# Patient Record
Sex: MALE | Race: WHITE | Employment: OTHER | URBAN - METROPOLITAN AREA
[De-identification: names, ages, dates, MRNs, and addresses within clinical notes are randomized per-mention and may not be internally consistent; named-entity substitution may affect disease eponyms.]

---

## 2017-04-06 ENCOUNTER — OFFICE VISIT (OUTPATIENT)
Dept: ORTHOPEDIC SURGERY | Age: 82
End: 2017-04-06

## 2017-04-06 VITALS
HEIGHT: 72 IN | DIASTOLIC BLOOD PRESSURE: 71 MMHG | SYSTOLIC BLOOD PRESSURE: 125 MMHG | HEART RATE: 72 BPM | WEIGHT: 167 LBS | BODY MASS INDEX: 22.62 KG/M2

## 2017-04-06 DIAGNOSIS — M79.671 FOOT PAIN, BILATERAL: Primary | ICD-10-CM

## 2017-04-06 DIAGNOSIS — M79.672 FOOT PAIN, BILATERAL: Primary | ICD-10-CM

## 2017-04-06 PROCEDURE — G8598 ASA/ANTIPLAT THER USED: HCPCS | Performed by: ORTHOPAEDIC SURGERY

## 2017-04-06 PROCEDURE — 99213 OFFICE O/P EST LOW 20 MIN: CPT | Performed by: ORTHOPAEDIC SURGERY

## 2017-04-06 PROCEDURE — 73630 X-RAY EXAM OF FOOT: CPT | Performed by: ORTHOPAEDIC SURGERY

## 2017-04-06 PROCEDURE — 1123F ACP DISCUSS/DSCN MKR DOCD: CPT | Performed by: ORTHOPAEDIC SURGERY

## 2017-04-06 PROCEDURE — 4040F PNEUMOC VAC/ADMIN/RCVD: CPT | Performed by: ORTHOPAEDIC SURGERY

## 2017-04-06 PROCEDURE — G8428 CUR MEDS NOT DOCUMENT: HCPCS | Performed by: ORTHOPAEDIC SURGERY

## 2017-04-06 PROCEDURE — 1036F TOBACCO NON-USER: CPT | Performed by: ORTHOPAEDIC SURGERY

## 2017-04-06 PROCEDURE — G8419 CALC BMI OUT NRM PARAM NOF/U: HCPCS | Performed by: ORTHOPAEDIC SURGERY

## 2017-05-16 ENCOUNTER — ORTHOTIC/BRACE ENCOUNTER (OUTPATIENT)
Dept: ORTHOPEDIC SURGERY | Age: 82
End: 2017-05-16

## 2017-05-23 ENCOUNTER — ORTHOTIC/BRACE ENCOUNTER (OUTPATIENT)
Dept: ORTHOPEDIC SURGERY | Age: 82
End: 2017-05-23

## 2017-06-06 ENCOUNTER — ORTHOTIC/BRACE ENCOUNTER (OUTPATIENT)
Dept: ORTHOPEDIC SURGERY | Age: 82
End: 2017-06-06

## 2017-06-26 ENCOUNTER — OFFICE VISIT (OUTPATIENT)
Dept: CARDIOLOGY CLINIC | Age: 82
End: 2017-06-26

## 2017-06-26 VITALS
DIASTOLIC BLOOD PRESSURE: 72 MMHG | BODY MASS INDEX: 24.24 KG/M2 | HEART RATE: 70 BPM | HEIGHT: 72 IN | OXYGEN SATURATION: 98 % | WEIGHT: 179 LBS | SYSTOLIC BLOOD PRESSURE: 124 MMHG

## 2017-06-26 DIAGNOSIS — I10 ESSENTIAL HYPERTENSION: ICD-10-CM

## 2017-06-26 DIAGNOSIS — I25.10 CAD IN NATIVE ARTERY: ICD-10-CM

## 2017-06-26 DIAGNOSIS — E78.5 HYPERLIPIDEMIA WITH TARGET LDL LESS THAN 100: Primary | ICD-10-CM

## 2017-06-26 DIAGNOSIS — R53.83 FATIGUE, UNSPECIFIED TYPE: ICD-10-CM

## 2017-06-26 DIAGNOSIS — I49.3 PVC (PREMATURE VENTRICULAR CONTRACTION): ICD-10-CM

## 2017-06-26 DIAGNOSIS — G45.9 TRANSIENT CEREBRAL ISCHEMIA, UNSPECIFIED TYPE: ICD-10-CM

## 2017-06-26 PROCEDURE — 93000 ELECTROCARDIOGRAM COMPLETE: CPT | Performed by: INTERNAL MEDICINE

## 2017-06-26 PROCEDURE — 4040F PNEUMOC VAC/ADMIN/RCVD: CPT | Performed by: INTERNAL MEDICINE

## 2017-06-26 PROCEDURE — 1036F TOBACCO NON-USER: CPT | Performed by: INTERNAL MEDICINE

## 2017-06-26 PROCEDURE — 99214 OFFICE O/P EST MOD 30 MIN: CPT | Performed by: INTERNAL MEDICINE

## 2017-06-26 PROCEDURE — 1123F ACP DISCUSS/DSCN MKR DOCD: CPT | Performed by: INTERNAL MEDICINE

## 2017-06-26 PROCEDURE — G8427 DOCREV CUR MEDS BY ELIG CLIN: HCPCS | Performed by: INTERNAL MEDICINE

## 2017-06-26 PROCEDURE — G8598 ASA/ANTIPLAT THER USED: HCPCS | Performed by: INTERNAL MEDICINE

## 2017-06-26 PROCEDURE — G8420 CALC BMI NORM PARAMETERS: HCPCS | Performed by: INTERNAL MEDICINE

## 2017-06-26 RX ORDER — MIRTAZAPINE 15 MG/1
15 TABLET, FILM COATED ORAL NIGHTLY
Qty: 30 TABLET | Refills: 3 | Status: SHIPPED | OUTPATIENT
Start: 2017-06-26 | End: 2017-10-03 | Stop reason: ALTCHOICE

## 2017-06-26 RX ORDER — MIRTAZAPINE 15 MG/1
15 TABLET, FILM COATED ORAL NIGHTLY
Qty: 30 TABLET | Refills: 3 | Status: SHIPPED | OUTPATIENT
Start: 2017-06-26 | End: 2017-06-26 | Stop reason: SDUPTHER

## 2017-06-26 RX ORDER — AMLODIPINE BESYLATE 5 MG/1
5 TABLET ORAL DAILY
COMMUNITY
End: 2017-10-03 | Stop reason: SDUPTHER

## 2017-06-26 RX ORDER — CLOPIDOGREL BISULFATE 75 MG/1
75 TABLET ORAL DAILY
COMMUNITY
End: 2018-11-13 | Stop reason: SDUPTHER

## 2017-06-27 ENCOUNTER — HOSPITAL ENCOUNTER (OUTPATIENT)
Dept: OTHER | Age: 82
Discharge: HOME | End: 2017-06-27
Attending: INTERNAL MEDICINE | Admitting: INTERNAL MEDICINE

## 2017-06-27 LAB
A/G RATIO: 1.3 (ref 1.1–2.2)
ALBUMIN SERPL-MCNC: 4 G/DL (ref 3.4–5)
ALP BLD-CCNC: 92 U/L (ref 40–129)
ALT SERPL-CCNC: 10 U/L (ref 10–40)
ANION GAP SERPL CALCULATED.3IONS-SCNC: 14 MMOL/L (ref 3–16)
AST SERPL-CCNC: 19 U/L (ref 15–37)
BILIRUB SERPL-MCNC: 0.6 MG/DL (ref 0–1)
BUN BLDV-MCNC: 20 MG/DL (ref 7–20)
CALCIUM SERPL-MCNC: 9.4 MG/DL (ref 8.3–10.6)
CHLORIDE BLD-SCNC: 101 MMOL/L (ref 99–110)
CHOLESTEROL, FASTING: 164 MG/DL (ref 0–199)
CO2: 27 MMOL/L (ref 21–32)
CREAT SERPL-MCNC: 1.2 MG/DL (ref 0.8–1.3)
GFR AFRICAN AMERICAN: >60
GFR NON-AFRICAN AMERICAN: 57
GLOBULIN: 3.2 G/DL
GLUCOSE FASTING: 88 MG/DL (ref 70–99)
HCT VFR BLD CALC: 46.2 % (ref 40.5–52.5)
HDLC SERPL-MCNC: 53 MG/DL (ref 40–60)
HEMOGLOBIN: 15.5 G/DL (ref 13.5–17.5)
LDL CHOLESTEROL CALCULATED: 84 MG/DL
MCH RBC QN AUTO: 31.2 PG (ref 26–34)
MCHC RBC AUTO-ENTMCNC: 33.5 G/DL (ref 31–36)
MCV RBC AUTO: 93 FL (ref 80–100)
PDW BLD-RTO: 13.5 % (ref 12.4–15.4)
PLATELET # BLD: 188 K/UL (ref 135–450)
PMV BLD AUTO: 9.7 FL (ref 5–10.5)
POTASSIUM SERPL-SCNC: 4.4 MMOL/L (ref 3.5–5.1)
RBC # BLD: 4.97 M/UL (ref 4.2–5.9)
SODIUM BLD-SCNC: 142 MMOL/L (ref 136–145)
TOTAL PROTEIN: 7.2 G/DL (ref 6.4–8.2)
TRIGLYCERIDE, FASTING: 134 MG/DL (ref 0–150)
TSH REFLEX: 1.7 UIU/ML (ref 0.27–4.2)
VLDLC SERPL CALC-MCNC: 27 MG/DL
WBC # BLD: 10.6 K/UL (ref 4–11)

## 2017-08-19 PROBLEM — J15.9 COMMUNITY ACQUIRED BACTERIAL PNEUMONIA: Status: ACTIVE | Noted: 2017-08-19

## 2017-08-19 PROBLEM — J18.9 PNEUMONIA DUE TO ORGANISM: Status: ACTIVE | Noted: 2017-08-19

## 2017-08-19 PROBLEM — I50.32 CHRONIC DIASTOLIC CONGESTIVE HEART FAILURE (HCC): Status: ACTIVE | Noted: 2017-08-19

## 2017-08-21 PROBLEM — A41.9 SEPSIS (HCC): Status: ACTIVE | Noted: 2017-08-19

## 2017-09-08 ENCOUNTER — TELEPHONE (OUTPATIENT)
Dept: CARDIOLOGY CLINIC | Age: 82
End: 2017-09-08

## 2017-09-11 ENCOUNTER — TELEPHONE (OUTPATIENT)
Dept: CARDIOLOGY CLINIC | Age: 82
End: 2017-09-11

## 2017-10-03 ENCOUNTER — OFFICE VISIT (OUTPATIENT)
Dept: CARDIOLOGY CLINIC | Age: 82
End: 2017-10-03

## 2017-10-03 VITALS
SYSTOLIC BLOOD PRESSURE: 110 MMHG | OXYGEN SATURATION: 97 % | BODY MASS INDEX: 25.06 KG/M2 | HEIGHT: 72 IN | WEIGHT: 185 LBS | HEART RATE: 72 BPM | DIASTOLIC BLOOD PRESSURE: 66 MMHG

## 2017-10-03 DIAGNOSIS — I49.9 IRREGULAR CARDIAC RHYTHM: ICD-10-CM

## 2017-10-03 DIAGNOSIS — E78.2 MIXED HYPERLIPIDEMIA: ICD-10-CM

## 2017-10-03 DIAGNOSIS — I50.32 CHRONIC DIASTOLIC CONGESTIVE HEART FAILURE (HCC): ICD-10-CM

## 2017-10-03 DIAGNOSIS — I49.3 PVC (PREMATURE VENTRICULAR CONTRACTION): ICD-10-CM

## 2017-10-03 DIAGNOSIS — I10 ESSENTIAL HYPERTENSION: ICD-10-CM

## 2017-10-03 DIAGNOSIS — E78.5 HYPERLIPIDEMIA WITH TARGET LDL LESS THAN 100: Primary | ICD-10-CM

## 2017-10-03 DIAGNOSIS — I25.10 CAD IN NATIVE ARTERY: ICD-10-CM

## 2017-10-03 PROCEDURE — G8484 FLU IMMUNIZE NO ADMIN: HCPCS | Performed by: INTERNAL MEDICINE

## 2017-10-03 PROCEDURE — 4040F PNEUMOC VAC/ADMIN/RCVD: CPT | Performed by: INTERNAL MEDICINE

## 2017-10-03 PROCEDURE — G8427 DOCREV CUR MEDS BY ELIG CLIN: HCPCS | Performed by: INTERNAL MEDICINE

## 2017-10-03 PROCEDURE — 1123F ACP DISCUSS/DSCN MKR DOCD: CPT | Performed by: INTERNAL MEDICINE

## 2017-10-03 PROCEDURE — 99214 OFFICE O/P EST MOD 30 MIN: CPT | Performed by: INTERNAL MEDICINE

## 2017-10-03 PROCEDURE — G8598 ASA/ANTIPLAT THER USED: HCPCS | Performed by: INTERNAL MEDICINE

## 2017-10-03 PROCEDURE — 93000 ELECTROCARDIOGRAM COMPLETE: CPT | Performed by: INTERNAL MEDICINE

## 2017-10-03 PROCEDURE — G8419 CALC BMI OUT NRM PARAM NOF/U: HCPCS | Performed by: INTERNAL MEDICINE

## 2017-10-03 PROCEDURE — 1036F TOBACCO NON-USER: CPT | Performed by: INTERNAL MEDICINE

## 2017-10-03 RX ORDER — AMLODIPINE BESYLATE 2.5 MG/1
2.5 TABLET ORAL DAILY
Qty: 30 TABLET | Refills: 11 | Status: ON HOLD | OUTPATIENT
Start: 2017-10-03 | End: 2018-08-12 | Stop reason: HOSPADM

## 2017-10-03 NOTE — COMMUNICATION BODY
[address] Office Note  10/3/2017     Subjective:  Mr. Juni Grant is a 80 year male seen for HTN, HLD,  CHF recent hospitalization for multiple issues. Today he reports to feeling lethargic with lack of stamina. Denies chest pain, shortness of breath, edema, dizziness, palpitations and syncope. [place]:   He was recently in the hospital in August 2017  With pneumonia. TIA in 2011 vs CVA - presented w/ lightheadedness/dizziness (still present) and expressive aphasia (now resolved). 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 He is planning on returning to [place] for winter. Review of Systems:  12 point ROS negative in all areas as listed below except as in [place]  Constitutional, EENT, Cardiovascular, pulmonary, GI, , Musculoskeletal, skin, neurological, hematological, endocrine, Psychiatric    Reviewed past medical history, social, and family history.  nonsmoker    Past Medical History:   Diagnosis Date    Arthralgia     Cancer [facility])     prostate    Chronic back pain     x40 years    Diverticulitis     Dizziness     Fatigue     Fractures     Hearing decreased     Hyperlipidemia     Hypertension     Osteoarthritis of knee     Osteoarthritis of shoulder     TIA (transient ischemic attack) 2006    Vertigo     Wears glasses      Past Surgical History:   Procedure Laterality Date    CATARACT REMOVAL  1997    bilaterally    COLONOSCOPY      ESOPHAGEAL DILATATION      X 2    INGUINAL HERNIA REPAIR      right    JOINT REPLACEMENT      KNEE SURGERY Bilateral     PROSTATECTOMY  1998    carcinoma    SHOULDER ARTHROPLASTY      SHOULDER SURGERY      TOE SURGERY Right 06/03/2013    PERMANENT RIGHT GREAT TOENAIL ABLATION AND REMOVAL    TOTAL KNEE ARTHROPLASTY      UPPER GASTROINTESTINAL ENDOSCOPY  jan 2011       Objective:   /66  Pulse 72  Ht 6' (1.829 m)  Wt 185 lb (83.9 kg)  SpO2 97%  BMI 25.09 kg/m2    Wt Readings from Last 3 Encounters:   10/03/17 185 lb (83.9 kg)   09/21/17 184 lb (83.5 kg)   08/22/17 184 lb 6.4 oz (83.6 kg)       Physical Exam:  General: No Respiratory distress, appears well developed and well nourished. Eyes:  Sclera nonicteric  Nose/Sinuses:  negative findings: nose shows no deformity, asymmetry, or inflammation, nasal mucosa normal, septum midline with no perforation or bleeding  Back:  no pain to palpation  Joint:  no active joint inflammation  Musculoskeletal:  negative  Skin:  Warm and dry  Neck:  Negative for JVD and Carotid Bruits. Chest:  Clear to auscultation, respiration easy  Cardiovascular:  Irregular with ectopy, S1S2 normal, no murmur, no rub or thrill. Abdomen:  Soft normal liver and spleen  Extremities:   No edema, clubbing, cyanosis,  Pulses:   1+ right foot pulse, 1+ left foot pulse  Neuro: intact    Medications:   Outpatient Encounter Prescriptions as of 10/3/2017   Medication Sig Dispense Refill    pantoprazole (PROTONIX) 40 MG tablet Take 1 tablet by mouth daily 30 tablet 3    CLONAZEPAM PO Take by mouth as needed      Vitamins-Lipotropics (LIPOFLAVONOID) TABS Take 1 tablet by mouth Daily with lunch Indications: vertigo      amLODIPine (NORVASC) 5 MG tablet Take 5 mg by mouth daily      clopidogrel (PLAVIX) 75 MG tablet Take 75 mg by mouth daily      escitalopram (LEXAPRO) 10 MG tablet Take 10 mg by mouth daily      vitamin B-12 (CYANOCOBALAMIN) 100 MCG tablet Take 50 mcg by mouth daily      predniSONE (DELTASONE) 5 MG tablet Take 5 mg by mouth daily.  simvastatin (ZOCOR) 40 MG tablet Take 40 mg by mouth daily.  CALCIUM PO Take 1,000 mg by mouth daily.  vitamin D (CHOLECALCIFEROL) 1000 UNIT TABS tablet Take 1,000 Units by mouth daily.       Multiple Vitamins-Minerals (MULTIVITAMIN PO) Take 1 tablet by mouth daily       [DISCONTINUED] loperamide (LOPERAMIDE A-D) 2 MG tablet Take 2 mg by mouth Daily with lunch      [DISCONTINUED] mirtazapine (REMERON) 15 MG tablet Take 1 tablet by mouth nightly 30 tablet 3     No facility-administered encounter medications on file as of 10/3/2017. Lab Data:  CBC: No results for input(s): WBC, HGB, HCT, MCV, PLT in the last 72 hours. BMP: No results for input(s): NA, K, CL, CO2, PHOS, BUN, CREATININE in the last 72 hours. Invalid input(s): CA  LIVER PROFILE: No results for input(s): AST, ALT, LIPASE, BILIDIR, BILITOT, ALKPHOS in the last 72 hours. Invalid input(s): AMYLASE,  ALB  LIPID:   Lab Results   Component Value Date    CHOL 171 09/29/2016    CHOL 124 08/18/2016    CHOL 147 09/10/2012     Lab Results   Component Value Date    TRIG 147 09/29/2016    TRIG 94 08/18/2016    TRIG 113 09/10/2012     Lab Results   Component Value Date    HDL 53 06/27/2017    HDL 55 09/29/2016    HDL 45 08/18/2016     Lab Results   Component Value Date    LDLCALC 84 06/27/2017    LDLCALC 87 09/29/2016    LDLCALC 60 08/18/2016     Lab Results   Component Value Date    LABVLDL 27 06/27/2017    LABVLDL 29 09/29/2016    LABVLDL 19 08/18/2016     No results found for: CHOLHDLRATIO  PT/INR: No results for input(s): PROTIME, INR in the last 72 hours. A1C:   Lab Results   Component Value Date    LABA1C 5.6 09/29/2016     BNP:  No results for input(s): BNP in the last 72 hours. IMAGING:   Chest xray 9.21.17  No acute cardiopulmonary disease. Hyperinflation of the lungs. EKG: 10.3.17  PVC LAFB possible old anterior wall MI  EKG 9/21/17  Sinus bradycardia Left anterior fascicular block Non-specific intra-ventricular conduction delay   When compared with ECG of 19-AUG-2017 17:04, Minimal criteria for Septal infarct are no longer Present Confirmed by Geraldo Nava MD, Graciela Duke ([id number]) on 9/21/2017 4:39:40 PM     Echo doppler 8.21.17   Left ventricle systolic function is mildly reduced with an estimated   ejection fraction of 45-50%.  There is hypokinesis of the basal inferior wall.   Normal left ventricle size.   No wall motion aortic valve regurgitation. Mild pulmonic insuffiencey. 03/09/2016 CT chest PE W contrast  No evidence of PE. Minimal nodularity in the apices, probably granulomatous. This should be followed up to ensure stability. 4-6 mm pulmonary nodules. 04/17/2014 carotid u/s  IMPRESSION:       1. Stable exam with less than 50% stenosis of the bilateral   internal carotid arteries. 2. Patent vertebral arteries with antegrade flow bilaterally. Assessment:  Alberto Lance was seen today for  Fatigue and weakness  Last lipids 9/29/16  Cholesterol, [id number] - 199 mg/dLFinal [id number] - 150 mg/dLFinal [id number] - 60 mg/dLFinal LDL [id number]  EKG today shows occasional PVC possible old ant MI but work up 5 years ago in [place] with stress test was negative. Plan:  1. EKG today PVC's. Reduce amlodipine dose due to low normal BP and fatigue  2. Healthy lifestyle education reviewed including nutrition, exercise and activity  3. Scheduled return visit 6 months      QUALITY MEASURES  1. Tobacco Cessation Counseling: NA  2. Retake of BP if >140/90:   NA  3. Documentation to PCP/referring for new patient:  Sent to PCP at close of office visit  4. CAD patient on anti-platelet: Yes  5. CAD patient on STATIN therapy:  Yes  6.  Patient with CHF and aFib on anticoagulation:  CHECO Barbosa MD 10/3/2017 2:13 PM

## 2017-10-03 NOTE — PROGRESS NOTES
(REMERON) 15 MG tablet Take 1 tablet by mouth nightly 30 tablet 3     No facility-administered encounter medications on file as of 10/3/2017. Lab Data:  CBC: No results for input(s): WBC, HGB, HCT, MCV, PLT in the last 72 hours. BMP: No results for input(s): NA, K, CL, CO2, PHOS, BUN, CREATININE in the last 72 hours. Invalid input(s): CA  LIVER PROFILE: No results for input(s): AST, ALT, LIPASE, BILIDIR, BILITOT, ALKPHOS in the last 72 hours. Invalid input(s): AMYLASE,  ALB  LIPID:   Lab Results   Component Value Date    CHOL 171 09/29/2016    CHOL 124 08/18/2016    CHOL 147 09/10/2012     Lab Results   Component Value Date    TRIG 147 09/29/2016    TRIG 94 08/18/2016    TRIG 113 09/10/2012     Lab Results   Component Value Date    HDL 53 06/27/2017    HDL 55 09/29/2016    HDL 45 08/18/2016     Lab Results   Component Value Date    LDLCALC 84 06/27/2017    LDLCALC 87 09/29/2016    LDLCALC 60 08/18/2016     Lab Results   Component Value Date    LABVLDL 27 06/27/2017    LABVLDL 29 09/29/2016    LABVLDL 19 08/18/2016     No results found for: CHOLHDLRATIO  PT/INR: No results for input(s): PROTIME, INR in the last 72 hours. A1C:   Lab Results   Component Value Date    LABA1C 5.6 09/29/2016     BNP:  No results for input(s): BNP in the last 72 hours. IMAGING:   Chest xray 9.21.17  No acute cardiopulmonary disease. Hyperinflation of the lungs. EKG: 10.3.17  PVC LAFB possible old anterior wall MI  EKG 9/21/17  Sinus bradycardia Left anterior fascicular block Non-specific intra-ventricular conduction delay   When compared with ECG of 19-AUG-2017 17:04, Minimal criteria for Septal infarct are no longer Present Confirmed by Marine Casey MD, Marisela Underwood ([id number]) on 9/21/2017 4:39:40 PM     Echo doppler 8.21.17   Left ventricle systolic function is mildly reduced with an estimated   ejection fraction of 45-50%.  There is hypokinesis of the basal inferior wall.   Normal left ventricle size.   No wall motion aortic valve regurgitation. Mild pulmonic insuffiencey. 03/09/2016 CT chest PE W contrast  No evidence of PE. Minimal nodularity in the apices, probably granulomatous. This should be followed up to ensure stability. 4-6 mm pulmonary nodules. 04/17/2014 carotid u/s  IMPRESSION:       1. Stable exam with less than 50% stenosis of the bilateral   internal carotid arteries. 2. Patent vertebral arteries with antegrade flow bilaterally. Assessment:  Tayler Bonilla was seen today for  Fatigue and weakness  Last lipids 9/29/16  Cholesterol, [id number] - 199 mg/dLFinal Fzwvumoyzgpxx6538 - 150 mg/dLFinal OPT1105 - 60 mg/dLFinal LDL [id number]  EKG today shows occasional PVC possible old ant MI but work up 5 years ago in [place] with stress test was negative. Plan:  1. EKG today PVC's. Reduce amlodipine dose due to low normal BP and fatigue  2. Healthy lifestyle education reviewed including nutrition, exercise and activity  3. Scheduled return visit 6 months      QUALITY MEASURES  1. Tobacco Cessation Counseling: NA  2. Retake of BP if >140/90:   NA  3. Documentation to PCP/referring for new patient:  Sent to PCP at close of office visit  4. CAD patient on anti-platelet: Yes  5. CAD patient on STATIN therapy:  Yes  6.  Patient with CHF and aFib on anticoagulation:  NA     Regan Sanders MD 10/3/2017 2:13 PM

## 2017-11-14 ENCOUNTER — TELEPHONE (OUTPATIENT)
Dept: CARDIOLOGY CLINIC | Age: 82
End: 2017-11-14

## 2017-11-14 NOTE — TELEPHONE ENCOUNTER
Pt is in [employer] currently, before pt left for [email or web address] had decreased pt's amLODIPine (NORVASC) 2.5 MG tablet. Pt was taking 5mg but his B/P was low at 110/60. Last Thursday 11/09 the pt accidentally started taking the 5mg dose again. Now his B/P are running 11/10 164/79 pulse 50 , 11/11 163/71 pulse 51, 11/12 163/85, pulse 55, 11/14 169/84 pulse 57. Sister in law wants to know what she should do. Not sure she should restart the 2.5mg due to his pulse being low. Please advise.

## 2018-05-01 ENCOUNTER — OFFICE VISIT (OUTPATIENT)
Dept: ORTHOPEDIC SURGERY | Age: 83
End: 2018-05-01

## 2018-05-01 VITALS
BODY MASS INDEX: 24.93 KG/M2 | HEIGHT: 72 IN | HEART RATE: 62 BPM | WEIGHT: 184.08 LBS | SYSTOLIC BLOOD PRESSURE: 143 MMHG | DIASTOLIC BLOOD PRESSURE: 82 MMHG

## 2018-05-01 DIAGNOSIS — M51.36 DDD (DEGENERATIVE DISC DISEASE), LUMBAR: ICD-10-CM

## 2018-05-01 DIAGNOSIS — S39.012A STRAIN OF LUMBAR REGION, INITIAL ENCOUNTER: ICD-10-CM

## 2018-05-01 DIAGNOSIS — M54.50 LUMBAR SPINE PAIN: Primary | ICD-10-CM

## 2018-05-01 PROCEDURE — G8427 DOCREV CUR MEDS BY ELIG CLIN: HCPCS | Performed by: PHYSICIAN ASSISTANT

## 2018-05-01 PROCEDURE — 4040F PNEUMOC VAC/ADMIN/RCVD: CPT | Performed by: PHYSICIAN ASSISTANT

## 2018-05-01 PROCEDURE — G8420 CALC BMI NORM PARAMETERS: HCPCS | Performed by: PHYSICIAN ASSISTANT

## 2018-05-01 PROCEDURE — 1123F ACP DISCUSS/DSCN MKR DOCD: CPT | Performed by: PHYSICIAN ASSISTANT

## 2018-05-01 PROCEDURE — 1036F TOBACCO NON-USER: CPT | Performed by: PHYSICIAN ASSISTANT

## 2018-05-01 PROCEDURE — G8598 ASA/ANTIPLAT THER USED: HCPCS | Performed by: PHYSICIAN ASSISTANT

## 2018-05-01 PROCEDURE — 99213 OFFICE O/P EST LOW 20 MIN: CPT | Performed by: PHYSICIAN ASSISTANT

## 2018-05-01 RX ORDER — HYDROCODONE BITARTRATE AND ACETAMINOPHEN 5; 325 MG/1; MG/1
1 TABLET ORAL EVERY 4 HOURS PRN
Qty: 42 TABLET | Refills: 0 | Status: SHIPPED | OUTPATIENT
Start: 2018-05-01 | End: 2018-05-08

## 2018-05-08 ENCOUNTER — OFFICE VISIT (OUTPATIENT)
Dept: ORTHOPEDIC SURGERY | Age: 83
End: 2018-05-08

## 2018-05-08 VITALS
BODY MASS INDEX: 24.93 KG/M2 | DIASTOLIC BLOOD PRESSURE: 78 MMHG | HEART RATE: 67 BPM | HEIGHT: 72 IN | SYSTOLIC BLOOD PRESSURE: 141 MMHG | WEIGHT: 184.08 LBS

## 2018-05-08 DIAGNOSIS — M51.36 DDD (DEGENERATIVE DISC DISEASE), LUMBAR: ICD-10-CM

## 2018-05-08 DIAGNOSIS — S22.000A CLOSED COMPRESSION FRACTURE OF THORACIC VERTEBRA, INITIAL ENCOUNTER (HCC): Primary | ICD-10-CM

## 2018-05-08 PROCEDURE — 4040F PNEUMOC VAC/ADMIN/RCVD: CPT | Performed by: PHYSICIAN ASSISTANT

## 2018-05-08 PROCEDURE — G8420 CALC BMI NORM PARAMETERS: HCPCS | Performed by: PHYSICIAN ASSISTANT

## 2018-05-08 PROCEDURE — 99214 OFFICE O/P EST MOD 30 MIN: CPT | Performed by: PHYSICIAN ASSISTANT

## 2018-05-08 PROCEDURE — G8427 DOCREV CUR MEDS BY ELIG CLIN: HCPCS | Performed by: PHYSICIAN ASSISTANT

## 2018-05-08 PROCEDURE — 1123F ACP DISCUSS/DSCN MKR DOCD: CPT | Performed by: PHYSICIAN ASSISTANT

## 2018-05-08 PROCEDURE — L0462 TLSO 3MOD SACRO-SCAP PRE: HCPCS | Performed by: PHYSICIAN ASSISTANT

## 2018-05-08 PROCEDURE — 1036F TOBACCO NON-USER: CPT | Performed by: PHYSICIAN ASSISTANT

## 2018-05-08 PROCEDURE — G8598 ASA/ANTIPLAT THER USED: HCPCS | Performed by: PHYSICIAN ASSISTANT

## 2018-06-08 ENCOUNTER — OFFICE VISIT (OUTPATIENT)
Dept: ORTHOPEDIC SURGERY | Age: 83
End: 2018-06-08

## 2018-06-08 VITALS
HEART RATE: 61 BPM | HEIGHT: 72 IN | DIASTOLIC BLOOD PRESSURE: 81 MMHG | SYSTOLIC BLOOD PRESSURE: 152 MMHG | WEIGHT: 184.08 LBS | BODY MASS INDEX: 24.93 KG/M2

## 2018-06-08 DIAGNOSIS — S22.000A CLOSED COMPRESSION FRACTURE OF THORACIC VERTEBRA, INITIAL ENCOUNTER (HCC): Primary | ICD-10-CM

## 2018-06-08 DIAGNOSIS — M51.36 DDD (DEGENERATIVE DISC DISEASE), LUMBAR: ICD-10-CM

## 2018-06-08 PROCEDURE — 1123F ACP DISCUSS/DSCN MKR DOCD: CPT | Performed by: PHYSICAL MEDICINE & REHABILITATION

## 2018-06-08 PROCEDURE — 99214 OFFICE O/P EST MOD 30 MIN: CPT | Performed by: PHYSICAL MEDICINE & REHABILITATION

## 2018-06-08 PROCEDURE — G8420 CALC BMI NORM PARAMETERS: HCPCS | Performed by: PHYSICAL MEDICINE & REHABILITATION

## 2018-06-08 PROCEDURE — G8427 DOCREV CUR MEDS BY ELIG CLIN: HCPCS | Performed by: PHYSICAL MEDICINE & REHABILITATION

## 2018-06-08 PROCEDURE — G8598 ASA/ANTIPLAT THER USED: HCPCS | Performed by: PHYSICAL MEDICINE & REHABILITATION

## 2018-06-08 PROCEDURE — 4040F PNEUMOC VAC/ADMIN/RCVD: CPT | Performed by: PHYSICAL MEDICINE & REHABILITATION

## 2018-06-08 PROCEDURE — 1036F TOBACCO NON-USER: CPT | Performed by: PHYSICAL MEDICINE & REHABILITATION

## 2018-06-25 ENCOUNTER — OFFICE VISIT (OUTPATIENT)
Dept: ORTHOPEDIC SURGERY | Age: 83
End: 2018-06-25

## 2018-06-25 VITALS — HEIGHT: 72 IN | BODY MASS INDEX: 24.79 KG/M2 | WEIGHT: 183 LBS

## 2018-06-25 DIAGNOSIS — M75.102 ROTATOR CUFF TEAR ARTHROPATHY, LEFT: ICD-10-CM

## 2018-06-25 DIAGNOSIS — M25.512 PAIN IN JOINT OF LEFT SHOULDER REGION: Primary | ICD-10-CM

## 2018-06-25 DIAGNOSIS — M12.812 ROTATOR CUFF TEAR ARTHROPATHY, LEFT: ICD-10-CM

## 2018-06-25 PROCEDURE — 4040F PNEUMOC VAC/ADMIN/RCVD: CPT | Performed by: PHYSICIAN ASSISTANT

## 2018-06-25 PROCEDURE — 1036F TOBACCO NON-USER: CPT | Performed by: PHYSICIAN ASSISTANT

## 2018-06-25 PROCEDURE — G8420 CALC BMI NORM PARAMETERS: HCPCS | Performed by: PHYSICIAN ASSISTANT

## 2018-06-25 PROCEDURE — 99213 OFFICE O/P EST LOW 20 MIN: CPT | Performed by: PHYSICIAN ASSISTANT

## 2018-06-25 PROCEDURE — 1123F ACP DISCUSS/DSCN MKR DOCD: CPT | Performed by: PHYSICIAN ASSISTANT

## 2018-06-25 PROCEDURE — G8598 ASA/ANTIPLAT THER USED: HCPCS | Performed by: PHYSICIAN ASSISTANT

## 2018-06-25 PROCEDURE — G8427 DOCREV CUR MEDS BY ELIG CLIN: HCPCS | Performed by: PHYSICIAN ASSISTANT

## 2018-06-26 ENCOUNTER — HOSPITAL ENCOUNTER (OUTPATIENT)
Dept: PHYSICAL THERAPY | Age: 83
Discharge: HOME | End: 2018-06-30
Admitting: PHYSICIAN ASSISTANT

## 2018-06-26 NOTE — PROGRESS NOTES
The following patient has been evaluated for physical therapy services and for therapy to continue, Medicare requires physician review of the treatment plan. Please review the attached evaluation and/or summary of the patient's plan of care, and verify that you agree therapy should continue by signing below and sending it back to our office. Thank you for this referral.    Physician signature_______________________ Date________________    Fax to:   [facility] [phone]    UPPER EXTREMITY PHYSICAL THERAPY EVALUATION    Evaluation Date:  6/26/2018        Patient Name: Kobe Ware  \"Rigoberto\"       YOB: 1927       Medical Diagnosis:    Pain in joint of left shoulder region (M25.512)                 Treatment Diagnosis:  Left UE weakness, limited ROM, pain    Onset Date:  4/25/18      Referral Date:  6/25/18     Referring Physician:  Charisse Ramirez PA-C        Visits Allowed/Insurance/Certification Information:  Medicare    Restrictions/Precautions:  HTN, OA, [place], wear TLSO until 7/6/18, and avoid bending and twisting, fall risk. Pt Occupation/Job Duties:   Retired, enjoys watching TV, woodworking    Social support/Environment: , lives alone, in a single level house with a basement, two steps to enter. He does not go to the basement. He has a cleaning lady come 1x/week and she does laundry and house cleaning. He has 4 grown children, but only one lives close by (20 min away). Health History reviewed with pt:  Yes, via epic: OA, prostate CA, chronic back pain, diverticulitis, dizziness, fatigue, [place], hyperlipidemia, HTN, TIA 2006, vertigo, wears glasses, cataracts removed, esophageal dilation, inguinal hernia repair x2, L TKR, prostatectomy, Bilateral great toenail abalation with removal, bladder stimulator implant, 3 falls in the past year.     SUBJECTIVE FINDINGS      History of Present Illness:   Had left shoulder replacement 7 years arm and decrease pain \"    OBJECTIVE FINDINGS    Posture  Slouched in chair with forward head and rounded shoulders. Keeps left arm flexed and resting against his stomach. Cervical Spine    AROM and strength WFL  Sensation:  WNL bilateral UE's     Range of Motion/Strength Testing   Right hand dominant  Range Tested AROM PROM Resisted Comments   *denotes pain Left Right Left Right Left Right    Shoulder Flexion 85 135 130  2/5 5/5    Shoulder Extension 42 63   4/5 5/5    Shoulder Abduction 55 110 120  2/5 5/5    Shoulder Adduction      /5 /5    Shoulder IR 60 40   /5 5/5 R shoulder in 90 deg abd, left shoulder in 60 deg abd   Shoulder ER 55 85   /5 5/5 \"   Elbow Flexion WNL WNL   4/5 5/5    Elbow Extension  \" \"   4/5 5/5    Pronation \" \"   5/5 5/5    Supination \" \"   5/5 5/5    Wrist Flexion \" \"   5/5 5/5    Wrist Extension \" \"   5/5 5/5    Radial Deviation \" \"   5/5 5/5    Ulnar Deviation \" \"   5/5 5/5         /5 /5      Palpation/Tenderness    Non-tender with palpation, but reports pain is normally at insertion of left deltoid with movement     Special Tests    Test Right Left Comments   Neer  +    Woo janett  +    Empty can  +                        Functional Outcome Measure    Measure used:  SPADI  Score:  105/130  % Disability:  81%    Additional Comments  Patient reports 3 falls in the past year. Patient was instructed to wear TSLO and start using straight cane which he brought neither to the clinic today. Patient instructed to wear TSLO when out in the community. Also educated patient on how to size straight cane for proper height, and provided gait instruction using a three point pattern and swing through gait when walking out of the clinic to the car, due to the patient using environmental support for balance when walking into clinic. Patient verbalized understanding of instruction provided and ambulated with supervision with straight cane.     ASSESSMENT    GCode:  [id number]  Patient presents with s/s consistent with Dx. Recommend PT treatment to address problem list so that the patient may return to regular activities without any functional limitations noted. Needed reminders from PT to wear TLSO brace and use straight cane for community mobility. Problems     Decreased Range of Motion   Decreased Strength   Decreased Joint Mobility   Decreased Functional Status   Decreased Flexibility   Poor Posture/Alignment   Increased Pain    Rehabilitation Potential: Good for goals listed below. Strengths for achieving goals include:  PLOF  Limitations for achieving goals include:  none    Prognosis: [x]    Good []    Fair []    Poor    GOALS  GCode:  K6025IQ  Short Term Goals (  4  weeks) Long Term Goals ( 8  weeks)   1). Decrease pain to 5/10 at worst 1). Decreased pain to 0-3/10 at all times. 2). Increase AROM by 5-10 deg in limited areas 2). AROM L UE = R UE within 5 deg   3). Increase MMT by 1/2-1 grade in weak areas. 3).  MMT >/=4+/5 throughout left UE   4). Patient independent with HEP 4). Patient able to perform regular activities without significant limitations noted in left UE. 5). 5).   6). 6). PLAN OF CARE    To see patient  2  x/week for 8  weeks for the following treatment interventions:   Therapeutic Exercise   Progressive Resistive Exercise   Modalities of Choice (Heat/Cold/US/EStim/Ionto)   Home Exercise Program   Manual Techniques/Mobilization   Postural Reeducation    Thank you for the referral of this patient.       Timed Code Treatment Minutes: 40  minutes      Total Treatment Time:  60 minutes    Kait Irwin PT [id number]

## 2018-06-26 NOTE — FLOWSHEET NOTE
Outpatient Physical Therapy     [x] Daily Treatment Note   [] Progress Note   [] Discharge Note    Date:  6/26/2018    Patient Name:  Geno Santana        YOB: 1927    Medical Diagnosis:    Pain in joint of left shoulder region (M25.512)                           Treatment Diagnosis:  Left UE weakness, limited ROM, pain     Onset Date:  4/25/18                          Referral Date:  6/25/18           Referring Physician:  Ismael Devlin PA-C                                                Visits Allowed/Insurance/Certification Information:  Medicare     Restrictions/Precautions:  HTN, OA, [place], wear TLSO until 7/6/18, and avoid bending and twisting, fall risk. Plan of care signed (Y/N): sent for cosign     Progress Note covers period from (if applicable):    [x]  NA    [] From          To           Next Progress Note due:   7/24/18    Visit# / total visits: 1 /16    Plan for Next Session:  Review and progress exercises for left shoulder ROM, strengthening, initiate PROM, and joint mobs. Subjective: see eval     Pain level: 0-8/10 lateral aspect left shoulder      Objective:   See eval    Exercises:    Exercises in bold performed in department today. Items not bolded are carried forward from prior visits for continuity of the record. Exercise/Equipment Resistance/Repetitions Other comments     PROM left shoulder nv      codmans x10      AAROM shoulder flex, abd, IR/ER x10 each direction with cane      Seated AAROM shoulder flex on table x10      scap retraction 6 sec/ x10                                                                                                           Therapeutic Exercise/Home Exercise Program:  x30 min. Patient educated on eval findings, plan of care, and goals. Instructed in HEP with handout given. Group Therapy:       Therapeutic Activity:        Gait: x10 min. Patient reports 3 falls in the past year.   Patient was instructed to wear TSLO and start using straight cane which he brought neither to the clinic today. Patient instructed to wear TSLO when out in the community. Also educated patient on how to size straight cane for proper height, and provided gait instruction using a three point pattern and swing through gait when walking out of the clinic to the car, due to the patient using environmental support for balance when walking into clinic. Patient verbalized understanding of instruction provided and ambulated with supervision with straight cane.     Neuromuscular Re-Education:      Canalith Repositioning Procedure:    Manual Therapy:     Modalities:      Functional Outcome Measure:   Measure used:  SPADI  Score:  105/130  % Disability:  81%       Assessment/Treatment/Activity Tolerance:    GCode:  E4944XP  Patients response to treatment:   [x] Patient tolerated treatment well with no adverse reactions noted [] Patient limited by fatigue   [] Patient limited by pain [] Patient limited by other medical complications   [] Other:     Goals: P2478XP  Progress towards goals: goals set at eval    Prognosis: [x] Good [] Fair  [] Poor    Patient Requires Follow-up:  [x] Yes  [] No    Plan: [] Continue per plan of care [] Alter current plan (see comments)   [x] Plan of care initiated [] Hold pending MD visit [] Discharge    Timed Code Treatment Minutes:  40    Total Treatment Minutes:  60    Medicare Cap total YTD:  $171    Electronically signed by:  Sheryl Contreras, PT [id number]

## 2018-07-01 ENCOUNTER — HOSPITAL ENCOUNTER (OUTPATIENT)
Dept: OTHER | Age: 83
Discharge: HOME | End: 2018-07-01
Attending: PHYSICIAN ASSISTANT | Admitting: PHYSICIAN ASSISTANT

## 2018-07-03 ENCOUNTER — HOSPITAL ENCOUNTER (OUTPATIENT)
Dept: PHYSICAL THERAPY | Age: 83
Discharge: HOME | End: 2018-07-04

## 2018-07-03 NOTE — FLOWSHEET NOTE
Outpatient Physical Therapy     [x] Daily Treatment Note   [] Progress Note   [] Discharge Note    Date:  7/3/2018    Patient Name:  Noel Batres        YOB: 1927    Medical Diagnosis:    Pain in joint of left shoulder region (M25.512)                           Treatment Diagnosis:  Left UE weakness, limited ROM, pain     Onset Date:  4/25/18                          Referral Date:  6/25/18           Referring Physician:  Trinity Echols PA-C                                                Visits Allowed/Insurance/Certification Information:  Medicare     Restrictions/Precautions:  HTN, OA, [place], wear TLSO until 7/6/18, and avoid bending and twisting, fall risk. Plan of care signed (Y/N): sent for cosign     Progress Note covers period from (if applicable):    [x]  NA    [] From          To           Next Progress Note due:   7/24/18    Visit# / total visits: 2/16    Plan for Next Session:  Review and progress exercises for left shoulder ROM, strengthening,jt mobs    Subjective: reports he is doing better, pain not waking him, bothersome with movement. Reports when he was seen for his shoulder many years ago he never was able to do either the ER or abd (we are not for sure which but think it was ER)     Pain level: 0-5/10 in the last wk  eval 0-8/10 lateral aspect left shoulder      Objective:   See eval    Exercises:    Exercises in bold performed in department today. Items not bolded are carried forward from prior visits for continuity of the record.   Exercise/Equipment Resistance/Repetitions Other comments     PROM left shoulder 10'      codmans x10      AAROM shoulder flex, abd, IR/ER x10 each direction with cane      Seated AAROM shoulder flex on table x10      scap retraction 6 sec/ x10      Mid and low row 1x10, red To incr gradually to 3x10-15, 1-2x/day                                                                                                   Therapeutic Exercise/Home Exercise Program:  x24 min.       Group Therapy:       Therapeutic Activity:        Gait: pt walking with cane today    Neuromuscular Re-Education:      Manual Therapy: gh distraction and inf glide x 8'    Modalities:      Functional Outcome Measure:   Measure used:  SPADI  Score:  105/130  % Disability:  81%       Assessment/Treatment/Activity Tolerance:    GCode:  [id number]  Patients response to treatment:   [x] Patient tolerated treatment well with no adverse reactions noted [] Patient limited by fatigue   [] Patient limited by pain [] Patient limited by other medical complications   [] Other:     Goals: [id number]  Progress towards goals: goals set at [facility]    Prognosis: [x] Good [] Fair  [] Poor    Patient Requires Follow-up:  [x] Yes  [] No    Plan: [x] Continue per plan of care [] Alter current plan (see comments)   [] Plan of care initiated [] Hold pending MD visit [] Discharge    Timed Code Treatment Minutes:  32    Total Treatment Minutes:  32    Medicare Cap total YTD:  $[id number]     Electronically signed by:  Nguyễn Billings [id number]

## 2018-07-06 ENCOUNTER — HOSPITAL ENCOUNTER (OUTPATIENT)
Dept: PHYSICAL THERAPY | Age: 83
Discharge: HOME | End: 2018-07-07

## 2018-07-06 NOTE — FLOWSHEET NOTE
Outpatient Physical Therapy     [x] Daily Treatment Note   [] Progress Note   [] Discharge Note    Date:  7/6/2018    Patient Name:  Yanet Trinidad        YOB: 1927    Medical Diagnosis:    Pain in joint of left shoulder region (M25.512) (focus on RTC/ deltoid strengthening)                          Treatment Diagnosis:  Left UE weakness, limited ROM, pain     Onset Date:  4/25/18                          Referral Date:  6/25/18           Referring Physician:  Jereld Mcardle, PA-C                                                Visits Allowed/Insurance/Certification Information:  Medicare     Restrictions/Precautions:  HTN, OA, [place], fall risk,  wear TLSO until 7/6/18 for T6 compression fracture on 4/25/18, and avoid bending and twisting, prior left TSA 6-7 years ago. Plan of care signed (Y/N): Signed on 6/26/18    Progress Note covers period from (if applicable):    [x]  NA    [] From          To           Next Progress Note due:   7/24/18    Visit# / total visits: 3/16    Plan for Next Session:  Review and progress exercises for left shoulder ROM, strengthening,jt mobs    Subjective: Patient very pleased with his progress. Reports his pain is much better, but still limited with ROM. Able to sleep through the night without waking due to pain. Performing HEP 2x/day, not icing. Pain level: 0/10 at rest, 4/10 at worst with end range positioning  eval 0-8/10 lateral aspect left shoulder      Objective:   See eval    Exercises:    Exercises in bold performed in department today. Items not bolded are carried forward from prior visits for continuity of the record.   Exercise/Equipment Resistance/Repetitions Other comments     PROM left shoulder (flex abd, IR/ER) 2x10 each direction      codmans x10      AAROM shoulder flex, abd, IR/ER x10 each direction with cane      Seated AAROM shoulder flex on table x10      scap retraction 6 sec/ x10      Mid and low row 1x10, red To incr gradually to

## 2018-07-10 ENCOUNTER — HOSPITAL ENCOUNTER (OUTPATIENT)
Dept: PHYSICAL THERAPY | Age: 83
Discharge: HOME | End: 2018-07-11

## 2018-07-10 NOTE — FLOWSHEET NOTE
2x10, red To incr gradually to 3x10-15, 1-2x/day     Ceiling punches 2x10, 2#     supine chest press  2x10, 2#      Rhythmic stabilization in supine x1 min with manual resistance      Bilateral shoulder abd   2X10, 2#       Standing bicep curls 2x10, 2#      Bent over row 2x10, 2#                                                       Therapeutic Exercise/Home Exercise Program:  x20 min. Progressed exercises as noted above with new handout given. Demonstrates good understanding after further review of ceiling punches and chest press. Instructed to use water bottle of can of soup for 1-2# weights. Group Therapy:       Therapeutic Activity:        Gait: pt walking with cane today    Neuromuscular Re-Education:      Manual Therapy: x10 min. PROM as noted above, gentle inf oscillations performed between each direction. Gentle inferior and posterior mobs to left shoulder with patient in supine. Modalities:  Cp x10 min to left shoulder after treatment with patient in semi-reclined position. Functional Outcome Measure:   Measure used:  SPADI  Score:  105/130  % Disability:  81%       Assessment/Treatment/Activity Tolerance:    GCode:  Y0855HW  Patients response to treatment:   [x] Patient tolerated treatment well with no adverse reactions noted [] Patient limited by fatigue   [] Patient limited by pain [] Patient limited by other medical complications   [x] Other: Pain 0/10 after treatment with some reports of fatigue.     Goals: [id number]  Progress towards goals: goals set at eval    Prognosis: [x] Good [] Fair  [] Poor    Patient Requires Follow-up:  [x] Yes  [] No    Plan: [x] Continue per plan of care [] Alter current plan (see comments)   [] Plan of care initiated [] Hold pending MD visit [] Discharge    Timed Code Treatment Minutes:  30    Total Treatment Minutes:  30 +cp    Medicare Cap total YTD:  [id number]     Electronically signed by:  Martha Neri, [facility] [address] [place] [address]

## 2018-07-13 ENCOUNTER — HOSPITAL ENCOUNTER (OUTPATIENT)
Dept: PHYSICAL THERAPY | Age: 83
Discharge: HOME | End: 2018-07-14

## 2018-07-13 NOTE — FLOWSHEET NOTE
Plan of care initiated [] Hold pending MD visit [] Discharge    Timed Code Treatment Minutes:  30    Total Treatment Minutes:  30 +cp    Medicare Cap total YTD:  [id number]     Electronically signed by:  Karolyn Quintanilla, PT [id number]

## 2018-07-16 ENCOUNTER — OFFICE VISIT (OUTPATIENT)
Dept: ORTHOPEDIC SURGERY | Age: 83
End: 2018-07-16

## 2018-07-16 VITALS
SYSTOLIC BLOOD PRESSURE: 146 MMHG | WEIGHT: 183 LBS | HEART RATE: 51 BPM | DIASTOLIC BLOOD PRESSURE: 83 MMHG | HEIGHT: 72 IN | BODY MASS INDEX: 24.79 KG/M2

## 2018-07-16 DIAGNOSIS — Z96.612 HISTORY OF TOTAL SHOULDER REPLACEMENT, LEFT: Primary | ICD-10-CM

## 2018-07-16 PROCEDURE — 99213 OFFICE O/P EST LOW 20 MIN: CPT | Performed by: ORTHOPAEDIC SURGERY

## 2018-07-16 PROCEDURE — 4040F PNEUMOC VAC/ADMIN/RCVD: CPT | Performed by: ORTHOPAEDIC SURGERY

## 2018-07-16 PROCEDURE — 1036F TOBACCO NON-USER: CPT | Performed by: ORTHOPAEDIC SURGERY

## 2018-07-16 PROCEDURE — 1123F ACP DISCUSS/DSCN MKR DOCD: CPT | Performed by: ORTHOPAEDIC SURGERY

## 2018-07-16 PROCEDURE — 1101F PT FALLS ASSESS-DOCD LE1/YR: CPT | Performed by: ORTHOPAEDIC SURGERY

## 2018-07-16 PROCEDURE — G8427 DOCREV CUR MEDS BY ELIG CLIN: HCPCS | Performed by: ORTHOPAEDIC SURGERY

## 2018-07-16 PROCEDURE — G8420 CALC BMI NORM PARAMETERS: HCPCS | Performed by: ORTHOPAEDIC SURGERY

## 2018-07-16 PROCEDURE — G8598 ASA/ANTIPLAT THER USED: HCPCS | Performed by: ORTHOPAEDIC SURGERY

## 2018-07-17 ENCOUNTER — HOSPITAL ENCOUNTER (OUTPATIENT)
Dept: PHYSICAL THERAPY | Age: 83
Setting detail: THERAPIES SERIES
Discharge: HOME | End: 2018-07-17
Payer: MEDICARE

## 2018-07-17 PROCEDURE — 97110 THERAPEUTIC EXERCISES: CPT

## 2018-07-17 PROCEDURE — 97150 GROUP THERAPEUTIC PROCEDURES: CPT

## 2018-07-17 PROCEDURE — 97140 MANUAL THERAPY 1/> REGIONS: CPT

## 2018-07-17 NOTE — FLOWSHEET NOTE
Outpatient Physical Therapy     [x] Daily Treatment Note   [] Progress Note   [] Discharge Note    Date:  7/17/2018    Patient Name:  Barbara Franklin        YOB: 1927    Medical Diagnosis:    Pain in joint of left shoulder region (M25.512) (focus on RTC/ deltoid strengthening)                          Treatment Diagnosis:  Left UE weakness, limited ROM, pain     Onset Date:  4/25/18                          Referral Date:  6/25/18           Referring Physician:  Jayesh Dumont PA-C                                                Visits Allowed/Insurance/Certification Information:  Medicare     Restrictions/Precautions:  HTN, OA, [place], fall risk,  wear TLSO until 7/6/18 for T6 compression fracture on 4/25/18, and avoid bending and twisting, prior left TSA 6-7 years ago. Plan of care signed (Y/N): Signed on 6/26/18    Progress Note covers period from (if applicable):    [x]  NA    [] From          To           Next Progress Note due:   7/24/18    Visit# / total visits: 6/16    Plan for Next Session:  Review and progress exercises for left shoulder ROM, strengthening, jt mobs    Subjective: reports MD did not have a lot to say during his visit. Performing HEP 2x/day. Still sleeping through the night without waking due to pain. Pain level: 0/10 at rest, 3/10 at worst with end range positioning  eval 0-8/10 lateral aspect left shoulder      Objective:     PROM: left shoulder flex 140 , abd 135 , ER 60, IR 60  AROM:  Left shoulder flex 75, abd 65, ext 50    Exercises:    Exercises in bold performed in department today. Items not bolded are carried forward from prior visits for continuity of the record.   Exercise/Equipment Resistance/Repetitions Other comments     PROM left shoulder (flex abd, IR/ER) 2x10 each direction      codmans x10      AAROM shoulder flex, abd, IR/ER x10 each direction with cane      Seated AAROM shoulder flex on table x10      scap retraction 2 x10, green tband      low No    Plan: [x] Continue per plan of care [] Alter current plan (see comments)   [] Plan of care initiated [] Hold pending MD visit [] Discharge    Timed Code Treatment Minutes:  44    Total Treatment Minutes:  44+cp    Medicare Cap total YTD:  $[id number]     Electronically signed by:  Ghazal Oneill, [id number]

## 2018-07-20 ENCOUNTER — HOSPITAL ENCOUNTER (OUTPATIENT)
Dept: PHYSICAL THERAPY | Age: 83
Setting detail: THERAPIES SERIES
Discharge: HOME | End: 2018-07-20
Payer: MEDICARE

## 2018-07-20 PROCEDURE — 97110 THERAPEUTIC EXERCISES: CPT

## 2018-07-20 PROCEDURE — 97140 MANUAL THERAPY 1/> REGIONS: CPT

## 2018-07-20 PROCEDURE — G8986 CARRY D/C STATUS: HCPCS

## 2018-07-20 PROCEDURE — G8985 CARRY GOAL STATUS: HCPCS

## 2018-07-20 PROCEDURE — G8984 CARRY CURRENT STATUS: HCPCS

## 2018-07-20 NOTE — FLOWSHEET NOTE
PROM left shoulder (flex abd, IR/ER) x15 each direction      codmans x10      AAROM shoulder flex, abd, IR/ER x10 each direction with cane      Seated AAROM shoulder flex on table x10      scap retraction 2 x10, green tband      low row 2x10,  Green tband To incr gradually to 3x10-15, 1-2x/day     Ceiling punches 2x10, 2#     supine chest press  2x10, 2#      Rhythmic stabilization in supine x1 min with manual resistance      Bilateral shoulder abd   2X10, 2#       Standing bicep curls 2x10, 2#      Bent over row 2x10, 2#      Bent over tricep kickback 2x10, 2#      Standing bilateral shoulder flexion 2x10, 2#                                         Therapeutic Exercise/Home Exercise Program:  x30 min. Exercises per flowsheet. Patient demonstrates independence and good compliance with HEP. Assessment done for d/c.         Group Therapy:       Therapeutic Activity:        Gait: pt walking with cane today    Neuromuscular Re-Education:      Manual Therapy: x15 min. PROM as noted above, gentle inf oscillations performed between each direction. Gentle inferior and posterior mobs to left shoulder with patient in supine. Modalities:  Cp x10 min to left shoulder after treatment with patient in semi-reclined position. Functional Outcome Measure:   Measure used:  SPADI  Score:  105/130  % Disability:  81%   7/20/18 [address]:  9% disability      Assessment/Treatment/Activity Tolerance:   Patient made significant improvement with PT treatment. Patient able to perform all normal activities without limitations noted. Demonstrates good understanding and compliance with HEP. All goals MET or partially MET. No further PT treatment needed at this time. Patient shoulder continue with HEP independently.      GCode:  [id number]  Patients response to treatment:   [x] Patient tolerated treatment well with no adverse reactions noted [] Patient limited by fatigue   [] Patient limited by pain [] Patient limited by other

## 2018-08-10 ENCOUNTER — HOSPITAL ENCOUNTER (INPATIENT)
Age: 83
LOS: 2 days | Discharge: HOME | DRG: 641 | End: 2018-08-12
Attending: EMERGENCY MEDICINE | Admitting: INTERNAL MEDICINE
Payer: MEDICARE

## 2018-08-10 ENCOUNTER — APPOINTMENT (OUTPATIENT)
Dept: GENERAL RADIOLOGY | Age: 83
DRG: 641 | End: 2018-08-10
Payer: MEDICARE

## 2018-08-10 ENCOUNTER — APPOINTMENT (OUTPATIENT)
Dept: CT IMAGING | Age: 83
DRG: 641 | End: 2018-08-10
Payer: MEDICARE

## 2018-08-10 DIAGNOSIS — I49.3 PVC (PREMATURE VENTRICULAR CONTRACTION): ICD-10-CM

## 2018-08-10 DIAGNOSIS — R42 DIZZINESS: Primary | ICD-10-CM

## 2018-08-10 DIAGNOSIS — E86.0 MILD DEHYDRATION: ICD-10-CM

## 2018-08-10 DIAGNOSIS — R00.1 BRADYCARDIA: ICD-10-CM

## 2018-08-10 LAB
A/G RATIO: 1.5 (ref 1.1–2.2)
ALBUMIN SERPL-MCNC: 4.4 G/DL (ref 3.4–5)
ALP BLD-CCNC: 93 U/L (ref 40–129)
ALT SERPL-CCNC: 13 U/L (ref 10–40)
ANION GAP SERPL CALCULATED.3IONS-SCNC: 14 MMOL/L (ref 3–16)
AST SERPL-CCNC: 22 U/L (ref 15–37)
BASOPHILS ABSOLUTE: 0.1 K/UL (ref 0–0.2)
BASOPHILS RELATIVE PERCENT: 0.5 %
BILIRUB SERPL-MCNC: 0.4 MG/DL (ref 0–1)
BUN BLDV-MCNC: 21 MG/DL (ref 7–20)
CALCIUM SERPL-MCNC: 9.4 MG/DL (ref 8.3–10.6)
CHLORIDE BLD-SCNC: 102 MMOL/L (ref 99–110)
CO2: 24 MMOL/L (ref 21–32)
CREAT SERPL-MCNC: 1.3 MG/DL (ref 0.8–1.3)
EOSINOPHILS ABSOLUTE: 0 K/UL (ref 0–0.6)
EOSINOPHILS RELATIVE PERCENT: 0.5 %
GFR AFRICAN AMERICAN: >60
GFR NON-AFRICAN AMERICAN: 52
GLOBULIN: 3 G/DL
GLUCOSE BLD-MCNC: 89 MG/DL (ref 70–99)
HCT VFR BLD CALC: 46.6 % (ref 40.5–52.5)
HEMOGLOBIN: 15.9 G/DL (ref 13.5–17.5)
LYMPHOCYTES ABSOLUTE: 0.9 K/UL (ref 1–5.1)
LYMPHOCYTES RELATIVE PERCENT: 9.7 %
MAGNESIUM: 2.2 MG/DL (ref 1.8–2.4)
MCH RBC QN AUTO: 31.5 PG (ref 26–34)
MCHC RBC AUTO-ENTMCNC: 34.2 G/DL (ref 31–36)
MCV RBC AUTO: 92.2 FL (ref 80–100)
MONOCYTES ABSOLUTE: 0.6 K/UL (ref 0–1.3)
MONOCYTES RELATIVE PERCENT: 6.3 %
NEUTROPHILS ABSOLUTE: 8.1 K/UL (ref 1.7–7.7)
NEUTROPHILS RELATIVE PERCENT: 83 %
PDW BLD-RTO: 13.7 % (ref 12.4–15.4)
PLATELET # BLD: 218 K/UL (ref 135–450)
PMV BLD AUTO: 9.3 FL (ref 5–10.5)
POTASSIUM REFLEX MAGNESIUM: 4.5 MMOL/L (ref 3.5–5.1)
RBC # BLD: 5.05 M/UL (ref 4.2–5.9)
SODIUM BLD-SCNC: 140 MMOL/L (ref 136–145)
TOTAL PROTEIN: 7.4 G/DL (ref 6.4–8.2)
TROPONIN: <0.01 NG/ML
WBC # BLD: 9.7 K/UL (ref 4–11)

## 2018-08-10 PROCEDURE — 84443 ASSAY THYROID STIM HORMONE: CPT

## 2018-08-10 PROCEDURE — 2580000003 HC RX 258: Performed by: INTERNAL MEDICINE

## 2018-08-10 PROCEDURE — 96361 HYDRATE IV INFUSION ADD-ON: CPT

## 2018-08-10 PROCEDURE — 6370000000 HC RX 637 (ALT 250 FOR IP): Performed by: EMERGENCY MEDICINE

## 2018-08-10 PROCEDURE — 2580000003 HC RX 258: Performed by: EMERGENCY MEDICINE

## 2018-08-10 PROCEDURE — 83735 ASSAY OF MAGNESIUM: CPT

## 2018-08-10 PROCEDURE — 70450 CT HEAD/BRAIN W/O DYE: CPT

## 2018-08-10 PROCEDURE — 99285 EMERGENCY DEPT VISIT HI MDM: CPT

## 2018-08-10 PROCEDURE — 84436 ASSAY OF TOTAL THYROXINE: CPT

## 2018-08-10 PROCEDURE — 6370000000 HC RX 637 (ALT 250 FOR IP): Performed by: INTERNAL MEDICINE

## 2018-08-10 PROCEDURE — 1200000000 HC SEMI PRIVATE

## 2018-08-10 PROCEDURE — 96360 HYDRATION IV INFUSION INIT: CPT

## 2018-08-10 PROCEDURE — 85025 COMPLETE CBC W/AUTO DIFF WBC: CPT

## 2018-08-10 PROCEDURE — 36415 COLL VENOUS BLD VENIPUNCTURE: CPT

## 2018-08-10 PROCEDURE — 93005 ELECTROCARDIOGRAM TRACING: CPT | Performed by: EMERGENCY MEDICINE

## 2018-08-10 PROCEDURE — 84484 ASSAY OF TROPONIN QUANT: CPT

## 2018-08-10 PROCEDURE — 80053 COMPREHEN METABOLIC PANEL: CPT

## 2018-08-10 PROCEDURE — 71046 X-RAY EXAM CHEST 2 VIEWS: CPT

## 2018-08-10 RX ORDER — SODIUM CHLORIDE 0.9 % (FLUSH) 0.9 %
10 SYRINGE (ML) INJECTION EVERY 12 HOURS SCHEDULED
Status: DISCONTINUED | OUTPATIENT
Start: 2018-08-10 | End: 2018-08-12 | Stop reason: HOSPADM

## 2018-08-10 RX ORDER — LOPERAMIDE HYDROCHLORIDE 2 MG/1
2 CAPSULE ORAL 4 TIMES DAILY PRN
COMMUNITY
End: 2019-01-01 | Stop reason: ALTCHOICE

## 2018-08-10 RX ORDER — 0.9 % SODIUM CHLORIDE 0.9 %
500 INTRAVENOUS SOLUTION INTRAVENOUS ONCE
Status: COMPLETED | OUTPATIENT
Start: 2018-08-10 | End: 2018-08-10

## 2018-08-10 RX ORDER — CLOPIDOGREL BISULFATE 75 MG/1
75 TABLET ORAL DAILY
Status: DISCONTINUED | OUTPATIENT
Start: 2018-08-10 | End: 2018-08-12 | Stop reason: HOSPADM

## 2018-08-10 RX ORDER — MIRTAZAPINE 15 MG/1
15 TABLET, FILM COATED ORAL NIGHTLY
COMMUNITY
End: 2019-04-03

## 2018-08-10 RX ORDER — AMLODIPINE BESYLATE 2.5 MG/1
2.5 TABLET ORAL DAILY
Status: DISCONTINUED | OUTPATIENT
Start: 2018-08-10 | End: 2018-08-12 | Stop reason: HOSPADM

## 2018-08-10 RX ORDER — AMLODIPINE BESYLATE 5 MG/1
2.5 TABLET ORAL EVERY MORNING
Status: ON HOLD | COMMUNITY
End: 2018-09-06 | Stop reason: HOSPADM

## 2018-08-10 RX ORDER — ESCITALOPRAM OXALATE 10 MG/1
10 TABLET ORAL DAILY
Status: DISCONTINUED | OUTPATIENT
Start: 2018-08-10 | End: 2018-08-12 | Stop reason: HOSPADM

## 2018-08-10 RX ORDER — LOPERAMIDE HYDROCHLORIDE 2 MG/1
2 CAPSULE ORAL 4 TIMES DAILY PRN
Status: DISCONTINUED | OUTPATIENT
Start: 2018-08-10 | End: 2018-08-12 | Stop reason: HOSPADM

## 2018-08-10 RX ORDER — SIMVASTATIN 40 MG
40 TABLET ORAL DAILY
Status: DISCONTINUED | OUTPATIENT
Start: 2018-08-10 | End: 2018-08-12 | Stop reason: HOSPADM

## 2018-08-10 RX ORDER — PREDNISONE 1 MG/1
5 TABLET ORAL DAILY
Status: DISCONTINUED | OUTPATIENT
Start: 2018-08-10 | End: 2018-08-12 | Stop reason: HOSPADM

## 2018-08-10 RX ORDER — SODIUM CHLORIDE 0.9 % (FLUSH) 0.9 %
10 SYRINGE (ML) INJECTION PRN
Status: DISCONTINUED | OUTPATIENT
Start: 2018-08-10 | End: 2018-08-12 | Stop reason: HOSPADM

## 2018-08-10 RX ORDER — ONDANSETRON 2 MG/ML
4 INJECTION INTRAMUSCULAR; INTRAVENOUS EVERY 6 HOURS PRN
Status: DISCONTINUED | OUTPATIENT
Start: 2018-08-10 | End: 2018-08-10 | Stop reason: CLARIF

## 2018-08-10 RX ORDER — MIRTAZAPINE 15 MG/1
15 TABLET, FILM COATED ORAL NIGHTLY
Status: DISCONTINUED | OUTPATIENT
Start: 2018-08-10 | End: 2018-08-12 | Stop reason: HOSPADM

## 2018-08-10 RX ORDER — FAMOTIDINE 40 MG/1
40 TABLET, FILM COATED ORAL DAILY
COMMUNITY
End: 2019-04-03

## 2018-08-10 RX ADMIN — Medication 10 ML: at 20:04

## 2018-08-10 RX ADMIN — MIRTAZAPINE 15 MG: 15 TABLET, FILM COATED ORAL at 20:04

## 2018-08-10 RX ADMIN — SODIUM CHLORIDE 500 ML: 9 INJECTION, SOLUTION INTRAVENOUS at 16:10

## 2018-08-10 RX ADMIN — SODIUM CHLORIDE 500 ML: 9 INJECTION, SOLUTION INTRAVENOUS at 17:22

## 2018-08-10 RX ADMIN — ESCITALOPRAM OXALATE 10 MG: 10 TABLET ORAL at 20:05

## 2018-08-10 RX ADMIN — ASPIRIN 325 MG: 325 TABLET, DELAYED RELEASE ORAL at 17:22

## 2018-08-10 RX ADMIN — CLOPIDOGREL BISULFATE 75 MG: 75 TABLET ORAL at 20:03

## 2018-08-10 RX ADMIN — AMLODIPINE BESYLATE 2.5 MG: 2.5 TABLET ORAL at 20:03

## 2018-08-10 NOTE — ED PROVIDER NOTES
[address] ED      CHIEF COMPLAINT  Dizziness (Pt presents with c/o dizziness x \"a week. \".  States his blood pressure at home was 158/60. He stated he called his MD who told him to come into the ER. Denies chest pain. Denies n/v/f/d.)       HISTORY OF PRESENT ILLNESS  Calista Overton is a 80 y.o. male  who presents to the ED complaining of fatigue/dizziness. The patient has a history of vertigo but he's been feeling lightheaded for about 2 weeks. Just under a month ago the patient had 10 teeth pulled and initially he was eating gray but he's been doing better but he states over the last week or 2 he's been more dizzy worse when he standing or when he lays down. No focal weakness no numbness. He denies any palpitations, chest pain or difficulty breathing. He denies any pain other than his chronic wrapping his feet, joint pain but that is not new. No new back or neck pain. No headaches. He's had no fever. No cough. He just felt generally weak so they called his primary care doctor today and they recommended he come in for evaluation. He does have a remote history of a TIA. He has chronic issues with urination due to BPH and has a sphincter that he can control his output and he's had problems with that for years but that's unchanged. No hematuria patient states the dizziness is really been getting worse since yesterday were the family feels like he's been off balance. No other complaints, modifying factors or associated symptoms. I have reviewed the following from the nursing documentation.     Past Medical History:   Diagnosis Date    Arthralgia     Cancer [facility])     prostate    Chronic back pain     x40 years    Diverticulitis     Dizziness     Fatigue     Fractures     Hearing decreased     Hyperlipidemia     Hypertension     Osteoarthritis of knee     Osteoarthritis of shoulder     TIA (transient ischemic attack) 2006    Vertigo     Wears glasses      Past Surgical History:   Procedure Laterality Date    CATARACT REMOVAL  1997    bilaterally    COLONOSCOPY      ESOPHAGEAL DILATATION      X 2    INGUINAL HERNIA REPAIR      right    JOINT REPLACEMENT      KNEE SURGERY Bilateral     PROSTATECTOMY  1998    carcinoma    SHOULDER ARTHROPLASTY      SHOULDER SURGERY      TOE SURGERY Right 06/03/2013    PERMANENT RIGHT GREAT TOENAIL ABLATION AND REMOVAL    TOTAL KNEE ARTHROPLASTY      UPPER GASTROINTESTINAL ENDOSCOPY  jan 2011     Family History   Problem Relation Age of Onset    Heart Disease Mother         MI    Parkinsonism Father      Social History     Social History    Marital status:      Spouse name: N/A    Number of children: N/A    Years of education: N/A     Occupational History    Not on file.      Social History Main Topics    Smoking status: Former Smoker     Types: Cigarettes     Quit date: 5/28/1949    Smokeless tobacco: Never Used    Alcohol use No    Drug use: No    Sexual activity: Not Currently     Other Topics Concern    Not on file     Social History Narrative    No narrative on file     Current Facility-Administered Medications   Medication Dose Route Frequency Provider Last Rate Last Dose    0.9 % sodium chloride bolus  500 mL Intravenous Once Neoma Seals,  mL/hr at 08/10/18 1610 500 mL at 08/10/18 1610    aspirin EC tablet 325 mg  325 mg Oral Once Neoma Seals, DO        0.9 % sodium chloride bolus  500 mL Intravenous Once Neoma Seals, DO         Current Outpatient Prescriptions   Medication Sig Dispense Refill    amLODIPine (NORVASC) 5 MG tablet Take 5 mg by mouth every morning      famotidine (PEPCID) 40 MG tablet Take 40 mg by mouth daily      loperamide (IMODIUM) 2 MG capsule Take 2 mg by mouth 4 times daily as needed for Diarrhea      mirtazapine (REMERON) 15 MG tablet Take 15 mg by mouth nightly      amLODIPine (NORVASC) 2.5 MG tablet Take 1 tablet by mouth daily (Patient taking differently: Take 2.5 mg by mouth nightly ) 30 tablet 11    CLONAZEPAM PO Take by mouth as needed      Vitamins-Lipotropics (LIPOFLAVONOID) TABS Take 1 tablet by mouth Daily with lunch Indications: vertigo      clopidogrel (PLAVIX) 75 MG tablet Take 75 mg by mouth daily      escitalopram (LEXAPRO) 10 MG tablet Take 10 mg by mouth daily      vitamin B-12 (CYANOCOBALAMIN) 100 MCG tablet Take 50 mcg by mouth daily      predniSONE (DELTASONE) 5 MG tablet Take 5 mg by mouth daily.  simvastatin (ZOCOR) 40 MG tablet Take 40 mg by mouth daily.  CALCIUM PO Take 1,000 mg by mouth daily.  vitamin D (CHOLECALCIFEROL) 1000 UNIT TABS tablet Take 1,000 Units by mouth daily.  Multiple Vitamins-Minerals (MULTIVITAMIN PO) Take 1 tablet by mouth daily        Allergies   Allergen Reactions    Altace [Ramipril]      (cough) Pt denies ever taking Ramipril    Sulfa Antibiotics        REVIEW OF SYSTEMS  10 systems reviewed, pertinent positives per HPI otherwise noted to be negative. PHYSICAL EXAM  /64   Pulse 57   Temp 97.1 °F (36.2 °C) (Oral)   Resp 25   SpO2 96%    GENERAL APPEARANCE: Awake and alert. Cooperative. Patient in no acute distress. HENT: Normocephalic. Atraumatic. Mucous membranes are moist, posterior pharynx is clear  NECK: Supple. EYES: PERRL. EOM's grossly intact. HEART/CHEST: RRR. No murmurs. 2+ radial and pedal pulses bilaterally  LUNGS: Respirations unlabored. CTAB. Good air exchange. Speaking comfortably in full sentences. ABDOMEN: No tenderness. Soft. Non-distended. No guarding or rigidity   MUSCULOSKELETAL: No extremity edema. Compartments soft. No deformity. No tenderness in the extremities. All extremities neurovascularly intact. SKIN: Warm and dry. No acute rashes. NEUROLOGICAL: Alert and oriented. CN's 2-12 intact. No gross facial drooping. Strength 5/5, sensation intact. Patient moving all extremities normally. Sensation intact. Finger to nose intact.   PSYCHIATRIC: Normal branch blockLeft anterior fascicular blockMinimal voltage criteria for LVH, may be normal variantNonspecific ST and T wave abnormalityAbnormal ECGWhen compared with ECG of 25-APR-2018 19:05,Electronic demand pacing is now PresentST now depressed in Inferior leadsNonspecific T wave abnormality now evident in Inferior leads       ECG  The Ekg interpreted by me shows  Some baseline artifact with what appears to be a sinus bradycardia with PVCs rate of 60. No ST elevation or depression but there is finding ST segments. He does have an incomplete right bundle-branch block as well. No comparison immediately available  RADIOLOGY  Xr Chest Standard (2 Vw)    Result Date: 8/10/2018  EXAMINATION: TWO VIEWS OF THE CHEST 8/10/2018 3:55 pm COMPARISON: Chest 04/25/2018. HISTORY: ORDERING SYSTEM PROVIDED HISTORY: dizziness TECHNOLOGIST PROVIDED HISTORY: Reason for exam:->dizziness Ordering Physician Provided Reason for Exam: Dizziness (Pt presents with c/o dizziness x \"a week. \". States his blood pressure at home was 158/60. He stated he called his MD who told him to come into the ER. Denies chest pain. Denies n/v/f/d.) Acuity: Acute Type of Exam: Initial Relevant Medical/Surgical History: HTN PROSTATE CANCER FINDINGS: Cardiac silhouette appears within normal limits for size. Calcific atherosclerotic disease aorta. Mediastinum appears otherwise unremarkable. The hilar silhouettes appear unremarkable. Chronic appearing coarse interstitial densities predominate parahilar regions and lung bases, typical of sequela from smoking or other previous infectious/inflammatory process. No focal consolidation is evident right lung. Hazy alveolar densities seen along the left cardiac apex central lower lung on frontal view. No pleural effusion is evident. No pleural effusion or pneumothorax is seen. Mild-to-moderate anterior wedge compression fracture of 1 of the midthoracic vertebra progressed compared with prior study.   Left humeral head prosthesis. 1. Interval progression of anterior wedge compression fracture of 1 of the midthoracic vertebra compared to chest 04/25/2018. 2. Possible infiltrate or atelectasis central left lung base. 3. Senescent pulmonary changes. 4. Calcific atherosclerotic disease aorta. 5. Otherwise unremarkable chest.     Ct Head Wo Contrast    Result Date: 8/10/2018  EXAMINATION: CT OF THE HEAD WITHOUT CONTRAST  8/10/2018 4:06 pm TECHNIQUE: CT of the head was performed without the administration of intravenous contrast. Dose modulation, iterative reconstruction, and/or weight based adjustment of the mA/kV was utilized to reduce the radiation dose to as low as reasonably achievable. COMPARISON: None. HISTORY: ORDERING SYSTEM PROVIDED HISTORY: ATAXIA, SLOWLY PROGRESSIVE, OR LONG DURATION TECHNOLOGIST PROVIDED HISTORY: Has a \"code stroke\" or \"stroke alert\" been called? ->No Ordering Physician Provided Reason for Exam: Dizziness (Pt presents with c/o dizziness x \"a week. \". States his blood pressure at home was 158/60. He stated he called his MD who told him to come into the ER. Denies chest pain. Denies n/v/f/d.) Acuity: Acute Type of Exam: Initial Relevant Medical/Surgical History: TIA HTN PROSTATE CANCER FINDINGS: BRAIN/VENTRICLES: There is no acute intracranial hemorrhage, mass effect or midline shift. No abnormal extra-axial fluid collection. The gray-white differentiation is maintained without evidence of an acute infarct. There is no evidence of hydrocephalus. There is extensive periventricular leukoencephalopathy consistent with small vessel ischemia slightly progressed from the prior exam. ORBITS: The visualized portion of the orbits demonstrate no acute abnormality. SINUSES: The visualized paranasal sinuses and mastoid air cells demonstrate no acute abnormality. SOFT TISSUES/SKULL:  No acute abnormality of the visualized skull or soft tissues. No acute intracranial abnormality.        ED COURSE/MDM  Patient

## 2018-08-10 NOTE — PROGRESS NOTES
Admitted to [place] [address] room 213-2 from ER in stable condition. Alert and oriented. Admission question asked and answered. Bed alarm in place and turned on. Call light in reach.

## 2018-08-11 LAB
BILIRUBIN URINE: NEGATIVE
BLOOD, URINE: NEGATIVE
CLARITY: CLEAR
COLOR: YELLOW
EKG ATRIAL RATE: 60 BPM
EKG ATRIAL RATE: 60 BPM
EKG DIAGNOSIS: NORMAL
EKG DIAGNOSIS: NORMAL
EKG P AXIS: 22 DEGREES
EKG P AXIS: 76 DEGREES
EKG P-R INTERVAL: 180 MS
EKG P-R INTERVAL: 206 MS
EKG Q-T INTERVAL: 416 MS
EKG Q-T INTERVAL: 420 MS
EKG QRS DURATION: 110 MS
EKG QRS DURATION: 110 MS
EKG QTC CALCULATION (BAZETT): 416 MS
EKG QTC CALCULATION (BAZETT): 420 MS
EKG R AXIS: -67 DEGREES
EKG R AXIS: -68 DEGREES
EKG T AXIS: 23 DEGREES
EKG T AXIS: 41 DEGREES
EKG VENTRICULAR RATE: 60 BPM
EKG VENTRICULAR RATE: 60 BPM
GLUCOSE URINE: NEGATIVE MG/DL
KETONES, URINE: NEGATIVE MG/DL
LEUKOCYTE ESTERASE, URINE: NEGATIVE
LV EF: 46 %
LVEF MODALITY: NORMAL
MICROSCOPIC EXAMINATION: NORMAL
NITRITE, URINE: NEGATIVE
PH UA: 6.5
PROTEIN UA: NEGATIVE MG/DL
SPECIFIC GRAVITY UA: 1.01
T4 TOTAL: 6.9 UG/DL (ref 4.5–10.9)
TSH SERPL DL<=0.05 MIU/L-ACNC: 1.94 UIU/ML (ref 0.27–4.2)
URINE REFLEX TO CULTURE: NORMAL
URINE TYPE: NORMAL
UROBILINOGEN, URINE: 0.2 E.U./DL

## 2018-08-11 PROCEDURE — 93010 ELECTROCARDIOGRAM REPORT: CPT | Performed by: INTERNAL MEDICINE

## 2018-08-11 PROCEDURE — 87077 CULTURE AEROBIC IDENTIFY: CPT

## 2018-08-11 PROCEDURE — 87186 SC STD MICRODIL/AGAR DIL: CPT

## 2018-08-11 PROCEDURE — 2580000003 HC RX 258: Performed by: INTERNAL MEDICINE

## 2018-08-11 PROCEDURE — 81003 URINALYSIS AUTO W/O SCOPE: CPT

## 2018-08-11 PROCEDURE — 6370000000 HC RX 637 (ALT 250 FOR IP): Performed by: INTERNAL MEDICINE

## 2018-08-11 PROCEDURE — 93005 ELECTROCARDIOGRAM TRACING: CPT | Performed by: INTERNAL MEDICINE

## 2018-08-11 PROCEDURE — 6360000002 HC RX W HCPCS: Performed by: INTERNAL MEDICINE

## 2018-08-11 PROCEDURE — 87086 URINE CULTURE/COLONY COUNT: CPT

## 2018-08-11 PROCEDURE — 99223 1ST HOSP IP/OBS HIGH 75: CPT | Performed by: INTERNAL MEDICINE

## 2018-08-11 PROCEDURE — 93306 TTE W/DOPPLER COMPLETE: CPT

## 2018-08-11 PROCEDURE — 2060000000 HC ICU INTERMEDIATE R&B

## 2018-08-11 RX ORDER — SODIUM CHLORIDE 9 MG/ML
INJECTION, SOLUTION INTRAVENOUS CONTINUOUS
Status: DISCONTINUED | OUTPATIENT
Start: 2018-08-11 | End: 2018-08-12

## 2018-08-11 RX ADMIN — AMLODIPINE BESYLATE 2.5 MG: 2.5 TABLET ORAL at 09:37

## 2018-08-11 RX ADMIN — ESCITALOPRAM OXALATE 10 MG: 10 TABLET ORAL at 09:37

## 2018-08-11 RX ADMIN — MIRTAZAPINE 15 MG: 15 TABLET, FILM COATED ORAL at 20:51

## 2018-08-11 RX ADMIN — Medication 10 ML: at 09:36

## 2018-08-11 RX ADMIN — CLOPIDOGREL BISULFATE 75 MG: 75 TABLET ORAL at 09:37

## 2018-08-11 RX ADMIN — Medication 10 ML: at 20:53

## 2018-08-11 RX ADMIN — SODIUM CHLORIDE: 9 INJECTION, SOLUTION INTRAVENOUS at 09:36

## 2018-08-11 RX ADMIN — SODIUM CHLORIDE: 9 INJECTION, SOLUTION INTRAVENOUS at 19:25

## 2018-08-11 RX ADMIN — ENOXAPARIN SODIUM 40 MG: 40 INJECTION SUBCUTANEOUS at 09:36

## 2018-08-11 RX ADMIN — PREDNISONE 5 MG: 5 TABLET ORAL at 09:37

## 2018-08-11 RX ADMIN — SIMVASTATIN 40 MG: 40 TABLET, FILM COATED ORAL at 09:37

## 2018-08-11 NOTE — PROGRESS NOTES
Occupational Therapy/Physical Therapy    Unit:  PCU   Date:  8/11/2018  Patient Name:    Abby Stratton  Admitting diagnosis:  Dizziness [R42]  Admit Date:  8/10/2018    Attempt @ 0401: Hold. Per chart review, observed pt to be experiencing bradycardia. Per conversation with RN, pt not appropriate for eval at this time. Will f/u as available and appropriate at this time.      Tess Villasenor, MOT, OTR/L   [id number]  Joan Dunlap, [facility]

## 2018-08-11 NOTE — PROGRESS NOTES
Attempted x2 to call pt's emergency contact [phone]  with no answer. Message left to call back.  Tony Mcwilliams

## 2018-08-11 NOTE — PROGRESS NOTES
Pt. oriented to room. Shift assessment completed. Pt. Denies any needs at this time. Will continue toy monitor. Call light within reach.

## 2018-08-11 NOTE — PROGRESS NOTES
Consult has been called to Dr. Elroy Poon' after hours answering service regarding cardiology consult.  11:51 PM    Carli Kuhn  8/10/2018

## 2018-08-11 NOTE — CARE COORDINATION
Case Management Assessment  Initial Evaluation    Date/Time of Evaluation: 8/11/2018 11:25 AM  Assessment Completed by: Wilma Prieto    Patient Name: Noel Batres  YOB: 1927  Diagnosis: Dizziness [R42]  Date / Time: 8/10/2018  3:34 PM  Admission status/Date:INPT 8/10/18  Chart Reviewed: Yes      Patient Interviewed: Yes   Family Interviewed:  No      Hospitalization in the last 30 days:  No    Contacts  : Melva Whitney   Relationship to Patient:Daughter  Phone Number: [phone]  Alternate Contact: Ewa Malcolm   Relationship to Patient: daughter   Phone Number: [phone]    Current PCP Bruce Zamora: Children  Transportation: self    Meal Preparation: self    Housing  Home Environment: home alone  Steps: 2  Plans to Return to Present Housing: Yes  Other Identified Issues: -    [address] 78  Currently active with [employer] : Yes - [facility]  Type of Home Care Services: Housekeeping  Passport/Waiver : No  Passport/Waiver Services: -    Durable Medical Equipment   DME Provider: n/a  Equipment: cane    Has Home O2 in place on admit:  No  Informed of need to bring portable home O2 tank on day of discharge for nursing to connect prior to leaving:   Not Indicated  Verbalized agreement/Understanding:   Not Indicated    Community Service Affiliation  Dialysis:  No  Outpatient PT/OT: No    Cancer Center: No     CHF Clinic: No     Pulmonary Rehab: No  Pain Clinic: No  Community Mental Health: No    Wound Clinic: No     Other: -    DISCHARGE PLAN: reviewed chart and met with pt. Pt reports that he lives at home alone and is IPTA. Active with [facility] Services for housekeeping weekly. Plans to return home at discharge. Denies needs. Will cont to follow.

## 2018-08-11 NOTE — PROGRESS NOTES
Pt resting. Resp e/e. Initial assessment completed and charted. No needs. Will monitor.  Mahad Romeat

## 2018-08-11 NOTE — CONSULTS
(PEPCID) 40 MG tablet Take 40 mg by mouth daily   Yes Historical Provider, MD   loperamide (IMODIUM) 2 MG capsule Take 2 mg by mouth 4 times daily as needed for Diarrhea   Yes Historical Provider, MD   mirtazapine (REMERON) 15 MG tablet Take 15 mg by mouth nightly   Yes Historical Provider, MD   amLODIPine (NORVASC) 2.5 MG tablet Take 1 tablet by mouth daily  Patient taking differently: Take 2.5 mg by mouth nightly  10/3/17  Yes David Phan MD   CLONAZEPAM PO Take by mouth as needed   Yes Historical Provider, MD   Vitamins-Lipotropics (LIPOFLAVONOID) TABS Take 1 tablet by mouth Daily with lunch Indications: vertigo   Yes Historical Provider, MD   clopidogrel (PLAVIX) 75 MG tablet Take 75 mg by mouth daily   Yes Historical Provider, MD   escitalopram (LEXAPRO) 10 MG tablet Take 10 mg by mouth daily   Yes Historical Provider, MD   vitamin B-12 (CYANOCOBALAMIN) 100 MCG tablet Take 50 mcg by mouth daily   Yes Historical Provider, MD   predniSONE (DELTASONE) 5 MG tablet Take 5 mg by mouth daily. Yes Historical Provider, MD   simvastatin (ZOCOR) 40 MG tablet Take 40 mg by mouth daily. Yes Historical Provider, MD   CALCIUM PO Take 1,000 mg by mouth daily. Yes Historical Provider, MD   vitamin D (CHOLECALCIFEROL) 1000 UNIT TABS tablet Take 1,000 Units by mouth daily.    Yes Historical Provider, MD   Multiple Vitamins-Minerals (MULTIVITAMIN PO) Take 1 tablet by mouth daily    Yes Historical Provider, MD        Allergies:  Altace [ramipril] and Sulfa antibiotics     Review of Systems:   12 point ROS negative in all areas as listed below except as in [place]  Constitutional, EENT, Cardiovascular, pulmonary, GI, , Musculoskeletal, skin, neurological, hematological, endocrine, Psychiatric    Physical Examination:    Vitals:    08/11/18 0015   BP: (!) 156/60   Pulse: (!) 34   Resp: 14   Temp: 97.9 °F (36.6 °C)   SpO2: 97%    Weight: 178 lb 14.4 oz (81.1 kg)         General Appearance:  Alert, cooperative, no distress, appears stated age   Head:  Normocephalic, without obvious abnormality, atraumatic   Eyes:  PERRL, conjunctiva/corneas clear       Nose: Nares normal, no drainage or sinus tenderness   Throat: Lips, mucosa, and tongue normal   Neck: Supple, symmetrical, trachea midline, no adenopathy, thyroid: not enlarged, symmetric, no tenderness/mass/nodules, no carotid bruit or JVD       Lungs:   Clear to auscultation bilaterally, respirations unlabored   Chest Wall:  No tenderness or deformity   Heart:  Regular rate and rhythm, S1, S2 normal, no murmur, rub or gallop   Abdomen:   Soft, non-tender, bowel sounds active all four quadrants,  no masses, no organomegaly           Extremities: Extremities normal, atraumatic, no cyanosis or edema   Pulses: Neg pulses in feet   Skin: Skin color, texture, turgor normal, no rashes or lesions   Pysch: Normal mood and affect   Neurologic: Normal gross motor and sensory exam.         Labs  CBC:   Lab Results   Component Value Date    WBC 9.7 08/10/2018    RBC 5.05 08/10/2018    HGB 15.9 08/10/2018    HCT 46.6 08/10/2018    MCV 92.2 08/10/2018    RDW 13.7 08/10/2018     08/10/2018     CMP:    Lab Results   Component Value Date     08/10/2018    K 4.5 08/10/2018     08/10/2018    CO2 24 08/10/2018    BUN 21 08/10/2018    CREATININE 1.3 08/10/2018    GFRAA >60 08/10/2018    GFRAA >60 09/10/2012    AGRATIO 1.5 08/10/2018    LABGLOM 52 08/10/2018    GLUCOSE 89 08/10/2018    PROT 7.4 08/10/2018    PROT 7.1 09/10/2012    CALCIUM 9.4 08/10/2018    BILITOT 0.4 08/10/2018    ALKPHOS 93 08/10/2018    AST 22 08/10/2018    ALT 13 08/10/2018     PT/INR:  No results found for: PTINR  Lab Results   Component Value Date    CKTOTAL 60 09/10/2012    TROPONINI <0.01 08/10/2018       Echo doppler heart 8.21.17   Left ventricle systolic function is mildly reduced with an estimated   ejection fraction of 45-50%. There is hypokinesis of the basal inferior   wall. Normal left ventricle size.

## 2018-08-11 NOTE — H&P
Labs      08/10/18   1542   WBC  9.7   HGB  15.9   HCT  46.6   PLT  218      RENAL  Recent Labs      08/10/18   1542   NA  140   K  4.5   CL  102   CO2  24   BUN  21*   CREATININE  1.3     LFT'S  Recent Labs      08/10/18   1542   AST  22   ALT  13   BILITOT  0.4   ALKPHOS  93     CARDIAC ENZYMES  Recent Labs      08/10/18   1542   TROPONINI  <1.56     PHYSICIAN CERTIFICATION    I certify that Courtney Delacruz is expected to be hospitalized for 2 midnights based on the following assessment and plan:    ASSESSMENT/PLAN:  1. Light headedness with difficulty ambulating, likely related to cardiac arrhythmia and/or dehydration. IVF, telemetry, fall precautions, check TSH. CT head read as no acute process. 2. Bradycardic Arrhythmia, 2:1 and 1:1 normal beat to PVC ratios. Patient's functional heart rate is significantly less than that read on the monitor. By palpation/auscultation functional heart rate is between half and two thirds of the rate displayed on the monitor. He has had palpated rates down to the 30s. Cardiology consult. Patient does not have a pacemaker. Move patient to PCU. Of note the arhythmia was present to a lesser degree in last EKG 4/25/18. Interestingly, it was not present on EKG from 9/21/17 but at that time there were not any neurostimulator spikes either. 3. Possible mild RONAK, creatinine 1.3, baseline around 1.1. Likely related to dehydration. Repeat labs in a.m. Chk UA. 4. Dehydration, IVF, BUN to creatinine ratio 21:1.3.  5. Chronic back pain with worsening thoracic wedge fracture, neurostimulator in place. 6. Decreased oral intake since multiple teeth removed. Dietitian consult. 7. Remote history of TIA, continue home aspirin and Plavix. 8. Psy, continue home regimen. 9. Hyperlipidemia, continue statin. DVT Prophylaxis: Lovenox  Diet: Gen. Code Status: Full Code   PT/OT Eval Status:  Will order if needed and as patient condition allows  Dispo - Admit to inpatient Jacob Salinas MD    Thank you Jono Rivera MD for the opportunity to be involved in this patient's care. If you have any questions or concerns please feel free to contact me via the [employer] at [phone]. This chart was generated using the [address] [place] [address] [address] dictation system. I created this record but it may contain dictation errors given the limitations of this technology.

## 2018-08-12 VITALS
DIASTOLIC BLOOD PRESSURE: 55 MMHG | SYSTOLIC BLOOD PRESSURE: 160 MMHG | OXYGEN SATURATION: 98 % | TEMPERATURE: 96.5 F | BODY MASS INDEX: 23.63 KG/M2 | HEART RATE: 42 BPM | WEIGHT: 174.5 LBS | HEIGHT: 72 IN | RESPIRATION RATE: 18 BRPM

## 2018-08-12 PROCEDURE — 97116 GAIT TRAINING THERAPY: CPT

## 2018-08-12 PROCEDURE — 97530 THERAPEUTIC ACTIVITIES: CPT

## 2018-08-12 PROCEDURE — 97535 SELF CARE MNGMENT TRAINING: CPT

## 2018-08-12 PROCEDURE — 2580000003 HC RX 258: Performed by: INTERNAL MEDICINE

## 2018-08-12 PROCEDURE — 6370000000 HC RX 637 (ALT 250 FOR IP): Performed by: INTERNAL MEDICINE

## 2018-08-12 PROCEDURE — G8989 SELF CARE D/C STATUS: HCPCS

## 2018-08-12 PROCEDURE — 99233 SBSQ HOSP IP/OBS HIGH 50: CPT | Performed by: INTERNAL MEDICINE

## 2018-08-12 PROCEDURE — G8978 MOBILITY CURRENT STATUS: HCPCS

## 2018-08-12 PROCEDURE — 97165 OT EVAL LOW COMPLEX 30 MIN: CPT

## 2018-08-12 PROCEDURE — G8987 SELF CARE CURRENT STATUS: HCPCS

## 2018-08-12 PROCEDURE — G8980 MOBILITY D/C STATUS: HCPCS

## 2018-08-12 PROCEDURE — 97162 PT EVAL MOD COMPLEX 30 MIN: CPT

## 2018-08-12 PROCEDURE — G8988 SELF CARE GOAL STATUS: HCPCS

## 2018-08-12 PROCEDURE — 6360000002 HC RX W HCPCS: Performed by: INTERNAL MEDICINE

## 2018-08-12 RX ORDER — CEFUROXIME AXETIL 250 MG/1
250 TABLET ORAL 2 TIMES DAILY
Qty: 10 TABLET | Refills: 0 | Status: SHIPPED | OUTPATIENT
Start: 2018-08-12 | End: 2018-08-22

## 2018-08-12 RX ADMIN — ENOXAPARIN SODIUM 40 MG: 40 INJECTION SUBCUTANEOUS at 09:18

## 2018-08-12 RX ADMIN — Medication 10 ML: at 09:18

## 2018-08-12 RX ADMIN — ESCITALOPRAM OXALATE 10 MG: 10 TABLET ORAL at 09:16

## 2018-08-12 RX ADMIN — SIMVASTATIN 40 MG: 40 TABLET, FILM COATED ORAL at 09:18

## 2018-08-12 RX ADMIN — CLOPIDOGREL BISULFATE 75 MG: 75 TABLET ORAL at 09:16

## 2018-08-12 RX ADMIN — SODIUM CHLORIDE: 9 INJECTION, SOLUTION INTRAVENOUS at 04:45

## 2018-08-12 RX ADMIN — PREDNISONE 5 MG: 5 TABLET ORAL at 09:18

## 2018-08-12 RX ADMIN — AMLODIPINE BESYLATE 2.5 MG: 2.5 TABLET ORAL at 09:18

## 2018-08-12 NOTE — DISCHARGE SUMMARY
Hospital Medicine Discharge Summary    Patient ID: Geno Santana      Patient's PCP: Laura Maravilla MD    Admit Date: 8/10/2018     Discharge Date:   8/12/18  Admitting Physician: Melody Gao MD     Discharge Physician: Adrianna Dancer, MD     Discharge Diagnoses: Active Hospital Problems    Diagnosis Date Noted    Bradycardia [R00.1]     Mild dehydration [E86.0]     PVC (premature ventricular contraction) [I49.3]     Near syncope [R55]     Dizziness [R42]        The patient was seen and examined on day of discharge and this discharge summary is in conjunction with any daily progress note from day of discharge. Hospital Course: History of Present Illness:  Geno Santana is a 80 y.o. patient who presented to the hospital with complaints of progressive lightheadedness and fatigue over last 2 years    He was given IVFdue to mild dehydration  He remained bradycardic PVCS      He feels much better today  Cardio consulted with DrGupta    Sinus bradycardia possible sick sinus syndrome   1. He will contact his technician to have his bladder neck stimulant turn  it down,pt will schedule 24 hr Holter as out patient and  F/up as an outpt  Physical Exam Performed:     BP (!) 160/55   Pulse (!) 42   Temp 96.5 °F (35.8 °C) (Oral)   Resp 18   Ht 6' (1.829 m)   Wt 174 lb 8 oz (79.2 kg)   SpO2 98%   BMI 23.67 kg/m²       General appearance:  No apparent distress, appears stated age and cooperative. HEENT:  Normal cephalic, atraumatic without obvious deformity. Pupils equal, round, and reactive to light. Extra ocular muscles intact. Conjunctivae/corneas clear. Neck: Supple, with full range of motion. No jugular venous distention. Trachea midline. Respiratory:  Normal respiratory effort. Clear to auscultation, bilaterally without Rales/Wheezes/Rhonchi. Cardiovascular:  Regular rate and rhythm with normal S1/S2 without murmurs, rubs or gallops.   Abdomen: Soft, non-tender, tablet Take 5 mg by mouth every morning      famotidine (PEPCID) 40 MG tablet Take 40 mg by mouth daily      loperamide (IMODIUM) 2 MG capsule Take 2 mg by mouth 4 times daily as needed for Diarrhea      mirtazapine (REMERON) 15 MG tablet Take 15 mg by mouth nightly      CLONAZEPAM PO Take by mouth as needed      Vitamins-Lipotropics (LIPOFLAVONOID) TABS Take 1 tablet by mouth Daily with lunch Indications: vertigo      clopidogrel (PLAVIX) 75 MG tablet Take 75 mg by mouth daily      escitalopram (LEXAPRO) 10 MG tablet Take 10 mg by mouth daily      vitamin B-12 (CYANOCOBALAMIN) 100 MCG tablet Take 50 mcg by mouth daily      predniSONE (DELTASONE) 5 MG tablet Take 5 mg by mouth daily. simvastatin (ZOCOR) 40 MG tablet Take 40 mg by mouth daily. CALCIUM PO Take 1,000 mg by mouth daily. vitamin D (CHOLECALCIFEROL) 1000 UNIT TABS tablet Take 1,000 Units by mouth daily. Multiple Vitamins-Minerals (MULTIVITAMIN PO) Take 1 tablet by mouth daily              Time Spent on discharge is more than 45 minutes in the examination, evaluation, counseling and review of medications and discharge plan. Signed:    Brooke Prakash MD   8/12/2018      Thank you Lior Alcala MD for the opportunity to be involved in this patient's care. If you have any questions or concerns please feel free to contact me at [phone].

## 2018-08-12 NOTE — PROGRESS NOTES
Pt discharged. Reviewed discharge instructions with patient. Deny further questions regarding instructions. IV removed per discharge. Pt leaving in stable condition via wheelchair and transport. Pt received walker via therapy. Pt denies further needs. Belongings sent with patient.

## 2018-08-12 NOTE — PROGRESS NOTES
[facility] Daily Progress Note      Admit Date:  8/10/2018    Reason for Consultation/Chief Complaint: \"I have been having lightheadedness and fatigue . \"  Jana Barthel feels much better today and denies any complaints. He remains on heart monitor and sinus bradycardia with HR in 30- 40 with PVC's  No reported symptoms with it today. History of Present Illness:  Aranza García is a 80 y.o. patient who presented to the hospital with complaints of progressive lightheadedness and fatigue over last 2 years  Usually if he would lay down symptoms will resolve but on day of admission they did not. He felt as if he is going to pass out. He had no chest pain shortness of breath palpitations or syncope. He has a bladder sphincter stimulator implanted for incontinence of urine with power source placed in his rt hip region. He says it is not helping much.   . I have been asked to provide consultation regarding further management and testing.     ROS:  12 point ROS negative in all areas as listed below except as in [place]  Constitutional, EENT, Cardiovascular, pulmonary, GI, , Musculoskeletal, skin, neurological, hematological, endocrine, Psychiatric    Past Medical History:   Diagnosis Date    Arthralgia     Cancer ([address]     prostate    Chronic back pain     x40 years    Chronic diastolic (congestive) heart failure ([address]     Diverticulitis     Dizziness     Fatigue     Fractures     Hearing decreased     Hyperlipidemia     Hypertension     Osteoarthritis of knee     Osteoarthritis of shoulder     TIA (transient ischemic attack) 2006    Vertigo     Wears glasses      Past Surgical History:   Procedure Laterality Date    CATARACT REMOVAL  1997    bilaterally    COLONOSCOPY      ESOPHAGEAL DILATATION      X 2    INGUINAL HERNIA REPAIR      right    JOINT REPLACEMENT      KNEE SURGERY Bilateral     PROSTATECTOMY  1998    carcinoma    SHOULDER ARTHROPLASTY      SHOULDER SURGERY      TOE SURGERY Right AST  22   ALT  13   BILITOT  0.4   ALKPHOS  93     PT/INR: No results for input(s): PROTIME, INR in the last 72 hours. APTT: No results for input(s): APTT in the last 72 hours. BNP:  No results for input(s): BNP in the last 72 hours. IMAGING:   EKG 8.11.18     Final 08/11/2018  8:46 AM 14   Sinus rhythm with occasional Premature ventricular complexes and Fusion complexes Intra-ventricular conduction delayLeft axis deviationVoltage criteria for left ventricular hypertrophyAbnormal ECGWhen compared with ECG of 10-AUG-2018 15:39,Fusion complexes are now PresentConfirmed by Tay Zuniga MD, [address] ([id number]) on 8/11/2018 9:15:35 AM      Chest xray 8.10.18  1. Interval progression of anterior wedge compression fracture of 1 of the   midthoracic vertebra compared to chest 04/25/2018. 2. Possible infiltrate or atelectasis central left lung base. 3. Senescent pulmonary changes. 4. Calcific atherosclerotic disease aorta. 5. Otherwise unremarkable chest.     Echo doppler of heart 8.11.18   Summary   Left ventricle systolic function is mildly reduced with ejection fraction of   46%. Inferior basal akinesia is present.   Normal left ventricle size.   Moderate concentric left ventricular hypertrophy.   Grade I diastolic dysfunction with normal filling pressure.   The aortic valve sclerosis but opens well.   Mild aortic regurgitation.   The aortic root is enlarged in size at 4.5cm.   Assessment:  Sinus bradycardia with PVC's  Near syncope at admission   Bladder sphincter stimulant makes it very hard to interpret EKG strips  Patient Active Problem List    Diagnosis Date Noted    Bradycardia     Mild dehydration     PVC (premature ventricular contraction)     Near syncope     History of total shoulder replacement, left 07/16/2018    Pneumonia due to organism 08/19/2017    Sepsis [address] 08/19/2017    Community acquired bacterial pneumonia 08/19/2017    Chronic diastolic congestive heart failure ([address] 08/19/2017    TIA

## 2018-08-12 NOTE — PROGRESS NOTES
Inpatient Occupational Therapy  Evaluation and Treatment    Unit:   PCU   Date:  8/12/2018  Patient Name:    Carlitos Dyer  Admitting diagnosis:  Dizziness [R42]  Admit Date:  8/10/2018  Precautions/Restrictions/WB Status/ Lines/ Wounds/ Oxygen: fall risk, bradycardia, dizziness  Treatment Time:  2416-4920  Treatment Number: 1    Patient Goals for Therapy:  \" to go home \"    Discharge Recommendations: Home with PRN assist  AM-PAC Score: 24    DME needs for discharge: Shower chair (discussed with pt, not interested at this time)       Preadmission Environment:    Pt. Lives alone. Has someone available to stay with him overnight for the first few nights. Home environment:  one story home   Steps to enter first floor: 2 GILB[address] with doorframe and handle   Bath: tub shower, and grab bars   Bed: Pt sleeps in flat bed   Equipment owned: cane     Preadmission Status:  Pt. Is able to drive. Pt. Ind. with cooking, selfcare (bathing, dressing, grooming, toileting). Daughter does the grocery shopping   Senior services comes in 2 hrs/wk: cleaning (dishes, laundry)   Pt. Fully independent for transfers and gait and walked with [facility]   Preferred Leisure Ax's: used to golf. Reads some     Fall Hx: Pt reports 3-4 falls in the 6 months. Pt reports he has tripped over items. OT/PT Hx: 10 days ago at [facility] (Pt reports he is not going to continue.)     Pain:  Pt with no c/o pain at rest.     Cognition:    A&O to self, place, and time. Follows 1 step and 2 step commands, consistently. Upper Extremity ROM/Strength:  [facility]EN[facility], pt able to perform all bed mobility, transfers, and gait without ROM limitation. Upper Extremity Sensation:  No apparent deficits for UE. Burning and tingling in feet.      Upper Extremity Proprioception:  No apparent deficits    Skin Integrity:  No apparent deficits    Coordination and Tone:  No apparent deficits     Activity Tolerance:  Initial: /49  Following mobility: /117     Balance: Static Sitting: Good  Dynamic Sitting: Good  Static Standing: Good  Dynamic Standing: Good (-); Pt with 1 therapist corrected LOB. Bed mobility:    Supine to sit: Indpt with HOB flat  Sit to supine: Indpt  Scooting to head of bed: Indpt  Scooting in sitting: Indpt  Rolling: Indpt    Transfers:    Sit to stand: Indpt up to RW; Sup with cane   Stand to sit: Indpt with RW; sup with cane   Pt completed functional mobility of household distance in preparation for standing ADL/IADLs this date with RW and supervision. Pt benefited from CGA and 1 episode of Abran with cane. ADLs:  LE dressing: Indpt  Eating: Indpt  Toileting: Sup for toilet transfer with use of crab bars  Grooming: Indpt to wash hands standing sink side     Positioning Needs: Pt in bed with needs in place. Patient/Family Education:  Role of OT, d/c planning, shower chair, home safety assessment  Handouts provided: fall prevention strategies, remaining in your home as you age     Family Present: None    Staff Transfer Message (as written in room): None provided, indpt walker     Assessment of Deficits: Self Care [id number]  Pt demonstrated decreased activity tolerance, decreased balance, however, not limiting completion of daily needs. Pt is not in need of acute skilled occupational therapy services at this time. Discussed with pt the benefits of HHOT and a home safety evaluation. Pt reports he feels comfortable without that assessment at this time. Educated pt on process of obtaining at a later date. At end of evaluation, pt. In bed, call light and needs within reach. RN notified. Pt discharged from acute skilled occupational therapy. No goals set. No plan of care established. Please reorder if pt status changes. Thank you.      Timed Code Treatment Minutes: 30 min    Total Treatment Time:   40   minutes    Signature and License #    Amalia Logan, MOT, OTR/L   [id number]           If patient discharges from this facility prior to next visit, this note

## 2018-08-12 NOTE — CARE COORDINATION
DISCHARGE ORDER  Date/Time 2018 12:54 PM  Completed by: Josie Castillo, Case Management    Patient Name: Jason Macario    : 1927  Admitting Diagnosis: Dizziness [R42]  Admit Date/Time: 8/10/2018  3:34 PM    Noted discharge order. Confirmed discharge plan with patient / family (pt): Yes   Discharge Plan: pt cont to plan to go home at discharge. Denies needs.

## 2018-08-13 ENCOUNTER — TELEPHONE (OUTPATIENT)
Dept: CARDIOLOGY CLINIC | Age: 83
End: 2018-08-13

## 2018-08-13 DIAGNOSIS — R00.1 BRADYCARDIA: Primary | ICD-10-CM

## 2018-08-13 DIAGNOSIS — R55 NEAR SYNCOPE: ICD-10-CM

## 2018-08-13 LAB
ORGANISM: ABNORMAL
ORGANISM: ABNORMAL
URINE CULTURE, ROUTINE: ABNORMAL

## 2018-08-14 ENCOUNTER — HOSPITAL ENCOUNTER (OUTPATIENT)
Dept: NON INVASIVE DIAGNOSTICS | Age: 83
Discharge: HOME | End: 2018-08-14
Payer: MEDICARE

## 2018-08-14 DIAGNOSIS — R55 NEAR SYNCOPE: ICD-10-CM

## 2018-08-14 DIAGNOSIS — R00.1 BRADYCARDIA: ICD-10-CM

## 2018-08-14 PROCEDURE — 93225 XTRNL ECG REC<48 HRS REC: CPT

## 2018-08-16 LAB
ACQUISITION DURATION: NORMAL S
AVERAGE HEART RATE: 58 BPM
EKG DIAGNOSIS: NORMAL
FASTEST SUPRAVENTRICULAR RATE: 141 BPM
FASTEST VENTRICULAR RATE: 144 BPM
HOLTER MAX HEART RATE: 73 BPM
HOOKUP DATE: NORMAL
HOOKUP TIME: NORMAL
LONGEST SUPRAVENTRICULAR RATE: 129 BPM
LONGEST VE RUN RATE: 125 BPM
MAX HEART RATE TIME/DATE: NORMAL
MIN HEART RATE TIME/DATE: NORMAL
MIN HEART RATE: 44 BPM
NUMBER OF FASTEST SUPRAVENTRICULAR BEATS: 4
NUMBER OF FASTEST VENTRICULAR BEATS: 3
NUMBER OF LONGEST SUPRAVENTRICULAR BEATS: 6
NUMBER OF LONGEST VENTRICULAR BEATS: 5
NUMBER OF QRS COMPLEXES: NORMAL
NUMBER OF SUPRAVENTRICULAR BEATS IN RUNS: 10
NUMBER OF SUPRAVENTRICULAR COUPLETS: 2
NUMBER OF SUPRAVENTRICULAR ECTOPICS: 83
NUMBER OF SUPRAVENTRICULAR ISOLATED BEATS: 69
NUMBER OF SUPRAVENTRICULAR RUNS: 2
NUMBER OF VENTRICULAR BEATS IN RUNS: 101
NUMBER OF VENTRICULAR BIGEMINAL CYCLES: 6585
NUMBER OF VENTRICULAR COUPLETS: 173
NUMBER OF VENTRICULAR ECTOPICS: NORMAL
NUMBER OF VENTRICULAR ISOLATED BEATS: NORMAL
NUMBER OF VENTRICULAR RUNS: 33

## 2018-08-17 ENCOUNTER — TELEPHONE (OUTPATIENT)
Dept: CARDIOLOGY CLINIC | Age: 83
End: 2018-08-17

## 2018-08-17 RX ORDER — METOPROLOL SUCCINATE 25 MG/1
25 TABLET, EXTENDED RELEASE ORAL DAILY
Qty: 30 TABLET | Refills: 3 | Status: SHIPPED | OUTPATIENT
Start: 2018-08-17 | End: 2018-09-04

## 2018-08-17 NOTE — TELEPHONE ENCOUNTER
Spoke with Annalee Devries, relayed message per [facility] regarding monitor results. Pt verbalized understanding.

## 2018-08-31 ENCOUNTER — TELEPHONE (OUTPATIENT)
Dept: CARDIOLOGY CLINIC | Age: 83
End: 2018-08-31

## 2018-09-04 ENCOUNTER — HOSPITAL ENCOUNTER (EMERGENCY)
Age: 83
Discharge: ANOTHER HOSPITAL | End: 2018-09-04
Attending: EMERGENCY MEDICINE
Payer: MEDICARE

## 2018-09-04 ENCOUNTER — HOSPITAL ENCOUNTER (OUTPATIENT)
Age: 83
Setting detail: OBSERVATION
Discharge: NURSING, REHAB OR LONG-TERM CARE | End: 2018-09-06
Attending: INTERNAL MEDICINE | Admitting: INTERNAL MEDICINE
Payer: MEDICARE

## 2018-09-04 ENCOUNTER — APPOINTMENT (OUTPATIENT)
Dept: CT IMAGING | Age: 83
End: 2018-09-04
Payer: MEDICARE

## 2018-09-04 ENCOUNTER — APPOINTMENT (OUTPATIENT)
Dept: GENERAL RADIOLOGY | Age: 83
End: 2018-09-04
Payer: MEDICARE

## 2018-09-04 VITALS
RESPIRATION RATE: 18 BRPM | SYSTOLIC BLOOD PRESSURE: 161 MMHG | DIASTOLIC BLOOD PRESSURE: 66 MMHG | OXYGEN SATURATION: 100 % | BODY MASS INDEX: 23.73 KG/M2 | WEIGHT: 175 LBS | TEMPERATURE: 97.6 F | HEART RATE: 81 BPM

## 2018-09-04 DIAGNOSIS — G45.0 VERTEBROBASILAR ARTERY SYNDROME: Primary | ICD-10-CM

## 2018-09-04 LAB
A/G RATIO: 1.4 (ref 1.1–2.2)
ALBUMIN SERPL-MCNC: 3.9 G/DL (ref 3.4–5)
ALP BLD-CCNC: 88 U/L (ref 40–129)
ALT SERPL-CCNC: 16 U/L (ref 10–40)
AMPHETAMINE SCREEN, URINE: NORMAL
ANION GAP SERPL CALCULATED.3IONS-SCNC: 16 MMOL/L (ref 3–16)
AST SERPL-CCNC: 25 U/L (ref 15–37)
BARBITURATE SCREEN URINE: NORMAL
BASOPHILS ABSOLUTE: 0.1 K/UL (ref 0–0.2)
BASOPHILS RELATIVE PERCENT: 1.4 %
BENZODIAZEPINE SCREEN, URINE: NORMAL
BILIRUB SERPL-MCNC: 0.5 MG/DL (ref 0–1)
BILIRUBIN URINE: NEGATIVE
BLOOD, URINE: NEGATIVE
BUN BLDV-MCNC: 22 MG/DL (ref 7–20)
C-REACTIVE PROTEIN: 5.5 MG/L (ref 0–5.1)
CALCIUM SERPL-MCNC: 9.3 MG/DL (ref 8.3–10.6)
CANNABINOID SCREEN URINE: NORMAL
CHLORIDE BLD-SCNC: 102 MMOL/L (ref 99–110)
CHP ED QC CHECK: YES
CLARITY: CLEAR
CO2: 21 MMOL/L (ref 21–32)
COCAINE METABOLITE SCREEN URINE: NORMAL
COLOR: YELLOW
CREAT SERPL-MCNC: 1.5 MG/DL (ref 0.8–1.3)
EKG ATRIAL RATE: 65 BPM
EKG DIAGNOSIS: NORMAL
EKG Q-T INTERVAL: 446 MS
EKG QRS DURATION: 98 MS
EKG QTC CALCULATION (BAZETT): 463 MS
EKG R AXIS: -71 DEGREES
EKG T AXIS: 32 DEGREES
EKG VENTRICULAR RATE: 65 BPM
EOSINOPHILS ABSOLUTE: 0.1 K/UL (ref 0–0.6)
EOSINOPHILS RELATIVE PERCENT: 1.2 %
EPITHELIAL CELLS, UA: NORMAL /HPF
GFR AFRICAN AMERICAN: 53
GFR NON-AFRICAN AMERICAN: 44
GLOBULIN: 2.7 G/DL
GLUCOSE BLD-MCNC: 103 MG/DL
GLUCOSE BLD-MCNC: 103 MG/DL (ref 70–99)
GLUCOSE BLD-MCNC: 114 MG/DL (ref 70–99)
GLUCOSE URINE: NEGATIVE MG/DL
HCT VFR BLD CALC: 45.2 % (ref 40.5–52.5)
HEMOGLOBIN: 15.5 G/DL (ref 13.5–17.5)
INR BLD: 1.1 (ref 0.86–1.14)
KETONES, URINE: NEGATIVE MG/DL
LEUKOCYTE ESTERASE, URINE: NEGATIVE
LYMPHOCYTES ABSOLUTE: 2.2 K/UL (ref 1–5.1)
LYMPHOCYTES RELATIVE PERCENT: 21.8 %
Lab: NORMAL
MCH RBC QN AUTO: 31.2 PG (ref 26–34)
MCHC RBC AUTO-ENTMCNC: 34.2 G/DL (ref 31–36)
MCV RBC AUTO: 91.3 FL (ref 80–100)
METHADONE SCREEN, URINE: NORMAL
MICROSCOPIC EXAMINATION: YES
MONOCYTES ABSOLUTE: 1.1 K/UL (ref 0–1.3)
MONOCYTES RELATIVE PERCENT: 10.5 %
NEUTROPHILS ABSOLUTE: 6.5 K/UL (ref 1.7–7.7)
NEUTROPHILS RELATIVE PERCENT: 65.1 %
NITRITE, URINE: NEGATIVE
OPIATE SCREEN URINE: NORMAL
OXYCODONE URINE: NORMAL
PDW BLD-RTO: 14.2 % (ref 12.4–15.4)
PERFORMED ON: ABNORMAL
PH UA: 7.5
PH UA: 7.5
PHENCYCLIDINE SCREEN URINE: NORMAL
PLATELET # BLD: 183 K/UL (ref 135–450)
PMV BLD AUTO: 9.5 FL (ref 5–10.5)
POTASSIUM SERPL-SCNC: 4.7 MMOL/L (ref 3.5–5.1)
PRO-BNP: 2898 PG/ML (ref 0–449)
PROPOXYPHENE SCREEN: NORMAL
PROTEIN UA: ABNORMAL MG/DL
PROTHROMBIN TIME: 12.5 SEC (ref 9.8–13)
RBC # BLD: 4.95 M/UL (ref 4.2–5.9)
RBC UA: NORMAL /HPF (ref 0–2)
SEDIMENTATION RATE, ERYTHROCYTE: 7 MM/HR (ref 0–20)
SODIUM BLD-SCNC: 139 MMOL/L (ref 136–145)
SPECIFIC GRAVITY UA: 1.01
TOTAL CK: 59 U/L (ref 39–308)
TOTAL PROTEIN: 6.6 G/DL (ref 6.4–8.2)
TROPONIN: <0.01 NG/ML
URINE TYPE: ABNORMAL
UROBILINOGEN, URINE: 0.2 E.U./DL
WBC # BLD: 10 K/UL (ref 4–11)
WBC UA: NORMAL /HPF (ref 0–5)

## 2018-09-04 PROCEDURE — 83880 ASSAY OF NATRIURETIC PEPTIDE: CPT

## 2018-09-04 PROCEDURE — 85025 COMPLETE CBC W/AUTO DIFF WBC: CPT

## 2018-09-04 PROCEDURE — 96374 THER/PROPH/DIAG INJ IV PUSH: CPT

## 2018-09-04 PROCEDURE — 6370000000 HC RX 637 (ALT 250 FOR IP): Performed by: INTERNAL MEDICINE

## 2018-09-04 PROCEDURE — 51798 US URINE CAPACITY MEASURE: CPT

## 2018-09-04 PROCEDURE — 99223 1ST HOSP IP/OBS HIGH 75: CPT | Performed by: PSYCHIATRY & NEUROLOGY

## 2018-09-04 PROCEDURE — 97535 SELF CARE MNGMENT TRAINING: CPT

## 2018-09-04 PROCEDURE — 70450 CT HEAD/BRAIN W/O DYE: CPT

## 2018-09-04 PROCEDURE — 82550 ASSAY OF CK (CPK): CPT

## 2018-09-04 PROCEDURE — G8988 SELF CARE GOAL STATUS: HCPCS

## 2018-09-04 PROCEDURE — 6360000002 HC RX W HCPCS: Performed by: INTERNAL MEDICINE

## 2018-09-04 PROCEDURE — 51701 INSERT BLADDER CATHETER: CPT

## 2018-09-04 PROCEDURE — G0378 HOSPITAL OBSERVATION PER HR: HCPCS

## 2018-09-04 PROCEDURE — 96372 THER/PROPH/DIAG INJ SC/IM: CPT

## 2018-09-04 PROCEDURE — 85652 RBC SED RATE AUTOMATED: CPT

## 2018-09-04 PROCEDURE — 93005 ELECTROCARDIOGRAM TRACING: CPT | Performed by: EMERGENCY MEDICINE

## 2018-09-04 PROCEDURE — 96361 HYDRATE IV INFUSION ADD-ON: CPT

## 2018-09-04 PROCEDURE — 36415 COLL VENOUS BLD VENIPUNCTURE: CPT

## 2018-09-04 PROCEDURE — 2580000003 HC RX 258: Performed by: INTERNAL MEDICINE

## 2018-09-04 PROCEDURE — 97162 PT EVAL MOD COMPLEX 30 MIN: CPT

## 2018-09-04 PROCEDURE — G8989 SELF CARE D/C STATUS: HCPCS

## 2018-09-04 PROCEDURE — 85610 PROTHROMBIN TIME: CPT

## 2018-09-04 PROCEDURE — 86140 C-REACTIVE PROTEIN: CPT

## 2018-09-04 PROCEDURE — G8987 SELF CARE CURRENT STATUS: HCPCS

## 2018-09-04 PROCEDURE — G8979 MOBILITY GOAL STATUS: HCPCS

## 2018-09-04 PROCEDURE — 99285 EMERGENCY DEPT VISIT HI MDM: CPT

## 2018-09-04 PROCEDURE — 81001 URINALYSIS AUTO W/SCOPE: CPT

## 2018-09-04 PROCEDURE — 97116 GAIT TRAINING THERAPY: CPT

## 2018-09-04 PROCEDURE — 84484 ASSAY OF TROPONIN QUANT: CPT

## 2018-09-04 PROCEDURE — 93010 ELECTROCARDIOGRAM REPORT: CPT | Performed by: INTERNAL MEDICINE

## 2018-09-04 PROCEDURE — 71045 X-RAY EXAM CHEST 1 VIEW: CPT

## 2018-09-04 PROCEDURE — 80053 COMPREHEN METABOLIC PANEL: CPT

## 2018-09-04 PROCEDURE — 97530 THERAPEUTIC ACTIVITIES: CPT

## 2018-09-04 PROCEDURE — 80307 DRUG TEST PRSMV CHEM ANLYZR: CPT

## 2018-09-04 PROCEDURE — 97166 OT EVAL MOD COMPLEX 45 MIN: CPT

## 2018-09-04 PROCEDURE — G8978 MOBILITY CURRENT STATUS: HCPCS

## 2018-09-04 RX ORDER — GABAPENTIN 100 MG/1
100 CAPSULE ORAL 2 TIMES DAILY
Status: DISCONTINUED | OUTPATIENT
Start: 2018-09-04 | End: 2018-09-06 | Stop reason: HOSPADM

## 2018-09-04 RX ORDER — AMLODIPINE BESYLATE 2.5 MG/1
2.5 TABLET ORAL EVERY MORNING
Status: DISCONTINUED | OUTPATIENT
Start: 2018-09-04 | End: 2018-09-06

## 2018-09-04 RX ORDER — FINASTERIDE 5 MG/1
5 TABLET, FILM COATED ORAL DAILY
Status: DISCONTINUED | OUTPATIENT
Start: 2018-09-04 | End: 2018-09-05

## 2018-09-04 RX ORDER — SODIUM CHLORIDE 0.9 % (FLUSH) 0.9 %
10 SYRINGE (ML) INJECTION PRN
Status: DISCONTINUED | OUTPATIENT
Start: 2018-09-04 | End: 2018-09-06 | Stop reason: HOSPADM

## 2018-09-04 RX ORDER — SODIUM CHLORIDE 9 MG/ML
INJECTION, SOLUTION INTRAVENOUS CONTINUOUS
Status: DISCONTINUED | OUTPATIENT
Start: 2018-09-04 | End: 2018-09-06 | Stop reason: HOSPADM

## 2018-09-04 RX ORDER — GABAPENTIN 100 MG/1
100 CAPSULE ORAL 3 TIMES DAILY
Status: DISCONTINUED | OUTPATIENT
Start: 2018-09-04 | End: 2018-09-04

## 2018-09-04 RX ORDER — ACETAMINOPHEN 325 MG/1
650 TABLET ORAL EVERY 4 HOURS PRN
Status: DISCONTINUED | OUTPATIENT
Start: 2018-09-04 | End: 2018-09-06 | Stop reason: HOSPADM

## 2018-09-04 RX ORDER — HYDROMORPHONE HCL 110MG/55ML
0.5 PATIENT CONTROLLED ANALGESIA SYRINGE INTRAVENOUS
Status: DISCONTINUED | OUTPATIENT
Start: 2018-09-04 | End: 2018-09-04

## 2018-09-04 RX ORDER — PREDNISONE 1 MG/1
5 TABLET ORAL DAILY
Status: DISCONTINUED | OUTPATIENT
Start: 2018-09-04 | End: 2018-09-04

## 2018-09-04 RX ORDER — SODIUM CHLORIDE 0.9 % (FLUSH) 0.9 %
10 SYRINGE (ML) INJECTION EVERY 12 HOURS SCHEDULED
Status: DISCONTINUED | OUTPATIENT
Start: 2018-09-04 | End: 2018-09-06 | Stop reason: HOSPADM

## 2018-09-04 RX ORDER — GABAPENTIN 100 MG/1
100 CAPSULE ORAL 3 TIMES DAILY
Status: ON HOLD | COMMUNITY
End: 2018-09-06 | Stop reason: HOSPADM

## 2018-09-04 RX ORDER — MECLIZINE HCL 12.5 MG/1
12.5 TABLET ORAL 3 TIMES DAILY PRN
Status: ON HOLD | COMMUNITY
End: 2018-09-06 | Stop reason: HOSPADM

## 2018-09-04 RX ORDER — 0.9 % SODIUM CHLORIDE 0.9 %
1000 INTRAVENOUS SOLUTION INTRAVENOUS ONCE
Status: COMPLETED | OUTPATIENT
Start: 2018-09-04 | End: 2018-09-04

## 2018-09-04 RX ORDER — CLOPIDOGREL BISULFATE 75 MG/1
75 TABLET ORAL DAILY
Status: DISCONTINUED | OUTPATIENT
Start: 2018-09-04 | End: 2018-09-06 | Stop reason: HOSPADM

## 2018-09-04 RX ORDER — ONDANSETRON 2 MG/ML
4 INJECTION INTRAMUSCULAR; INTRAVENOUS EVERY 6 HOURS PRN
Status: DISCONTINUED | OUTPATIENT
Start: 2018-09-04 | End: 2018-09-04

## 2018-09-04 RX ORDER — HEPARIN SODIUM 5000 [USP'U]/ML
5000 INJECTION, SOLUTION INTRAVENOUS; SUBCUTANEOUS EVERY 8 HOURS SCHEDULED
Status: DISCONTINUED | OUTPATIENT
Start: 2018-09-04 | End: 2018-09-05

## 2018-09-04 RX ORDER — SIMVASTATIN 40 MG
40 TABLET ORAL DAILY
Status: DISCONTINUED | OUTPATIENT
Start: 2018-09-04 | End: 2018-09-06 | Stop reason: HOSPADM

## 2018-09-04 RX ORDER — ESCITALOPRAM OXALATE 10 MG/1
10 TABLET ORAL DAILY
Status: DISCONTINUED | OUTPATIENT
Start: 2018-09-04 | End: 2018-09-06 | Stop reason: HOSPADM

## 2018-09-04 RX ORDER — FAMOTIDINE 20 MG/1
40 TABLET, FILM COATED ORAL DAILY
Status: DISCONTINUED | OUTPATIENT
Start: 2018-09-04 | End: 2018-09-06 | Stop reason: HOSPADM

## 2018-09-04 RX ORDER — PREDNISONE 1 MG/1
5 TABLET ORAL DAILY
Status: DISCONTINUED | OUTPATIENT
Start: 2018-09-05 | End: 2018-09-06 | Stop reason: HOSPADM

## 2018-09-04 RX ORDER — MIRTAZAPINE 15 MG/1
15 TABLET, FILM COATED ORAL NIGHTLY
Status: DISCONTINUED | OUTPATIENT
Start: 2018-09-04 | End: 2018-09-06 | Stop reason: HOSPADM

## 2018-09-04 RX ORDER — ASPIRIN 81 MG/1
162 TABLET, CHEWABLE ORAL DAILY
Status: DISCONTINUED | OUTPATIENT
Start: 2018-09-04 | End: 2018-09-06 | Stop reason: HOSPADM

## 2018-09-04 RX ADMIN — HEPARIN SODIUM 5000 UNITS: 5000 INJECTION INTRAVENOUS; SUBCUTANEOUS at 07:15

## 2018-09-04 RX ADMIN — FINASTERIDE 5 MG: 5 TABLET, FILM COATED ORAL at 15:06

## 2018-09-04 RX ADMIN — HEPARIN SODIUM 5000 UNITS: 5000 INJECTION INTRAVENOUS; SUBCUTANEOUS at 22:35

## 2018-09-04 RX ADMIN — MIRTAZAPINE 15 MG: 15 TABLET, FILM COATED ORAL at 21:07

## 2018-09-04 RX ADMIN — FAMOTIDINE 40 MG: 20 TABLET ORAL at 09:42

## 2018-09-04 RX ADMIN — GABAPENTIN 100 MG: 100 CAPSULE ORAL at 09:42

## 2018-09-04 RX ADMIN — ESCITALOPRAM OXALATE 10 MG: 10 TABLET ORAL at 09:43

## 2018-09-04 RX ADMIN — SODIUM CHLORIDE: 9 INJECTION, SOLUTION INTRAVENOUS at 15:07

## 2018-09-04 RX ADMIN — SODIUM CHLORIDE 1000 ML: 9 INJECTION, SOLUTION INTRAVENOUS at 09:43

## 2018-09-04 RX ADMIN — HYDROCORTISONE SODIUM SUCCINATE 50 MG: 100 INJECTION, POWDER, FOR SOLUTION INTRAMUSCULAR; INTRAVENOUS at 09:43

## 2018-09-04 RX ADMIN — ASPIRIN 81 MG 162 MG: 81 TABLET ORAL at 09:42

## 2018-09-04 RX ADMIN — SODIUM CHLORIDE, PRESERVATIVE FREE 10 ML: 5 INJECTION INTRAVENOUS at 09:43

## 2018-09-04 RX ADMIN — CLOPIDOGREL BISULFATE 75 MG: 75 TABLET ORAL at 09:42

## 2018-09-04 RX ADMIN — SIMVASTATIN 40 MG: 40 TABLET, FILM COATED ORAL at 09:42

## 2018-09-04 RX ADMIN — GABAPENTIN 100 MG: 100 CAPSULE ORAL at 21:07

## 2018-09-04 RX ADMIN — AMLODIPINE BESYLATE 2.5 MG: 2.5 TABLET ORAL at 09:42

## 2018-09-04 RX ADMIN — HEPARIN SODIUM 5000 UNITS: 5000 INJECTION INTRAVENOUS; SUBCUTANEOUS at 15:06

## 2018-09-04 ASSESSMENT — PAIN SCALES - GENERAL
PAINLEVEL_OUTOF10: 2
PAINLEVEL_OUTOF10: 0
PAINLEVEL_OUTOF10: 2
PAINLEVEL_OUTOF10: 0
PAINLEVEL_OUTOF10: 2
PAINLEVEL_OUTOF10: 2
PAINLEVEL_OUTOF10: 0

## 2018-09-04 ASSESSMENT — PAIN DESCRIPTION - DESCRIPTORS
DESCRIPTORS: ACHING;SORE
DESCRIPTORS: ACHING;SORE

## 2018-09-04 NOTE — ED NOTES
Report given to Maryann at [facility] at this time     Shea Haskins, JOHN  09/04/18 8158

## 2018-09-04 NOTE — PLAN OF CARE
Problem: Nutrition  Goal: Optimal nutrition therapy  Outcome: Ongoing  Nutrition Problem: Predicted suboptimal energy intake  Intervention: Food and/or Nutrient Delivery: Modify current diet, Start ONS  Nutritional Goals:  Tolerate diet and have intakes 50% or greater this admission

## 2018-09-04 NOTE — CONSULTS
0639    clopidogrel (PLAVIX) tablet 75 mg  75 mg Oral Daily Chon Holland MD   75 mg at 09/04/18 0942    escitalopram (LEXAPRO) tablet 10 mg  10 mg Oral Daily Chon Holland MD   10 mg at 09/04/18 0943    famotidine (PEPCID) tablet 40 mg  40 mg Oral Daily Chon Holland MD   40 mg at 09/04/18 7793    mirtazapine (REMERON) tablet 15 mg  15 mg Oral Nightly Chon Holland MD        simvastatin (ZOCOR) tablet 40 mg  40 mg Oral Daily Chon Holland MD   40 mg at 09/04/18 4405    heparin (porcine) injection 5,000 Units  5,000 Units Subcutaneous 3 times per day Chon Holland MD   5,000 Units at 09/04/18 0715    gabapentin (NEURONTIN) capsule 100 mg  100 mg Oral BID Roselie Dandy, MD   100 mg at 09/04/18 5387    prochlorperazine (COMPAZINE) injection 5 mg  5 mg Intravenous Q6H PRN Roselie Dandy, MD       [facility] [START ON 9/5/2018] predniSONE (DELTASONE) tablet 5 mg  5 mg Oral Daily Roselie Dandy, MD        0.9 % sodium chloride bolus  1,000 mL Intravenous Once Roselie Dandy,  mL/hr at 09/04/18 0943 1,000 mL at 09/04/18 0943    0.9 % sodium chloride infusion   Intravenous Continuous Roselie Dandy, MD        acetaminophen (TYLENOL) tablet 650 mg  650 mg Oral Q4H PRN Roselie Dandy, MD           ROS : A 10-12 system review of constitutional, cardiovascular, respiratory, musculoskeletal, endocrine, skin, hematological, SHEENT, genitourinary, psychiatric and neurologic systems was obtained and updated today and is unremarkable except as mentioned in my HPI      Exam:   Constitutional:   Vitals:    09/04/18 0504 09/04/18 0717   BP: (!) 153/84 (!) 161/74   Pulse: (!) 40 67   Resp: 20 18   Temp: 98 °F (36.7 °C) 99.4 °F (37.4 °C)   TempSrc: Oral Oral   SpO2: 97% 98%   Height: 6' (1.829 m)        General appearance: NAD  Eye: No icterus. No blurring of optic disc. Neck: supple  Cardiovascular: No carotid bruit. No lower leg edema with good pulsation. Mental Status: Oriented to person, place, problem, and time.

## 2018-09-04 NOTE — PROGRESS NOTES
PT/OT this afternoon. \"I'm feeling about three times better today than I did yesterday. \"  Pt c/o \"bad lightheadedness and headache\" when he was first admitted to hospital, although states that has since resolved.   Pain Screening  Patient Currently in Pain: Denies  Intervention List: Patient able to continue with treatment    Orientation  Orientation  Overall Orientation Status: Impaired  Orientation Level: Oriented to situation;Oriented to person;Disoriented to place;Oriented to time    Social/Functional History  Social/Functional History  Lives With: Alone  Type of Home: House  Home Layout: One level  Home Access: Stairs to enter with rails (2 GILBERTO (door frame & handle available for pt to hold onto))  Entrance Stairs - Rails: Left (ascending)  Bathroom Shower/Tub: Tub/Shower unit  Bathroom Toilet: Standard  Bathroom Equipment: Grab bars in shower  Home Equipment: Grab bars, Rolling walker, Cane  Receives Help From: Other (comment), Family ([employer] services 2x/week for cleaning & laundry (although family reports whether or not they show is very inconsistent), daughter does grocery shopping & recently has been doing all homemaking for pt)  ADL Assistance: Independent  Homemaking Assistance: Needs assistance  Ambulation Assistance: Independent (occasionally uses cane or RW (pt states \"I should\") although pt reports he typically just furniture-walks)  Transfer Assistance: Independent  Active : Yes  Occupation: Retired  Leisure & Hobbies: Reading, used to enjoy golfing  Objective  Observation/Palpation  Posture: Fair    RLE AROM: WFL  LLE AROM : WFL  Strength RLE: Grossly 4-/5 to 4/5 throughout  Strength LLE: Grossly 4-/5 to 4/5 throughout     Bed mobility  Supine to Sit: Supervision (HOB elevated ~15 degrees)  Scooting: Supervision (to scoot forward to EOB)     Transfers  Sit to Stand: Contact guard assistance  Stand to sit: Contact guard assistance  Bed to Chair: Contact guard assistance (using RW, pt mildly unsteady during transfers)     Ambulation 1  Surface: level tile  Device: No Device  Assistance: Minimal assistance  Quality of Gait: Pt amb with mildly unsteady gait with wide NOÉ, reaching outside NOÉ for nearby objects to steady himself. Distance: x 25 feet  Comments: Discontinued amb at this point without device due to pt's increasing unsteadiness. Ambulation 2  Surface - 2: level tile  Device 2: Rolling Walker  Assistance 2: Contact guard assistance  Quality of Gait 2: Pt amb with slow denise but with improved degree of steadiness when using RW vs. no device. Demonstrates decreased step length and foot clearance bilat. Min cues needed to turn safely and with proper technique while managing RW. Distance: x 125 feet    Balance  Posture: Fair  Sitting - Static: Good  Sitting - Dynamic: Fair;+  Standing - Static: Fair;+  Standing - Dynamic: Fair;-    Assessment   Body structures, Functions, Activity limitations: Decreased functional mobility ; Decreased endurance;Decreased balance;Decreased strength  Assessment: Pt referred for PT evaluation during current hospital stay with dx of acute-on-chronic lightheadedness due to possible dehydration. Pt currently functioning below his baseline with regard to strength, balance, and (I) with mobility, requiring consistent use of RW and min/CGA x 1 for safe transfers/amb. Pt demonstrates noticeably unsteady gait when attempting to amb without AD. Mild-moderate c/o lightheadedness evident when standing or after ambulating, although no significant change in BP or HR observed with position changes/activity. Given pt's current deficits, hx of falls (pt reporting falling approximately once every 2 weeks at home), and previously (I) LOF, recommend IP rehab at D/C.   Treatment Diagnosis: Impaired standing balance, decreased (I) with mobility  Specific instructions for Next Treatment: Progress ther ex and mobility as noemi, gait training with RW  Prognosis: Good  Decision

## 2018-09-04 NOTE — ED NOTES
Pt more alert and able to give name and birth date, pt states I just dont feel no good     Alessia Oscar RN  09/04/18 3761

## 2018-09-04 NOTE — PROGRESS NOTES
Patient admitted to room 208 from Emergency Department. Patient oriented to room, call light, bed rails, phone, lights and bathroom. Patient instructed about the schedule of the day including: vital sign frequency, lab draws, possible tests, frequency of MD and staff rounds, including RN/MD rounding together at bedside, daily weights, and I &O's. Patient instructed about prescribed diet, how to use 8MENU, and television. Bed alarm in place, patient aware of placement and reason. Telemetry box in place, patient aware of placement and reason. Bed locked, in lowest position, side rails up 2/4, call light within reach. Will continue to monitor.       Electronically signed by Joe Ovalle RN on 9/4/2018 at 5:04 AM

## 2018-09-04 NOTE — PROGRESS NOTES
mentioned occupational performance deficits which are affecting participation in daily living skills. Pt would benefit from continued skilled occupational therapy to address ADLs, functional mobility, and safety while in acute care. Prognosis: Good  Decision Making: Low Complexity  Patient Education: ADLs, bed mobility, functional transfers and role  of OT  REQUIRES OT FOLLOW UP: Yes  Activity Tolerance  Activity Tolerance: Patient limited by fatigue;Patient Tolerated treatment well  Activity Tolerance: Vitals during session:  Seated EOB: /71, HR 47 bpm, O2 sat 95% on room air. Standing: /66, HR 70 bpm, O2 sat 94%. After functional mobility: /94, HR 46 bpm, O2 sat 95%. HR readings inconsistent via pulse oximeter (HR consistently read in the low- to mid-70s on telemetry monitor). Pt with mild-moderate c/o lightheadedness when standing and while amb. Safety Devices  Safety Devices in place: Yes  Type of devices: Left in chair;Call light within reach;Nurse notified;Gait belt; Chair alarm in place         Plan   Plan  Times per week: 3-5x's a week while in acute care    G-Code  OT G-codes  Functional Assessment Tool Used: -PAC  Score: 19  Functional Limitation: Self care  Self Care Current Status (): At least 40 percent but less than 60 percent impaired, limited or restricted  Self Care Goal Status ():  At least 20 percent but less than 40 percent impaired, limited or restricted    AM-PAC Score        AM-[facility] Inpatient Daily Activity Raw Score: 19  AM-PAC Inpatient ADL T-Scale Score : 40.22  ADL Inpatient CMS 0-100% Score: 42.8  ADL Inpatient CMS G-Code Modifier : CK    Goals  Short term goals  Time Frame for Short term goals: 1 week unless otherwise specified  Short term goal 1: Pt. will complete LE dressing with SBA by 9/8  Short term goal 2: Pt will complete toilet transfers with SBA  Short term goal 3: Pt will complete standing level ADLs with SBA for balance  Short term goal 4: Pt

## 2018-09-04 NOTE — H&P
Hospital Medicine History & Physical      PCP: Bridgette Delatorre MD    Date of Admission: 9/4/2018    Date of Service: Pt seen/examined on 09/04/18 and Admitted to Inpatient with expected LOS greater than two midnights due to medical therapy. Chief Complaint:  Worse dizziness and headache than normal    History Of Present Illness:   80 Y M with a h/o former smoking, HTN, HLD, prostate cancer s/p prostatectomy, and TIA was sent to the [facility] ED by his family because his chronic dizziness, fatigue, and generalized weakness were worse than normal.  The [facility] ED thought a CVA was possible so he was transferred to [facility] overnight for neurology consultation. The patient was recently discharged with [facility] on 8/12 after being admitted with 2 years of lightheadedness and fatigue. During that admission he felt better after IVFs. Cardiology noted that he was bradycardic and suggested an outpatient 24 hr holter monitor and perhaps turning down the intensity of his bladder neck stimulator. PT/OT rec'd PRN assistance during that stay. He was resting calmly in bed with family present. Main complaint is headache. Frontal, constant, severe, present since last night. No h/o migraines.         Past Medical History:          Diagnosis Date    Arthralgia     Cancer [facility])     prostate    Chronic back pain     x40 years    Chronic diastolic (congestive) heart failure (HCC)     Diverticulitis     Dizziness     Fatigue     Fractures     Hearing decreased     Hyperlipidemia     Hypertension     Osteoarthritis of knee     Osteoarthritis of shoulder     TIA (transient ischemic attack) 2006    Vertigo     Wears glasses        Past Surgical History:          Procedure Laterality Date    CATARACT REMOVAL  1997    bilaterally    COLONOSCOPY      ESOPHAGEAL DILATATION      X 2    INGUINAL HERNIA REPAIR      right    JOINT REPLACEMENT      KNEE SURGERY Bilateral     PROSTATECTOMY  1998    carcinoma    SHOULDER Allergies:  Altace [ramipril] and Sulfa antibiotics    Social History:      The patient currently lives at home alone    TOBACCO:   reports that he quit smoking about 69 years ago. His smoking use included Cigarettes. He has never used smokeless tobacco.  ETOH:   reports that he does not drink alcohol. Family History:      Reviewed in detail and negative for DM, Cancer, CVA. Positive as follows:    Family History   Problem Relation Age of Onset    Heart Disease Mother         MI    Parkinsonism Father        REVIEW OF SYSTEMS:   Pertinent positives as noted in the HPI. All other systems reviewed and negative. PHYSICAL EXAM PERFORMED:    BP (!) 161/74   Pulse 67   Temp 99.4 °F (37.4 °C) (Oral)   Resp 18   Ht 6' (1.829 m)   SpO2 98%   BMI 23.73 kg/m²     General appearance:  No apparent distress, appears stated age and cooperative. HEENT:  Normal cephalic, atraumatic without obvious deformity. Pupils equal, round, and reactive to light. Extra ocular muscles intact. Conjunctivae/corneas clear. Neck: Supple, with full range of motion. No jugular venous distention. Trachea midline. Respiratory:  Normal respiratory effort. Clear to auscultation, bilaterally without Rales/Wheezes/Rhonchi. Cardiovascular:  Regular rate and rhythm with normal S1/S2 without murmurs, rubs or gallops. Abdomen: Soft, non-tender, non-distended with normal bowel sounds. Musculoskeletal:  No clubbing, cyanosis or edema bilaterally. Full range of motion without deformity. Skin: Skin color, texture, turgor normal.  No rashes or lesions. Neurologic:  Neurovascularly intact without any focal sensory/motor deficits. Cranial nerves: II-XII intact, grossly non-focal.  Tremulous extremities. Psychiatric:  Alert, oriented to self, year, and hospital, wrong month. Poor concentration, limited insight.   Capillary Refill: Brisk,< 3 seconds   Peripheral Pulses: +2 palpable, equal bilaterally       Labs:     Recent Labs

## 2018-09-04 NOTE — PLAN OF CARE
Problem: Falls - Risk of:  Goal: Will remain free from falls  Will remain free from falls   Outcome: Ongoing  Pt high fall risk. Instructed to use call light before getting out of bed. Call light within reach. Bed in low position. Bed alarm on. Will continue to monitor.

## 2018-09-04 NOTE — PROGRESS NOTES
Nutrition Assessment    Type and Reason for Visit: Initial, Positive Nutrition Screen    Nutrition Recommendations:   · Recommend add dental soft restriction  · Continue 2 gm Na diet   · Add Ensure BID  · Obtain actual weight if possible  · Monitor po intakes, nutrition adequacy, weights, pertinent labs, BMs, chewing    Malnutrition Assessment:  · Malnutrition Status: Insufficient data (PT sleeping)    Nutrition Diagnosis:   · Problem: Predicted suboptimal energy intake  · Etiology: related to Insufficient energy/nutrient consumption     Signs and symptoms:  as evidenced by  (Difficulty chewing - recent tooth extraction)    Nutrition Assessment:  · Subjective Assessment: + screen for difficulty chewing/swallowing. Pt is a 79 yo male with Hx of CHF, HTN, HLD, TIA, who was admitted with worse dizziness and headache than normal. Pt sleeping at time of visit, spoke with family at [facility]. Family reports that pt recently had teeth extracted. Pt appetite is okay, but not large. Pt has been eating softer foods PTA, will add dental soft restriction. Family favorable to adding Ensure, will order. Weight stable per family. JOSE any weight changes d/t lack of CBW recorded.    · Nutrition-Focused Physical Findings: Recent teeth extraction  · Wound Type: None  · Current Nutrition Therapies:  · Oral Diet Orders: 2gm Sodium   · Oral Diet intake: 0%  · Oral Nutrition Supplement (ONS) Orders: None  · ONS intake: Unable to assess  · Anthropometric Measures:  · Ht: 6' (182.9 cm)   · Current Body Wt:  (None recorded)  · % Weight Change: JOSE,     · Ideal Body Wt: 178 lb (80.7 kg)   · BMI Classification:  (JOSE)  · Comparative Standards (Estimated Nutrition Needs):  · Estimated Daily Total Kcal: 2017-2421  · Estimated Daily Protein (g): 80-96    Estimated Intake vs Estimated Needs: Intake Less Than Needs    Nutrition Risk Level: High (Needs full assessment)    Nutrition Interventions:   Modify current diet, Start ONS  Continued Inpatient

## 2018-09-05 ENCOUNTER — APPOINTMENT (OUTPATIENT)
Dept: CT IMAGING | Age: 83
End: 2018-09-05
Attending: INTERNAL MEDICINE
Payer: MEDICARE

## 2018-09-05 LAB
ANION GAP SERPL CALCULATED.3IONS-SCNC: 9 MMOL/L (ref 3–16)
BASOPHILS ABSOLUTE: 0 K/UL (ref 0–0.2)
BASOPHILS RELATIVE PERCENT: 0.4 %
BUN BLDV-MCNC: 22 MG/DL (ref 7–20)
CALCIUM SERPL-MCNC: 8.3 MG/DL (ref 8.3–10.6)
CHLORIDE BLD-SCNC: 108 MMOL/L (ref 99–110)
CO2: 24 MMOL/L (ref 21–32)
CREAT SERPL-MCNC: 1.2 MG/DL (ref 0.8–1.3)
EOSINOPHILS ABSOLUTE: 0.1 K/UL (ref 0–0.6)
EOSINOPHILS RELATIVE PERCENT: 1.6 %
GFR AFRICAN AMERICAN: >60
GFR NON-AFRICAN AMERICAN: 57
GLUCOSE BLD-MCNC: 87 MG/DL (ref 70–99)
HCT VFR BLD CALC: 40 % (ref 40.5–52.5)
HEMOGLOBIN: 13.2 G/DL (ref 13.5–17.5)
LYMPHOCYTES ABSOLUTE: 1.3 K/UL (ref 1–5.1)
LYMPHOCYTES RELATIVE PERCENT: 17.1 %
MCH RBC QN AUTO: 30.4 PG (ref 26–34)
MCHC RBC AUTO-ENTMCNC: 33 G/DL (ref 31–36)
MCV RBC AUTO: 92.2 FL (ref 80–100)
MONOCYTES ABSOLUTE: 0.8 K/UL (ref 0–1.3)
MONOCYTES RELATIVE PERCENT: 10.6 %
NEUTROPHILS ABSOLUTE: 5.4 K/UL (ref 1.7–7.7)
NEUTROPHILS RELATIVE PERCENT: 70.3 %
PDW BLD-RTO: 14.4 % (ref 12.4–15.4)
PLATELET # BLD: 143 K/UL (ref 135–450)
PMV BLD AUTO: 8.8 FL (ref 5–10.5)
POTASSIUM REFLEX MAGNESIUM: 4.8 MMOL/L (ref 3.5–5.1)
RBC # BLD: 4.34 M/UL (ref 4.2–5.9)
SODIUM BLD-SCNC: 141 MMOL/L (ref 136–145)
WBC # BLD: 7.7 K/UL (ref 4–11)

## 2018-09-05 PROCEDURE — 80048 BASIC METABOLIC PNL TOTAL CA: CPT

## 2018-09-05 PROCEDURE — 70496 CT ANGIOGRAPHY HEAD: CPT

## 2018-09-05 PROCEDURE — 6360000002 HC RX W HCPCS: Performed by: INTERNAL MEDICINE

## 2018-09-05 PROCEDURE — 6360000004 HC RX CONTRAST MEDICATION: Performed by: INTERNAL MEDICINE

## 2018-09-05 PROCEDURE — 36415 COLL VENOUS BLD VENIPUNCTURE: CPT

## 2018-09-05 PROCEDURE — 2580000003 HC RX 258: Performed by: INTERNAL MEDICINE

## 2018-09-05 PROCEDURE — G0378 HOSPITAL OBSERVATION PER HR: HCPCS

## 2018-09-05 PROCEDURE — 97116 GAIT TRAINING THERAPY: CPT

## 2018-09-05 PROCEDURE — 70498 CT ANGIOGRAPHY NECK: CPT

## 2018-09-05 PROCEDURE — 6370000000 HC RX 637 (ALT 250 FOR IP): Performed by: INTERNAL MEDICINE

## 2018-09-05 PROCEDURE — 96372 THER/PROPH/DIAG INJ SC/IM: CPT

## 2018-09-05 PROCEDURE — 85025 COMPLETE CBC W/AUTO DIFF WBC: CPT

## 2018-09-05 PROCEDURE — 97110 THERAPEUTIC EXERCISES: CPT

## 2018-09-05 PROCEDURE — 99232 SBSQ HOSP IP/OBS MODERATE 35: CPT | Performed by: PSYCHIATRY & NEUROLOGY

## 2018-09-05 PROCEDURE — 51798 US URINE CAPACITY MEASURE: CPT

## 2018-09-05 RX ADMIN — SODIUM CHLORIDE: 9 INJECTION, SOLUTION INTRAVENOUS at 23:22

## 2018-09-05 RX ADMIN — FINASTERIDE 5 MG: 5 TABLET, FILM COATED ORAL at 08:53

## 2018-09-05 RX ADMIN — HEPARIN SODIUM 5000 UNITS: 5000 INJECTION INTRAVENOUS; SUBCUTANEOUS at 05:42

## 2018-09-05 RX ADMIN — PREDNISONE 5 MG: 5 TABLET ORAL at 08:54

## 2018-09-05 RX ADMIN — FAMOTIDINE 40 MG: 20 TABLET ORAL at 08:53

## 2018-09-05 RX ADMIN — GABAPENTIN 100 MG: 100 CAPSULE ORAL at 19:54

## 2018-09-05 RX ADMIN — AMLODIPINE BESYLATE 2.5 MG: 2.5 TABLET ORAL at 08:54

## 2018-09-05 RX ADMIN — ASPIRIN 81 MG 162 MG: 81 TABLET ORAL at 08:54

## 2018-09-05 RX ADMIN — IOPAMIDOL 80 ML: 755 INJECTION, SOLUTION INTRAVENOUS at 11:46

## 2018-09-05 RX ADMIN — CLOPIDOGREL BISULFATE 75 MG: 75 TABLET ORAL at 08:54

## 2018-09-05 RX ADMIN — SIMVASTATIN 40 MG: 40 TABLET, FILM COATED ORAL at 08:53

## 2018-09-05 RX ADMIN — MIRTAZAPINE 15 MG: 15 TABLET, FILM COATED ORAL at 19:54

## 2018-09-05 RX ADMIN — SODIUM CHLORIDE: 9 INJECTION, SOLUTION INTRAVENOUS at 13:26

## 2018-09-05 RX ADMIN — ESCITALOPRAM OXALATE 10 MG: 10 TABLET ORAL at 08:53

## 2018-09-05 RX ADMIN — GABAPENTIN 100 MG: 100 CAPSULE ORAL at 08:54

## 2018-09-05 RX ADMIN — ENOXAPARIN SODIUM 40 MG: 40 INJECTION SUBCUTANEOUS at 18:24

## 2018-09-05 ASSESSMENT — PAIN SCALES - GENERAL
PAINLEVEL_OUTOF10: 0

## 2018-09-05 NOTE — PROGRESS NOTES
Pt voided into urinal twice with 100 mL out each time. Bladder scan read 97 mL residual. Will continue to monitor. No need for blanton at this time according to orders.

## 2018-09-05 NOTE — PLAN OF CARE
Problem: Falls - Risk of:  Goal: Absence of physical injury  Absence of physical injury   Outcome: Ongoing  Pt will remain free from falls throughout hospital stay. Fall precautions in place, bed alarm on, bed in lowest position with wheels locked and side rails 2/4 up. Room door open and hourly rounding completed. Will continue to monitor throughout shift.

## 2018-09-05 NOTE — CARE COORDINATION
CM: Writer had lengthy discussion with Pt's Dtr (Pt currently in the br) about DC plans: states Pt is approaching the time when he will need [address] - r/t freq falls and not keeping his house clean - dishes / food etc. Pt lives alone and is able to heat up the pre- prepared meals but does not clean up after himself and family is concerned with Pt's well being. Dtr visits him frequently, at least 3 x / week. Pt had been on the waiting list for the [facility]) and 6 months ago took his name off the list when approached with avail bed - stated was not ready yet. Family plans to place Pt back on the [facility] waiting list - Pt agrees to LT there, but when he feels is time. Pt is agreeable to Acute Rehab, Pt / family would like [facility] - referral made. Discussed that if [facility] declines Pt - family will need to come up with another DC plan - ie: home with them / increased assist, or possibly PVT PAY at a SNF?? (discussed Medicare OBSERV status vs Medicare SNF coverage with IN PT 3 day stay)    Referral made to [facility],  [address] will review and let writer know if can take Pt or not - explained possibly ready for DC today or tomorrow. 9/5/2018 ELECTRONIC JERONIMO in chart for completion & MD signature as / if needed. Thank you, JESUS Brownlee RN DCP / [facility]

## 2018-09-05 NOTE — PLAN OF CARE
Problem: FLUID AND ELECTROLYTE IMBALANCE  Goal: Fluid and electrolyte balance are achieved/maintained  Outcome: Ongoing  Patient's EF (Ejection Fraction) is greater than 40%    Patient has a past medical history of Arthralgia; Cancer ([facility] [address] Chronic back pain; Chronic diastolic (congestive) heart failure ([facility] [address] Diverticulitis; Dizziness; Fatigue; Fractures; Hearing decreased; Hyperlipidemia; Hypertension; Osteoarthritis of knee; Osteoarthritis of shoulder; TIA (transient ischemic attack); Vertigo; and Wears glasses. Comorbidities reviewed and education provided. Patient and family's stated goal of care: be more comfortable prior to discharge    Patient's current functional capacity:  No limitation of physical activity. Ordinary physical activity does not cause undue fatigue, palpitation, dyspnea. Pt resting in bed at this time on room air. Pt denies shortness of breath. Pt without lower extremity edema. Patient's weights and intake/output reviewed:    Patient Vitals for the past 96 hrs (Last 3 readings):   Weight   09/04/18 1615 181 lb 4.8 oz (82.2 kg)       Intake/Output Summary (Last 24 hours) at 09/05/18 0039  Last data filed at 09/04/18 2244   Gross per 24 hour   Intake          1900.33 ml   Output              700 ml   Net          1200.33 ml       Patient provided with education on CHF signs/symptoms, causes, discharge medications, daily weights, low sodium diet, activity, and follow-up. Notified patient to call the doctor post discharge if patient experiences shortness of breath, chest pain, swelling, cough, or weight gain of three pounds in a day/five pounds in a week. Also notified patient to call the doctor with dizziness, increased fatigue, decreased or difficulty urinating. Pt verbalized understanding. No additional questions at this time.     Education Time: 15 Minutes

## 2018-09-05 NOTE — PROGRESS NOTES
M[facility]  Neurology Follow-up  [facility] Neurology    Date of Service: 9/5/2018    Subjective:   CC: Follow up today regarding: Acute dizziness and ataxia    Events noted. Chart and lab reviewed. He is slightly better today. ESR and CRP were unremarkable. He denies any severe headache, visual disturbance, neck or back pain. No chest pain, dysphagia or dysarthria. Occasional mild lightheadedness. Creatinine is back to normal.  No other new symptoms today. Other review of system was unremarkable. ROS : A 10-12 system review of constitutional, cardiovascular, respiratory, musculoskeletal, endocrine, skin, SHEENT, genitourinary, psychiatric and neurologic systems was obtained and updated today and is unremarkable except as mentioned  in my interval history.        Past Medical History:   Diagnosis Date    Arthralgia     Cancer [facility])     prostate    Chronic back pain     x40 years    Chronic diastolic (congestive) heart failure (HCC)     Diverticulitis     Dizziness     Fatigue     Fractures     Hearing decreased     Hyperlipidemia     Hypertension     Osteoarthritis of knee     Osteoarthritis of shoulder     TIA (transient ischemic attack) 2006    Vertigo     Wears glasses      Current Facility-Administered Medications   Medication Dose Route Frequency Provider Last Rate Last Dose    sodium chloride flush 0.9 % injection 10 mL  10 mL Intravenous 2 times per day Emmanuel Wu MD   10 mL at 09/04/18 0943    sodium chloride flush 0.9 % injection 10 mL  10 mL Intravenous PRN Emmanuel Wu MD        magnesium hydroxide (MILK OF MAGNESIA) 400 MG/5ML suspension 30 mL  30 mL Oral Daily PRN Emmanuel Wu MD        aspirin chewable tablet 162 mg  162 mg Oral Daily Emmanuel Wu MD   162 mg at 09/04/18 0942    amLODIPine (NORVASC) tablet 2.5 mg  2.5 mg Oral QAM Emmanuel Wu MD   2.5 mg at 09/04/18 0942    clopidogrel (PLAVIX) tablet 75 mg  75 mg Oral Daily Emmanuel Wu MD   75 mg at 09/04/18 2117  escitalopram (LEXAPRO) tablet 10 mg  10 mg Oral Daily Babatunde Aguilera MD   10 mg at 09/04/18 0943    famotidine (PEPCID) tablet 40 mg  40 mg Oral Daily Babatunde Aguilera MD   40 mg at 09/04/18 6674    mirtazapine (REMERON) tablet 15 mg  15 mg Oral Nightly Babatunde Aguilera MD   15 mg at 09/04/18 2107    simvastatin (ZOCOR) tablet 40 mg  40 mg Oral Daily Babatunde Aguilera MD   40 mg at 09/04/18 2351    heparin (porcine) injection 5,000 Units  5,000 Units Subcutaneous 3 times per day Babatunde Aguilera MD   5,000 Units at 09/05/18 0542    gabapentin (NEURONTIN) capsule 100 mg  100 mg Oral BID Regulo Rodriguez MD   100 mg at 09/04/18 2107    prochlorperazine (COMPAZINE) injection 5 mg  5 mg Intravenous Q6H PRN Regulo Rodriguez MD        predniSONE (DELTASONE) tablet 5 mg  5 mg Oral Daily Regulo Rodriguez MD        0.9 % sodium chloride infusion   Intravenous Continuous Regulo Rodriguez  mL/hr at 09/04/18 1507      acetaminophen (TYLENOL) tablet 650 mg  650 mg Oral Q4H PRN Regulo Rodriguez MD        finasteride (PROSCAR) tablet 5 mg  5 mg Oral Daily Regulo Rodriguez MD   5 mg at 09/04/18 1506     Allergies   Allergen Reactions    Altace [Ramipril]      (cough) Pt denies ever taking Ramipril    Sulfa Antibiotics      family history includes Heart Disease in his mother; Parkinsonism in his father. reports that he quit smoking about 69 years ago. His smoking use included Cigarettes. He has never used smokeless tobacco. He reports that he does not drink alcohol or use drugs. Objective:  Exam:  Constitutional:   Vitals:    09/05/18 0335 09/05/18 0618 09/05/18 0731 09/05/18 0847   BP: (!) 140/71  (!) 150/75 130/66   Pulse: 54  63 58   Resp: 18  16 16   Temp: 97.8 °F (36.6 °C)  97.6 °F (36.4 °C) 97.8 °F (36.6 °C)   TempSrc: Oral  Oral Oral   SpO2: 91%  93% 93%   Weight:  183 lb 1.6 oz (83.1 kg)     Height:           General appearance: NAD. Eye: No icterus. PRRR.    Neck: supple  Cardiovascular:          No lower leg edema with good pulsation. Mental Status: Oriented to person, place, problem, and time. Fluent speech. Normal attention span and concentration. Cranial Nerves:   II: Visual fields: Full to confrontation  III: Pupils: equal, round, reactive to light  III,IV,VI: Extra Ocular Movements are intact. No nystagmus  V: Facial sensation is intact to pin prick and light touch  VII: Facial strength and movements: intact and symmetric  VIII: Hearing: Intact to finger rub bilaterally  IX: Palate elevation is symmetric  XI: Shoulder shrug is intact  XII: Tongue movements are normal  Musculoskeletal: 5/5 in all 4 extremities. Normal tone. Reflexes: Bilateral biceps 2/4, triceps 2/4, brachial radialis 2/4, knee 1/4 and ankle 1/4. Planters: flexor bilaterally. Coordination: no pronator drift, no dysmetria. Finger nose finger testing within normal limits. Sensation: normal to all modalities. Gait/Posture: unsteady      Data:  LABS:   Lab Results   Component Value Date     09/05/2018    K 4.8 09/05/2018     09/05/2018    CO2 24 09/05/2018    BUN 22 09/05/2018    CREATININE 1.2 09/05/2018    GFRAA >60 09/05/2018    GFRAA >60 09/10/2012    LABGLOM 57 09/05/2018    GLUCOSE 87 09/05/2018    MG 2.20 08/10/2018    CALCIUM 8.3 09/05/2018     Lab Results   Component Value Date    WBC 7.7 09/05/2018    RBC 4.34 09/05/2018    HGB 13.2 09/05/2018    HCT 40.0 09/05/2018    MCV 92.2 09/05/2018    RDW 14.4 09/05/2018     09/05/2018     Lab Results   Component Value Date    INR 1.10 09/04/2018    PROTIME 12.5 09/04/2018     Neuroimaging and/or  labs reviewed by me and discussed results with the patient and family. Impression:  New onset dizziness, headache and encephalopathy. Likely combination of recent change in medications, bradycardia, peripheral vertigo and dehydration. The same. Acute kidney injury, improved.    Hypertension, poorly controlled  Cardiac arrhythmia  Prostate cancer      Recommendation  Continue

## 2018-09-05 NOTE — PROGRESS NOTES
(1.829 m)   Wt 183 lb 1.6 oz (83.1 kg)   SpO2 93%   BMI 24.83 kg/m²     General appearance:  No apparent distress, appears stated age and cooperative. HEENT:  Normal cephalic, atraumatic without obvious deformity. Pupils equal, round, and reactive to light. Extra ocular muscles intact. Conjunctivae/corneas clear. Neck: Supple, with full range of motion. No jugular venous distention. Trachea midline. Respiratory:  Normal respiratory effort. Clear to auscultation, bilaterally without Rales/Wheezes/Rhonchi. Cardiovascular:  Regular rate and rhythm with normal S1/S2 without murmurs, rubs or gallops. Abdomen: Soft, non-tender, non-distended with normal bowel sounds. Musculoskeletal:  No clubbing, cyanosis or edema bilaterally. Full range of motion without deformity. Skin: Skin color, texture, turgor normal.  No rashes or lesions. Neurologic:  Neurovascularly intact without any focal sensory/motor deficits. Cranial nerves: II-XII intact, grossly non-focal.  Tremulous extremities. Psychiatric:  Alert, oriented to self, year, and hospital, wrong month. Poor concentration, limited insight.   Capillary Refill: Brisk,< 3 seconds   Peripheral Pulses: +2 palpable, equal bilaterally       Labs:   Recent Labs      09/04/18 0115 09/05/18 0656   WBC  10.0  7.7   HGB  15.5  13.2*   HCT  45.2  40.0*   PLT  183  143     Recent Labs      09/04/18 0115  09/05/18   0656   NA  139  141   K  4.7  4.8   CL  102  108   CO2  21  24   BUN  22*  22*   CREATININE  1.5*  1.2   CALCIUM  9.3  8.3     Recent Labs      09/04/18   0115   AST  25   ALT  16   BILITOT  0.5   ALKPHOS  88     Recent Labs      09/04/18   0115   INR  1.10     Recent Labs      09/04/18   0115  09/04/18   0845   CKTOTAL   --   59   TROPONINI  <0.01   --        Urinalysis:    Lab Results   Component Value Date    NITRU Negative 09/04/2018    WBCUA 0-2 09/04/2018    RBCUA 0-2 09/04/2018    BLOODU Negative 09/04/2018    SPECGRAV 1.015 09/04/2018    GLUCOSEU Negative 09/04/2018       Radiology:  No orders to display           Assessment/Plan:    Active Hospital Problems    Diagnosis Date Noted    RONAK (acute kidney injury) ([facility] [address] [N17.9]     New persistent daily headache [G44.52]     Essential hypertension [I10] 09/06/2012    Dizziness [R42]        80 Y M with a h/o former smoking, HTN, HLD, prostate cancer s/p prostatectomy, and TIA was sent to the [facility] ED by his family because his chronic dizziness, fatigue, and generalized weakness were worse than normal.  The [facility] ED thought a CVA was possible so he was transferred to [facility] overnight for neurology consultation. The patient was recently discharged with [facility] on 8/12 after being admitted with 2 years of lightheadedness and fatigue. During that admission he felt better after IVFs. Cardiology noted that he was bradycardic and suggested an outpatient 24 hr holter monitor and perhaps turning down the intensity of his bladder neck stimulator. PT/OT rec'd PRN assistance during that stay.          Acute on chronic lightheadedness, fatigue, and diffuse weakness  - likely due to dehydration, with underlying chronic BPPV and muscular deconditioning. PT/OT, IVFs, encourage more PO intake. - Exam non-focal, CVA or TIA seems less likely. Head CT negative. Cannot have MRI because of his bladder stimulator. Will consider CTA head and neck when Cr improves. Aspirin, clopidogrel, statin. Neurology was consulted overnight and their input is appreciated. - His symptoms are not due to bradycardia from his sick sinus syndrome. He feels terribly lightheaded even when his HR is consistently in the 70's here. Recent 24-hr Holter monitor showed average daily heart rate 58 ranging from 44 to 73, with lots of PVCs. No need for pacer. Can continue to f/u with cardiology as outpatient. He has already turned off his bladder sphincter stimulator.    - trop and EKG not suggestive of ischemia.   The patient denied ever having chest

## 2018-09-05 NOTE — PROGRESS NOTES
Physical Therapy  Facility/Department: [facility] A2 CARD TELEMETRY  Daily Treatment Note  NAME: Filippo Best  : 1927  MRN: [id number]    Date of Service: 2018    Discharge Recommendations:  IP Rehab   PT Equipment Recommendations  Equipment Needed: No    Patient Diagnosis(es): There were no encounter diagnoses. has a past medical history of Arthralgia; Cancer ([facility] [address] Chronic back pain; Chronic diastolic (congestive) heart failure ([facility] [address] Diverticulitis; Dizziness; Fatigue; Fractures; Hearing decreased; Hyperlipidemia; Hypertension; Osteoarthritis of knee; Osteoarthritis of shoulder; TIA (transient ischemic attack); Vertigo; and Wears glasses. has a past surgical history that includes Inguinal hernia repair; Cataract removal (); Prostatectomy (); Colonoscopy; Upper gastrointestinal endoscopy (2011); Esophagus dilation; knee surgery (Bilateral); Total shoulder arthroplasty; shoulder surgery; Toe Surgery (Right, 2013); joint replacement; and Total knee arthroplasty.     Restrictions  Restrictions/Precautions  Restrictions/Precautions: General Precautions, Fall Risk  Position Activity Restriction  Other position/activity restrictions: High fall risk per nursing assessment, up as tolerated, orthostatic BP's     Subjective   General  Chart Reviewed: Yes  Family / Caregiver Present: Yes (dtr)  Referring Practitioner: Dr. Wang Spotted: Pt agreeable to work with PT, RN requests orthstatics on pt  Pain Screening  Patient Currently in Pain: No  Vital Signs  Patient Currently in Pain: No       Objective   Bed mobility  Supine to Sit: Supervision  Sit to Supine: Supervision  Transfers  Sit to Stand: Stand by assistance  Stand to sit: Stand by assistance  Ambulation  Ambulation?: Yes  Ambulation 1  Surface: level tile  Device: Rolling Walker  Assistance: Contact guard assistance  Distance: 125'        Exercises  Gluteal Sets: X 15 B  Hip Flexion: X 15 BLE seated march  Hip Abduction: X 15 BLE seated clamshells  Knee Long Arc Quad: X 15 BLE   Upper Extremity: X 15 BLE  Comments: Orthostatic vitals: supine 133/62 HR 50  sitting 142/56 HR 70  standing 141/57 HR 66  Pt denies any symptoms of lightheadedness or dizziness                        Assessment   Body structures, Functions, Activity limitations: Decreased functional mobility ; Decreased endurance;Decreased balance;Decreased strength  Treatment Diagnosis: Impaired standing balance, decreased (I) with mobility  Specific instructions for Next Treatment: Progress ther ex and mobility as noemi, gait training with RW  Prognosis: Good  Patient Education: Role of PT, benefits of mobility, safety in transfers/amb with RW vs. no device, D/C recs - pt and pt's dtr verbalize understanding  REQUIRES PT FOLLOW UP: Yes  Activity Tolerance  Activity Tolerance: Patient Tolerated treatment well              AM-PAC Score     AM-PAC Inpatient Mobility without Stair Climbing Raw Score : 18  AM-PAC Inpatient without Stair Climbing T-Scale Score : 51.97  Mobility Inpatient CMS 0-100% Score: 23.26  Mobility Inpatient without Stair CMS G-Code Modifier : CJ       Goals  Short term goals  Time Frame for Short term goals: 1 week (unless otherwise specified)  Short term goal 1: Pt will transfer supine <-> sit with (I)  Short term goal 2: Pt will transfer sit <-> stand and bed>chair using RW with modified(I)  Short term goal 3: Pt will ambulate x 200 feet using RW with supervision  Short term goal 4: By 9/06/18: Pt will tolerate 12-15 reps BLE exercise for strengthening, balance, and endurance  Patient Goals   Patient goals :  \"To get back to being about to walk while steady on my feet and to get rid of this lightheadedness\"    Plan    Plan  Times per week: 3-5x/week in acute care  Times per day: Daily  Specific instructions for Next Treatment: Progress ther ex and mobility as noemi, gait training with RW  Current Treatment Recommendations: Strengthening, Balance Training, Functional Mobility Training, Transfer Training, Gait Training, Patient/Caregiver Education & Training, Safety Education & Training, Endurance Training, Equipment Evaluation, Education, & procurement  Safety Devices  Type of devices:  All fall risk precautions in place, Call light within reach, Chair alarm in place, Gait belt, Left in chair, Nurse notified     Therapy Time   Individual Concurrent Group Co-treatment   Time In 1510         Time Out 1535         Minutes [id number] [address] U[address][id number]

## 2018-09-06 ENCOUNTER — HOSPITAL ENCOUNTER (INPATIENT)
Age: 83
LOS: 8 days | Discharge: HOME HEALTH | DRG: 640 | End: 2018-09-14
Attending: PHYSICAL MEDICINE & REHABILITATION | Admitting: PHYSICAL MEDICINE & REHABILITATION
Payer: MEDICARE

## 2018-09-06 VITALS
BODY MASS INDEX: 25.26 KG/M2 | OXYGEN SATURATION: 95 % | DIASTOLIC BLOOD PRESSURE: 55 MMHG | TEMPERATURE: 98.1 F | HEIGHT: 72 IN | WEIGHT: 186.5 LBS | HEART RATE: 47 BPM | RESPIRATION RATE: 16 BRPM | SYSTOLIC BLOOD PRESSURE: 143 MMHG

## 2018-09-06 PROBLEM — G93.41 METABOLIC ENCEPHALOPATHY: Status: ACTIVE | Noted: 2018-09-06

## 2018-09-06 LAB
ANION GAP SERPL CALCULATED.3IONS-SCNC: 9 MMOL/L (ref 3–16)
BUN BLDV-MCNC: 24 MG/DL (ref 7–20)
CALCIUM SERPL-MCNC: 8.2 MG/DL (ref 8.3–10.6)
CHLORIDE BLD-SCNC: 109 MMOL/L (ref 99–110)
CO2: 21 MMOL/L (ref 21–32)
CREAT SERPL-MCNC: 1 MG/DL (ref 0.8–1.3)
GFR AFRICAN AMERICAN: >60
GFR NON-AFRICAN AMERICAN: >60
GLUCOSE BLD-MCNC: 82 MG/DL (ref 70–99)
POTASSIUM REFLEX MAGNESIUM: 5 MMOL/L (ref 3.5–5.1)
SODIUM BLD-SCNC: 139 MMOL/L (ref 136–145)

## 2018-09-06 PROCEDURE — 80048 BASIC METABOLIC PNL TOTAL CA: CPT

## 2018-09-06 PROCEDURE — 6360000002 HC RX W HCPCS: Performed by: INTERNAL MEDICINE

## 2018-09-06 PROCEDURE — 97535 SELF CARE MNGMENT TRAINING: CPT

## 2018-09-06 PROCEDURE — 2580000003 HC RX 258: Performed by: INTERNAL MEDICINE

## 2018-09-06 PROCEDURE — 6370000000 HC RX 637 (ALT 250 FOR IP): Performed by: PHYSICAL MEDICINE & REHABILITATION

## 2018-09-06 PROCEDURE — G8988 SELF CARE GOAL STATUS: HCPCS

## 2018-09-06 PROCEDURE — 97110 THERAPEUTIC EXERCISES: CPT

## 2018-09-06 PROCEDURE — G8987 SELF CARE CURRENT STATUS: HCPCS

## 2018-09-06 PROCEDURE — 97530 THERAPEUTIC ACTIVITIES: CPT

## 2018-09-06 PROCEDURE — 1280000000 HC REHAB R&B

## 2018-09-06 PROCEDURE — 97116 GAIT TRAINING THERAPY: CPT

## 2018-09-06 PROCEDURE — 6370000000 HC RX 637 (ALT 250 FOR IP): Performed by: INTERNAL MEDICINE

## 2018-09-06 PROCEDURE — 36415 COLL VENOUS BLD VENIPUNCTURE: CPT

## 2018-09-06 PROCEDURE — G0378 HOSPITAL OBSERVATION PER HR: HCPCS

## 2018-09-06 RX ORDER — UREA 10 %
1 LOTION (ML) TOPICAL NIGHTLY PRN
Status: CANCELLED | OUTPATIENT
Start: 2018-09-06

## 2018-09-06 RX ORDER — AMLODIPINE BESYLATE 5 MG/1
5 TABLET ORAL EVERY MORNING
Status: DISCONTINUED | OUTPATIENT
Start: 2018-09-07 | End: 2018-09-13

## 2018-09-06 RX ORDER — AMLODIPINE BESYLATE 5 MG/1
5 TABLET ORAL EVERY MORNING
Qty: 30 TABLET | Refills: 3 | Status: ON HOLD | OUTPATIENT
Start: 2018-09-07 | End: 2018-09-13 | Stop reason: HOSPADM

## 2018-09-06 RX ORDER — FAMOTIDINE 20 MG/1
40 TABLET, FILM COATED ORAL DAILY
Status: DISCONTINUED | OUTPATIENT
Start: 2018-09-07 | End: 2018-09-14 | Stop reason: HOSPADM

## 2018-09-06 RX ORDER — ASPIRIN 81 MG/1
162 TABLET, CHEWABLE ORAL DAILY
Status: DISCONTINUED | OUTPATIENT
Start: 2018-09-07 | End: 2018-09-14 | Stop reason: HOSPADM

## 2018-09-06 RX ORDER — AMLODIPINE BESYLATE 5 MG/1
5 TABLET ORAL EVERY MORNING
Status: DISCONTINUED | OUTPATIENT
Start: 2018-09-07 | End: 2018-09-06 | Stop reason: HOSPADM

## 2018-09-06 RX ORDER — SIMVASTATIN 40 MG
40 TABLET ORAL DAILY
Status: DISCONTINUED | OUTPATIENT
Start: 2018-09-07 | End: 2018-09-14 | Stop reason: HOSPADM

## 2018-09-06 RX ORDER — PREDNISONE 1 MG/1
5 TABLET ORAL DAILY
Status: DISCONTINUED | OUTPATIENT
Start: 2018-09-07 | End: 2018-09-14 | Stop reason: HOSPADM

## 2018-09-06 RX ORDER — POLYETHYLENE GLYCOL 3350 17 G/17G
17 POWDER, FOR SOLUTION ORAL DAILY PRN
Status: CANCELLED | OUTPATIENT
Start: 2018-09-06

## 2018-09-06 RX ORDER — CLOPIDOGREL BISULFATE 75 MG/1
75 TABLET ORAL DAILY
Status: DISCONTINUED | OUTPATIENT
Start: 2018-09-07 | End: 2018-09-14 | Stop reason: HOSPADM

## 2018-09-06 RX ORDER — SODIUM CHLORIDE 0.9 % (FLUSH) 0.9 %
10 SYRINGE (ML) INJECTION EVERY 12 HOURS SCHEDULED
Status: DISCONTINUED | OUTPATIENT
Start: 2018-09-06 | End: 2018-09-14 | Stop reason: HOSPADM

## 2018-09-06 RX ORDER — FAMOTIDINE 20 MG/1
40 TABLET, FILM COATED ORAL DAILY
Status: CANCELLED | OUTPATIENT
Start: 2018-09-07

## 2018-09-06 RX ORDER — DOCUSATE SODIUM 100 MG/1
100 CAPSULE, LIQUID FILLED ORAL 2 TIMES DAILY
Status: CANCELLED | OUTPATIENT
Start: 2018-09-06

## 2018-09-06 RX ORDER — ASPIRIN 81 MG/1
162 TABLET, CHEWABLE ORAL DAILY
Status: CANCELLED | OUTPATIENT
Start: 2018-09-07

## 2018-09-06 RX ORDER — MIRTAZAPINE 15 MG/1
15 TABLET, FILM COATED ORAL NIGHTLY
Status: CANCELLED | OUTPATIENT
Start: 2018-09-06

## 2018-09-06 RX ORDER — ACETAMINOPHEN 325 MG/1
650 TABLET ORAL EVERY 6 HOURS PRN
Status: DISCONTINUED | OUTPATIENT
Start: 2018-09-06 | End: 2018-09-14 | Stop reason: HOSPADM

## 2018-09-06 RX ORDER — BISACODYL 10 MG
10 SUPPOSITORY, RECTAL RECTAL DAILY PRN
Status: DISCONTINUED | OUTPATIENT
Start: 2018-09-06 | End: 2018-09-14 | Stop reason: HOSPADM

## 2018-09-06 RX ORDER — SODIUM CHLORIDE 0.9 % (FLUSH) 0.9 %
10 SYRINGE (ML) INJECTION PRN
Status: CANCELLED | OUTPATIENT
Start: 2018-09-06

## 2018-09-06 RX ORDER — GABAPENTIN 100 MG/1
100 CAPSULE ORAL 2 TIMES DAILY
Status: DISCONTINUED | OUTPATIENT
Start: 2018-09-06 | End: 2018-09-14 | Stop reason: HOSPADM

## 2018-09-06 RX ORDER — POLYETHYLENE GLYCOL 3350 17 G/17G
17 POWDER, FOR SOLUTION ORAL DAILY PRN
Status: DISCONTINUED | OUTPATIENT
Start: 2018-09-06 | End: 2018-09-14 | Stop reason: HOSPADM

## 2018-09-06 RX ORDER — UREA 10 %
1 LOTION (ML) TOPICAL NIGHTLY PRN
Status: DISCONTINUED | OUTPATIENT
Start: 2018-09-06 | End: 2018-09-14 | Stop reason: HOSPADM

## 2018-09-06 RX ORDER — GABAPENTIN 100 MG/1
100 CAPSULE ORAL 2 TIMES DAILY
Qty: 60 CAPSULE | Refills: 0 | Status: ON HOLD
Start: 2018-09-06 | End: 2018-09-21 | Stop reason: HOSPADM

## 2018-09-06 RX ORDER — ACETAMINOPHEN 325 MG/1
650 TABLET ORAL EVERY 6 HOURS PRN
Status: CANCELLED | OUTPATIENT
Start: 2018-09-06

## 2018-09-06 RX ORDER — SODIUM CHLORIDE 0.9 % (FLUSH) 0.9 %
10 SYRINGE (ML) INJECTION EVERY 12 HOURS SCHEDULED
Status: CANCELLED | OUTPATIENT
Start: 2018-09-06

## 2018-09-06 RX ORDER — SIMVASTATIN 40 MG
40 TABLET ORAL DAILY
Status: CANCELLED | OUTPATIENT
Start: 2018-09-07

## 2018-09-06 RX ORDER — ESCITALOPRAM OXALATE 10 MG/1
10 TABLET ORAL DAILY
Status: CANCELLED | OUTPATIENT
Start: 2018-09-07

## 2018-09-06 RX ORDER — DOCUSATE SODIUM 100 MG/1
100 CAPSULE, LIQUID FILLED ORAL 2 TIMES DAILY
Status: DISCONTINUED | OUTPATIENT
Start: 2018-09-06 | End: 2018-09-14 | Stop reason: HOSPADM

## 2018-09-06 RX ORDER — CLOPIDOGREL BISULFATE 75 MG/1
75 TABLET ORAL DAILY
Status: CANCELLED | OUTPATIENT
Start: 2018-09-07

## 2018-09-06 RX ORDER — SODIUM CHLORIDE 0.9 % (FLUSH) 0.9 %
10 SYRINGE (ML) INJECTION PRN
Status: DISCONTINUED | OUTPATIENT
Start: 2018-09-06 | End: 2018-09-14 | Stop reason: HOSPADM

## 2018-09-06 RX ORDER — AMLODIPINE BESYLATE 2.5 MG/1
2.5 TABLET ORAL ONCE
Status: DISCONTINUED | OUTPATIENT
Start: 2018-09-06 | End: 2018-09-06 | Stop reason: HOSPADM

## 2018-09-06 RX ORDER — ONDANSETRON 4 MG/1
4 TABLET, FILM COATED ORAL EVERY 8 HOURS PRN
Status: CANCELLED | OUTPATIENT
Start: 2018-09-06

## 2018-09-06 RX ORDER — AMLODIPINE BESYLATE 5 MG/1
5 TABLET ORAL EVERY MORNING
Status: CANCELLED | OUTPATIENT
Start: 2018-09-07

## 2018-09-06 RX ORDER — GABAPENTIN 100 MG/1
100 CAPSULE ORAL 2 TIMES DAILY
Status: CANCELLED | OUTPATIENT
Start: 2018-09-06

## 2018-09-06 RX ORDER — PREDNISONE 1 MG/1
5 TABLET ORAL DAILY
Status: CANCELLED | OUTPATIENT
Start: 2018-09-07

## 2018-09-06 RX ORDER — BISACODYL 10 MG
10 SUPPOSITORY, RECTAL RECTAL DAILY PRN
Status: CANCELLED | OUTPATIENT
Start: 2018-09-06

## 2018-09-06 RX ORDER — MIRTAZAPINE 15 MG/1
15 TABLET, FILM COATED ORAL NIGHTLY
Status: DISCONTINUED | OUTPATIENT
Start: 2018-09-06 | End: 2018-09-14 | Stop reason: HOSPADM

## 2018-09-06 RX ORDER — ESCITALOPRAM OXALATE 10 MG/1
10 TABLET ORAL DAILY
Status: DISCONTINUED | OUTPATIENT
Start: 2018-09-07 | End: 2018-09-14 | Stop reason: HOSPADM

## 2018-09-06 RX ORDER — ONDANSETRON 4 MG/1
4 TABLET, ORALLY DISINTEGRATING ORAL EVERY 8 HOURS PRN
Status: DISCONTINUED | OUTPATIENT
Start: 2018-09-06 | End: 2018-09-14 | Stop reason: HOSPADM

## 2018-09-06 RX ADMIN — PREDNISONE 5 MG: 5 TABLET ORAL at 10:29

## 2018-09-06 RX ADMIN — ASPIRIN 81 MG 162 MG: 81 TABLET ORAL at 10:28

## 2018-09-06 RX ADMIN — SIMVASTATIN 40 MG: 40 TABLET, FILM COATED ORAL at 10:28

## 2018-09-06 RX ADMIN — MIRTAZAPINE 15 MG: 15 TABLET, FILM COATED ORAL at 21:08

## 2018-09-06 RX ADMIN — ESCITALOPRAM OXALATE 10 MG: 10 TABLET ORAL at 10:29

## 2018-09-06 RX ADMIN — GABAPENTIN 100 MG: 100 CAPSULE ORAL at 21:08

## 2018-09-06 RX ADMIN — CLOPIDOGREL BISULFATE 75 MG: 75 TABLET ORAL at 10:29

## 2018-09-06 RX ADMIN — DOCUSATE SODIUM 100 MG: 100 CAPSULE, LIQUID FILLED ORAL at 21:09

## 2018-09-06 RX ADMIN — GABAPENTIN 100 MG: 100 CAPSULE ORAL at 10:29

## 2018-09-06 RX ADMIN — ENOXAPARIN SODIUM 40 MG: 40 INJECTION SUBCUTANEOUS at 10:28

## 2018-09-06 RX ADMIN — FAMOTIDINE 40 MG: 20 TABLET ORAL at 10:28

## 2018-09-06 RX ADMIN — AMLODIPINE BESYLATE 2.5 MG: 2.5 TABLET ORAL at 10:31

## 2018-09-06 RX ADMIN — SODIUM CHLORIDE: 9 INJECTION, SOLUTION INTRAVENOUS at 10:34

## 2018-09-06 ASSESSMENT — PAIN SCALES - GENERAL: PAINLEVEL_OUTOF10: 0

## 2018-09-06 NOTE — PROGRESS NOTES
Physical Therapy  Facility/Department: [facility] A2 CARD TELEMETRY  Daily Treatment Note  NAME: Iggy Burk  : 1927  MRN: [id number]    Date of Service: 2018    Discharge Recommendations:  IP Rehab   PT Equipment Recommendations  Equipment Needed:  (defer)    Patient Diagnosis(es): There were no encounter diagnoses. has a past medical history of Arthralgia; Cancer ([facility] [address] Chronic back pain; Chronic diastolic (congestive) heart failure ([facility] [address] Diverticulitis; Dizziness; Fatigue; Fractures; Hearing decreased; Hyperlipidemia; Hypertension; Osteoarthritis of knee; Osteoarthritis of shoulder; TIA (transient ischemic attack); Vertigo; and Wears glasses. has a past surgical history that includes Inguinal hernia repair; Cataract removal (); Prostatectomy (); Colonoscopy; Upper gastrointestinal endoscopy (2011); Esophagus dilation; knee surgery (Bilateral); Total shoulder arthroplasty; shoulder surgery; Toe Surgery (Right, 2013); joint replacement; and Total knee arthroplasty. Restrictions  Restrictions/Precautions  Restrictions/Precautions: General Precautions, Fall Risk  Position Activity Restriction  Other position/activity restrictions: High fall risk per nursing assessment, up as tolerated, orthostatic BP's, telemetry  Subjective   General  Chart Reviewed: Yes  Family / Caregiver Present: Yes (daughter)  Referring Practitioner: Dr. Darrion Dalton: Pt supine in bed & agreeable to PT.   Pain Screening  Patient Currently in Pain: Denies  Vital Signs  Patient Currently in Pain: Denies       Orientation     Objective   Bed mobility  Supine to Sit: Modified independent  Sit to Supine: Modified independent  Transfers  Sit to Stand: Stand by assistance (verbal cues for hand placement)  Stand to sit: Stand by assistance (verbal cues for hand placement)  Ambulation  Ambulation?: Yes  More Ambulation?: Yes  Ambulation 1  Surface: level tile  Device: Rolling Walker  Assistance: Contact guard assistance;Stand by assistance  Quality of Gait: wide NOÉ, steady pace. Distance: 400 ft x 2  Comments: cues for positioning within device. no LOB. cues to bring RW with him when turning to sit. Exercises  Hip Flexion: x 20 bilat  Knee Long Arc Quad: x 20 bilat  Comments: standing toe/heel raises x 20 bilat (with RW)         Comment: sit<->stand x 10 from chair to RW, focusing on safe technique. Assessment   Body structures, Functions, Activity limitations: Decreased functional mobility ; Decreased endurance;Decreased balance;Decreased strength  Assessment: Pt progressing with gait & overall endurance. Continues to require frequent safety cues. Would benefit from cont intpt PT to maximize independence, as pt lives alone. Cont PT per plan of care. Treatment Diagnosis: Impaired standing balance, decreased (I) with mobility  Specific instructions for Next Treatment: Progress ther ex and mobility as noemi, gait training with RW  Prognosis: Good  Patient Education: transfers, gait, LE ther ex, what to expect on ARU. REQUIRES PT FOLLOW UP: Yes  Activity Tolerance  Activity Tolerance: Patient Tolerated treatment well                                                        AM-PAC Score  AM-PAC Inpatient Mobility Raw Score : 19  -PAC Inpatient T-Scale Score : 45.44  Mobility Inpatient CMS 0-100% Score: 41.77  Mobility Inpatient CMS G-Code Modifier : CK          Goals  Short term goals  Time Frame for Short term goals: 1 week (unless otherwise specified)  Short term goal 1: Pt will transfer supine <-> sit with (I)  Short term goal 2: Pt will transfer sit <-> stand and bed>chair using RW with modified(I)  Short term goal 3: Pt will ambulate x 200 feet using RW with supervision  Short term goal 4: By 9/06/18: Pt will tolerate 12-15 reps BLE exercise for strengthening, balance, and endurance (met 9/6)  Patient Goals   Patient goals :  \"To get back to being about to walk while steady on my feet

## 2018-09-06 NOTE — PROGRESS NOTES
Pt d/c'd to ARU. Removed PIV and stopped bleeding. Catheter intact. Pt tolerated well. No redness noted at site. Notified CMU and removed tele box. Reviewed d/c instructions, home meds, and  f/u information utilizing teach-back method. Patient and spouse educated on CHF management and verbalized understanding.

## 2018-09-06 NOTE — PROGRESS NOTES
ARU PATIENT TREATMENT PLAN  [address] [place] [address] [address]   [phone]    Des Perez    : 1927  Acct #: [de-identified]  MRN: [id number]   PHYSICIAN:  Alon Pappas MD  Primary Problem    Patient Active Problem List   Diagnosis    Fatigue    Dizziness    Hyperlipidemia with target LDL less than 100    Essential hypertension    Polymyalgia rheumatica syndrome (HCC)    Other affections of shoulder region, not elsewhere classified    Primary localized osteoarthrosis, lower leg    Primary localized osteoarthrosis of shoulder region    Toe pain    Toe fracture    Callus of foot    TIA (transient ischemic attack)    GERD (gastroesophageal reflux disease)    Insomnia    Depression    BPH (benign prostatic hyperplasia)    Acute encephalopathy    HTN (hypertension), benign    CAD in native artery    Hyperlipidemia    Pneumonia due to organism    Sepsis ([facility] [address]    Community acquired bacterial pneumonia    Chronic diastolic congestive heart failure (HCC)    History of total shoulder replacement, left    Bradycardia    Mild dehydration    PVC (premature ventricular contraction)    Near syncope    RONAK (acute kidney injury) ([facility] [address]    New persistent daily headache    Metabolic encephalopathy       Rehabilitation Diagnosis:     Metabolic encephalopathy [M92.89]       ADMIT DATE:2018    Patient Goals: to go home    Admitting Impairments: dizziness,ataxia  Barriers: ataxia, age  Parrticipation: good     CARE PLAN     NURSING:  Des Perez while on this unit will:     [x] Be continent of bowel and bladder     [x] Have an adequate number of bowel movements  [x] Urinate with no urinary retention >300ml in bladder  [x] Complete bladder protocol with blanton removal  [x] Maintain O2 SATs at ___%  [x] Have pain managed while on ARU       [x] Be pain free by discharge   [x] Have no skin breakdown while on ARU  [x] Have improved skin integrity via wound measurements  [x] Have no signs/symptoms of infection at the wound site  [x] Be free from injury during hospitalization   [x] Complete education with patient/family with understanding demonstrated for:  [x] Adjustment   [x] Other:   Nursing interventions may include bowel/bladder training, education for medical assistive devices, medication education, O2 saturation management, energy conservation, stress management techniques, fall prevention, alarms protocol, seating and positioning, skin/wound care, pressure relief instruction,dressing changes,  infection protection, DVT prophylaxis, and/or assistance with in room safety with transfers to bed, toilet, wheelchair, shower as well as bathroom activities and hygiene. Patient/caregiver education for:   [x] Disease/sustained injury/management      [x] Medication Use   [x] Surgical intervention   [x] Safety   [x] Body mechanics and or joint protection   [x] Health maintenance         PHYSICAL THERAPY:  Goals:                  Short term goals  Time Frame for Short term goals: 3 days  Short term goal 1: Pt will be supervision with sit<>Stand and bed<>Chair with LRAD. Short term goal 2: Pt will ambulate 150 ft with supervision with LRAD. Short term goal 3: Pt will negotiate 2 stairs with HR supervision. Long term goals  Time Frame for Long term goals : 7 days  Long term goal 1: Pt will be mod I with sit<>Stand and bed<>Chair with LRAD. Long term goal 2: Pt will ambulate 150 ft mod I with LRAD. Long term goal 3: Pt will negotiate 2 stairs with HR mod I.  Long term goal 4: Pt will be indep with HEP to maintain strength and balance. Long term goal 5: Pt will improve Titus Balance score to 52/56 to reflect decreased fall risk. These goals were reviewed with this patient at the time of assessment and Noel Batres is in agreement.      Plan of Care: Pt to be seen 5 out of 7 days per week, 90   mins (exact) per day for 7 days (exact) Current Treatment Recommendations: Strengthening, Neuromuscular Re-education, Home Exercise Program, ROM, Safety Education & Training, Balance Training, Endurance Training, Functional Mobility Training, Transfer Training, Gait Training, Stair training, Patient/Caregiver Education & Training      OCCUPATIONAL THERAPY:  Goals:             Short term goals  Time Frame for Short term goals: 3 days  Short term goal 1: Pt. will complete LE dressing with SBA  with LRAD  Short term goal 2: Pt will complete toilet transfers with SBA  Short term goal 3: Pt will complete standing level ADLs with SBA for balance  Short term goal 4: Pt will complete item retrival with SBA from closet :  Long term goals  Time Frame for Long term goals : 7 days  Long term goal 1: Pt will complete 1 simple meal prep task Kaila  Long term goal 2: Pt will complete bathing including gathering clothing Kaila prior to DC using LRAD  Long term goal 3: Pt will tolerate x15-20 minutes dyn standing balance task with LRAD :    These goals were reviewed with this patient at the time of assessment and Annalise Pascual is in agreement    Plan of Care:  Pt to be seen 5 out of 7 days per week, 90  mins (exact) per day for 7 days (exact)  Current Treatment Recommendations: Strengthening, ROM, Balance Training, Functional Mobility Training, Safety Education & Training, Endurance Training, Self-Care / ADL, Patient/Caregiver Education & Training      SPEECH THERAPY:   Goals:  Long Term: Pt will improve overall cog-linguistic skills to promote safe and independent return home at [facility]. Short-term Goals  Timeframe for Short-term Goals: 7 Days  Goal 1: Pt will complete graded recall tasks with 90% accy, min cueing. Goal 2: Pt will complete functional problem solving tasks with 90% accy (math, time, money, meds) given min cueing. Goal 3: Pt will complete higher level exec function / safety and thought organization tasks with 90% accy, min cues.    Goal 4: Pt with SLP on dietary upgrades. Neuropsychology/Psychology may evaluate and provide necessary support. Medical issues being managed closely and that require 24 hour availability of a physician:   [] Swallowing Precautions  [] Bowel/Bladder Fx  [] Weight bearing precautions   [] Wound Care    [x] Pain Mgmt   [x] Infection Protection   [x] DVT Prophylaxis   [x] Fall Precautions  [x] Fluid/Electrolyte/Nutrition Balance   [] Voice Protection   [] Respiratory  [] Other:    Medical Prognosis: [] Good  [x] Fair    [] Guarded     Total expected IRF days 10    Anticipated discharge destination:    [] Home Independently   [x] Home Modified Independent  [] Home with supervision    []SNF     [] Other                                           Physician anticipated functional outcomes: To go home     IPOC brief synthesis: 80 Y M with a h/o former smoking, HTN, HLD, prostate cancer s/p prostatectomy, and TIA was sent to the [facility] ED by his family because his chronic dizziness, fatigue, and generalized weakness that were worse than normal.The patient has a history of 2 years of lightheadedness and fatigue. The [facility] ED thought a CVA was possible so he was transferred to [facility] overnight for neurology consultation.  Neurology felt that his new onset dizziness, headache and encephalopathy was likely due to a combination of recent change in medications, bradycardia, peripheral vertigo and dehydration. CT Scan of head negative for acute changes. Cardiology noted that he was bradycardic and suggested an outpatient 24 hr holter monitor and perhaps turning down the intensity of his bladder neck stimulator. ESR and CRP were unremarkable.  He denies any severe headache, visual disturbance, neck or back pain.  No chest pain, dysphagia or dysarthria.  Occasional mild lightheadedness.  Creatinine is back to normal.  Patient now feeling better with treatment than he has in last month, and he needs more therapy before D/C home safely.  He lives at

## 2018-09-06 NOTE — PLAN OF CARE
Problem: HEMODYNAMIC STATUS  Goal: Patient has stable vital signs and fluid balance  Outcome: Met This Shift  Patient's EF (Ejection Fraction) is greater than 40%    Patient has a past medical history of Arthralgia; Cancer ([facility] [address] Chronic back pain; Chronic diastolic (congestive) heart failure ([place] [address] Diverticulitis; Dizziness; Fatigue; Fractures; Hearing decreased; Hyperlipidemia; Hypertension; Osteoarthritis of knee; Osteoarthritis of shoulder; TIA (transient ischemic attack); Vertigo; and Wears glasses. Comorbidities reviewed and education provided. Patient and family's stated goal of care: increase activity tolerance prior to discharge    Patient's current functional capacity:  Slight limitation of physical activity. Comfortable at rest. Ordinary physical activity results in fatigue, palpitation, dyspnea. Pt resting in bed at this time on room air. Pt denies shortness of breath. Pt with nonpitting lower extremity edema. Patient's weights and intake/output reviewed:    Patient Vitals for the past 96 hrs (Last 3 readings):   Weight   09/06/18 0655 186 lb 8 oz (84.6 kg)   09/05/18 0618 183 lb 1.6 oz (83.1 kg)   09/04/18 1615 181 lb 4.8 oz (82.2 kg)       Intake/Output Summary (Last 24 hours) at 09/06/18 1603  Last data filed at 09/06/18 1315   Gross per 24 hour   Intake             2670 ml   Output             2740 ml   Net              -70 ml       Patient provided with education on CHF signs/symptoms, causes, discharge medications, daily weights, low sodium diet, activity, and follow-up. Notified patient to call the doctor post discharge if patient experiences shortness of breath, chest pain, swelling, cough, or weight gain of three pounds in a day/five pounds in a week. Also notified patient to call the doctor with dizziness, increased fatigue, decreased or difficulty urinating. Pt education needs reinforcement. No additional questions at this time.     Education Time: 10 Minutes

## 2018-09-06 NOTE — PLAN OF CARE
Problem: OXYGENATION/RESPIRATORY FUNCTION  Goal: Patient will maintain patent airway  Patient's EF (Ejection Fraction) is greater than 40%    Patient has a past medical history of Arthralgia; Cancer ([address] Chronic back pain; Chronic diastolic (congestive) heart failure ([address] Diverticulitis; Dizziness; Fatigue; Fractures; Hearing decreased; Hyperlipidemia; Hypertension; Osteoarthritis of knee; Osteoarthritis of shoulder; TIA (transient ischemic attack); Vertigo; and Wears glasses. Comorbidities reviewed and education provided. Patient and family's stated goal of care: be more comfortable prior to discharge    Patient's current functional capacity:  Slight limitation of physical activity. Comfortable at rest. Ordinary physical activity results in fatigue, palpitation, dyspnea. Pt resting in bed at this time on room air. Pt denies shortness of breath. Pt without lower extremity edema. Patient's weights and intake/output reviewed:    Patient Vitals for the past 96 hrs (Last 3 readings):   Weight   09/05/18 0618 183 lb 1.6 oz (83.1 kg)   09/04/18 1615 181 lb 4.8 oz (82.2 kg)       Intake/Output Summary (Last 24 hours) at 09/05/18 2358  Last data filed at 09/05/18 2323   Gross per 24 hour   Intake             3918 ml   Output             1405 ml   Net             2513 ml       Patient provided with education on CHF signs/symptoms, causes, discharge medications, daily weights, low sodium diet, activity, and follow-up. Notified patient to call the doctor post discharge if patient experiences shortness of breath, chest pain, swelling, cough, or weight gain of three pounds in a day/five pounds in a week. Also notified patient to call the doctor with dizziness, increased fatigue, decreased or difficulty urinating. Pt verbalized understanding. No additional questions at this time.     Education Time: 10 Minutes

## 2018-09-06 NOTE — DISCHARGE SUMMARY
bilateral vertebral artery origins   measuring approximately 50%. Mild areas of narrowing are noted in the V4   segments of the vertebral arteries. 2.  There is an estimated 50% stenosis in the proximal right internal carotid   artery and a 40% stenosis in the proximal left internal carotid artery. 3.  Unremarkable CTA of the brain. CTA NECK W CONTRAST   Final Result   1. 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 Mild areas of narrowing are noted in the V4   segments of the vertebral arteries. 2.  There is an estimated 50% stenosis in the proximal right internal carotid   artery and a 40% stenosis in the proximal left internal carotid artery. 3.  Unremarkable CTA of the brain. Consults:     IP CONSULT TO NEUROLOGY    Disposition:  ARU     Condition at Discharge: Stable    Discharge Instructions/Follow-up:  Follow up with PCP within 1-2 weeks. Code Status:  Full Code     Activity: activity as tolerated    Diet: regular diet and encourage fluids      Discharge Medications:     Current Discharge Medication List           Details   gabapentin (NEURONTIN) 100 MG capsule Take 1 capsule by mouth 2 times daily for 30 days. Ramy Archerler: 60 capsule, Refills: 0      amLODIPine (NORVASC) 5 MG tablet Take 1 tablet by mouth every morning  Qty: 30 tablet, Refills: 3              Details   famotidine (PEPCID) 40 MG tablet Take 40 mg by mouth daily      loperamide (IMODIUM) 2 MG capsule Take 2 mg by mouth 4 times daily as needed for Diarrhea      mirtazapine (REMERON) 15 MG tablet Take 15 mg by mouth nightly      Vitamins-Lipotropics (LIPOFLAVONOID) TABS Take 1 tablet by mouth Daily with lunch Indications: vertigo      clopidogrel (PLAVIX) 75 MG tablet Take 75 mg by mouth daily      escitalopram (LEXAPRO) 10 MG tablet Take 10 mg by mouth daily      vitamin B-12 (CYANOCOBALAMIN) 100 MCG tablet Take 50 mcg by mouth daily predniSONE (DELTASONE) 5 MG tablet Take 5 mg by mouth daily. simvastatin (ZOCOR) 40 MG tablet Take 40 mg by mouth daily. CALCIUM PO Take 1,000 mg by mouth daily. vitamin D (CHOLECALCIFEROL) 1000 UNIT TABS tablet Take 1,000 Units by mouth daily. Multiple Vitamins-Minerals (MULTIVITAMIN PO) Take 1 tablet by mouth daily                Time Spent on discharge is more than 30 minutes in the examination, evaluation, counseling and review of medications and discharge plan. Signed:    Mandie Bush MD   9/6/2018      Thank you Carmen Zepeda MD for the opportunity to be involved in this patient's care. If you have any questions or concerns please feel free to contact me at [phone].

## 2018-09-07 LAB
ANION GAP SERPL CALCULATED.3IONS-SCNC: 12 MMOL/L (ref 3–16)
BASOPHILS ABSOLUTE: 0 K/UL (ref 0–0.2)
BASOPHILS RELATIVE PERCENT: 0.4 %
BUN BLDV-MCNC: 19 MG/DL (ref 7–20)
CALCIUM SERPL-MCNC: 9 MG/DL (ref 8.3–10.6)
CHLORIDE BLD-SCNC: 104 MMOL/L (ref 99–110)
CO2: 26 MMOL/L (ref 21–32)
CREAT SERPL-MCNC: 1.1 MG/DL (ref 0.8–1.3)
EOSINOPHILS ABSOLUTE: 0.2 K/UL (ref 0–0.6)
EOSINOPHILS RELATIVE PERCENT: 1.7 %
GFR AFRICAN AMERICAN: >60
GFR NON-AFRICAN AMERICAN: >60
GLUCOSE BLD-MCNC: 165 MG/DL (ref 70–99)
HCT VFR BLD CALC: 42.4 % (ref 40.5–52.5)
HEMOGLOBIN: 14.2 G/DL (ref 13.5–17.5)
LYMPHOCYTES ABSOLUTE: 1 K/UL (ref 1–5.1)
LYMPHOCYTES RELATIVE PERCENT: 11 %
MCH RBC QN AUTO: 30.8 PG (ref 26–34)
MCHC RBC AUTO-ENTMCNC: 33.4 G/DL (ref 31–36)
MCV RBC AUTO: 92.1 FL (ref 80–100)
MONOCYTES ABSOLUTE: 0.4 K/UL (ref 0–1.3)
MONOCYTES RELATIVE PERCENT: 4.7 %
NEUTROPHILS ABSOLUTE: 7.6 K/UL (ref 1.7–7.7)
NEUTROPHILS RELATIVE PERCENT: 82.2 %
PDW BLD-RTO: 14.3 % (ref 12.4–15.4)
PLATELET # BLD: 154 K/UL (ref 135–450)
PMV BLD AUTO: 9 FL (ref 5–10.5)
POTASSIUM REFLEX MAGNESIUM: 4.4 MMOL/L (ref 3.5–5.1)
PREALBUMIN: 24.3 MG/DL (ref 20–40)
RBC # BLD: 4.6 M/UL (ref 4.2–5.9)
SODIUM BLD-SCNC: 142 MMOL/L (ref 136–145)
WBC # BLD: 9.2 K/UL (ref 4–11)

## 2018-09-07 PROCEDURE — 84134 ASSAY OF PREALBUMIN: CPT

## 2018-09-07 PROCEDURE — 6360000002 HC RX W HCPCS: Performed by: PHYSICAL MEDICINE & REHABILITATION

## 2018-09-07 PROCEDURE — 1280000000 HC REHAB R&B

## 2018-09-07 PROCEDURE — 97530 THERAPEUTIC ACTIVITIES: CPT

## 2018-09-07 PROCEDURE — G8979 MOBILITY GOAL STATUS: HCPCS

## 2018-09-07 PROCEDURE — 6370000000 HC RX 637 (ALT 250 FOR IP): Performed by: PHYSICAL MEDICINE & REHABILITATION

## 2018-09-07 PROCEDURE — 80048 BASIC METABOLIC PNL TOTAL CA: CPT

## 2018-09-07 PROCEDURE — 97112 NEUROMUSCULAR REEDUCATION: CPT

## 2018-09-07 PROCEDURE — 97535 SELF CARE MNGMENT TRAINING: CPT

## 2018-09-07 PROCEDURE — 36415 COLL VENOUS BLD VENIPUNCTURE: CPT

## 2018-09-07 PROCEDURE — 97110 THERAPEUTIC EXERCISES: CPT

## 2018-09-07 PROCEDURE — 97162 PT EVAL MOD COMPLEX 30 MIN: CPT

## 2018-09-07 PROCEDURE — 85025 COMPLETE CBC W/AUTO DIFF WBC: CPT

## 2018-09-07 PROCEDURE — G8978 MOBILITY CURRENT STATUS: HCPCS

## 2018-09-07 PROCEDURE — 92523 SPEECH SOUND LANG COMPREHEN: CPT

## 2018-09-07 PROCEDURE — 97116 GAIT TRAINING THERAPY: CPT

## 2018-09-07 PROCEDURE — 97166 OT EVAL MOD COMPLEX 45 MIN: CPT

## 2018-09-07 RX ADMIN — SIMVASTATIN 40 MG: 40 TABLET, FILM COATED ORAL at 08:20

## 2018-09-07 RX ADMIN — GABAPENTIN 100 MG: 100 CAPSULE ORAL at 08:17

## 2018-09-07 RX ADMIN — CLOPIDOGREL BISULFATE 75 MG: 75 TABLET ORAL at 08:16

## 2018-09-07 RX ADMIN — GABAPENTIN 100 MG: 100 CAPSULE ORAL at 20:13

## 2018-09-07 RX ADMIN — FAMOTIDINE 40 MG: 20 TABLET ORAL at 08:16

## 2018-09-07 RX ADMIN — MIRTAZAPINE 15 MG: 15 TABLET, FILM COATED ORAL at 20:12

## 2018-09-07 RX ADMIN — PREDNISONE 5 MG: 5 TABLET ORAL at 08:17

## 2018-09-07 RX ADMIN — DOCUSATE SODIUM 100 MG: 100 CAPSULE, LIQUID FILLED ORAL at 08:17

## 2018-09-07 RX ADMIN — AMLODIPINE BESYLATE 5 MG: 5 TABLET ORAL at 08:16

## 2018-09-07 RX ADMIN — ESCITALOPRAM OXALATE 10 MG: 10 TABLET ORAL at 08:16

## 2018-09-07 RX ADMIN — ENOXAPARIN SODIUM 40 MG: 40 INJECTION SUBCUTANEOUS at 08:18

## 2018-09-07 NOTE — PROGRESS NOTES
Speech Language Pathology  Facility/Department: [facility] ARU  Initial Speech/Language/Cognitive Assessment    NAME: Darius Sierra  : 1927   MRN: [id number]  ADMISSION DATE: 2018  ADMITTING DIAGNOSIS: has Fatigue; Dizziness; Hyperlipidemia with target LDL less than 100; Essential hypertension; Polymyalgia rheumatica syndrome ([address] Other affections of shoulder region, not elsewhere classified; Primary localized osteoarthrosis, lower leg; Primary localized osteoarthrosis of shoulder region; Toe pain; Toe fracture; Callus of foot; TIA (transient ischemic attack); GERD (gastroesophageal reflux disease); Insomnia; Depression; BPH (benign prostatic hyperplasia); Acute encephalopathy; HTN (hypertension), benign; CAD in native artery; Hyperlipidemia; Pneumonia due to organism; Sepsis ([address] Community acquired bacterial pneumonia; Chronic diastolic congestive heart failure ([address] History of total shoulder replacement, left; Bradycardia; Mild dehydration; PVC (premature ventricular contraction); Near syncope; RONAK (acute kidney injury) ([address] New persistent daily headache; and Metabolic encephalopathy on his problem list.  DATE ONSET: 18    Date of Eval: 2018   Evaluating Therapist: SHAILESH Rehman    RECENT RESULTS  CT OF HEAD: 18 per radiologist results  Impression   No acute intracranial abnormality. Primary Complaint:  Primary history and complaint was provided by the patient. Pt stated he was at home when he began feeling confused, pt stated he \"went completely out of [his] head. \"  Pt was brought to the hospital with lightheadedness and fatigue; [facility] ED initially thought CVA and pt was transferred to [facility]. Per MD note, \"Neurology felt that his new onset dizziness, headache and encephalopathy was likely due to a combination of recent change in medications, bradycardia, peripheral vertigo and dehydration. CT Scan of head negative for acute changes. \"    Pt stated at baseline he lives at home independently. Pt is currently retired. Stated he cares for his own meds and finances. Daughter does grocery shopping and he has a cleaning service 1x/week for laundry and house cleaning. Pt goals: Go home. Pain:  Patient Currently in Pain: Denies    Assessment:  Cognitive Diagnosis: Mild cog-linguistic deficit     Administered portions of the KAREN for higher level, functional assessment of cognitive-linguistic skills as they relate to daily tasks pt may need to complete at home. Pt demonstrated mild deficits in the areas of fx'al recall, thought organization, attention, problem solving, and exec function. Pt also presents with some impulsivity and reduced safety awareness; disregarding cueing for safe transfers and chair alarm when with SLP, leaving walker to the side and walking across room, etc.  Recommend continued speech therapy for cog-linguistic tx. Discussed with pt and daughter (briefly) - both in agreement. Dysphagia Screen:  Pt is currently edentulous. Had all teeth pulled approx 3 weeks ago and is awaiting denture fitting for a full set. Pt stated he is able to monitor his menu choices independently based on what he can and cannot chew. Pt was once on a regular solid diet during hospital stay but had difficulty with mastication, stated ever since it was changed to dental soft he has not had any difficulty or complaint. No desire to change current solid consistency. No reported difficulty; meds whole with water. No hx of recent or recurring pna; no reported s/s of GERD. Full dysphagia evaluation not warranted at this time. Cont current diet as tolerated. Assessment of Language-Related Functional Activities (KAREN)  Subtest Score Comments   1. Telling Time 10/10 No difficulty. 2.  Counting Money 7/10 Reduced thought organization. Decreased self monitoring; pt quick to answer at times, not double checking work or noticing that response was incorrect.     3.  Addressing an

## 2018-09-07 NOTE — PLAN OF CARE
Problem: Falls - Risk of:  Goal: Will remain free from falls  Will remain free from falls   Outcome: Ongoing  Fall precautions in place, bed alarm on, nonskid foot wear applied, bed in lowest position, and call light within reach. Will continue to monitor.

## 2018-09-07 NOTE — CARE COORDINATION
Case Management ARU Admission Assessment     Objective:  met with the patient to complete initial assessment and review the role of Case Management while on the ARU. Patient educated on team conferences. Discussed family training with the patient/family on how it is encouraged on the unit. Order for \"discharge planning\" has been addressed. Family Present: None   Primary : Daughter Rachel Magallon [phone]    Admit date:  09-           Insurance: Medicare and [employer]     Admitting diagnosis: Metabolic Encephalopathy     Current home situation: Patient lives at home alone. He reported being independent and able to drive. DME at home: [employer] and [employer]     Services prior to admission:  Homemaking through [employer]     Patient's goal(s): \"To get back to every day life\"    Working or Volunteering prior to admission:  __Yes __XNo                        Accessibility to community resources/transportation:  He reported being able to drive       Active with:   X__[employer]      __[employer]  __Other    Has patient experienced a recent loss or significant life event that would impact their care or ability to participate?  __X No  __Yes, please explain:    Has patient ever been treated for emotional disorder(s)? _X_No  __Yes, how does this affect current situation? Is patient and family coping appropriately with stressful events and this hospitalization? _X_Yes  __No, please explain:    Support system at home and in the community: He stated his daughters and neighbors. He stated \"All set, I am really blessed there\"    Discharge plan: He hopes to be able to return home     Summary: Patient is a  80year old male presenting to the ARU. He maintained appropriate eye contact and conversation throughout the initial assessment. He stated we can contact his daughter if needed. He was educated on team conferences and family training.  SW encouraged him to discuss

## 2018-09-07 NOTE — PROGRESS NOTES
Physical Therapy    Facility/Department: [facility]  Initial Assessment and Treatment    NAME: Kobe Ware  : 1927  MRN: [id number]    Date of Service: 2018    Discharge Recommendations:  Home with assist PRN   PT Equipment Recommendations  Equipment Needed: No (pt has all DME)    Patient Diagnosis(es): There were no encounter diagnoses. has a past medical history of Arthralgia; Cancer [address] [address] Chronic back pain; Chronic diastolic (congestive) heart failure ([facility] [address] Diverticulitis; Dizziness; Fatigue; Fractures; Hearing decreased; Hyperlipidemia; Hypertension; Osteoarthritis of knee; Osteoarthritis of shoulder; TIA (transient ischemic attack); Vertigo; and Wears glasses. has a past surgical history that includes Inguinal hernia repair; Cataract removal (); Prostatectomy (); Colonoscopy; Upper gastrointestinal endoscopy (2011); Esophagus dilation; knee surgery (Bilateral); Total shoulder arthroplasty; shoulder surgery; Toe Surgery (Right, 2013); joint replacement; and Total knee arthroplasty.     Restrictions  Restrictions/Precautions  Restrictions/Precautions: General Precautions, Fall Risk  Position Activity Restriction  Other position/activity restrictions: High fall risk per nursing assessment, up as tolerated, orthostatic BP's, telemetry  Vision/Hearing  Vision: Impaired  Vision Exceptions: Wears glasses at all times  Hearing: Exceptions to [facility] Exceptions: Hard of hearing/hearing concerns;Bilateral hearing aid     Subjective  General  Chart Reviewed: Yes  Patient assessed for rehabilitation services?: Yes  Response To Previous Treatment: Not applicable  Family / Caregiver Present: No  Referring Practitioner: Dr. Maribeth Quarles MD  Referral Date : 18  Diagnosis: Metabolic encephalopathy  Follows Commands: Within Functional Limits  General Comment  Comments: Pt resting in bed on approach and agreeable to therapy  Pain Screening  Patient Currently in Pain: Denies goals  Time Frame for Long term goals : 7 days  Long term goal 1: Pt will be mod I with sit<>Stand and bed<>Chair with LRAD. Long term goal 2: Pt will ambulate 150 ft mod I with LRAD. Long term goal 3: Pt will negotiate 2 stairs with HR mod I.  Long term goal 4: Pt will be indep with HEP to maintain strength and balance. Long term goal 5: Pt will improve Titus Balance score to 52/56 to reflect decreased fall risk.   Patient Goals   Patient goals : \"to do things without a problem\"       Therapy Time   Individual Concurrent Group Co-treatment   Time In 1230         Time Out 1330         Minutes 60         Timed Code Treatment Minutes: [address] [place], [place], DPT #[id number]

## 2018-09-07 NOTE — H&P
mother; Parkinsonism in his father. REVIEW OF SYSTEMS:   CONSTITUTIONAL: negative for fevers, chills, diaphoresis, activity change, appetite change, fatigue, night sweats and unexpected weight change. EYES: negative for blurred vision, eye discharge, visual disturbance and icterus. HEENT: negative for hearing loss, tinnitus, ear drainage, sinus pressure, nasal congestion, epistaxis and snoring. RESPIRATORY: Negative for hemoptysis, cough, sputum production. CARDIOVASCULAR: negative for chest pain, palpitations, exertional chest pressure/discomfort, edema, syncope. + CAD  GASTROINTESTINAL: negative for nausea, vomiting, diarrhea, constipation, blood in stool and abdominal pain. GENITOURINARY: negative for frequency, dysuria, urinary incontinence, decreased urine volume, and hematuria. HEMATOLOGIC/LYMPHATIC: negative for easy bruising, bleeding and lymphadenopathy. ALLERGIC/IMMUNOLOGIC: negative for recurrent infections, angioedema, anaphylaxis and drug reactions. ENDOCRINE: negative for weight changes and diabetic symptoms including polyuria, polydipsia and polyphagia. MUSCULOSKELETAL: negative for pain, joint swelling, decreased range of motion and muscle weakness. NEUROLOGICAL: negative for headaches, slurred speech, unilateral weakness. + dizziness  PSYCHIATRIC/BEHAVIORAL: negative for hallucinations, behavioral problems, confusion and agitation. All pertinent positives are noted in the HPI.     Physical Examination:  Vitals: Patient Vitals for the past 24 hrs:   BP Temp Temp src Pulse Resp SpO2 Weight   09/07/18 0814 139/66 97.9 °F (36.6 °C) Oral 56 16 99 % -   09/07/18 0600 - - - - - - 184 lb (83.5 kg)   09/06/18 2100 (!) 156/73 98 °F (36.7 °C) Oral 53 17 95 % -   09/06/18 1844 (!) 148/72 97.7 °F (36.5 °C) Axillary 52 18 93 % -     Psych: Stable mood, normal judgement, normal affect   Const: No distress  Eyes: Conjunctiva noninjected, no icterus noted; pupils equal, round, and reactive to light. HENT: Atraumatic, normocephalic; Oral mucosa moist  Neck: Trachea midline, neck supple. No thyromegaly noted. CV: Regular rate and rhythm, no murmur rub or gallop noted  Resp: Lungs clear to auscultation bilaterally, no rales wheezes or ronchi, no retractions. Respirations unlabored. GI: Soft, nontender, nondistended. Normal bowel sounds. No palpable masses. Neuro: Alert, oriented, appropriate. No cranial nerve deficits appreciated. Motor examination reveals normal strength in all four limbs diffusely. Skin: Normal temperature and turgor  MSK: No joint abnormalities noted. Ext: No significant edema appreciated. No varicosities. Lab Results   Component Value Date    WBC 9.2 09/07/2018    HGB 14.2 09/07/2018    HCT 42.4 09/07/2018    MCV 92.1 09/07/2018     09/07/2018     Lab Results   Component Value Date    INR 1.10 09/04/2018    PROTIME 12.5 09/04/2018     Lab Results   Component Value Date    CREATININE 1.1 09/07/2018    BUN 19 09/07/2018     09/07/2018    K 4.4 09/07/2018     09/07/2018    CO2 26 09/07/2018     Lab Results   Component Value Date    ALT 16 09/04/2018    AST 25 09/04/2018    ALKPHOS 88 09/04/2018    BILITOT 0.5 09/04/2018       Xr Chest Standard (2 Vw)    Result Date: 8/10/2018  EXAMINATION: TWO VIEWS OF THE CHEST 8/10/2018 3:55 pm COMPARISON: Chest 04/25/2018. HISTORY: ORDERING SYSTEM PROVIDED HISTORY: dizziness TECHNOLOGIST PROVIDED HISTORY: Reason for exam:->dizziness Ordering Physician Provided Reason for Exam: Dizziness (Pt presents with c/o dizziness x \"a week. \". States his blood pressure at home was 158/60. He stated he called his MD who told him to come into the ER. Denies chest pain. Denies n/v/f/d.) Acuity: Acute Type of Exam: Initial Relevant Medical/Surgical History: HTN PROSTATE CANCER FINDINGS: Cardiac silhouette appears within normal limits for size. Calcific atherosclerotic disease aorta. Mediastinum appears otherwise unremarkable.  The hilar silhouettes appear unremarkable. Chronic appearing coarse interstitial densities predominate parahilar regions and lung bases, typical of sequela from smoking or other previous infectious/inflammatory process. No focal consolidation is evident right lung. Hazy alveolar densities seen along the left cardiac apex central lower lung on frontal view. No pleural effusion is evident. No pleural effusion or pneumothorax is seen. Mild-to-moderate anterior wedge compression fracture of 1 of the midthoracic vertebra progressed compared with prior study. Left humeral head prosthesis. 1. Interval progression of anterior wedge compression fracture of 1 of the midthoracic vertebra compared to chest 04/25/2018. 2. Possible infiltrate or atelectasis central left lung base. 3. Senescent pulmonary changes. 4. Calcific atherosclerotic disease aorta. 5. Otherwise unremarkable chest.     Ct Head Wo Contrast    Result Date: 9/4/2018  EXAMINATION: CT OF THE HEAD WITHOUT CONTRAST  9/4/2018 1:34 am TECHNIQUE: CT of the head was performed without the administration of intravenous contrast. Dose modulation, iterative reconstruction, and/or weight based adjustment of the mA/kV was utilized to reduce the radiation dose to as low as reasonably achievable. COMPARISON: CT 08/10/2018 HISTORY: ORDERING SYSTEM PROVIDED HISTORY: dizziness TECHNOLOGIST PROVIDED HISTORY: Has a \"code stroke\" or \"stroke alert\" been called? ->No Ordering Physician Provided Reason for Exam:  (pt arrives to room 1 family c/o dizziness and weakness, family states pt lives alone and they called them and said his feet were cold and that he felt like his hand was swelling, pt not following commands very well and just wants to lay on right side during triage Acuity: Acute Type of Exam: Initial FINDINGS: BRAIN/VENTRICLES: There is no acute intracranial hemorrhage, mass effect or midline shift. No abnormal extra-axial fluid collection.   The gray-white differentiation cells demonstrate no acute abnormality. SOFT TISSUES/SKULL:  No acute abnormality of the visualized skull or soft tissues. No acute intracranial abnormality. Xr Chest Portable    Result Date: 9/4/2018  EXAMINATION: SINGLE XRAY VIEW OF THE CHEST 9/4/2018 1:33 am COMPARISON: 08/10/2018 HISTORY: ORDERING SYSTEM PROVIDED HISTORY: cp TECHNOLOGIST PROVIDED HISTORY: Reason for exam:->cp Ordering Physician Provided Reason for Exam:  (pt arrives to room 1 family c/o dizziness and weakness, family states pt lives alone and they called them and said his feet were cold and that he felt like his hand was swelling, pt not following commands very well and just wants to lay on right side during triage Acuity: Acute Type of Exam: Initial FINDINGS: Stable cardiomegaly. Trachea is essentially midline. No lung infiltrate or consolidation. No definite pneumothorax or pleural effusion. Status post left shoulder arthroplasty. No acute abnormality. Cta Neck W Contrast    Result Date: 9/5/2018  EXAMINATION: CTA OF THE NECK; CTA OF THE HEAD WITH CONTRAST 9/5/2018 11:49 am: TECHNIQUE: CTA of the neck was performed with the administration of intravenous contrast. Multiplanar reformatted images are provided for review. MIP images are provided for review. Stenosis of the internal carotid arteries measured using NASCET criteria. Dose modulation, iterative reconstruction, and/or weight based adjustment of the mA/kV was utilized to reduce the radiation dose to as low as reasonably achievable.; CTA of the head/brain was performed with the administration of intravenous contrast. Multiplanar reformatted images are provided for review. MIP images are provided for review. Dose modulation, iterative reconstruction, and/or weight based adjustment of the mA/kV was utilized to reduce the radiation dose to as low as reasonably achievable. COMPARISON: 09/04/2018.  HISTORY: ORDERING SYSTEM PROVIDED HISTORY: STROKE TECHNOLOGIST PROVIDED HISTORY: Has a \"code stroke\" or \"stroke alert\" been called? ->No Ordering Physician Provided Reason for Exam: dizziness Acuity: Acute Type of Exam: Initial; ORDERING SYSTEM PROVIDED HISTORY: DIZZINESS TECHNOLOGIST PROVIDED HISTORY: Has a \"code stroke\" or \"stroke alert\" been called? ->No Ordering Physician Provided Reason for Exam: dizziness Acuity: Acute Type of Exam: Initial Additional signs and symptoms: prev TIA FINDINGS: CTA NECK: AORTIC ARCH/ARCH VESSELS: There is fibrofatty and fibrocalcific plaque noted along the aortic arch. There is a normal branch pattern of the aortic arch. Fibrocalcific plaque is noted at the innominate artery origin without evidence of a flow-limiting stenosis. There is fibrocalcific plaque noted in the proximal right subclavian artery with an estimated 25% stenosis. There is fibrofatty and fibrocalcific plaque noted in the left subclavian artery. There is an estimated 50% stenosis in the left subclavian artery just proximal to the vertebral artery origin. The mid and distal left subclavian artery are patent. CAROTID ARTERIES: The right common carotid artery has fibrofatty and fibrocalcific plaque with an estimated 30% stenosis along the midportion. There is fibrocalcific plaque noted in the proximal right internal carotid artery with an estimated 50% stenosis using NASCET criteria. The left common carotid artery has scattered areas of fibrofatty plaque. Fibrocalcific plaque is noted in the proximal left internal carotid artery with an estimated 40% stenosis. VERTEBRAL ARTERIES: There is fibrocalcific plaque noted at the right vertebral artery origin with a greater than 50% stenosis, moderate in severity. There is fibrocalcific plaque noted in the V4 segments of bilateral vertebral arteries which is mild in severity. There is an estimated 50% stenosis at the left vertebral artery origin, moderate in severity. SOFT TISSUES: There is respiratory motion artifact in the lung apices.  There utilized to reduce the radiation dose to as low as reasonably achievable.; CTA of the head/brain was performed with the administration of intravenous contrast. Multiplanar reformatted images are provided for review. MIP images are provided for review. Dose modulation, iterative reconstruction, and/or weight based adjustment of the mA/kV was utilized to reduce the radiation dose to as low as reasonably achievable. COMPARISON: 09/04/2018. HISTORY: ORDERING SYSTEM PROVIDED HISTORY: STROKE TECHNOLOGIST PROVIDED HISTORY: Has a \"code stroke\" or \"stroke alert\" been called? ->No Ordering Physician Provided Reason for Exam: dizziness Acuity: Acute Type of Exam: Initial; ORDERING SYSTEM PROVIDED HISTORY: DIZZINESS TECHNOLOGIST PROVIDED HISTORY: Has a \"code stroke\" or \"stroke alert\" been called? ->No Ordering Physician Provided Reason for Exam: dizziness Acuity: Acute Type of Exam: Initial Additional signs and symptoms: prev TIA FINDINGS: CTA NECK: AORTIC ARCH/ARCH VESSELS: There is fibrofatty and fibrocalcific plaque noted along the aortic arch. There is a normal branch pattern of the aortic arch. Fibrocalcific plaque is noted at the innominate artery origin without evidence of a flow-limiting stenosis. There is fibrocalcific plaque noted in the proximal right subclavian artery with an estimated 25% stenosis. There is fibrofatty and fibrocalcific plaque noted in the left subclavian artery. There is an estimated 50% stenosis in the left subclavian artery just proximal to the vertebral artery origin. The mid and distal left subclavian artery are patent. CAROTID ARTERIES: The right common carotid artery has fibrofatty and fibrocalcific plaque with an estimated 30% stenosis along the midportion. There is fibrocalcific plaque noted in the proximal right internal carotid artery with an estimated 50% stenosis using NASCET criteria. The left common carotid artery has scattered areas of fibrofatty plaque.  Fibrocalcific plaque is noted in the proximal left internal carotid artery with an estimated 40% stenosis. VERTEBRAL ARTERIES: There is fibrocalcific plaque noted at the right vertebral artery origin with a greater than 50% stenosis, moderate in severity. There is fibrocalcific plaque noted in the V4 segments of bilateral vertebral arteries which is mild in severity. There is an estimated 50% stenosis at the left vertebral artery origin, moderate in severity. SOFT TISSUES: There is respiratory motion artifact in the lung apices. There is no superior mediastinal adenopathy. The thyroid gland is unremarkable. The submandibular and parotid glands are normal in appearance. The parapharyngeal fat spaces are unremarkable. The base of the tongue, vallecula and epiglottis are normal in appearance. The true vocal cords are unremarkable. BONES: The paranasal sinuses and mastoid air cells are well-aerated. Degenerative changes are noted in the spine. No fracture. CTA HEAD: ANTERIOR CIRCULATION: Fibrocalcific plaque is noted in the carotid siphons bilaterally without evidence of a flow-limiting stenosis. The right PCOM is patent. The anterior middle cerebral arteries are unremarkable. POSTERIOR CIRCULATION: The basilar artery is normal in appearance. There is a complete fetal origin of the right PCA. The left posterior cerebral artery is unremarkable. BRAIN: No areas of abnormal contrast enhancement are identified. 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 No midline shift. Scattered low-attenuation areas are noted, likely related to sequela of chronic microvascular white matter ischemic disease. 1.  Moderate stenoses are noted in bilateral vertebral artery origins measuring approximately 50%. Mild areas of narrowing are noted in the V4 segments of the vertebral arteries.  2.  There is an estimated 50% stenosis in the proximal right internal carotid artery and a 40% stenosis in the proximal left internal carotid Screen, this Post-Admission Evaluation, and the therapy  evaluations. Assessment and Plan:    Acute on chronic lightheadedness, fatigue, and diffuse weakness- likely due to dehydration, with underlying chronic BPPV. 24-hr Holter monitor -No need for pacer. CAD-ASA,plavix    HLD-lipitor     Acute urinary retention-BPH, improved     Acute metabolic encephalopathy- likely due to above issues.       HTN, chronic dCHF- amlodipine.  Allergic to ACE.  .       Bowels: Schedule Miralax + Senna S. Follow bowel movements. Enema or suppository if needed. Bladder: Check PVR x 3. [address] if PVR > 200ml or if any volume is > 500 ml. Pain: Tylenol is ordered prn.        Rufina Elmore MD, 9/7/2018, 10:23 AM

## 2018-09-07 NOTE — CONSULTS
Nutrition Assessment    Type and Reason for Visit: Initial, Consult, Positive Nutrition Screen    Nutrition Recommendations:   Continue general, dental soft diet  Continue Ensure daily  Monitor po intakes, nutrition adequacy, weights, pertinent labs, BMs, chewing    Malnutrition Assessment:  · Malnutrition Status: No malnutrition    Nutrition Diagnosis:   · Problem: No nutrition diagnosis at this time (Pt reports that he is able to eat well on dental soft diet)    Nutrition Assessment:  · Subjective Assessment: Consult for pt request to see RD. + screen for difficulty chewing/swallowing. New ARU admission. Pt is a 79 yo male with Hx of HTN, HLD, CHF, and TIA, who was initially admitted d/t his chronic dizziness, fatigue, and generalized weakness were worse than normal. Currently on a general, dental soft diet with Ensure daily. Intakes % per EMR. Pt recently had teeth extracted. Pt reports that he has been tolerating dental soft diet well. No complaints. Pt endorses good appetite and stable weight PTA. Pt declined need for diet education or any nutrition questions/needs. · Nutrition-Focused Physical Findings: Recent teeth extraction  · Wound Type: None  · Current Nutrition Therapies:  · Oral Diet Orders: General, Dental Soft   · Oral Diet intake: %  · Oral Nutrition Supplement (ONS) Orders: Standard High Calorie Oral Supplement  · ONS intake: %  · Anthropometric Measures:  · Ht:  (None recorded)   · Current Body Wt: 184 lb (83.5 kg)  · BMI Classification:  (JOSE)    Estimated Intake vs Estimated Needs: Intake Meets Needs    Nutrition Risk Level: Low    Nutrition Interventions:   Continue current diet, Continue current ONS  Continued Inpatient Monitoring    Nutrition Evaluation:   · Evaluation: Goals set   · Goals:  Toelrate diet and have intakes 50% or greater this admission    · Monitoring: Meal Intake, Supplement Intake, Diet Tolerance, Weight, Chewing/Swallowing    See Adult Nutrition Doc

## 2018-09-08 PROCEDURE — 6370000000 HC RX 637 (ALT 250 FOR IP): Performed by: PHYSICAL MEDICINE & REHABILITATION

## 2018-09-08 PROCEDURE — 97116 GAIT TRAINING THERAPY: CPT

## 2018-09-08 PROCEDURE — 97112 NEUROMUSCULAR REEDUCATION: CPT

## 2018-09-08 PROCEDURE — 97127 HC SP THER IVNTJ W/FOCUS COG FUNCJ: CPT

## 2018-09-08 PROCEDURE — 97530 THERAPEUTIC ACTIVITIES: CPT

## 2018-09-08 PROCEDURE — 6360000002 HC RX W HCPCS: Performed by: PHYSICAL MEDICINE & REHABILITATION

## 2018-09-08 PROCEDURE — 97110 THERAPEUTIC EXERCISES: CPT

## 2018-09-08 PROCEDURE — 97535 SELF CARE MNGMENT TRAINING: CPT

## 2018-09-08 PROCEDURE — 1280000000 HC REHAB R&B

## 2018-09-08 RX ORDER — LOPERAMIDE HYDROCHLORIDE 2 MG/1
2 CAPSULE ORAL 4 TIMES DAILY PRN
Status: DISCONTINUED | OUTPATIENT
Start: 2018-09-08 | End: 2018-09-14 | Stop reason: HOSPADM

## 2018-09-08 RX ADMIN — MIRTAZAPINE 15 MG: 15 TABLET, FILM COATED ORAL at 20:45

## 2018-09-08 RX ADMIN — SIMVASTATIN 40 MG: 40 TABLET, FILM COATED ORAL at 11:56

## 2018-09-08 RX ADMIN — PREDNISONE 5 MG: 5 TABLET ORAL at 11:56

## 2018-09-08 RX ADMIN — CLOPIDOGREL BISULFATE 75 MG: 75 TABLET ORAL at 11:56

## 2018-09-08 RX ADMIN — GABAPENTIN 100 MG: 100 CAPSULE ORAL at 20:45

## 2018-09-08 RX ADMIN — GABAPENTIN 100 MG: 100 CAPSULE ORAL at 11:56

## 2018-09-08 RX ADMIN — ASPIRIN 81 MG 162 MG: 81 TABLET ORAL at 11:55

## 2018-09-08 RX ADMIN — FAMOTIDINE 40 MG: 20 TABLET ORAL at 11:56

## 2018-09-08 RX ADMIN — AMLODIPINE BESYLATE 5 MG: 5 TABLET ORAL at 11:56

## 2018-09-08 RX ADMIN — DOCUSATE SODIUM 100 MG: 100 CAPSULE, LIQUID FILLED ORAL at 11:55

## 2018-09-08 RX ADMIN — ENOXAPARIN SODIUM 40 MG: 40 INJECTION SUBCUTANEOUS at 11:56

## 2018-09-08 RX ADMIN — ESCITALOPRAM OXALATE 10 MG: 10 TABLET ORAL at 11:55

## 2018-09-08 RX ADMIN — LOPERAMIDE HYDROCHLORIDE 2 MG: 2 CAPSULE ORAL at 17:32

## 2018-09-08 RX ADMIN — DOCUSATE SODIUM 100 MG: 100 CAPSULE, LIQUID FILLED ORAL at 20:45

## 2018-09-08 ASSESSMENT — PAIN SCALES - GENERAL: PAINLEVEL_OUTOF10: 0

## 2018-09-08 NOTE — PROGRESS NOTES
Occupational Therapy  Facility/Department: [facility]  Daily Treatment Note  NAME: Kobe Ware  : 1927  MRN: [id number]    Date of Service: 2018    Discharge Recommendations:  Home with assist PRN, Home with Home health OT, Home with nursing aide     Patient Diagnosis(es): There were no encounter diagnoses. has a past medical history of Arthralgia; Cancer ([facility] [address] Chronic back pain; Chronic diastolic (congestive) heart failure ([facility] [address] Diverticulitis; Dizziness; Fatigue; Fractures; Hearing decreased; Hyperlipidemia; Hypertension; Osteoarthritis of knee; Osteoarthritis of shoulder; TIA (transient ischemic attack); Vertigo; and Wears glasses. has a past surgical history that includes Inguinal hernia repair; Cataract removal (); Prostatectomy (); Colonoscopy; Upper gastrointestinal endoscopy (2011); Esophagus dilation; knee surgery (Bilateral); Total shoulder arthroplasty; shoulder surgery; Toe Surgery (Right, 2013); joint replacement; and Total knee arthroplasty. Restrictions  Restrictions/Precautions  Restrictions/Precautions: General Precautions, Fall Risk  Position Activity Restriction  Other position/activity restrictions: High fall risk per nursing assessment, up as tolerated, orthostatic BP's, telemetry  Subjective   General  Chart Reviewed: Yes  Patient assessed for rehabilitation services?: Yes  Family / Caregiver Present: No  Referring Practitioner: Dr. Maribeth Quarles  Diagnosis: Acute on chronic lightheadedness, fatigue, and diffuse weakness  Subjective  Subjective: Patient laying in bed, agreeable to OT treatment. General Comment  Comments: Daughter present.   Pain Assessment  Patient Currently in Pain: No  Vital Signs  Patient Currently in Pain: No   Orientation  Orientation  Overall Orientation Status: Within Functional Limits  Objective    Balance  Sitting Balance: Supervision  Standing Balance: Contact guard assistance  Standing Balance  Time: total of 40 minutes with

## 2018-09-08 NOTE — PROGRESS NOTES
Physical Therapy  Facility/Department: [facility]  Daily Treatment Note  NAME: Charmaine Pearson  : 1927  MRN: [id number]    Date of Service: 2018    Discharge Recommendations:  Home with assist PRN        Patient Diagnosis(es): There were no encounter diagnoses. has a past medical history of Arthralgia; Cancer ([facility] [address] Chronic back pain; Chronic diastolic (congestive) heart failure ([facility] [address] Diverticulitis; Dizziness; Fatigue; Fractures; Hearing decreased; Hyperlipidemia; Hypertension; Osteoarthritis of knee; Osteoarthritis of shoulder; TIA (transient ischemic attack); Vertigo; and Wears glasses. has a past surgical history that includes Inguinal hernia repair; Cataract removal (); Prostatectomy (); Colonoscopy; Upper gastrointestinal endoscopy (2011); Esophagus dilation; knee surgery (Bilateral); Total shoulder arthroplasty; shoulder surgery; Toe Surgery (Right, 2013); joint replacement; and Total knee arthroplasty. Restrictions  Restrictions/Precautions  Restrictions/Precautions: General Precautions, Fall Risk  Position Activity Restriction  Other position/activity restrictions: High fall risk per nursing assessment, up as tolerated, orthostatic BP's, telemetry  Subjective   General  Chart Reviewed: Yes  Response To Previous Treatment: Patient with no complaints from previous session. Family / Caregiver Present: No  Referring Practitioner: Dr. Elsa Elias MD  Subjective  Subjective: Pt supine in bed & agreeable to PT. General Comment  Comments: pt denies pain.    Pain Screening  Patient Currently in Pain: Denies  Vital Signs  Patient Currently in Pain: Denies       Orientation  Orientation  Overall Orientation Status: Within Functional Limits  Objective   Bed mobility  Supine to Sit: Independent  Sit to Supine: Independent  Transfers  Sit to Stand: Stand by assistance  Stand to sit: Stand by assistance  Ambulation  Ambulation?: Yes  More Ambulation?: Yes  Ambulation 1  Surface: level tile;carpet  Device: Single point cane  Assistance: Stand by assistance  Quality of Gait: mild unsteady, minimal LOB when turning with narrow NOÉ with CGA to correct  Distance: 379 ft (10 ft of carpet)  Ambulation 2  Surface - 2: level tile  Device 2: No device  Assistance 2: Contact guard assistance;Stand by assistance  Quality of Gait 2: narrow NOÉ close to scissoring when weaving, decreased heesl strike, forward flexed posture  Distance: 150 ft  Comments: obstacle course with weaving betweeen 4 discs, stepping over 4 cones with BUE support progressing to 1 UE support (both with SBA) progressing to no UE support with CGA provided by therapist.   Ambulation 3:   Pt ambulated 95 ft x 2 reps with no AD and close SBA with reciprocal gait pattern and use of environment for balance. Pt offered AD to improve stability,pt declined use of AD. Stairs/Curb  Stairs?: Yes  Stairs  # Steps : 12  Stairs Height: 6\"  Rails: Right ascending  Device: No Device  Assistance: Stand by assistance;Supervision  Comment: flight of steps with 1 HR and SBA-supv provided by therapist, reciprocal stepping pattern. Neuromuscular Education  Neuromuscular Comments: cone taps with no UE support, CGA provided by therapist, tapping 10 reps of R to R cone, L to L cone, followed by 10 alternating. 10 taps R across midline, L acorss midline, 10 alteranting across midline each LE, CGA-min A provided by therapist.   Balance  Standing - Dynamic: Fair;+  Comments: dyn stand activity: rebounder toss with feet shoulder width apart/feet together/tandem stance and RLE and LLE leading/ trunk rotation to R and L 2x10 of each with SBA provided by therapist with no LOB. Item retrieval task in gym x 135  ft to  objects on floor and at various heights. No AD utilized and CGA-SBA provided by therapist. Pt utilized environment (wall, chairs) for external support and balance. Pt attempted to utilize door for balance, educated on safety hazard.  No LOB noted with retrieving objects from floor. Exercises  Comments: 2# weights to BLE, completed 2x10 standing heel/toe raises, marches, hip abduction , HS curls, hip extension, mini squats with BUE support and supv provided by therapist. pt utilzied multiple seated rest breaks due to fatigue. Pt completed 12 min on SCIFIT level 1.5 with BUE and BLE to improve activity tolerance and BLE strength. Assessment   Body structures, Functions, Activity limitations: Decreased functional mobility ; Decreased endurance;Decreased balance;Decreased strength  Assessment: pt highly motivated to participate. Ambulates with SBA with SPC with CGA provided when turning due to scissoring when changing directions. completes remaining gait and balance activities wtih no AD and CGA. pt demonstrates righting reactions with LOB. pt fatigued towards end of session, requried increased rest breaks. Treatment Diagnosis: Impaired standing balance, decreased (I) with mobility  Specific instructions for Next Treatment: balance training, gait training  Prognosis: Good  Patient Education: balance, safety with giat, ther ex,  REQUIRES PT FOLLOW UP: Yes  Activity Tolerance  Activity Tolerance: Patient Tolerated treatment well  Activity Tolerance: SPo2 and HR remained WNL. Goals  Short term goals  Time Frame for Short term goals: 3 days  Short term goal 1: Pt will be supervision with sit<>Stand and bed<>Chair with LRAD. Short term goal 2: Pt will ambulate 150 ft with supervision with LRAD. Short term goal 3: Pt will negotiate 2 stairs with HR supervision. Short term goal 4: -  Long term goals  Time Frame for Long term goals : 7 days  Long term goal 1: Pt will be mod I with sit<>Stand and bed<>Chair with LRAD. Long term goal 2: Pt will ambulate 150 ft mod I with LRAD. Long term goal 3: Pt will negotiate 2 stairs with HR mod I.  Long term goal 4: Pt will be indep with HEP to maintain strength and balance.   Long term goal 5: Pt will

## 2018-09-09 PROCEDURE — 6370000000 HC RX 637 (ALT 250 FOR IP): Performed by: PHYSICAL MEDICINE & REHABILITATION

## 2018-09-09 PROCEDURE — 6360000002 HC RX W HCPCS: Performed by: PHYSICAL MEDICINE & REHABILITATION

## 2018-09-09 PROCEDURE — 1280000000 HC REHAB R&B

## 2018-09-09 RX ADMIN — MIRTAZAPINE 15 MG: 15 TABLET, FILM COATED ORAL at 21:27

## 2018-09-09 RX ADMIN — SIMVASTATIN 40 MG: 40 TABLET, FILM COATED ORAL at 08:58

## 2018-09-09 RX ADMIN — ASPIRIN 81 MG 162 MG: 81 TABLET ORAL at 08:58

## 2018-09-09 RX ADMIN — DOCUSATE SODIUM 100 MG: 100 CAPSULE, LIQUID FILLED ORAL at 21:27

## 2018-09-09 RX ADMIN — AMLODIPINE BESYLATE 5 MG: 5 TABLET ORAL at 08:58

## 2018-09-09 RX ADMIN — CLOPIDOGREL BISULFATE 75 MG: 75 TABLET ORAL at 08:58

## 2018-09-09 RX ADMIN — GABAPENTIN 100 MG: 100 CAPSULE ORAL at 21:27

## 2018-09-09 RX ADMIN — FAMOTIDINE 40 MG: 20 TABLET ORAL at 08:58

## 2018-09-09 RX ADMIN — DOCUSATE SODIUM 100 MG: 100 CAPSULE, LIQUID FILLED ORAL at 08:58

## 2018-09-09 RX ADMIN — ESCITALOPRAM OXALATE 10 MG: 10 TABLET ORAL at 08:58

## 2018-09-09 RX ADMIN — GABAPENTIN 100 MG: 100 CAPSULE ORAL at 08:58

## 2018-09-09 RX ADMIN — Medication 1 MG: at 21:27

## 2018-09-09 RX ADMIN — ENOXAPARIN SODIUM 40 MG: 40 INJECTION SUBCUTANEOUS at 08:58

## 2018-09-09 RX ADMIN — PREDNISONE 5 MG: 5 TABLET ORAL at 08:58

## 2018-09-09 ASSESSMENT — PAIN SCALES - GENERAL
PAINLEVEL_OUTOF10: 0
PAINLEVEL_OUTOF10: 0

## 2018-09-09 NOTE — PLAN OF CARE
Problem: Falls - Risk of:  Goal: Will remain free from falls  Will remain free from falls   Outcome: Ongoing  Patient will remain free from falls with assistance with transferring and ambulation.

## 2018-09-10 LAB
ANION GAP SERPL CALCULATED.3IONS-SCNC: 13 MMOL/L (ref 3–16)
BASOPHILS ABSOLUTE: 0 K/UL (ref 0–0.2)
BASOPHILS RELATIVE PERCENT: 0.6 %
BUN BLDV-MCNC: 29 MG/DL (ref 7–20)
CALCIUM SERPL-MCNC: 9.3 MG/DL (ref 8.3–10.6)
CHLORIDE BLD-SCNC: 103 MMOL/L (ref 99–110)
CO2: 22 MMOL/L (ref 21–32)
CREAT SERPL-MCNC: 1 MG/DL (ref 0.8–1.3)
EOSINOPHILS ABSOLUTE: 0.2 K/UL (ref 0–0.6)
EOSINOPHILS RELATIVE PERCENT: 2.3 %
GFR AFRICAN AMERICAN: >60
GFR NON-AFRICAN AMERICAN: >60
GLUCOSE BLD-MCNC: 92 MG/DL (ref 70–99)
HCT VFR BLD CALC: 42.4 % (ref 40.5–52.5)
HEMOGLOBIN: 14.4 G/DL (ref 13.5–17.5)
LYMPHOCYTES ABSOLUTE: 1.2 K/UL (ref 1–5.1)
LYMPHOCYTES RELATIVE PERCENT: 14.8 %
MCH RBC QN AUTO: 31 PG (ref 26–34)
MCHC RBC AUTO-ENTMCNC: 33.9 G/DL (ref 31–36)
MCV RBC AUTO: 91.2 FL (ref 80–100)
MONOCYTES ABSOLUTE: 0.8 K/UL (ref 0–1.3)
MONOCYTES RELATIVE PERCENT: 9 %
NEUTROPHILS ABSOLUTE: 6.1 K/UL (ref 1.7–7.7)
NEUTROPHILS RELATIVE PERCENT: 73.3 %
PDW BLD-RTO: 14.2 % (ref 12.4–15.4)
PLATELET # BLD: 167 K/UL (ref 135–450)
PMV BLD AUTO: 9.2 FL (ref 5–10.5)
POTASSIUM REFLEX MAGNESIUM: 4.4 MMOL/L (ref 3.5–5.1)
RBC # BLD: 4.65 M/UL (ref 4.2–5.9)
SODIUM BLD-SCNC: 138 MMOL/L (ref 136–145)
WBC # BLD: 8.4 K/UL (ref 4–11)

## 2018-09-10 PROCEDURE — 6370000000 HC RX 637 (ALT 250 FOR IP): Performed by: PHYSICAL MEDICINE & REHABILITATION

## 2018-09-10 PROCEDURE — 97116 GAIT TRAINING THERAPY: CPT

## 2018-09-10 PROCEDURE — 80048 BASIC METABOLIC PNL TOTAL CA: CPT

## 2018-09-10 PROCEDURE — 97127 HC SP THER IVNTJ W/FOCUS COG FUNCJ: CPT

## 2018-09-10 PROCEDURE — 97530 THERAPEUTIC ACTIVITIES: CPT

## 2018-09-10 PROCEDURE — 97110 THERAPEUTIC EXERCISES: CPT

## 2018-09-10 PROCEDURE — 6360000002 HC RX W HCPCS: Performed by: PHYSICAL MEDICINE & REHABILITATION

## 2018-09-10 PROCEDURE — 36415 COLL VENOUS BLD VENIPUNCTURE: CPT

## 2018-09-10 PROCEDURE — 1280000000 HC REHAB R&B

## 2018-09-10 PROCEDURE — 85025 COMPLETE CBC W/AUTO DIFF WBC: CPT

## 2018-09-10 PROCEDURE — 97535 SELF CARE MNGMENT TRAINING: CPT

## 2018-09-10 RX ADMIN — MIRTAZAPINE 15 MG: 15 TABLET, FILM COATED ORAL at 23:31

## 2018-09-10 RX ADMIN — GABAPENTIN 100 MG: 100 CAPSULE ORAL at 09:36

## 2018-09-10 RX ADMIN — FAMOTIDINE 40 MG: 20 TABLET ORAL at 09:36

## 2018-09-10 RX ADMIN — ESCITALOPRAM OXALATE 10 MG: 10 TABLET ORAL at 09:36

## 2018-09-10 RX ADMIN — ASPIRIN 81 MG 162 MG: 81 TABLET ORAL at 09:36

## 2018-09-10 RX ADMIN — SIMVASTATIN 40 MG: 40 TABLET, FILM COATED ORAL at 09:36

## 2018-09-10 RX ADMIN — CLOPIDOGREL BISULFATE 75 MG: 75 TABLET ORAL at 09:36

## 2018-09-10 RX ADMIN — GABAPENTIN 100 MG: 100 CAPSULE ORAL at 23:31

## 2018-09-10 RX ADMIN — ENOXAPARIN SODIUM 40 MG: 40 INJECTION SUBCUTANEOUS at 09:36

## 2018-09-10 RX ADMIN — PREDNISONE 5 MG: 5 TABLET ORAL at 09:36

## 2018-09-10 RX ADMIN — DOCUSATE SODIUM 100 MG: 100 CAPSULE, LIQUID FILLED ORAL at 13:52

## 2018-09-10 RX ADMIN — AMLODIPINE BESYLATE 5 MG: 5 TABLET ORAL at 09:36

## 2018-09-10 ASSESSMENT — PAIN SCALES - GENERAL: PAINLEVEL_OUTOF10: 0

## 2018-09-10 NOTE — PROGRESS NOTES
Occupational Therapy  Facility/Department: [facility]  Daily Treatment Note  NAME: Beth Doyle  : 1927  MRN: [id number]    Date of Service: 9/10/2018    Discharge Recommendations:  Home with assist PRN, Home with Home health OT, Home with nursing aide  OT Equipment Recommendations  Other: cont to assess need, currently no need    Patient Diagnosis(es): There were no encounter diagnoses. has a past medical history of Arthralgia; Cancer ([facility] [address] Chronic back pain; Chronic diastolic (congestive) heart failure ([facility] [address] Diverticulitis; Dizziness; Fatigue; Fractures; Hearing decreased; Hyperlipidemia; Hypertension; Osteoarthritis of knee; Osteoarthritis of shoulder; TIA (transient ischemic attack); Vertigo; and Wears glasses. has a past surgical history that includes Inguinal hernia repair; Cataract removal (); Prostatectomy (); Colonoscopy; Upper gastrointestinal endoscopy (2011); Esophagus dilation; knee surgery (Bilateral); Total shoulder arthroplasty; shoulder surgery; Toe Surgery (Right, 2013); joint replacement; and Total knee arthroplasty. Restrictions  Restrictions/Precautions  Restrictions/Precautions: General Precautions, Fall Risk  Position Activity Restriction  Other position/activity restrictions: High fall risk per nursing assessment, up as tolerated, orthostatic BP's, telemetry  Subjective   General  Chart Reviewed: Yes  Patient assessed for rehabilitation services?: Yes  Family / Caregiver Present: No  Referring Practitioner: Dr. Va Paulino  Diagnosis: Acute on chronic lightheadedness, fatigue, and diffuse weakness  Subjective  Subjective: First Session: Pt in gym, agreeable to OT tx. Agreeable to a shower;  Second Session: Pt sleeping in bed, agreeable to BUE ex in gym  General Comment  Comments: No c/o pain   Pain Assessment  Patient Currently in Pain: Denies  Vital Signs  Patient Currently in Pain: Denies     Orientation  Orientation  Overall Orientation Status: Within Functional Limits    Objective    ADL  Feeding: Setup; Beverage management  Grooming: Stand by assistance; Increased time to complete (wahing hands at sinkside)  UE Bathing: Supervision; Increased time to complete (standing)  LE Bathing: Supervision; Increased time to complete (standing and seated on TTB)  UE Dressing: Modified independent  (gathered clothing with SPC, Kaila for donning/doffing standing)  LE Dressing: Supervision; Increased time to complete;Verbal cueing (Kaila to gather items, SPV-CGA (1 episode of LOB) standing, educated to sit to complete donning socks/shoes)  Additional Comments: use of SPC, decreased safety needing cues for strategies to reduce falls during mobility, transfers, and ADLs especially while standing. Instrumental ADL's  Instrumental ADLs: Yes  Meal Prep  Meal Prep Level: Cane  Meal Prep Level of Assistance: Supervision  Meal Preparation: Pt gathered various items from various heights/cabinets in kitchen with SPV. Educated patient on home safety and energy conservation regarding holding onto stationary counter vs. SPC to reduce risk of falls, verbalizing understanding and demonstrating carryover. Balance  Sitting Balance: Supervision  Standing Balance: Contact guard assistance (CGA to SPV with no device during ball bounce, SPV with cane during ADLs and IADLs. )  Standing Balance  Time: 2x5 min, x10 min, x15 min, x7 min, x8 min  Activity: amb to/from gym to/from room, standing ball bounce activity, standing bathing tasks, standing to groom, standing for IADL/kitchen mangement  Sit to stand: Supervision  Stand to sit: Supervision  Functional Mobility  Functional - Mobility Device: Cane  Activity: To/from bathroom; Other  Assist Level: Supervision  Functional Mobility Comments: to/from rehab unit, in bathroom, in room, in kitchen with cues for safety  Toilet Transfers  Toilet - Technique: Ambulating  Equipment Used: Standard toilet (with SPC and grab bars)  Toilet Transfer:

## 2018-09-10 NOTE — PROGRESS NOTES
step height bilat. Distance: 400 ft  Comments: included 10 ft on carpet. cues for posture & forward gaze. steady with no LOB. Ambulation 2  Surface - 2: level tile  Device 2: Single point cane  Assistance 2: Stand by assistance (close SBA)  Quality of Gait 2: decreased step height bilat, narrow NOÉ at times, slower denise than with RW. Distance: 400 ft, several short distances in gym  Comments: included 10 ft of carpet & multiple turns, open & tight areas. no LOB. cues for posture & forward gaze. Stairs/Curb  Stairs?: Yes  Stairs  # Steps : 12  Stairs Height: 6\"  Rails: Left ascending (& left rail descending (st cane on right))  Curbs: 6\" (performed x 4)  Device: Single pt cane  Assistance: Stand by assistance  Comment: step-to pattern. Exercises  Comments: standing at torres rail x 20 bilat LE: marching, hip abd, hip ext, HS curls, toe/heel raises, mini squats. lateral & forward step-ups on 6-in step with bilat UE support. Comment: alternating toe taps on 6-in step with support of st cane on right (CGA), marching on foam pad x 30 without UE support & CGA/min A. sidestepping & backward walking without UE support & CGA. Sci-Fit x 6 min using bilat UE & LE (level 1). Assessment   Body structures, Functions, Activity limitations: Decreased functional mobility ; Decreased endurance;Decreased balance;Decreased strength  Assessment: Pt progressing with balance & gait. Pt demonstrated improvement with turning when using st cane. Continues to require safety cues. Pt remains motivated & cooperative. Recommend cont PT on rehab unit per plan of care. Treatment Diagnosis: Impaired standing balance, decreased (I) with mobility  Specific instructions for Next Treatment: balance training, gait training  Prognosis: Good  Patient Education: transfers, gait, LE ther ex.   REQUIRES PT FOLLOW UP: Yes  Activity Tolerance  Activity Tolerance: Patient Tolerated treatment well

## 2018-09-10 NOTE — PROGRESS NOTES
Speech Language Pathology  [facility]: ACUTE REHAB UNIT  SPEECH-LANGUAGE PATHOLOGY      [x] Daily  [] Weekly Care Conference Note  [] Discharge    Patient:Mann Hammonds      :1927  [facility]:[id number]  Rehab Dx/Hx: Metabolic encephalopathy [E78.70]    Precautions: falls  Home situation: Lives alone.  Has family to assist  ST Dx: [] Aphasia  [] Dysarthria  [] Apraxia   [] Oropharyngeal dysphagia [x] Cognitive Impairment  [] Other:   Date of Admit: 2018  Room #: 0170/0170-01    Current functional status (updated daily):         Pt being seen for : [] Speech/Language Treatment  [] Dysphagia Treatment [x] Cognitive Treatment  [] Other:  Communication: [x]WFL  [] Aphasia  [] Dysarthria  [] Apraxia  [] Pragmatic Impairment [] Non-verbal  [] Hearing Loss  [] Other:   Cognition: [] WFL  [x] Mild  [] Moderate  [] Severe [] Unable to Assess  [] Other:  Memory: [] WFL  [x] Mild  [] Moderate  [] Severe [] Unable to Assess  [] Other:  Behavior: [x] Alert  [x] Cooperative  [x]  Pleasant  [] Confused  [] Agitated  [] Uncooperative  [] Distractible [] Motivated  [] Self-Limiting [] Anxious  [] Other:  Endurance:  [x] Adequate for participation in SLP sessions  [] Reduced overall  [] Lethargic  [] Other:  Safety: [] No concerns at this time  [] Reduced insight into deficits  [x]  Reduced safety awareness [] Not following call light procedures  [] Unable to Assess  [] Other:    Current Diet Order:DIET GENERAL; Dental Soft  Dietary Nutrition Supplements: Standard High Calorie Oral Supplement  Swallowing Precautions: Sit up for all meals and thereafter for 30 minutes and Alternate solids with liquids       Date: 9/10/2018      Tx session 1  [id number] Tx session 2  All needs met in first session   Total Timed Code Min 30 --   Total Treatment Minutes 30 --   Individual Treatment Minutes 30 --   Group Treatment Minutes 0 0   Co-Treat Minutes 0 0   Variance/Reason:      Pain None reported    Pain Intervention [] RN notified  [] Repositioned  [] Intervention offered and patient declined  [x] N/A  [] Other: [] RN notified  [] Repositioned  [] Intervention offered and patient declined  [] N/A  [] Other:   Subjective     Patient alert and oriented, cooperative and agreeable to participate in therapy. Pt seen in community room. Objective:  Goals     Goal 1: Pt will complete graded recall tasks with 90% accy, min cueing. Goal indirectly targeted. SLP and pt discussed use of compensatory memory strategies (writing, repetition, association, and picturing) to assist with daily functional and short term recall. Pt reported that he implements use of writing and repetition most frequently at home. Goal 2: Pt will complete functional problem solving tasks with 90% accy (math, time, money, meds) given min cueing. Medication management task with use of med organizer:  -80% accuracy independently, improving to 100% accuracy min cues provided.  -Pt preferred to use organizer with morning, afternoon, evening, and bed time slots as pt stated this is the exact medication organizer that he uses at home.  -Pt reported that lately he has been setting his medication organizer to the side when he eats breakfast, lunch, or dinner, and then has been forgetting to back to the organizer to take his pills.  -Pt reported that his daughter has agreed to start calling him three times daily as a verbal reminder to take his medications, expressing his dislike for this plan. -SLP encourage pt to implement use of memory strategies, alarms, writing information down, etc to promote independence as much as possible upon d/c. Goal 3: Pt will complete higher level exec function / safety and thought organization tasks with 90% accy, min cues. Deductive reasoning puzzle:  -50% accuracy mod cues provided  -Pt required cues for process of elimination, deductive thinking, and how the puzzle is completed.       Goal 4: Pt will complete graded attention tasks (hearing aid/glasses)  Expression: 6 - Device used to express complex ideas/needs  Social Interaction: 6 - Patient requires medication for mood and/or effect  Problem Solvin - Patient solves simple/routine tasks 75-90%+   Memory: 4 - Patient remembers 75-90%+ of the time    Total Time Breakdown / Charges    Time in Time out Total Time / units   Cognitive Tx 1300 1330 30 mins/ 2 units   Speech Tx -- -- --   Dysphagia Tx -- -- --     Electronically Signed by     Lisandro Espinoza. A CCC-SLP  Speech-Language Pathologist  [phone]

## 2018-09-10 NOTE — CARE COORDINATION
Chart review completed on this date. Recommendations are pending patient's progress. Will update patient and family after the team conference.      Kayla Youssef MSW, LSW

## 2018-09-10 NOTE — PLAN OF CARE
Problem: Falls - Risk of:  Goal: Will remain free from falls  Will remain free from falls   Outcome: Ongoing  Patient remains free from falls during this shift. Oriented to call light. Verbalizes understanding. Alert and oriented x4. Bedside table and call light within reach. Bed in lowest position, brakes locked. Nonskid footwear in place. Will continue to monitor. Bed alarm on and in place.

## 2018-09-11 PROCEDURE — 97110 THERAPEUTIC EXERCISES: CPT

## 2018-09-11 PROCEDURE — 97530 THERAPEUTIC ACTIVITIES: CPT

## 2018-09-11 PROCEDURE — 97112 NEUROMUSCULAR REEDUCATION: CPT

## 2018-09-11 PROCEDURE — 6360000002 HC RX W HCPCS: Performed by: PHYSICAL MEDICINE & REHABILITATION

## 2018-09-11 PROCEDURE — 97127 HC SP THER IVNTJ W/FOCUS COG FUNCJ: CPT

## 2018-09-11 PROCEDURE — 1280000000 HC REHAB R&B

## 2018-09-11 PROCEDURE — 97535 SELF CARE MNGMENT TRAINING: CPT

## 2018-09-11 PROCEDURE — 6370000000 HC RX 637 (ALT 250 FOR IP): Performed by: PHYSICAL MEDICINE & REHABILITATION

## 2018-09-11 PROCEDURE — 97116 GAIT TRAINING THERAPY: CPT

## 2018-09-11 RX ADMIN — AMLODIPINE BESYLATE 5 MG: 5 TABLET ORAL at 08:03

## 2018-09-11 RX ADMIN — CLOPIDOGREL BISULFATE 75 MG: 75 TABLET ORAL at 08:03

## 2018-09-11 RX ADMIN — MIRTAZAPINE 15 MG: 15 TABLET, FILM COATED ORAL at 22:30

## 2018-09-11 RX ADMIN — GABAPENTIN 100 MG: 100 CAPSULE ORAL at 22:30

## 2018-09-11 RX ADMIN — DOCUSATE SODIUM 100 MG: 100 CAPSULE, LIQUID FILLED ORAL at 08:03

## 2018-09-11 RX ADMIN — SIMVASTATIN 40 MG: 40 TABLET, FILM COATED ORAL at 08:03

## 2018-09-11 RX ADMIN — ESCITALOPRAM OXALATE 10 MG: 10 TABLET ORAL at 08:03

## 2018-09-11 RX ADMIN — PREDNISONE 5 MG: 5 TABLET ORAL at 08:03

## 2018-09-11 RX ADMIN — GABAPENTIN 100 MG: 100 CAPSULE ORAL at 08:03

## 2018-09-11 RX ADMIN — DOCUSATE SODIUM 100 MG: 100 CAPSULE, LIQUID FILLED ORAL at 22:31

## 2018-09-11 RX ADMIN — ASPIRIN 81 MG 162 MG: 81 TABLET ORAL at 08:03

## 2018-09-11 RX ADMIN — ENOXAPARIN SODIUM 40 MG: 40 INJECTION SUBCUTANEOUS at 08:03

## 2018-09-11 RX ADMIN — FAMOTIDINE 40 MG: 20 TABLET ORAL at 08:03

## 2018-09-11 ASSESSMENT — PAIN SCALES - GENERAL
PAINLEVEL_OUTOF10: 0

## 2018-09-11 NOTE — PROGRESS NOTES
Physical Therapy  Facility/Department: [facility]  Daily Treatment Note  NAME: Brittany Thomas  : 1927  MRN: [id number]    Date of Service: 2018    Discharge Recommendations:  Home with assist PRN   PT Equipment Recommendations  Equipment Needed: No  Other: pt has cane at home    Patient Diagnosis(es): There were no encounter diagnoses. has a past medical history of Arthralgia; Cancer ([facility] [address] Chronic back pain; Chronic diastolic (congestive) heart failure ([facility] [address] Diverticulitis; Dizziness; Fatigue; Fractures; Hearing decreased; Hyperlipidemia; Hypertension; Osteoarthritis of knee; Osteoarthritis of shoulder; TIA (transient ischemic attack); Vertigo; and Wears glasses. has a past surgical history that includes Inguinal hernia repair; Cataract removal (); Prostatectomy (); Colonoscopy; Upper gastrointestinal endoscopy (2011); Esophagus dilation; knee surgery (Bilateral); Total shoulder arthroplasty; shoulder surgery; Toe Surgery (Right, 2013); joint replacement; and Total knee arthroplasty. Restrictions  Restrictions/Precautions  Restrictions/Precautions: General Precautions, Fall Risk  Position Activity Restriction  Other position/activity restrictions: High fall risk per nursing assessment, up as tolerated, orthostatic BP's, telemetry     Subjective   General  Chart Reviewed: Yes  Response To Previous Treatment: Patient with no complaints from previous session.   Family / Caregiver Present: No  Referring Practitioner: Dr. Tamera Crystal MD  Subjective  Subjective: Pt seated EOB in room upon arrival and agreeable to PT session  General Comment  Comments: Pt reports feeling \"a little lightheaded\" this morning  Pain Screening  Patient Currently in Pain: Denies  Vital Signs  Patient Currently in Pain: Denies          Orientation  Orientation  Overall Orientation Status: Within Functional Limits     Objective   Transfers  Sit to Stand: Supervision  Stand to sit: Supervision Ambulation  Ambulation?: Yes  Ambulation 1  Surface: level tile;carpet  Device: Single point cane  Assistance: Supervision  Quality of Gait: decreased denise and B step length, decreased B foot clearance, mildly narrowed NOÉ. No LOB  Distance: 189ft    Stairs/Curb  Stairs?: Yes  Stairs  # Steps : 12  Stairs Height: 6\"  Rails: Left ascending (with SPC in R hand)  Device: Single pt cane  Assistance: Supervision  Comment: via non-reciprocal pattern           Exercises  Other exercises 1: sit<>stand transfers x 10 reps consecutively without AD, each with supervision  Other exercises 2: sci-fit stepper x 10 mins (level 1), average 75 steps/min, using BUE/BLE-- in order to increase strength and activity tolerance in preparation for increased functional mobility/independence                        Assessment   Body structures, Functions, Activity limitations: Decreased functional mobility ; Decreased endurance;Decreased balance;Decreased strength  Assessment: Pt continues to progress with activity tolerance and independence with functional mobility. Participated in LE exercise, ambulation, and stairs this session with supervision-SBA. Treatment Diagnosis: Impaired standing balance, decreased (I) with mobility  Prognosis: Good  Activity Tolerance  Activity Tolerance: Patient Tolerated treatment well  Activity Tolerance: pt with \"slight lightheadedness\" during session- /72       Goals  Short term goals  Time Frame for Short term goals: 3 days  Short term goal 1: Pt will be supervision with sit<>Stand and bed<>Chair with LRAD.- GOAL MET 9/11. supervision with transfers  Short term goal 2: Pt will ambulate 150 ft with supervision with LRAD.- GOAL MET 9/11.  pt amb >150 ft with spc, supv  Short term goal 3: Pt will negotiate 2 stairs with HR supervision.- GOAL MET 9/11, supervision for 12 steps with unilateral rail  Short term goal 4: -  Long term goals  Time Frame for Long term goals : 7 days  Long term goal 1: Pt will be mod I with sit<>Stand and bed<>Chair with LRAD. Long term goal 2: Pt will ambulate 150 ft mod I with LRAD. Long term goal 3: Pt will negotiate 2 stairs with HR mod I.  Long term goal 4: Pt will be indep with HEP to maintain strength and balance. Long term goal 5: Pt will improve Titus Balance score to 52/56 to reflect decreased fall risk. Patient Goals   Patient goals : \"to do things without a problem\"    Plan    Plan  Times per week: 5 out of 7 days/wk  Times per day: Daily  Specific instructions for Next Treatment: balance training, gait training  Current Treatment Recommendations: Strengthening, Neuromuscular Re-education, Home Exercise Program, ROM, Safety Education & Training, Balance Training, Endurance Training, Functional Mobility Training, Transfer Training, Gait Training, Stair training, Patient/Caregiver Education & Training  Safety Devices  Type of devices:  All fall risk precautions in place, Left in chair (pt seated in chair with OT at end of session)     Therapy Time   Individual Concurrent Group Co-treatment   Time In 0930         Time Out 1000         Minutes 30         Timed Code Treatment Minutes: [address] [facility], PT, DPT #[id number]

## 2018-09-11 NOTE — PROGRESS NOTES
Physical Therapy  Facility/Department: [facility]  Daily Treatment Note  NAME: Des Perez  : 1927  MRN: [id number]    Date of Service: 2018    Discharge Recommendations:  Home with assist PRN   PT Equipment Recommendations  Equipment Needed: No    Patient Diagnosis(es): There were no encounter diagnoses. has a past medical history of Arthralgia; Cancer ([facility] [address] Chronic back pain; Chronic diastolic (congestive) heart failure ([facility] [address] Diverticulitis; Dizziness; Fatigue; Fractures; Hearing decreased; Hyperlipidemia; Hypertension; Osteoarthritis of knee; Osteoarthritis of shoulder; TIA (transient ischemic attack); Vertigo; and Wears glasses. has a past surgical history that includes Inguinal hernia repair; Cataract removal (); Prostatectomy (); Colonoscopy; Upper gastrointestinal endoscopy (2011); Esophagus dilation; knee surgery (Bilateral); Total shoulder arthroplasty; shoulder surgery; Toe Surgery (Right, 2013); joint replacement; and Total knee arthroplasty. Restrictions  Restrictions/Precautions  Restrictions/Precautions: General Precautions, Fall Risk  Position Activity Restriction  Other position/activity restrictions: High fall risk per nursing assessment, up as tolerated, orthostatic BP's, telemetry  Subjective   General  Chart Reviewed: Yes  Response To Previous Treatment: Patient with no complaints from previous session. Family / Caregiver Present: No  Referring Practitioner: Dr. Rios Arroyo MD  Subjective  Subjective: pt supine in bed, agreeable to therapy. General Comment  Comments:  pt denies pain.    Pain Screening  Patient Currently in Pain: Denies  Vital Signs  Patient Currently in Pain: Denies       Orientation  Orientation  Overall Orientation Status: Within Functional Limits  Objective   Bed mobility  Supine to Sit: Independent  Sit to Supine: Independent  Transfers  Stand to sit: Supervision  Comment: sit to stand from EOB, commode, chair x 8 reps to [facility] with supv.   Ambulation  Ambulation?: Yes  Ambulation 1  Surface: level tile;carpet  Device: Single point cane  Assistance: Supervision  Quality of Gait: decreased denise and B step length, decreased B foot clearance, mildly narrowed NOÉ. No LOB  Distance: 275 ft (10 ft of carpet), 90 ft   Neuromuscular Education  Neuromuscular Comments: toe taps to target on R side of body, L side of body, and directly in front of body with UE support on cane, completed with LLE in sequence and RLE insequence x 10 reps each. followed by LLE across midline and RLE across midline wiht UE support on [facility] with CGA progressing to close SBA provided by therapist.   Balance  Standing - Static: Fair;+ (fair (+) to good (-))  Standing - Dynamic: Fair;+ (fair (+) to good (-))  Comments: ball bounce x 10 reps followed by ball bounce with step 10 reps wiht LLE and RLE each wiht SBA proivded by therapist, demonstrated appropriate stepping reactions with balance challenges. Exercises  Comments: 2.5# BLE, 2x10 of BLE seated ankle pumps, LAQ, marches, hip abduction with TKE, hip adduction isometrics, HS curls with green theraband. Assessment   Body structures, Functions, Activity limitations: Decreased functional mobility ; Decreased endurance;Decreased balance;Decreased strength  Assessment: Pt tolerated session well. progessing transfers /gait to supv level with SPC. improved dyn stand balance to fair (+) / good wiht SBA-supv provided by therapist. Pt c/o \"slight lightheadiness\" with balance activities. Therapist obtained orthostatic vitals, and notified JOHN Quintana. Pt stated lightheadiness remained with seated therapy activities. Pt supine in bed with needs within reach at end of session. Treatment Diagnosis: Impaired standing balance, decreased (I) with mobility  Specific instructions for Next Treatment: balance training, gait training  Prognosis: Good  Patient Education: transfers, gait, LE ther ex.   REQUIRES PT FOLLOW UP: Yes  Activity

## 2018-09-11 NOTE — PROGRESS NOTES
is 24.55 kg/m². Assessment and Plan:    Acute on chronic lightheadedness, fatigue, and diffuse weakness - likely due to dehydration, with underlying chronic BPPV. 24-hr Holter monitor.     CAD-ASA,plavix, lipitor    chronic dCHF-Daily wt. HTN - amlodipine       Metabolic Encephalopathy - Improving/resolving. Regulate sleep/wake. Emphasis on routine. SLP. Debility - PT/OT to address endurance, strength, compensatory strategies, equipment    H/o PMR? - chronic prednisone    Acute urinary retention (h/o prostate cancer s/p resection) - Improving Monitor PVRs and straight cath prn >300cc.      Bowels: Schedule colace, prn miralax. Follow bowel movements. Enema or suppository if needed.      Pain: Tylenol is ordered prn. Interdisciplinary team conference was held today with entire rehab treatment team including PT, OT, SLP, Dietician, RN, and SW. Discussion focused on progress toward rehab goals and discharge planning. Making progress with functional mobility, safety, balance. SLP working on higher level cognition. Separate conference then held with patient and family, questions answered and concerns addressed. Total treatment time >35 min with greater than 50% spent in care coordination. Anticipated dispo: home with intermittent assist  Services:  PT, OT, SLP, RN, Aide  DME: has cane  ELOS: 9/14    [address]  Saud Sagastume MD 9/11/2018, 10:45 AM

## 2018-09-12 PROCEDURE — 97530 THERAPEUTIC ACTIVITIES: CPT

## 2018-09-12 PROCEDURE — 97116 GAIT TRAINING THERAPY: CPT

## 2018-09-12 PROCEDURE — 6370000000 HC RX 637 (ALT 250 FOR IP): Performed by: PHYSICAL MEDICINE & REHABILITATION

## 2018-09-12 PROCEDURE — 97110 THERAPEUTIC EXERCISES: CPT

## 2018-09-12 PROCEDURE — 97535 SELF CARE MNGMENT TRAINING: CPT

## 2018-09-12 PROCEDURE — 6360000002 HC RX W HCPCS: Performed by: PHYSICAL MEDICINE & REHABILITATION

## 2018-09-12 PROCEDURE — 97127 HC SP THER IVNTJ W/FOCUS COG FUNCJ: CPT

## 2018-09-12 PROCEDURE — 1280000000 HC REHAB R&B

## 2018-09-12 RX ADMIN — MIRTAZAPINE 15 MG: 15 TABLET, FILM COATED ORAL at 21:26

## 2018-09-12 RX ADMIN — GABAPENTIN 100 MG: 100 CAPSULE ORAL at 09:35

## 2018-09-12 RX ADMIN — FAMOTIDINE 40 MG: 20 TABLET ORAL at 09:35

## 2018-09-12 RX ADMIN — CLOPIDOGREL BISULFATE 75 MG: 75 TABLET ORAL at 09:35

## 2018-09-12 RX ADMIN — ENOXAPARIN SODIUM 40 MG: 40 INJECTION SUBCUTANEOUS at 09:34

## 2018-09-12 RX ADMIN — ESCITALOPRAM OXALATE 10 MG: 10 TABLET ORAL at 09:35

## 2018-09-12 RX ADMIN — GABAPENTIN 100 MG: 100 CAPSULE ORAL at 21:26

## 2018-09-12 RX ADMIN — SIMVASTATIN 40 MG: 40 TABLET, FILM COATED ORAL at 09:34

## 2018-09-12 RX ADMIN — ASPIRIN 81 MG 162 MG: 81 TABLET ORAL at 09:35

## 2018-09-12 RX ADMIN — PREDNISONE 5 MG: 5 TABLET ORAL at 09:34

## 2018-09-12 RX ADMIN — DOCUSATE SODIUM 100 MG: 100 CAPSULE, LIQUID FILLED ORAL at 21:26

## 2018-09-12 RX ADMIN — AMLODIPINE BESYLATE 5 MG: 5 TABLET ORAL at 09:35

## 2018-09-12 RX ADMIN — DOCUSATE SODIUM 100 MG: 100 CAPSULE, LIQUID FILLED ORAL at 09:35

## 2018-09-12 ASSESSMENT — PAIN SCALES - GENERAL
PAINLEVEL_OUTOF10: 0

## 2018-09-12 NOTE — CARE COORDINATION
Received voicemail from patient's daughter Lashanda President asking if the staff can fill out a form for a waiting list for a facility and about his \"lightheadedness\". SW left a return message asking for a return call. Brittany LOPEZ updated on the above.      Yuniel Villalobos MSW, LSW

## 2018-09-12 NOTE — PROGRESS NOTES
Occupational Therapy  Facility/Department: [facility]  Daily Treatment Note  NAME: Brittany Thomas  : 1927  MRN: [id number]    Date of Service: 2018    Discharge Recommendations:  Home with assist PRN, Home with Home health OT, Home with nursing aide  OT Equipment Recommendations  Equipment Needed: No    Patient Diagnosis(es): There were no encounter diagnoses. has a past medical history of Arthralgia; Cancer ([facility] [address] Chronic back pain; Chronic diastolic (congestive) heart failure ([facility] [address] Diverticulitis; Dizziness; Fatigue; Fractures; Hearing decreased; Hyperlipidemia; Hypertension; Osteoarthritis of knee; Osteoarthritis of shoulder; TIA (transient ischemic attack); Vertigo; and Wears glasses. has a past surgical history that includes Inguinal hernia repair; Cataract removal (); Prostatectomy (); Colonoscopy; Upper gastrointestinal endoscopy (2011); Esophagus dilation; knee surgery (Bilateral); Total shoulder arthroplasty; shoulder surgery; Toe Surgery (Right, 2013); joint replacement; and Total knee arthroplasty. Restrictions  Restrictions/Precautions  Restrictions/Precautions: General Precautions, Fall Risk  Position Activity Restriction  Other position/activity restrictions: High fall risk per nursing assessment, up as tolerated, orthostatic BP's, telemetry    Subjective   General  Chart Reviewed: Yes  Patient assessed for rehabilitation services?: Yes  Family / Caregiver Present: No  Referring Practitioner: Dr. Tamera Crystal  Diagnosis: Acute on chronic lightheadedness, fatigue, and diffuse weakness    Subjective  Subjective: First session: Pt seated in gym, just finishing with PT and agreeable to OT session, asking if a shower is possible.  Second Session: sleeping soundly in bed, easily arousable and agreeable to OT  General Comment  Comments: No c/o pain     Pain Assessment  Patient Currently in Pain: Denies  Vital Signs  Patient Currently in Pain: Denies to/from gym, in gym to/from [address] - Technique: Chiki Roberson  Toilet Transfer: Modified independent  Toilet Transfers Comments: good safety with toileting tasks this date; amb to/from toilet without assistance despite cues to alert OT     Tub Transfers  Tub - Transfer From: [employer] - Transfer Type: To and From  Tub - Transfer To: Standing  Tub - Technique: Ambulating;Lateral;To right; To left  Tub Transfers: Supervision  Tub Transfers Comments: Pt educated on safety with transfer, pt stating does have grab bar placement at home. Educated to sit in shower chair at home if tired, pt verbalizing understanding.   Pt verbalized will wait for family and/or OT/aide to assist pt in/out of tub/shower the first few attempts to ensure proper safety/mechanics and reduce risk of falls    Bed mobility  Supine to Sit: Independent  Sit to Supine: Independent  Scooting: Independent    Transfers  Sit to stand: Supervision  Stand to sit: Supervision  Transfer Comments: good safety with transfers with cane in room, cues for manuevering tight spaces of bathroom and safety in kitchen     Cognition  Overall Cognitive Status: Exceptions  Safety Judgement: Decreased awareness of need for safety  Problem Solving: Assistance required to implement solutions  Insights: Decreased awareness of deficits  Cognition Comment: Pt educated on energy conservation and home safety, given handout on energy conservation strategies at home; pt does demonstrate decreased safety with tight spaces and turning; OT told pt to alert OT when done in bathroom however pt stood up from toilet and amb to bedside without assistance     Type of ROM/Therapeutic Exercise  Comment: Second session: Dyn standing balance task on [place] balance -- chest press, over head press, tricep press, circles forwards and backwards; pt then completed step ups on [place] balance ball 2x10 both BLE and reaching across midline and down various heights to treatment well  Safety Devices  Safety Devices in place: Yes  Type of devices: Call light within reach;Nurse notified;Gait belt;Left in bed;Bed alarm in place          Plan   Plan  Times per week: 5 out of 7 days per week  Times per day: Daily  Current Treatment Recommendations: Strengthening, ROM, Balance Training, Functional Mobility Training, Safety Education & Training, Endurance Training, Self-Care / ADL, Patient/Caregiver Education & Training       Goals  Short term goals  Time Frame for Short term goals: 3 days  Short term goal 1: Pt. will complete LE dressing with SBA  with LRAD GOAL MET 9/12  Short term goal 2: Pt will complete toilet transfers with SBA GOAL MET 9/10  Short term goal 3: Pt will complete standing level ADLs with SBA for balance GOAL MET 9/11  Short term goal 4: Pt will complete item retrival with SBA from closet GOAL MET 9/10  Long term goals  Time Frame for Long term goals : 7 days  Long term goal 1: Pt will complete 1 simple meal prep task Kaila GOAL MET 9/10  Long term goal 2: Pt will complete bathing including gathering clothing Kaila prior to DC using LRAD GOAL MET 9/12  Long term goal 3: Pt will tolerate x15-20 minutes dyn standing balance task with LRAD progressing  Patient Goals   Patient goals :  \"To get back to normal\"       Therapy Time   Individual Concurrent Group Co-treatment   Time In 1000         Time Out 1100         Minutes 60         Timed Code Treatment Minutes: 60 Minutes     Second Session Therapy Time:   Individual Concurrent Group Co-treatment   Time In 1400         Time Out 1440         Minutes 40           Timed Code Treatment Minutes:  40 Minutes    Total Treatment Minutes: 100 minutes      DOMINIQUE Rhoades/L

## 2018-09-12 NOTE — PROGRESS NOTES
Repositioned  [] Intervention offered and patient declined  [x] N/A  [] Other: [] RN notified  [] Repositioned  [] Intervention offered and patient declined  [] N/A  [] Other:   Subjective     Patient alert and oriented, cooperative and agreeable to participate in therapy. Pt seen in community room. Objective:  Goals     Goal 1: Pt will complete graded recall tasks with 90% accy, min cueing. Mental manipulation ranking task:  -Pt given three words and asked to rank them in a specific order from recall  -66% accuracy independently improving to 100% accuracy given mod cues. Goal 2: Pt will complete functional problem solving tasks with 90% accy (math, time, money, meds) given min cueing. Balancing a checkbook register:  -90% accuracy independently, improving to 100% accuracy given min cues. Goal 3: Pt will complete higher level exec function / safety and thought organization tasks with 90% accy, min cues. Goal indirectly targeted. Discussed use of call light, bed/chair alarms, gait belt, etc.  Pt verbalized understanding and able to appropriately express the needs for all safety devices. Goal 4: Pt will complete graded attention tasks with 90% accy, min cueing. Goal indirectly targeted. Pt able to attend to tasks entire duration of tx session without any cues provided. Other areas targeted: Reading comprehension task/answering questions regarding attribute inclusion:  -Pt given 4 words and given a question to answer  -91% accuracy independently improving to 100% accuracy given min cueing. Education:   Education provided to pt re: memory strategies, rationale for tx tasks provided. Safety Devices: [x] Call light within reach  [] Chair alarm activated  [] Bed alarm activated  [x] Other: Pt back to room, sitting on edge of bed, all belongings within reach.    [] Call light within reach  [] Chair alarm activated  [] Bed alarm activated  [] Other:    Assessment: out Total Time / units   Cognitive Tx 0900 0930 30 mins/ 2 units   Speech Tx -- -- --   Dysphagia Tx -- -- --     Electronically Signed by     Lisandro Espinoza. A CCC-SLP  Speech-Language Pathologist  [id number]

## 2018-09-12 NOTE — PLAN OF CARE
Problem: SAFETY  Goal: Free from accidental physical injury    Intervention: [address]  Patient will remain free from falls with assistance with transferring and ambulation.

## 2018-09-13 PROBLEM — G93.41 METABOLIC ENCEPHALOPATHY: Status: RESOLVED | Noted: 2018-09-06 | Resolved: 2018-09-13

## 2018-09-13 LAB
ANION GAP SERPL CALCULATED.3IONS-SCNC: 10 MMOL/L (ref 3–16)
BASOPHILS ABSOLUTE: 0.1 K/UL (ref 0–0.2)
BASOPHILS RELATIVE PERCENT: 0.8 %
BUN BLDV-MCNC: 29 MG/DL (ref 7–20)
CALCIUM SERPL-MCNC: 9.2 MG/DL (ref 8.3–10.6)
CHLORIDE BLD-SCNC: 103 MMOL/L (ref 99–110)
CO2: 26 MMOL/L (ref 21–32)
CREAT SERPL-MCNC: 1.2 MG/DL (ref 0.8–1.3)
EOSINOPHILS ABSOLUTE: 0.2 K/UL (ref 0–0.6)
EOSINOPHILS RELATIVE PERCENT: 2.3 %
GFR AFRICAN AMERICAN: >60
GFR NON-AFRICAN AMERICAN: 57
GLUCOSE BLD-MCNC: 90 MG/DL (ref 70–99)
HCT VFR BLD CALC: 39.1 % (ref 40.5–52.5)
HEMOGLOBIN: 13.4 G/DL (ref 13.5–17.5)
LYMPHOCYTES ABSOLUTE: 1.4 K/UL (ref 1–5.1)
LYMPHOCYTES RELATIVE PERCENT: 17 %
MCH RBC QN AUTO: 31.3 PG (ref 26–34)
MCHC RBC AUTO-ENTMCNC: 34.4 G/DL (ref 31–36)
MCV RBC AUTO: 91 FL (ref 80–100)
MONOCYTES ABSOLUTE: 1 K/UL (ref 0–1.3)
MONOCYTES RELATIVE PERCENT: 11.7 %
NEUTROPHILS ABSOLUTE: 5.5 K/UL (ref 1.7–7.7)
NEUTROPHILS RELATIVE PERCENT: 68.2 %
PDW BLD-RTO: 14.2 % (ref 12.4–15.4)
PLATELET # BLD: 173 K/UL (ref 135–450)
PMV BLD AUTO: 9.6 FL (ref 5–10.5)
POTASSIUM REFLEX MAGNESIUM: 4.8 MMOL/L (ref 3.5–5.1)
RBC # BLD: 4.3 M/UL (ref 4.2–5.9)
SODIUM BLD-SCNC: 139 MMOL/L (ref 136–145)
WBC # BLD: 8.1 K/UL (ref 4–11)

## 2018-09-13 PROCEDURE — 97110 THERAPEUTIC EXERCISES: CPT

## 2018-09-13 PROCEDURE — 97535 SELF CARE MNGMENT TRAINING: CPT

## 2018-09-13 PROCEDURE — 97530 THERAPEUTIC ACTIVITIES: CPT

## 2018-09-13 PROCEDURE — 80048 BASIC METABOLIC PNL TOTAL CA: CPT

## 2018-09-13 PROCEDURE — 6370000000 HC RX 637 (ALT 250 FOR IP): Performed by: PHYSICAL MEDICINE & REHABILITATION

## 2018-09-13 PROCEDURE — 6360000002 HC RX W HCPCS: Performed by: PHYSICAL MEDICINE & REHABILITATION

## 2018-09-13 PROCEDURE — 97116 GAIT TRAINING THERAPY: CPT

## 2018-09-13 PROCEDURE — 1280000000 HC REHAB R&B

## 2018-09-13 PROCEDURE — 97127 HC SP THER IVNTJ W/FOCUS COG FUNCJ: CPT

## 2018-09-13 PROCEDURE — 36415 COLL VENOUS BLD VENIPUNCTURE: CPT

## 2018-09-13 PROCEDURE — 85025 COMPLETE CBC W/AUTO DIFF WBC: CPT

## 2018-09-13 RX ORDER — AMLODIPINE BESYLATE 2.5 MG/1
2.5 TABLET ORAL NIGHTLY
Status: DISCONTINUED | OUTPATIENT
Start: 2018-09-14 | End: 2018-09-14 | Stop reason: HOSPADM

## 2018-09-13 RX ORDER — ASPIRIN 81 MG/1
162 TABLET, CHEWABLE ORAL DAILY
Qty: 30 TABLET | Refills: 0 | Status: ON HOLD | COMMUNITY
Start: 2018-09-14 | End: 2018-10-14 | Stop reason: HOSPADM

## 2018-09-13 RX ORDER — AMLODIPINE BESYLATE 5 MG/1
2.5 TABLET ORAL NIGHTLY
Qty: 30 TABLET | Refills: 0 | Status: ON HOLD
Start: 2018-09-13 | End: 2018-10-14 | Stop reason: HOSPADM

## 2018-09-13 RX ADMIN — FAMOTIDINE 40 MG: 20 TABLET ORAL at 07:38

## 2018-09-13 RX ADMIN — ENOXAPARIN SODIUM 40 MG: 40 INJECTION SUBCUTANEOUS at 07:39

## 2018-09-13 RX ADMIN — DOCUSATE SODIUM 100 MG: 100 CAPSULE, LIQUID FILLED ORAL at 20:13

## 2018-09-13 RX ADMIN — LOPERAMIDE HYDROCHLORIDE 2 MG: 2 CAPSULE ORAL at 07:46

## 2018-09-13 RX ADMIN — MIRTAZAPINE 15 MG: 15 TABLET, FILM COATED ORAL at 20:13

## 2018-09-13 RX ADMIN — PREDNISONE 5 MG: 5 TABLET ORAL at 07:38

## 2018-09-13 RX ADMIN — ESCITALOPRAM OXALATE 10 MG: 10 TABLET ORAL at 07:38

## 2018-09-13 RX ADMIN — CLOPIDOGREL BISULFATE 75 MG: 75 TABLET ORAL at 07:38

## 2018-09-13 RX ADMIN — GABAPENTIN 100 MG: 100 CAPSULE ORAL at 07:38

## 2018-09-13 RX ADMIN — AMLODIPINE BESYLATE 5 MG: 5 TABLET ORAL at 07:38

## 2018-09-13 RX ADMIN — SIMVASTATIN 40 MG: 40 TABLET, FILM COATED ORAL at 07:38

## 2018-09-13 RX ADMIN — GABAPENTIN 100 MG: 100 CAPSULE ORAL at 20:13

## 2018-09-13 ASSESSMENT — PAIN SCALES - GENERAL
PAINLEVEL_OUTOF10: 0

## 2018-09-13 NOTE — CARE COORDINATION
Critical access hospital    Spoke with patient regarding [facility] services. Patient aware and agreeable to services. Demographics verified. Patient states discharge on friday. Will fax orders when completed. Electronically signed by Yamilex Varela LPN on 4/48/59 at 4:28 PM        Yamilex Varela LPN  [facility] [address] Transition Nurse  [phone]

## 2018-09-13 NOTE — PROGRESS NOTES
complete at home. Goal 4: Pt will complete graded attention tasks with 90% accy, min cueing. Structured trail making / divided attention task:  - Cueing for planning and organization upon initiation of task  - Once initiated, pt completed task with 50% accy independently, improving to 100% with cueing to attend to details within task. Goal met  Attention within both quiet and distracting environments grossly WFL. Cueing for attention to details throughout task and more complex / alternating activities; recommend cont'd ST after d/c. Other areas targeted: N/A N/A   Education:   Education to pt re: progress made and goals met. Reviewed continued ST recommended and rationale as to why. Education and training provided throughout course of treatment for compensatory strategy use and fx'al carryover. Safety Devices: [x] Call light within reach  [] Chair alarm activated  [x] Bed alarm activated  [x] Other: Pt back to room, sitting on edge of bed, all belongings within reach. [] Call light within reach  [] Chair alarm activated  [] Bed alarm activated  [] Other:    Assessment: Planned discharge home tomorrow - see above for goal status at d/c. Overall pt met goals as written for this LOS. Demonstrated use and carryover of learned compensatory strategies. SLP continues to recommend ST at home after d/c to target higher level, daily functional tasks and ensure independence, safety, and accy within the home environment. Plan: Planned d/c home tomorrow.       Additional Information: N/A    Barriers toward progress: Cognitive deficit and Limited safety awareness  Discharge recommendations:  [] Home independently  [x] Home with assistance []  24 hour supervision  [] ECF [x] Other: See PT/OT    Continued Tx Upon Discharge: ? [x] Yes [] No [] TBD based on progress while on ARU [] Vital Stim indicated [] Other:   Estimated discharge date: 9/13/2018    Interventions used this date:  [] Speech/Language Treatment  [] Instruction in HEP [] Group [] Dysphagia Treatment [x] Cognitive Treatment   [] Other:    Admitting SLP FIM scores:  Comprehension: 5 - Patient understands basic needs (hungry/hot/pain)  Expression: 5 - Expresses basic ideas/needs only (hungry/hot/pain)  Social Interaction: 5 - Patient is appropriate with supervision/cues  Problem Solvin - Patient solves simple/routine tasks 75-90%+   Memory: 4 - Patient remembers 75-90%+ of the time    Current SLP FIM scores:  Comprehension: 6 - Complex ideas 90% or device (hearing aid/glasses)  Expression: 7 - Patient expresses complex ideas/needs  Social Interaction: 7 - Patient has appropriate behavior/relations 100% of the time  Problem Solvin - Patient able to solve simple/routine tasks  Memory: 5 - Patient requires prompting with stress/unfamiliar situations    Total Time Breakdown / Charges    Time in Time out Total Time / units   Cognitive Tx 0800 0830 30 mins/ 2 units   Speech Tx -- -- --   Dysphagia Tx -- -- --     Electronically Signed by     Thais Babb 92 #[id number]  Speech-Language Pathologist

## 2018-09-13 NOTE — CARE COORDINATION
After speaking with Dr. Jossy Wheat left a message for patient's daughter Sawyer Ratliff asking for a call back. When there is a call back, SW will notify her that the ARU MD believes the patient's PCP should complete the forms she left. Leticia Kruse, JOHN notified. Addendum at 3:30pm: SW met with the patient and his daughter Fransico Hutchison at bedside whom patient stated the writer could tell the above to. She stated understanding. ANA ROSA also explained services with [facility]. No concerns or questions expressed.      Kathi Kingsley MSW, LSW

## 2018-09-13 NOTE — PROGRESS NOTES
carpet)  OT  PT Equipment Recommendations  Equipment Needed: No  Other: pt has cane at home  Toilet - Technique: Ambulating  Equipment Used: Standard toilet  Toilet Transfers Comments: good safety with toileting tasks this date; amb to/from toilet without assistance despite cues to alert OT   Assessment        SLP                Body mass index is 24.62 kg/m². Assessment and Plan:    Acute on chronic lightheadedness, fatigue, and diffuse weakness - likely due to dehydration, with underlying chronic BPPV. 24-hr Holter monitor. Improving with increased po fluids.     CAD-ASA,plavix, lipitor    chronic dCHF-Daily wt. HTN - amlodipine - decrease dose and change to pm dosing      Metabolic Encephalopathy - Resolved. Regulate sleep/wake. Emphasis on routine. SLP. Debility - PT/OT to address endurance, strength, compensatory strategies, equipment    H/o PMR? - chronic prednisone    Acute urinary retention (h/o prostate cancer s/p resection) - Improving. Monitor PVRs and straight cath prn >300cc.      Bowels: Schedule colace, prn miralax. Follow bowel movements. Enema or suppository if needed.      Pain: Tylenol is ordered prn. Anticipated dispo: home alone with intermittent assist  Services:  PT, OT, SLP, RN, Aide  DME: has cane  ELOS: 9/14    Josesito Churchill.  Vipul Whelan MD 9/13/2018, 3:14 PM

## 2018-09-13 NOTE — DISCHARGE SUMMARY
Occupational Therapy  Discharge Summary     Name:Mann Avalos  YTK:[id number]  :1927  Treatment Diagnosis: Impaired standing balance, decreased (I) with mobility  Diagnosis: Metabolic encephalopathy    Restrictions/Precautions  Restrictions/Precautions: General Precautions, Fall Risk           Position Activity Restriction  Other position/activity restrictions: High fall risk per nursing assessment, up as tolerated, \"lightheadedness\"     Goals:   Short term goals  Time Frame for Short term goals: 3 days  Short term goal 1: Pt. will complete LE dressing with SBA  with LRAD GOAL MET   Short term goal 2: Pt will complete toilet transfers with SBA GOAL MET 9/10  Short term goal 3: Pt will complete standing level ADLs with SBA for balance GOAL MET   Short term goal 4: Pt will complete item retrival with SBA from closet GOAL MET 9/10   Long term goals  Time Frame for Long term goals : 7 days  Long term goal 1: Pt will complete 1 simple meal prep task Kaila GOAL MET 9/10  Long term goal 2: Pt will complete bathing including gathering clothing Kaila prior to DC using LRAD GOAL MET   Long term goal 3: Pt will tolerate x15-20 minutes dyn standing balance task with LRAD GOAL MET     Pt. Met 4/4 short term goals and 3/3 long term goals.      Current Functional Status:   ADL  Feeding: Beverage management, Modified independent   Grooming: Modified independent  (standing sinkside with SPC to shave, wash face/hands, brush mouth/use mouthwash)  UE Bathing: Supervision, Increased time to complete (distant SPV)  LE Bathing: Supervision, Increased time to complete (distant SPV, stood)  UE Dressing: Modified independent  (gathered clothing with SPC)  LE Dressing: Modified independent , Verbal cueing (gathered clothing with SPC, able to stabilize with grab bar when managing pants/up down and sat to thread without cues this date for safe strategies)  Toileting: Modified independent  (SPC and grab bar for support)  Additional Comments: used SPC throughout ADL, demonstrated increased safety awareness but still did need intermittent cues for safety with LB dressing and bathing standing tasks. Bed mobility  Bridging: Independent  Rolling to Left: Independent  Rolling to Right: Independent  Supine to Sit: Independent  Sit to Supine: Independent  Scooting: Independent    Functional Transfers: Toilet Transfers  Toilet - Technique: Ambulating  Equipment Used: Standard toilet  Toilet Transfer: Modified independent  Toilet Transfers Comments: good safety with toileting tasks this date; amb to/from toilet without assistance despite cues to alert OT     Tub Transfers  Tub - Transfer From: [employer] - Transfer Type: To and From  Tub - Transfer To: Standing  Tub - Technique: Ambulating, Lateral, To right, To left  Tub Transfers: Supervision  Tub Transfers Comments: Pt educated on safety with transfer, pt stating does have grab bar placement at home. Educated to sit in shower chair at home if tired, pt verbalizing understanding. Pt verbalized will wait for family and/or OT/aide to assist pt in/out of tub/shower the first few attempts to ensure proper safety/mechanics and reduce risk of falls    Shower Transfers  Shower - Transfer From: [employer] - Transfer Type: To and From  Shower - Transfer To:  Standing  Shower - Technique: Ambulating  Shower Transfers: Supervision (distant SPV, still needs cues for safety)  Shower Transfers Comments: with SPC, good endurance however cues for safety with tight space of shower      Functional Mobility  Functional - Mobility Device: Cane  Activity: To/from bathroom, Other  Assist Level: Modified independent   Functional Mobility Comments: in room to gather clothing, in bathroom for toileting and bathing, in hallway to/from gym    Instrumental ADL's  Instrumental ADLs: Yes  Meal Prep  Meal Prep Level: Cane  Meal Prep Level of Assistance: Supervision  Meal Preparation: Pt made muffins with

## 2018-09-13 NOTE — PROGRESS NOTES
Occupational Therapy  Facility/Department: [facility] ARU  Daily Treatment Note  NAME: Aranza García  : 1927  MRN: [id number]    Date of Service: 2018    Discharge Recommendations:  Home with assist PRN, Home with Home health OT, Home with nursing aide  OT Equipment Recommendations  Equipment Needed: No  Other: cont to assess need, currently no need    Patient Diagnosis(es): There were no encounter diagnoses. has a past medical history of Arthralgia; Cancer ([address] Chronic back pain; Chronic diastolic (congestive) heart failure ([address] Diverticulitis; Dizziness; Fatigue; Fractures; Hearing decreased; Hyperlipidemia; Hypertension; Osteoarthritis of knee; Osteoarthritis of shoulder; TIA (transient ischemic attack); Vertigo; and Wears glasses. has a past surgical history that includes Inguinal hernia repair; Cataract removal (); Prostatectomy (); Colonoscopy; Upper gastrointestinal endoscopy (2011); Esophagus dilation; knee surgery (Bilateral); Total shoulder arthroplasty; shoulder surgery; Toe Surgery (Right, 2013); joint replacement; and Total knee arthroplasty.     Restrictions  Restrictions/Precautions  Restrictions/Precautions: General Precautions, Fall Risk  Position Activity Restriction  Other position/activity restrictions: High fall risk per nursing assessment, up as tolerated, \"lightheadedness\"    Subjective   General  Chart Reviewed: Yes  Patient assessed for rehabilitation services?: Yes  Family / Caregiver Present: No  Referring Practitioner: Dr. Becca Gomez  Diagnosis: Acute on chronic lightheadedness, fatigue, and diffuse weakness  Subjective  Subjective: First session: Pt laying in bed, agreeable to in room ADL session Second Session:   General Comment  Comments: No c/o pain   Pain Assessment  Patient Currently in Pain: Denies  Vital Signs  Patient Currently in Pain: Denies     Orientation  Orientation  Overall Orientation Status: Within Functional Limits    Objective ADL  Feeding: Beverage management; Modified independent   Grooming: Modified independent  (standing sinkside with SPC to shave, wash face/hands, brush mouth/use mouthwash)  UE Bathing: Supervision; Increased time to complete (distant SPV)  LE Bathing: Supervision; Increased time to complete (distant SPV, stood)  UE Dressing: Modified independent  (gathered clothing with SPC)  LE Dressing: Modified independent ;Verbal cueing (gathered clothing with SPC, able to stabilize with grab bar when managing pants/up down and sat to thread without cues this date for safe strategies)  Toileting: Modified independent  (SPC and grab bar for support)  Additional Comments: used SPC throughout ADL, demonstrated increased safety awareness but still did need intermittent cues for safety with LB dressing and bathing standing tasks. Balance  Sitting Balance: Modified independent   Standing Balance: Modified independent  (multiple episodes in hallway of R knee buckling needing CGA-Gil to recover, however was able to amb in room, complete ADLs with SPC and Kaila prior to R knee buckling)  Standing Balance  Time: First Session: 2x5 min, x10 min, x8 min, 2 min; Second session: 2x5 min, x15 min  Activity: First Session: amb to/from bathroom and in room, standing to shower, standing to groom at sinkside with SPC, amb room <> gym attempt (unable to progress past 20 ft 2/2 R knee buckling x2, MD and RN aware); Second session: amb room <> gym, standing card game   Sit to stand: Modified independent  Stand to sit: Modified independent  Comment: good safety and balance/energy conservation noted this date during all mobility tasks; pt requesting to sit when tired and educated to sit at home when fatigued  Functional Mobility  Functional - Mobility Device: Cane  Activity: To/from bathroom; Other  Assist Level: Modified independent   Functional Mobility Comments: in room to gather clothing, in bathroom for toileting and bathing, in hallway Extension: 2x10 3# RUE, 2# LUE  Grasp/Release: 2x10 with 3 second rest breaks  Other: 2x10 3# RUE, 2# LUE chest press, circles forwards and backwards; 2x10 3# RUE overhead press, AROM LUE to tolerance        Assessment   Performance deficits / Impairments: Decreased functional mobility ; Decreased safe awareness;Decreased ADL status; Decreased strength;Decreased endurance;Decreased balance;Decreased high-level IADLs;Decreased cognition  Assessment: First Session: Pt agreeable to shower, progressing well with ambulation in the room to gather clothing Kaila, completed toileting/toileting transfer Kaila, needing distant SPV during shower transfer and bathing due to safety and intermittent cues. Pt then able to tolerate standing sinkside >5 minutes for grooming tasks with good balance. Pt attempted to amb to gym however after 20 ft, R knee buckling x2 needing to sit and then be brought to gym in [facility]. MD aware, pt stating never happened before yesterday. Pt tolerated BUE ex 2-3# to tolerance and demo'd teachback for HEP with minimal need for cues. Second Session: Pt amb room <> gym with RW, better balance this date pt stating \"I feel better about walking. \" Pt did demonstrate intermittent R knee buckling throughout mobility tasks. Pt tolerated x15 minutes card game with OT, minimal verbal cues for cognition/problem solving. Pt with good endurance and activity tolerance. Pt left in bed, educated on home safety and energy conservation especially regarding when R knee caryl intermittentkly. Pt demonstrating increased balance, safety, and endurance progressing towards Kaila for most ADLs and functional mobility, however mobility was limited this date by R knee buckling. Cont to rec HHOT, HHaide and intermittent supervision (especially during shower transfers). Cont OT POC.    Prognosis: Good  Patient Education: ADLs, bed mobility, functional transfers and role  of OT  REQUIRES OT FOLLOW UP: Yes  Activity

## 2018-09-13 NOTE — PROGRESS NOTES
Physical Therapy  Facility/Department: [facility]  Daily Treatment Note/ Discharge  NAME: Kim Moreno  : 1927  MRN: [id number]    Date of Service: 2018    Discharge Recommendations:  Home with assist PRN   PT Equipment Recommendations  Equipment Needed: No    Patient Diagnosis(es): There were no encounter diagnoses. has a past medical history of Arthralgia; Cancer ([facility] [address] Chronic back pain; Chronic diastolic (congestive) heart failure ([facility] [address] Diverticulitis; Dizziness; Fatigue; Fractures; Hearing decreased; Hyperlipidemia; Hypertension; Osteoarthritis of knee; Osteoarthritis of shoulder; TIA (transient ischemic attack); Vertigo; and Wears glasses. has a past surgical history that includes Inguinal hernia repair; Cataract removal (); Prostatectomy (); Colonoscopy; Upper gastrointestinal endoscopy (2011); Esophagus dilation; knee surgery (Bilateral); Total shoulder arthroplasty; shoulder surgery; Toe Surgery (Right, 2013); joint replacement; and Total knee arthroplasty. Restrictions  Restrictions/Precautions  Restrictions/Precautions: General Precautions, Fall Risk  Position Activity Restriction  Other position/activity restrictions: High fall risk per nursing assessment, up as tolerated, orthostatic BP's, telemetry  Subjective   General  Chart Reviewed: Yes  Response To Previous Treatment: Patient with no complaints from previous session. Family / Caregiver Present: No  Referring Practitioner: Dr. Hattie Schrader MD  Subjective  Subjective: pt supine in bed. General Comment  Comments: denies pain. agreeable to therapy. reports drinking water to assist with \"lightheadiness\".    Pain Screening  Patient Currently in Pain: Denies  Vital Signs  Patient Currently in Pain: Denies       Orientation  Orientation  Overall Orientation Status: Within Functional Limits  Objective   Bed mobility  Rolling to Left: Independent  Rolling to Right: Independent  Supine to Sit: Independent  Sit to sit with ind. Transfers: pt completed sit to stand from EOB to RW, commode <> RW and chair to RW with mod ind. Sit to stand from chair to RW with mod ind. Ambulation: pt ambualted 102 ft x 2 reps with RW, supv-mod ind and reciprocal gait pattern. Pt requested to utilize RW for AD during amb due to RLE buckling when ambulating with OT earlier in day. There ex: pt provided with HEP consisting of BLE seated ankle pumps, LAQ, marches, hip abduction with TKE, hip adduction isometrics. Completed with 2# weights to BLE 2x10 each. Assessment   Body structures, Functions, Activity limitations: Decreased functional mobility ; Decreased endurance;Decreased balance;Decreased strength  Assessment: pt set to d/c home with assist prn. pt currenlty mod ind with transfers with SPC, supv-mod ind with gait of household and community distances with SPC due to mild instaibility with gait. pt also utilizes environment for balance. pt reports no questions/concern about discharging home. provided wiht seated HEP to maintain functional strength. Treatment Diagnosis: Impaired standing balance, decreased (I) with mobility  Specific instructions for Next Treatment: balance training, gait training  Prognosis: Good  Patient Education: transfers, exercise, balance  REQUIRES PT FOLLOW UP: Yes  Activity Tolerance  Activity Tolerance: Patient Tolerated treatment well  Activity Tolerance: pt with slight lightheadiness with balance activity, not induced with head motions. RN made aware. Goals  Short term goals  Time Frame for Short term goals: 3 days  Short term goal 1: Pt will be supervision with sit<>Stand and bed<>Chair with LRAD.- GOAL MET 9/11. supervision with transfers  Short term goal 2: Pt will ambulate 150 ft with supervision with LRAD.- GOAL MET 9/11.  pt amb >150 ft with spc, supv  Short term goal 3: Pt will negotiate 2 stairs with HR supervision.- GOAL MET 9/11, supervision for 12 steps with unilateral rail  Short term goal 4: -  Long term goals  Time Frame for Long term goals : 7 days  Long term goal 1: Pt will be mod I with sit<>Stand and bed<>Chair with LRAD.- GOAL MET. mod ind with transfer with SPC. Long term goal 2: Pt will ambulate 150 ft mod I with LRAD.- GOAL NOT MET> supv requried at times for increased safety. Long term goal 3: Pt will negotiate 2 stairs with HR mod I.- GOAL NOT MET. supv at times for increased safety. Long term goal 4: Pt will be indep with HEP to maintain strength and balance. - GOAL MET. Long term goal 5: Pt will improve Titus Balance score to 52/56 to reflect decreased fall risk.- GOAL NOT MET. 49/56, indicating low risk of falls.    Patient Goals   Patient goals : \"to do things without a problem\"    Plan    Plan  Times per week: 5 out of 7 days/wk  Times per day: Daily  Specific instructions for Next Treatment: balance training, gait training  Current Treatment Recommendations: Strengthening, Neuromuscular Re-education, Home Exercise Program, ROM, Safety Education & Training, Balance Training, Endurance Training, Functional Mobility Training, Transfer Training, Gait Training, Stair training, Patient/Caregiver Education & Training  Safety Devices  Type of devices: Gait belt, Call light within reach, Nurse notified, Left in bed, Bed alarm in place     Therapy Time   Individual Concurrent Group Co-treatment   Time In 0900         Time Out 1000         Minutes 60         Timed Code Treatment Minutes: 60 Minutes     Second Session Therapy Time:   Individual Concurrent Group Co-treatment   Time In 1430         Time Out 1500         Minutes 30           Timed Code Treatment Minutes:  30    Total Treatment Minutes:  90    Vamshi Trotter PT

## 2018-09-13 NOTE — PROGRESS NOTES
Department of [facility]  Dr. White Half Progress Note    Patient Identification:  Antoinette Burk  [id number]  : 1927  Admit date: 2018      Diagnosis:   Patient Active Problem List   Diagnosis    Fatigue    Dizziness    Hyperlipidemia with target LDL less than 100    Essential hypertension    Polymyalgia rheumatica syndrome (HCC)    Other affections of shoulder region, not elsewhere classified    Primary localized osteoarthrosis, lower leg    Primary localized osteoarthrosis of shoulder region    Toe pain    Toe fracture    Callus of foot    TIA (transient ischemic attack)    GERD (gastroesophageal reflux disease)    Insomnia    Depression    BPH (benign prostatic hyperplasia)    Acute encephalopathy    HTN (hypertension), benign    CAD in native artery    Hyperlipidemia    Pneumonia due to organism    S[address] ([facility] [address]    Community acquired bacterial pneumonia    Chronic diastolic congestive heart failure (HCC)    History of total shoulder replacement, left    Bradycardia    Mild dehydration    PVC (premature ventricular contraction)    Near syncope    RONAK (acute kidney injury) ([facility]    New persistent daily headache    Metabolic encephalopathy           Subjective: Pt seen this AM. Patient has improved with his cognition and has less confusion. He is ready for discharge to home today with home therapies. He is looking forward to his discharge. He was encouraged to continue increased fluid intake after discharge to help with his dizziness. He will follow up with his Cardiologist next month. His [facility] and meds have been filled out for his discharge.      /72   Pulse (!) 42   Temp 97.5 °F (36.4 °C) (Oral)   Resp 16   Ht 6' (1.829 m)   Wt 181 lb 8 oz (82.3 kg)   SpO2 97%   BMI 24.62 kg/m²     Last 24 hour lab  Recent Results (from the past 24 hour(s))   Basic Metabolic Panel w/ Reflex to MG    Collection Time: 18  7:19 AM   Result Value Ref

## 2018-09-14 ENCOUNTER — TELEPHONE (OUTPATIENT)
Dept: CARDIOLOGY CLINIC | Age: 83
End: 2018-09-14

## 2018-09-14 VITALS
RESPIRATION RATE: 16 BRPM | OXYGEN SATURATION: 97 % | HEART RATE: 49 BPM | WEIGHT: 181.5 LBS | SYSTOLIC BLOOD PRESSURE: 127 MMHG | HEIGHT: 72 IN | BODY MASS INDEX: 24.58 KG/M2 | TEMPERATURE: 97.7 F | DIASTOLIC BLOOD PRESSURE: 60 MMHG

## 2018-09-14 PROCEDURE — 6360000002 HC RX W HCPCS: Performed by: PHYSICAL MEDICINE & REHABILITATION

## 2018-09-14 PROCEDURE — 6370000000 HC RX 637 (ALT 250 FOR IP): Performed by: PHYSICAL MEDICINE & REHABILITATION

## 2018-09-14 RX ADMIN — GABAPENTIN 100 MG: 100 CAPSULE ORAL at 08:27

## 2018-09-14 RX ADMIN — ASPIRIN 81 MG 162 MG: 81 TABLET ORAL at 08:26

## 2018-09-14 RX ADMIN — PREDNISONE 5 MG: 5 TABLET ORAL at 08:27

## 2018-09-14 RX ADMIN — DOCUSATE SODIUM 100 MG: 100 CAPSULE, LIQUID FILLED ORAL at 08:26

## 2018-09-14 RX ADMIN — CLOPIDOGREL BISULFATE 75 MG: 75 TABLET ORAL at 08:27

## 2018-09-14 RX ADMIN — SIMVASTATIN 40 MG: 40 TABLET, FILM COATED ORAL at 08:27

## 2018-09-14 RX ADMIN — FAMOTIDINE 40 MG: 20 TABLET ORAL at 08:27

## 2018-09-14 RX ADMIN — LOPERAMIDE HYDROCHLORIDE 2 MG: 2 CAPSULE ORAL at 08:27

## 2018-09-14 RX ADMIN — ESCITALOPRAM OXALATE 10 MG: 10 TABLET ORAL at 08:27

## 2018-09-14 RX ADMIN — ENOXAPARIN SODIUM 40 MG: 40 INJECTION SUBCUTANEOUS at 08:27

## 2018-09-14 ASSESSMENT — PAIN SCALES - GENERAL
PAINLEVEL_OUTOF10: 0
PAINLEVEL_OUTOF10: 0

## 2018-09-14 NOTE — PLAN OF CARE
Problem: Falls - Risk of:  Goal: Will remain free from falls  Will remain free from falls   Outcome: Met This Shift  Patient resting comfortably in bed  with belongings, call light, and phone within reach.  Instructed patient to call with any questions/concerns.  Electronically signed by Lorrie Monroy RN on 9/14/2018 at 11:22 AM

## 2018-09-14 NOTE — CARE COORDINATION
CASE MANAGEMENT DISCHARGE SUMMARY      Discharge to: Home with [address] Durable Medical Equipment ordered/agency: None     Transportation:    Family/car: Patient reported his family will be here     Notified: Patient    Family: Patient's daughter on 9-13   Facility/Agency: [facility] faxed [phone] liaison will fax orders   RN: Rea Duvall MSW, LSW

## 2018-09-14 NOTE — PROGRESS NOTES
Discharge instructions given to daughter/patient. No questions/concerns at this time. Patient discharged to home with [facility]. [facility] paperwork given back with instructions to have PCP complete forms. Patient escorted off unit via RN/Wheelchair. Instructed patient to call with any questions/concerns.   Electronically signed by Celso Monzon RN on 9/14/2018 at 1:00 PM

## 2018-09-14 NOTE — CARE COORDINATION
Replaced by [facility]    DC order noted, all docs needed have been faxed to [facility] for home care services.       Bruce Friend  Work mobile: [phone]  [facility] office: [phone]

## 2018-09-14 NOTE — DISCHARGE SUMMARY
step length, decreased B foot clearance, mildly narrowed NOÉ. No LOB  Distance: 275 ft (10 ft of carpet)  Comments: supv requried due to mild instability during gait. pt uses walls/chairs for stability when ambulating. , Stairs  # Steps : 12  Stairs Height: 6\"  Rails: Left ascending  Curbs: 6\" (x4 reps)  Device: Single pt cane  Assistance: Supervision, Modified independent   Comment: step to gait pattern when navigating flight of steps, supv/mod ind. cues for safety when navigating curb step x 4 reps, 1 LOB when turning with CGA to correct. Mobility: Bed, Chair, Wheel Chair: 5 - Requires setup/supervision/cues  Walk: 5 - Exception Household Locomotion Walks at least 50 feet Independently with or without a device May require an extra amount of time OR there may be a concern for safety  Distance Walked: >250 ft with RW/ SPC , supv-mod ind  Wheel Chair: 0 - Activity Not Assessed/Does Not Occur  Stairs: 5- Supervision Requires supervision(e.g., standing by, cuing, or coaxing) to go up and down one flight of stairs, PT Equipment Recommendations  Equipment Needed: No  Other: pt has cane at home, Assessment: pt set to d/c home with assist prn. pt currenlty mod ind with transfers with SPC, supv-mod ind with gait of household and community distances with SPC due to mild instaibility with gait. pt also utilizes environment for balance. pt reports no questions/concern about discharging home. provided wiht seated HEP to maintain functional strength.      Occupational therapy: Eatin - Patient feeds self  Groomin - Patient independent with all grooming tasks  Bathin - Able to bathe all 10 areas with device  Dressing-Upper: 7 - Patient independently dresses upper body  Dressing-Lower: 6 - Independent with device/prosthesis  Toiletin - Requires device (grab bar/walker/etc.)  Toilet Transfer: 6 - Independent with device (grab bar/walker/slide bar)  Tub Transfer: 5 - Supervision, set-up, cues  Shower Transfer: 5 - Supervision, set-up, cues,  , Assessment: First Session: Pt agreeable to shower, progressing well with ambulation in the room to gather clothing Kaila, completed toileting/toileting transfer Kaila, needing distant SPV during shower transfer and bathing due to safety and intermittent cues. Pt then able to tolerate standing sinkside >5 minutes for grooming tasks with good balance. Pt attempted to amb to gym however after 20 ft, R knee buckling x2 needing to sit and then be brought to gym in [facility]. MD aware, pt stating never happened before yesterday. Pt tolerated BUE ex 2-3# to tolerance and demo'd teachback for HEP with minimal need for cues. Second Session: Pt amb room <> gym with RW, better balance this date pt stating \"I feel better about walking. \" Pt did demonstrate intermittent R knee buckling throughout mobility tasks. Pt tolerated x15 minutes card game with OT, minimal verbal cues for cognition/problem solving. Pt with good endurance and activity tolerance. Pt left in bed, educated on home safety and energy conservation especially regarding when R knee caryl intermittentkly. Pt demonstrating increased balance, safety, and endurance progressing towards Kaila for most ADLs and functional mobility, however mobility was limited this date by R knee buckling. Cont to rec HHOT, HHaide and intermittent supervision (especially during shower transfers). Cont OT POC. Speech therapy:  Comprehension: 6 - Complex ideas 90% or device (hearing aid/glasses)  Expression: 7 - Patient expresses complex ideas/needs  Social Interaction: 7 - Patient has appropriate behavior/relations 100% of the time  Problem Solvin - Patient able to solve simple/routine tasks  Memory: 5 - Patient requires prompting with stress/unfamiliar situations      Inpatient Rehabilitation Course:   Beth Doyle is a 80 y.o. male admitted to inpatient rehabilitation on 3/4/4580 for s/p Metabolic encephalopathy.   The patient participated in an aggressive multidisciplinary inpatient rehabilitation program involving 3 hours per day, 5 days per week of rehabilitation. 80 Y M with a h/o former smoking, HTN, HLD, prostate cancer s/p prostatectomy, and TIA was sent to the [facility] ED by his family because his chronic dizziness, fatigue, and generalized weakness that were worse than normal.The patient has a history of 2 years of lightheadedness and fatigue. The [facility] ED thought a CVA was possible so he was transferred to [facility] overnight for neurology consultation.  Neurology felt that his new onset dizziness, headache and encephalopathy was likely due to a combination of recent change in medications, bradycardia, peripheral vertigo and dehydration. CT Scan of head negative for acute changes. Cardiology noted that he was bradycardic and suggested an outpatient 24 hr holter monitor and perhaps turning down the intensity of his bladder neck stimulator. ESR and CRP were unremarkable.  He denies any severe headache, visual disturbance, neck or back pain.  No chest pain, dysphagia or dysarthria.  Occasional mild lightheadedness.  Creatinine is back to normal.  Patient now feeling better with treatment than he has in last month, and he needs more therapy before D/C home safely. He lives at home alone. After admission to the Rehab Unit, he made steady progress in his therapies as listed above under each therapy section. His upper and lower extremity coordination and strength did improve in therapy, and he improved with his endurance and functional status as he participated in his rehab therapies.  His kidney function, blood sugars and blood pressure were monitored while on the rehabilitation unit. Patient has improved with his cognition and has less confusion. He is ready for discharge to home today with home therapies. He is looking forward to his discharge. He was encouraged to continue increased fluid intake after discharge to help with his dizziness.  He will follow up with contrast enhancement are identified. 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 No midline shift. Scattered low-attenuation areas are noted, likely related to sequela of chronic microvascular white matter ischemic disease. 1.  Moderate stenoses are noted in bilateral vertebral artery origins measuring approximately 50%. Mild areas of narrowing are noted in the V4 segments of the vertebral arteries. 2.  There is an estimated 50% stenosis in the proximal right internal carotid artery and a 40% stenosis in the proximal left internal carotid artery. 3.  Unremarkable CTA of the brain. Cta Head W Contrast    Result Date: 9/5/2018  EXAMINATION: CTA OF THE NECK; CTA OF THE HEAD WITH CONTRAST 9/5/2018 11:49 am: TECHNIQUE: CTA of the neck was performed with the administration of intravenous contrast. Multiplanar reformatted images are provided for review. MIP images are provided for review. Stenosis of the internal carotid arteries measured using NASCET criteria. Dose modulation, iterative reconstruction, and/or weight based adjustment of the mA/kV was utilized to reduce the radiation dose to as low as reasonably achievable.; CTA of the head/brain was performed with the administration of intravenous contrast. Multiplanar reformatted images are provided for review. MIP images are provided for review. Dose modulation, iterative reconstruction, and/or weight based adjustment of the mA/kV was utilized to reduce the radiation dose to as low as reasonably achievable. COMPARISON: 09/04/2018. HISTORY: ORDERING SYSTEM PROVIDED HISTORY: STROKE TECHNOLOGIST PROVIDED HISTORY: Has a \"code stroke\" or \"stroke alert\" been called? ->No Ordering Physician Provided Reason for Exam: dizziness Acuity: Acute Type of Exam: Initial; ORDERING SYSTEM PROVIDED HISTORY: DIZZINESS TECHNOLOGIST PROVIDED HISTORY: Has a \"code stroke\" or \"stroke alert\" been called? ->No Ordering Physician Provided Reason for Exam: dizziness Acuity: Acute Type of Exam: Initial Additional signs and symptoms: prev TIA FINDINGS: CTA NECK: AORTIC ARCH/ARCH VESSELS: There is fibrofatty and fibrocalcific plaque noted along the aortic arch. There is a normal branch pattern of the aortic arch. Fibrocalcific plaque is noted at the innominate artery origin without evidence of a flow-limiting stenosis. There is fibrocalcific plaque noted in the proximal right subclavian artery with an estimated 25% stenosis. There is fibrofatty and fibrocalcific plaque noted in the left subclavian artery. There is an estimated 50% stenosis in the left subclavian artery just proximal to the vertebral artery origin. The mid and distal left subclavian artery are patent. CAROTID ARTERIES: The right common carotid artery has fibrofatty and fibrocalcific plaque with an estimated 30% stenosis along the midportion. There is fibrocalcific plaque noted in the proximal right internal carotid artery with an estimated 50% stenosis using NASCET criteria. The left common carotid artery has scattered areas of fibrofatty plaque. Fibrocalcific plaque is noted in the proximal left internal carotid artery with an estimated 40% stenosis. VERTEBRAL ARTERIES: There is fibrocalcific plaque noted at the right vertebral artery origin with a greater than 50% stenosis, moderate in severity. There is fibrocalcific plaque noted in the V4 segments of bilateral vertebral arteries which is mild in severity. There is an estimated 50% stenosis at the left vertebral artery origin, moderate in severity. SOFT TISSUES: There is respiratory motion artifact in the lung apices. There is no superior mediastinal adenopathy. The thyroid gland is unremarkable. The submandibular and parotid glands are normal in appearance. The parapharyngeal fat spaces are unremarkable. The base of the tongue, vallecula and epiglottis are normal in appearance. The true vocal cords are unremarkable.  BONES: The paranasal sinuses and mastoid air cells are well-aerated. Degenerative changes are noted in the spine. No fracture. CTA HEAD: ANTERIOR CIRCULATION: Fibrocalcific plaque is noted in the carotid siphons bilaterally without evidence of a flow-limiting stenosis. The right PCOM is patent. The anterior middle cerebral arteries are unremarkable. POSTERIOR CIRCULATION: The basilar artery is normal in appearance. There is a complete fetal origin of the right PCA. The left posterior cerebral artery is unremarkable. BRAIN: No areas of abnormal contrast enhancement are identified. 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 No midline shift. Scattered low-attenuation areas are noted, likely related to sequela of chronic microvascular white matter ischemic disease. 1.  Moderate stenoses are noted in bilateral vertebral artery origins measuring approximately 50%. Mild areas of narrowing are noted in the V4 segments of the vertebral arteries. 2.  There is an estimated 50% stenosis in the proximal right internal carotid artery and a 40% stenosis in the proximal left internal carotid artery. 3.  Unremarkable CTA of the brain. Patient Instructions: Follow-up visits: He will follow up with his family doctor and Cardiologist next month. Discharge Medications:     Medication List      START taking these medications    aspirin 81 MG chewable tablet        CHANGE how you take these medications    amLODIPine 5 MG tablet  Commonly known as:  NORVASC  Take 0.5 tablets by mouth nightly  What changed:  · how much to take  · when to take this        CONTINUE taking these medications    CALCIUM PO     clopidogrel 75 MG tablet  Commonly known as:  PLAVIX     escitalopram 10 MG tablet  Commonly known as:  LEXAPRO     famotidine 40 MG tablet  Commonly known as:  PEPCID     gabapentin 100 MG capsule  Commonly known as:  NEURONTIN  Take 1 capsule by mouth 2 times daily for 30 days. Alice Sprague      LIPOFLAVONOID Tabs     loperamide 2 MG capsule  Commonly known as:  IMODIUM     mirtazapine 15 MG tablet  Commonly known as:  REMERON     MULTIVITAMIN PO     predniSONE 5 MG tablet  Commonly known as:  DELTASONE     simvastatin 40 MG tablet  Commonly known as:  ZOCOR     vitamin B-12 100 MCG tablet  Commonly known as:  CYANOCOBALAMIN     vitamin D 1000 UNIT Tabs tablet  Commonly known as:  CHOLECALCIFEROL           Where to Get Your Medications      Information about where to get these medications is not yet available    Ask your nurse or doctor about these medications  · amLODIPine 5 MG tablet       I spent over 35 minutes on this discharge encounter between counseling, coordination of care, and medication reconciliation.         To comply with [facility] by R.II.4.1:   Discharge order placed in advance to facilitate patients discharge needs        Audrey Berg MD, 9/14/2018, 12:13 PM

## 2018-09-17 ENCOUNTER — APPOINTMENT (OUTPATIENT)
Dept: GENERAL RADIOLOGY | Age: 83
End: 2018-09-17
Payer: MEDICARE

## 2018-09-17 ENCOUNTER — HOSPITAL ENCOUNTER (EMERGENCY)
Age: 83
Discharge: OTHER INSTITUTION | End: 2018-09-17
Attending: EMERGENCY MEDICINE
Payer: MEDICARE

## 2018-09-17 ENCOUNTER — HOSPITAL ENCOUNTER (INPATIENT)
Age: 83
LOS: 4 days | Discharge: HOME HEALTH | DRG: 308 | End: 2018-09-21
Attending: INTERNAL MEDICINE | Admitting: INTERNAL MEDICINE
Payer: MEDICARE

## 2018-09-17 ENCOUNTER — TELEPHONE (OUTPATIENT)
Dept: CARDIOLOGY CLINIC | Age: 83
End: 2018-09-17

## 2018-09-17 VITALS
RESPIRATION RATE: 11 BRPM | WEIGHT: 183 LBS | DIASTOLIC BLOOD PRESSURE: 64 MMHG | HEART RATE: 88 BPM | HEIGHT: 72 IN | SYSTOLIC BLOOD PRESSURE: 166 MMHG | OXYGEN SATURATION: 99 % | TEMPERATURE: 96.9 F | BODY MASS INDEX: 24.79 KG/M2

## 2018-09-17 DIAGNOSIS — M79.675 PAIN OF TOE OF LEFT FOOT: ICD-10-CM

## 2018-09-17 DIAGNOSIS — R00.1 BRADYCARDIA: ICD-10-CM

## 2018-09-17 DIAGNOSIS — R42 DIZZINESS: Primary | ICD-10-CM

## 2018-09-17 DIAGNOSIS — R42 LIGHTHEADEDNESS: Primary | ICD-10-CM

## 2018-09-17 DIAGNOSIS — M35.3 POLYMYALGIA RHEUMATICA SYNDROME (HCC): ICD-10-CM

## 2018-09-17 LAB
A/G RATIO: 1.4 (ref 1.1–2.2)
ALBUMIN SERPL-MCNC: 4 G/DL (ref 3.4–5)
ALP BLD-CCNC: 79 U/L (ref 40–129)
ALT SERPL-CCNC: 20 U/L (ref 10–40)
ANION GAP SERPL CALCULATED.3IONS-SCNC: 12 MMOL/L (ref 3–16)
APTT: 32.5 SEC (ref 26–36)
AST SERPL-CCNC: 20 U/L (ref 15–37)
BASOPHILS ABSOLUTE: 0.1 K/UL (ref 0–0.2)
BASOPHILS RELATIVE PERCENT: 0.6 %
BILIRUB SERPL-MCNC: 0.3 MG/DL (ref 0–1)
BILIRUBIN URINE: NEGATIVE
BLOOD, URINE: NEGATIVE
BUN BLDV-MCNC: 23 MG/DL (ref 7–20)
CALCIUM SERPL-MCNC: 9.2 MG/DL (ref 8.3–10.6)
CHLORIDE BLD-SCNC: 101 MMOL/L (ref 99–110)
CLARITY: CLEAR
CO2: 25 MMOL/L (ref 21–32)
COLOR: YELLOW
CREAT SERPL-MCNC: 1.2 MG/DL (ref 0.8–1.3)
EOSINOPHILS ABSOLUTE: 0.1 K/UL (ref 0–0.6)
EOSINOPHILS RELATIVE PERCENT: 0.6 %
GFR AFRICAN AMERICAN: >60
GFR NON-AFRICAN AMERICAN: 57
GLOBULIN: 2.8 G/DL
GLUCOSE BLD-MCNC: 111 MG/DL (ref 70–99)
GLUCOSE URINE: NEGATIVE MG/DL
HCT VFR BLD CALC: 43.4 % (ref 40.5–52.5)
HEMOGLOBIN: 14.5 G/DL (ref 13.5–17.5)
INR BLD: 1.16 (ref 0.86–1.14)
KETONES, URINE: NEGATIVE MG/DL
LACTIC ACID: 1.3 MMOL/L (ref 0.4–2)
LACTIC ACID: 2.3 MMOL/L (ref 0.4–2)
LEUKOCYTE ESTERASE, URINE: NEGATIVE
LIPASE: 33 U/L (ref 13–60)
LYMPHOCYTES ABSOLUTE: 0.9 K/UL (ref 1–5.1)
LYMPHOCYTES RELATIVE PERCENT: 7.4 %
MAGNESIUM: 2.3 MG/DL (ref 1.8–2.4)
MCH RBC QN AUTO: 31.3 PG (ref 26–34)
MCHC RBC AUTO-ENTMCNC: 33.5 G/DL (ref 31–36)
MCV RBC AUTO: 93.3 FL (ref 80–100)
MICROSCOPIC EXAMINATION: NORMAL
MONOCYTES ABSOLUTE: 0.6 K/UL (ref 0–1.3)
MONOCYTES RELATIVE PERCENT: 5.1 %
NEUTROPHILS ABSOLUTE: 10 K/UL (ref 1.7–7.7)
NEUTROPHILS RELATIVE PERCENT: 86.3 %
NITRITE, URINE: NEGATIVE
PDW BLD-RTO: 14.1 % (ref 12.4–15.4)
PH UA: 7.5
PLATELET # BLD: 195 K/UL (ref 135–450)
PMV BLD AUTO: 9.9 FL (ref 5–10.5)
POTASSIUM REFLEX MAGNESIUM: 4.3 MMOL/L (ref 3.5–5.1)
PRO-BNP: 2752 PG/ML (ref 0–449)
PROTEIN UA: NEGATIVE MG/DL
PROTHROMBIN TIME: 13.2 SEC (ref 9.8–13)
RBC # BLD: 4.65 M/UL (ref 4.2–5.9)
SODIUM BLD-SCNC: 138 MMOL/L (ref 136–145)
SPECIFIC GRAVITY UA: 1.02
T4 TOTAL: 6.6 UG/DL (ref 4.5–10.9)
TOTAL PROTEIN: 6.8 G/DL (ref 6.4–8.2)
TROPONIN: <0.01 NG/ML
TSH SERPL DL<=0.05 MIU/L-ACNC: 1.1 UIU/ML (ref 0.27–4.2)
URINE TYPE: NORMAL
UROBILINOGEN, URINE: 0.2 E.U./DL
WBC # BLD: 11.6 K/UL (ref 4–11)

## 2018-09-17 PROCEDURE — 6370000000 HC RX 637 (ALT 250 FOR IP): Performed by: NURSE PRACTITIONER

## 2018-09-17 PROCEDURE — 83605 ASSAY OF LACTIC ACID: CPT

## 2018-09-17 PROCEDURE — 71045 X-RAY EXAM CHEST 1 VIEW: CPT

## 2018-09-17 PROCEDURE — 84436 ASSAY OF TOTAL THYROXINE: CPT

## 2018-09-17 PROCEDURE — 81003 URINALYSIS AUTO W/O SCOPE: CPT

## 2018-09-17 PROCEDURE — 80053 COMPREHEN METABOLIC PANEL: CPT

## 2018-09-17 PROCEDURE — 2580000003 HC RX 258: Performed by: NURSE PRACTITIONER

## 2018-09-17 PROCEDURE — 2060000000 HC ICU INTERMEDIATE R&B

## 2018-09-17 PROCEDURE — 83735 ASSAY OF MAGNESIUM: CPT

## 2018-09-17 PROCEDURE — 83690 ASSAY OF LIPASE: CPT

## 2018-09-17 PROCEDURE — 93010 ELECTROCARDIOGRAM REPORT: CPT | Performed by: INTERNAL MEDICINE

## 2018-09-17 PROCEDURE — 99284 EMERGENCY DEPT VISIT MOD MDM: CPT

## 2018-09-17 PROCEDURE — 85730 THROMBOPLASTIN TIME PARTIAL: CPT

## 2018-09-17 PROCEDURE — 85610 PROTHROMBIN TIME: CPT

## 2018-09-17 PROCEDURE — 84443 ASSAY THYROID STIM HORMONE: CPT

## 2018-09-17 PROCEDURE — 84484 ASSAY OF TROPONIN QUANT: CPT

## 2018-09-17 PROCEDURE — 93005 ELECTROCARDIOGRAM TRACING: CPT | Performed by: EMERGENCY MEDICINE

## 2018-09-17 PROCEDURE — 36415 COLL VENOUS BLD VENIPUNCTURE: CPT

## 2018-09-17 PROCEDURE — 83880 ASSAY OF NATRIURETIC PEPTIDE: CPT

## 2018-09-17 PROCEDURE — 85025 COMPLETE CBC W/AUTO DIFF WBC: CPT

## 2018-09-17 RX ORDER — SODIUM CHLORIDE 9 MG/ML
INJECTION, SOLUTION INTRAVENOUS CONTINUOUS
Status: DISPENSED | OUTPATIENT
Start: 2018-09-17 | End: 2018-09-18

## 2018-09-17 RX ORDER — AMLODIPINE BESYLATE 2.5 MG/1
2.5 TABLET ORAL NIGHTLY
Status: DISCONTINUED | OUTPATIENT
Start: 2018-09-17 | End: 2018-09-19

## 2018-09-17 RX ORDER — ASPIRIN 81 MG/1
162 TABLET, CHEWABLE ORAL DAILY
Status: DISCONTINUED | OUTPATIENT
Start: 2018-09-17 | End: 2018-09-21 | Stop reason: HOSPADM

## 2018-09-17 RX ORDER — SODIUM CHLORIDE 0.9 % (FLUSH) 0.9 %
10 SYRINGE (ML) INJECTION PRN
Status: DISCONTINUED | OUTPATIENT
Start: 2018-09-17 | End: 2018-09-21 | Stop reason: HOSPADM

## 2018-09-17 RX ORDER — CLOPIDOGREL BISULFATE 75 MG/1
75 TABLET ORAL DAILY
Status: DISCONTINUED | OUTPATIENT
Start: 2018-09-17 | End: 2018-09-21 | Stop reason: HOSPADM

## 2018-09-17 RX ORDER — ONDANSETRON 2 MG/ML
4 INJECTION INTRAMUSCULAR; INTRAVENOUS EVERY 6 HOURS PRN
Status: DISCONTINUED | OUTPATIENT
Start: 2018-09-17 | End: 2018-09-21 | Stop reason: HOSPADM

## 2018-09-17 RX ORDER — GABAPENTIN 100 MG/1
100 CAPSULE ORAL 2 TIMES DAILY
Status: DISCONTINUED | OUTPATIENT
Start: 2018-09-17 | End: 2018-09-20

## 2018-09-17 RX ORDER — SODIUM CHLORIDE 0.9 % (FLUSH) 0.9 %
10 SYRINGE (ML) INJECTION EVERY 12 HOURS SCHEDULED
Status: DISCONTINUED | OUTPATIENT
Start: 2018-09-17 | End: 2018-09-21 | Stop reason: HOSPADM

## 2018-09-17 RX ORDER — FAMOTIDINE 20 MG/1
40 TABLET, FILM COATED ORAL DAILY
Status: DISCONTINUED | OUTPATIENT
Start: 2018-09-17 | End: 2018-09-21 | Stop reason: HOSPADM

## 2018-09-17 RX ORDER — ESCITALOPRAM OXALATE 10 MG/1
10 TABLET ORAL DAILY
Status: DISCONTINUED | OUTPATIENT
Start: 2018-09-17 | End: 2018-09-21 | Stop reason: HOSPADM

## 2018-09-17 RX ORDER — PREDNISONE 1 MG/1
5 TABLET ORAL DAILY
Status: DISCONTINUED | OUTPATIENT
Start: 2018-09-17 | End: 2018-09-21 | Stop reason: HOSPADM

## 2018-09-17 RX ORDER — MIRTAZAPINE 15 MG/1
15 TABLET, FILM COATED ORAL NIGHTLY
Status: DISCONTINUED | OUTPATIENT
Start: 2018-09-17 | End: 2018-09-21 | Stop reason: HOSPADM

## 2018-09-17 RX ORDER — BRAN 5 G
1 WAFER ORAL
Status: DISCONTINUED | OUTPATIENT
Start: 2018-09-17 | End: 2018-09-17 | Stop reason: RX

## 2018-09-17 RX ORDER — UBIDECARENONE 75 MG
50 CAPSULE ORAL DAILY
Status: DISCONTINUED | OUTPATIENT
Start: 2018-09-17 | End: 2018-09-17 | Stop reason: RX

## 2018-09-17 RX ORDER — SIMVASTATIN 40 MG
40 TABLET ORAL DAILY
Status: DISCONTINUED | OUTPATIENT
Start: 2018-09-17 | End: 2018-09-21 | Stop reason: HOSPADM

## 2018-09-17 RX ADMIN — GABAPENTIN 100 MG: 100 CAPSULE ORAL at 21:49

## 2018-09-17 RX ADMIN — CLOPIDOGREL BISULFATE 75 MG: 75 TABLET ORAL at 21:49

## 2018-09-17 RX ADMIN — MIRTAZAPINE 15 MG: 15 TABLET, FILM COATED ORAL at 21:49

## 2018-09-17 RX ADMIN — Medication 10 ML: at 21:51

## 2018-09-17 ASSESSMENT — PAIN SCALES - GENERAL: PAINLEVEL_OUTOF10: 0

## 2018-09-17 NOTE — ED NOTES
[id number] - called [address] for consult with [facility] Hospitalist.   Dx: atrial fibrillation w/RVR     Tori Jo  09/17/18 1558    1620 - Dr. Tiajuana Ion called back from [facility], they will accept at [facility] but will be a while d/c dependent     Tori Jo  09/17/18 1640    Room # 426  Report # [phone]  Bharathi Medina RN took message.       Tori Jo  09/17/18 7[facility]    [facility] ETA 1900     Tori Jo  09/17/18 1747    [facility] here for transport to [place] [address]  09/17/18 2631

## 2018-09-17 NOTE — ED PROVIDER NOTES
Emergency Department [facility] ED    Patient: Kim Moreno  MRN: [id number]  : 1927  Date of Evaluation: 2018  ED Provider: Tierra Hill MD    Chief Complaint       Chief Complaint   Patient presents with    Bradycardia     pt c/o being \"lightheaded\" every morning at 9:30. pt c/o heart rate being low and being really unsteady on feet. Pt PCP Dr. Lee Ann Louis sent pt here   Karolina Newsome is a 80 y.o. male who presents to the emergency department For evaluation of dizziness and lightheadedness. Patient reports being usual state of health until beginning part of August when symptoms started. Patient has been found to be bradycardic. No clear-cut etiology for his bradycardia has been identified. States that he's been seen and evaluated and admitted to the hospital for the same. Multiple changes of his cardiac medications. He states he is not on any beta blockers or calcium channel blockers at this time. No recent changes of diet or medications. States he's also been seen and evaluated by neurology with no clear-cut of patient's symptoms offered either. The patient's symptoms recurred again today he did get in contact with his primary care physician advised to come to the emergency Department for readmission. ROS:     At least 10. systems reviewed and otherwise acutely negative except as in the [address]     Past History     Past Medical History:   Diagnosis Date    Arthralgia     Cancer [facility])     prostate    Chronic back pain     x40 years    Chronic diastolic (congestive) heart failure (HCC)     Diverticulitis     Dizziness     Fatigue     Fractures     Hearing decreased     Hyperlipidemia     Hypertension     Osteoarthritis of knee     Osteoarthritis of shoulder     TIA (transient ischemic attack) 2006    Vertigo     Wears glasses      Past Surgical History:   Procedure Laterality Date    CATARACT REMOVAL      bilaterally    COLONOSCOPY      ESOPHAGEAL DILATATION      X 2    INGUINAL HERNIA REPAIR      right    JOINT REPLACEMENT      KNEE SURGERY Bilateral     PROSTATECTOMY  1998    carcinoma    SHOULDER ARTHROPLASTY      SHOULDER SURGERY      TOE SURGERY Right 06/03/2013    PERMANENT RIGHT GREAT TOENAIL ABLATION AND REMOVAL    TOTAL KNEE ARTHROPLASTY      UPPER GASTROINTESTINAL ENDOSCOPY  jan 2011     Social History     Social History    Marital status:      Spouse name: N/A    Number of children: N/A    Years of education: N/A     Social History Main Topics    Smoking status: Former Smoker     Types: Cigarettes     Quit date: 5/28/1949    Smokeless tobacco: Never Used    Alcohol use No    Drug use: No    Sexual activity: Not Currently     Other Topics Concern    None     Social History Narrative    None       Medications/Allergies     Previous Medications    AMLODIPINE (NORVASC) 5 MG TABLET    Take 0.5 tablets by mouth nightly    ASPIRIN 81 MG CHEWABLE TABLET    Take 2 tablets by mouth daily    CALCIUM PO    Take 1,000 mg by mouth daily. CLOPIDOGREL (PLAVIX) 75 MG TABLET    Take 75 mg by mouth daily    ESCITALOPRAM (LEXAPRO) 10 MG TABLET    Take 10 mg by mouth daily    FAMOTIDINE (PEPCID) 40 MG TABLET    Take 40 mg by mouth daily    GABAPENTIN (NEURONTIN) 100 MG CAPSULE    Take 1 capsule by mouth 2 times daily for 30 days. Chanelle Quiet LOPERAMIDE (IMODIUM) 2 MG CAPSULE    Take 2 mg by mouth 4 times daily as needed for Diarrhea    MIRTAZAPINE (REMERON) 15 MG TABLET    Take 15 mg by mouth nightly    MULTIPLE VITAMINS-MINERALS (MULTIVITAMIN PO)    Take 1 tablet by mouth daily     PREDNISONE (DELTASONE) 5 MG TABLET    Take 5 mg by mouth daily. SIMVASTATIN (ZOCOR) 40 MG TABLET    Take 40 mg by mouth daily. VITAMIN B-12 (CYANOCOBALAMIN) 100 MCG TABLET    Take 50 mcg by mouth daily    VITAMIN D (CHOLECALCIFEROL) 1000 UNIT TABS TABLET    Take 1,000 Units by mouth daily.     VITAMINS-LIPOTROPICS (LIPOFLAVONOID) TABS Take 1 tablet by mouth Daily with lunch Indications: vertigo     Allergies   Allergen Reactions    Altace [Ramipril]      (cough) Pt denies ever taking Ramipril    Sulfa Antibiotics         Physical Exam       ED Triage Vitals [09/17/18 1246]   BP Temp Temp Source Pulse Resp SpO2 Height Weight   (!) 154/65 96.9 °F (36.1 °C) Oral (!) 46 18 98 % 6' (1.829 m) 183 lb (83 kg)     General: Well-appearing well-nourished male in no acute distress  HEENT:  PERRLA, oropharynx moist mucous membranes no erythema edema or tonsillar exudate no trismus uvula is midline neck is supple   Chest: Regular rate and rhythm no murmurs rubs or gallops  Lungs: clear to auscultation bilaterally no wheezes rales rhonchi or stridor no accessory muscle usage no pursed lip breathing   Abdomen: Soft nontender nondistended no rebound guarding or peritoneal signs no tenderness at McBurney's point negative Mora sign  Back: No cervical thoracic or lumbosacral bony tenderness no CVA tenderness no posterior rib tenderness or crepitus noted  Extremities: No obvious deformities or dislocations.   No tenderness behind either one of his calves  Psychiatric: Alert and oriented ×3  Neurologic: Cranial nerves II through XII are grossly intact no apparent motor or sensory deficits  Integuments: No rashes erythema contusions or petechiae    Diagnostics   Labs:  Results for orders placed or performed during the hospital encounter of 09/17/18   CBC auto differential   Result Value Ref Range    WBC 11.6 (H) 4.0 - 11.0 K/uL    RBC 4.65 4.20 - 5.90 M/uL    Hemoglobin 14.5 13.5 - 17.5 g/dL    Hematocrit 43.4 40.5 - 52.5 %    MCV 93.3 80.0 - 100.0 fL    MCH 31.3 26.0 - 34.0 pg    MCHC 33.5 31.0 - 36.0 g/dL    RDW 14.1 12.4 - 15.4 %    Platelets 158 880 - 019 K/uL    MPV 9.9 5.0 - 10.5 fL    Neutrophils % 86.3 %    Lymphocytes % 7.4 %    Monocytes % 5.1 %    Eosinophils % 0.6 %    Basophils % 0.6 %    Neutrophils # 10.0 (H) 1.7 - 7.7 K/uL    Lymphocytes # 0.9 (L) 1.0 - 5.1 K/uL    Monocytes # 0.6 0.0 - 1.3 K/uL    Eosinophils # 0.1 0.0 - 0.6 K/uL    Basophils # 0.1 0.0 - 0.2 K/uL   Comprehensive Metabolic Panel w/ Reflex to MG   Result Value Ref Range    Sodium 138 136 - 145 mmol/L    Potassium reflex Magnesium 4.3 3.5 - 5.1 mmol/L    Chloride 101 99 - 110 mmol/L    CO2 25 21 - 32 mmol/L    Anion Gap 12 3 - 16    Glucose 111 (H) 70 - 99 mg/dL    BUN 23 (H) 7 - 20 mg/dL    CREATININE 1.2 0.8 - 1.3 mg/dL    GFR Non- 57 (A) >60    GFR African American >60 >60    Calcium 9.2 8.3 - 10.6 mg/dL    Total Protein 6.8 6.4 - 8.2 g/dL    Alb 4.0 3.4 - 5.0 g/dL    Albumin/Globulin Ratio 1.4 1.1 - 2.2    Total Bilirubin 0.3 0.0 - 1.0 mg/dL    Alkaline Phosphatase 79 40 - 129 U/L    ALT 20 10 - 40 U/L    AST 20 15 - 37 U/L    Globulin 2.8 g/dL   APTT   Result Value Ref Range    aPTT 32.5 26.0 - 36.0 sec   Troponin   Result Value Ref Range    Troponin <0.01 <0.01 ng/mL   Lipase   Result Value Ref Range    Lipase 33.0 13.0 - 60.0 U/L   Brain natriuretic peptide   Result Value Ref Range    Pro-BNP 2,752 (H) 0 - 449 pg/mL   Protime-INR   Result Value Ref Range    Protime 13.2 (H) 9.8 - 13.0 sec    INR 1.16 (H) 0.86 - 1.14   Lactic acid, plasma   Result Value Ref Range    Lactic Acid 2.3 (H) 0.4 - 2.0 mmol/L   Magnesium   Result Value Ref Range    Magnesium 2.30 1.80 - 2.40 mg/dL   EKG 12 lead   Result Value Ref Range    Ventricular Rate 55 BPM    Atrial Rate 55 BPM    P-R Interval 202 ms    QRS Duration 148 ms    Q-T Interval 466 ms    QTc Calculation (Bazett) 445 ms    P Axis 44 degrees    R Axis -75 degrees    T Axis 31 degrees    Diagnosis       Sinus bradycardia with sinus arrhythmiaPossible Left atrial enlargementRight bundle branch blockLeft anterior fascicular block  Bifascicular block Left ventricular hypertrophyAbnormal ECGNo previous ECGs available     Radiographs:  Xr Chest Portable    Result Date: 9/17/2018  EXAMINATION: SINGLE XRAY VIEW OF THE CHEST 9/17/2018 1:16 pm COMPARISON: 09/04/2018 HISTORY: ORDERING SYSTEM PROVIDED HISTORY: shortness of breath TECHNOLOGIST PROVIDED HISTORY: Reason for exam:->shortness of breath Ordering Physician Provided Reason for Exam: shortness of breath,Bradycardia (pt c/o being \"lightheaded\" every morning at 9:30. pt c/o heart rate being low and being really unsteady on feet. Pt PCP Dr. Rajni Caldera sent pt here) Acuity: Acute Type of Exam: Initial FINDINGS: The lungs are clear. Calcified granulomatous disease is noted. Cardiomegaly is stable. There is no pneumothorax or pleural effusion. Status post left shoulder arthroplasty. 1.  No acute abnormality. Procedures/EKG:   Patient's EKG as interpreted by me. Cardiac rhythm rate 55 QTc 445  no ST elevation no ST depression patient does have a bifascicular block I appreciate no acute ischemia or infarction CKG no significant changes compared patient's previous EKG September 2018 other than rate    ED Course and MDM   In brief, Yanet Trinidad is a 80 y.o. male who presented to the emergency department For evaluation of bradycardia lightheadedness and near syncope. Based on patient's history and physical would be concerned about electrolyte abnormality. Other possibilities do include adrenal insufficiency, dysrhythmia, infectious etiology among others. Patient is currently asymptomatic while lying down. I review patient's imaging studies and laboratory work as noted above. Patient is resting comfortably. He is asymptomatic while lying here in emergency department. I discussed the case with hospital service as well as Dr. Wilma Carter patient's cardiologist.  Her condition was patient goes to [facility] for electrophysiology evaluation. I did discuss case with Dr. Agusto Gardiner, at [facility].  He did agree to accept the patient for further evaluation and treatment. ED Medication Orders     None          Final Impression      1. Lightheadedness    2.  Bradycardia

## 2018-09-18 LAB
ANION GAP SERPL CALCULATED.3IONS-SCNC: 9 MMOL/L (ref 3–16)
BASOPHILS ABSOLUTE: 0.1 K/UL (ref 0–0.2)
BASOPHILS RELATIVE PERCENT: 0.8 %
BUN BLDV-MCNC: 22 MG/DL (ref 7–20)
CALCIUM SERPL-MCNC: 8.8 MG/DL (ref 8.3–10.6)
CHLORIDE BLD-SCNC: 105 MMOL/L (ref 99–110)
CO2: 27 MMOL/L (ref 21–32)
CREAT SERPL-MCNC: 1.2 MG/DL (ref 0.8–1.3)
EKG ATRIAL RATE: 55 BPM
EKG DIAGNOSIS: NORMAL
EKG P AXIS: 44 DEGREES
EKG P-R INTERVAL: 202 MS
EKG Q-T INTERVAL: 466 MS
EKG QRS DURATION: 148 MS
EKG QTC CALCULATION (BAZETT): 445 MS
EKG R AXIS: -75 DEGREES
EKG T AXIS: 31 DEGREES
EKG VENTRICULAR RATE: 55 BPM
EOSINOPHILS ABSOLUTE: 0.2 K/UL (ref 0–0.6)
EOSINOPHILS RELATIVE PERCENT: 2.1 %
GFR AFRICAN AMERICAN: >60
GFR NON-AFRICAN AMERICAN: 57
GLUCOSE BLD-MCNC: 86 MG/DL (ref 70–99)
HCT VFR BLD CALC: 41.4 % (ref 40.5–52.5)
HEMOGLOBIN: 14 G/DL (ref 13.5–17.5)
LYMPHOCYTES ABSOLUTE: 1.6 K/UL (ref 1–5.1)
LYMPHOCYTES RELATIVE PERCENT: 17.8 %
MCH RBC QN AUTO: 31.1 PG (ref 26–34)
MCHC RBC AUTO-ENTMCNC: 33.8 G/DL (ref 31–36)
MCV RBC AUTO: 92.1 FL (ref 80–100)
MONOCYTES ABSOLUTE: 0.8 K/UL (ref 0–1.3)
MONOCYTES RELATIVE PERCENT: 9.1 %
NEUTROPHILS ABSOLUTE: 6.3 K/UL (ref 1.7–7.7)
NEUTROPHILS RELATIVE PERCENT: 70.2 %
PDW BLD-RTO: 14 % (ref 12.4–15.4)
PLATELET # BLD: 185 K/UL (ref 135–450)
PMV BLD AUTO: 9.8 FL (ref 5–10.5)
POTASSIUM REFLEX MAGNESIUM: 4.4 MMOL/L (ref 3.5–5.1)
RBC # BLD: 4.49 M/UL (ref 4.2–5.9)
SODIUM BLD-SCNC: 141 MMOL/L (ref 136–145)
WBC # BLD: 9 K/UL (ref 4–11)

## 2018-09-18 PROCEDURE — 2060000000 HC ICU INTERMEDIATE R&B

## 2018-09-18 PROCEDURE — 36415 COLL VENOUS BLD VENIPUNCTURE: CPT

## 2018-09-18 PROCEDURE — 85025 COMPLETE CBC W/AUTO DIFF WBC: CPT

## 2018-09-18 PROCEDURE — 80048 BASIC METABOLIC PNL TOTAL CA: CPT

## 2018-09-18 PROCEDURE — 6370000000 HC RX 637 (ALT 250 FOR IP): Performed by: INTERNAL MEDICINE

## 2018-09-18 PROCEDURE — 99222 1ST HOSP IP/OBS MODERATE 55: CPT | Performed by: INTERNAL MEDICINE

## 2018-09-18 PROCEDURE — 2580000003 HC RX 258: Performed by: NURSE PRACTITIONER

## 2018-09-18 PROCEDURE — 6370000000 HC RX 637 (ALT 250 FOR IP): Performed by: NURSE PRACTITIONER

## 2018-09-18 RX ADMIN — VITAMIN D, TAB 1000IU (100/BT) 1000 UNITS: 25 TAB at 09:30

## 2018-09-18 RX ADMIN — CLOPIDOGREL BISULFATE 75 MG: 75 TABLET ORAL at 09:29

## 2018-09-18 RX ADMIN — ESCITALOPRAM OXALATE 10 MG: 10 TABLET ORAL at 09:30

## 2018-09-18 RX ADMIN — Medication 10 ML: at 09:31

## 2018-09-18 RX ADMIN — GABAPENTIN 100 MG: 100 CAPSULE ORAL at 09:30

## 2018-09-18 RX ADMIN — Medication 400 MG: at 16:54

## 2018-09-18 RX ADMIN — AMLODIPINE BESYLATE 2.5 MG: 2.5 TABLET ORAL at 20:55

## 2018-09-18 RX ADMIN — SODIUM CHLORIDE, PRESERVATIVE FREE 10 ML: 5 INJECTION INTRAVENOUS at 02:18

## 2018-09-18 RX ADMIN — METOPROLOL TARTRATE 12.5 MG: 25 TABLET ORAL at 20:55

## 2018-09-18 RX ADMIN — MIRTAZAPINE 15 MG: 15 TABLET, FILM COATED ORAL at 20:55

## 2018-09-18 RX ADMIN — FAMOTIDINE 40 MG: 20 TABLET ORAL at 09:30

## 2018-09-18 RX ADMIN — Medication 10 ML: at 20:55

## 2018-09-18 RX ADMIN — SODIUM CHLORIDE: 9 INJECTION, SOLUTION INTRAVENOUS at 02:18

## 2018-09-18 RX ADMIN — PREDNISONE 5 MG: 5 TABLET ORAL at 09:29

## 2018-09-18 RX ADMIN — SIMVASTATIN 40 MG: 40 TABLET, FILM COATED ORAL at 09:29

## 2018-09-18 ASSESSMENT — PAIN SCALES - GENERAL: PAINLEVEL_OUTOF10: 0

## 2018-09-18 NOTE — PROGRESS NOTES
lesions. Neurologic:  Neurovascularly intact without any focal sensory/motor deficits.  Cranial nerves: II-XII intact, grossly non-focal.  Psychiatric: Alert and oriented, thought content appropriate, normal insight  Capillary Refill: Brisk,< 3 seconds   Peripheral Pulses: +2 palpable, equal bilaterally       Labs:   Recent Labs      09/17/18   1258  09/18/18   0442   WBC  11.6*  9.0   HGB  14.5  14.0   HCT  43.4  41.4   PLT  195  185     Recent Labs      09/17/18   1258  09/18/18   0442   NA  138  141   K  4.3  4.4   CL  101  105   CO2  25  27   BUN  23*  22*   CREATININE  1.2  1.2   CALCIUM  9.2  8.8     Recent Labs      09/17/18   1258   AST  20   ALT  20   BILITOT  0.3   ALKPHOS  79     Recent Labs      09/17/18   1258   INR  1.16*     Recent Labs      09/17/18   1258   TROPONINI  <0.01       Urinalysis:    Lab Results   Component Value Date    NITRU Negative 09/17/2018    WBCUA 0-2 09/04/2018    RBCUA 0-2 09/04/2018    BLOODU Negative 09/17/2018    SPECGRAV 1.020 09/17/2018    GLUCOSEU Negative 09/17/2018       Radiology:  No orders to display           Assessment/Plan:    Active Hospital Problems    Diagnosis Date Noted    Sinus bradycardia [R00.1] 09/17/2018    Chronic diastolic congestive heart failure (HCC) [I50.32] 08/19/2017    HTN (hypertension), benign [I10]     Hyperlipidemia [E78.5]     CAD in native artery [I25.10]     GERD (gastroesophageal reflux disease) [K21.9] 08/18/2016    TIA (transient ischemic attack) [G45.9] 08/18/2016    Dizziness [R42]      Dizziness/lightheadedness in setting of sinus bradycardia  -MIVFs x 1L ordered  -orthostatics ordered  -ECHO from 8/11/2018 revealed: mildly reduced LV systolic function with EF of 46%; inferior basal akinesia, moderate concentric LV hypertrophy, grade I diastolic dysfunction, mild aortic regurgitation  -BL carotid duplex from 8/18/2018 revealed: moderate plaque on BL carotid arteries with <50% diameter reduction, patent vertebral arteries  -cardiology consult - appreciate recs in advance, input is pending.  -tele monitoring  -up with assistance     Lactic acidosis, 2.3 on admission at [facility]  -repeat 1.3     Essential HTN, uncontrolled  -continue amlodipine     HLD  -continue zocor     GERD  -continue pepcid     CHF, stable  -does not appear to be in acute exacerbation  -BNP 2752 on admission  -ECHO from 8/11/2018 revealed: mildly reduced LV systolic function with EF of 46%; inferior basal akinesia, moderate concentric LV hypertrophy, grade I diastolic dysfunction, mild aortic regurgitation  -daily weights  -strict I&O     Leukocytosis, 11.6 on admission  -unknown etiology  -Ua negative    DVT Prophylaxis: Lovenox  Diet: DIET CARDIAC;  Code Status: Full Code    PT/OT Eval Status: Not indicated    Dispo - in 2-3 days    Angella Cummings MD

## 2018-09-18 NOTE — CONSULTS
Patient Name: Garo Gutierrez  Date of admission: 9/17/2018  7:51 PM  Patient's age: 80 y.o., 5/23/1927  Admission Dx: Sinus bradycardia [R00.1]    Reason for Consult:  Chronic dizziness   Requesting Physician: Amos Rm MD  Primary Care physician: Martin Vega MD      History Obtained From:  patient    HISTORY OF PRESENT ILLNESS:      The patient is a 80 y.o.  male who is admitted to the hospital for dizziness. He was recently evaluated for similar complaint. Patient has dizziness from 9 am to about 5 PM. He feels better lying in bed and becomes dizzy when he stands up. He had neurological work up 2 weeks ago and has had holter for bradycardia evaluation. Patient had dizziness in the past and was diagnosed with vertigo. Patient very concerned about life style getting effected with dizziness. He denies passing out. No chest pain no shortness of breath. His symptoms started > 2 years ago however according to him symptoms are getting worse over last 2 weeks. Patient has been admitted for dizziness Please see admission H&P for further details. His orthostatics were negative in ER and on the floor. The patient is not able to give detailed information about the events of hospitalization due to their underlying medical illness. The majority of the information is taken from the chart, medical staff, and available family. Past Medical History:   has a past medical history of Arthralgia; Cancer ([address] Chronic back pain; Chronic diastolic (congestive) heart failure ([address] Diverticulitis; Dizziness; Fatigue; Fractures; Hearing decreased; Hyperlipidemia; Hypertension; Osteoarthritis of knee; Osteoarthritis of shoulder; TIA (transient ischemic attack); Vertigo; and Wears glasses. Past Surgical History:   has a past surgical history that includes Inguinal hernia repair; Cataract removal (1997); Prostatectomy (1998); Colonoscopy;  Upper gastrointestinal endoscopy (jan 2011); Esophagus dilation; knee surgery (Bilateral); Total shoulder arthroplasty; shoulder surgery; Toe Surgery (Right, 06/03/2013); joint replacement; and Total knee arthroplasty. Home Medications:    Prior to Admission medications    Medication Sig Start Date End Date Taking? Authorizing Provider   amLODIPine (NORVASC) 5 MG tablet Take 0.5 tablets by mouth nightly 9/13/18  Yes Jame Plata MD   gabapentin (NEURONTIN) 100 MG capsule Take 1 capsule by mouth 2 times daily for 30 days. . 9/6/18 10/6/18 Yes Regulo Rodriguez MD   famotidine (PEPCID) 40 MG tablet Take 40 mg by mouth daily   Yes Historical Provider, MD   loperamide (IMODIUM) 2 MG capsule Take 2 mg by mouth 4 times daily as needed for Diarrhea   Yes Historical Provider, MD   mirtazapine (REMERON) 15 MG tablet Take 15 mg by mouth nightly   Yes Historical Provider, MD   Vitamins-Lipotropics (LIPOFLAVONOID) TABS Take 1 tablet by mouth Daily with lunch Indications: vertigo   Yes Historical Provider, MD   clopidogrel (PLAVIX) 75 MG tablet Take 75 mg by mouth daily   Yes Historical Provider, MD   escitalopram (LEXAPRO) 10 MG tablet Take 10 mg by mouth daily   Yes Historical Provider, MD   vitamin B-12 (CYANOCOBALAMIN) 100 MCG tablet Take 50 mcg by mouth daily   Yes Historical Provider, MD   predniSONE (DELTASONE) 5 MG tablet Take 5 mg by mouth daily. Yes Historical Provider, MD   simvastatin (ZOCOR) 40 MG tablet Take 40 mg by mouth daily. Yes Historical Provider, MD   CALCIUM PO Take 1,000 mg by mouth daily. Yes Historical Provider, MD   vitamin D (CHOLECALCIFEROL) 1000 UNIT TABS tablet Take 1,000 Units by mouth daily.    Yes Historical Provider, MD   Multiple Vitamins-Minerals (MULTIVITAMIN PO) Take 1 tablet by mouth daily    Yes Historical Provider, MD   aspirin 81 MG chewable tablet Take 2 tablets by mouth daily 9/14/18   Jame Plata MD       Allergies:  Altace [ramipril] and Sulfa antibiotics    Social History:   reports that he quit 0442   WBC  11.6*  9.0   HGB  14.5  14.0   HCT  43.4  41.4   PLT  195  185     BMP: Recent Labs      09/17/18   1258  09/18/18   0442   NA  138  141   K  4.3  4.4   CO2  25  27   BUN  23*  22*   CREATININE  1.2  1.2   LABGLOM  57*  57*   GLUCOSE  111*  86     BNP: No results for input(s): BNP in the last 72 hours. PT/INR:   Recent Labs      09/17/18   1258   PROTIME  13.2*   INR  1.16*     APTT:  Recent Labs      09/17/18   1258   APTT  32.5     CARDIAC ENZYMES:  Recent Labs      09/17/18   1258   TROPONINI  <0.01     FASTING LIPID PANEL:  Lab Results   Component Value Date    HDL 53 06/27/2017    LDLCALC 84 06/27/2017    TRIG 147 09/29/2016     LIVER PROFILE:  Recent Labs      09/17/18   1258   AST  20   ALT  20       IMPRESSION:    Active Problems:    Dizziness    TIA (transient ischemic attack)    GERD (gastroesophageal reflux disease)    HTN (hypertension), benign    CAD in native artery    Hyperlipidemia    Chronic diastolic congestive heart failure (HCC)    Sinus bradycardia  Resolved Problems:    * No resolved hospital problems. *      RECOMMENDATIONS:  1. Dizziness   -patient dizzy from ~9 am to about 5:30 pm   -very atypical symptoms   -discussed various causes of dizziness including vertigo(previous history)   -neurology evaluated on 9/3/18 and thought dizziness multifactorial    2. Bradycardia   -patient has high PVC burden and was in bigeminal rhythm   -will try to suppress PVC's and reevaluate sinus rate   -discussed symptoms might be unrelated to bradycardia    -start Metoprolol 12.5 mg bid and Mg Oxide 400 mg daily   3. Chronic systolic congestive heart failure    -stable    4. Essential hypertension   -patient had hypertensive response to standing up [id number]   -?reason for dizziness   -Metoprolol 12.5 bid      Thank you for asking me to assist in the care of this patient. Please contact me if you have any questions regarding her evaluation.     All questions and concerns were addressed to the

## 2018-09-18 NOTE — H&P
Hospital Medicine History & Physical      PCP: Miriam Costello MD    Date of Admission: 9/17/2018    Date of Service: Pt seen/examined on 9/17/2018 and Admitted to Inpatient with expected LOS greater than two midnights due to medical therapy. Chief Complaint:  Dizziness, lightheadedness    History Of Present Illness:     80 y.o. male, with PMH of HTN, HLD, CAD, TIA, CHF, who was a direct admit to [facility] with symptomatic bradycarida, symptoms including: dizziness and lightheadedness. History obtained from the patient and review of the EMR. The patient states that for the past 3 weeks, he has been intermittently severely lightheaded for several hours each day. He states that when he wakes up in the morning, he feels great until about 0930 each day when he begins to feel extremely lightheaded with slight nausea. He states that this is only relieved by lying down for most of the day (until about 1700 each day), after which he feels back to normal again. The patient states that his heart rate has also been fluctuating for the past few weeks as well, dropping to 31 earlier today. His cardiologist is Dr. Jazzy Gongora. At the time of this exam, the patient denies having any symptoms. The patient denied any other associated symptoms as well as any aggravating and/or alleviating factors. At the time of this assessment, the patient was resting comfortably in bed. He currently denies any chest pain, back pain, abdominal pain, shortness of breath, numbness, tingling, N/V/C/D, fever and/or chills.      Past Medical History:          Diagnosis Date    Arthralgia     Cancer [facility])     prostate    Chronic back pain     x40 years    Chronic diastolic (congestive) heart failure (HCC)     Diverticulitis     Dizziness     Fatigue     Fractures     Hearing decreased     Hyperlipidemia     Hypertension     Osteoarthritis of knee     Osteoarthritis of shoulder     TIA (transient ischemic attack) tablet Take 1,000 Units by mouth daily. Yes Historical Provider, MD   Multiple Vitamins-Minerals (MULTIVITAMIN PO) Take 1 tablet by mouth daily    Yes Historical Provider, MD   aspirin 81 MG chewable tablet Take 2 tablets by mouth daily 9/14/18   Prashanth Combs MD     Allergies:  Altace [ramipril] and Sulfa antibiotics    Social History:      The patient currently lives at home. TOBACCO:   reports that he quit smoking about 69 years ago. His smoking use included Cigarettes. He has never used smokeless tobacco.  ETOH:   reports that he does not drink alcohol. Family History:      Reviewed in detail. Positive as follows:    Family History   Problem Relation Age of Onset    Heart Disease Mother         MI    Parkinsonism Father      REVIEW OF SYSTEMS:   Pertinent positives as noted in the HPI. All other systems reviewed and negative. PHYSICAL EXAM PERFORMED:    BP (!) 167/82   Pulse 55   Temp 97.5 °F (36.4 °C) (Oral)   Resp 16   Ht 6' (1.829 m)   Wt 184 lb (83.5 kg)   SpO2 97%   BMI 24.95 kg/m²     General appearance:  Pleasant, elderly male in no apparent distress, appears stated age and cooperative. HEENT:  Pupils equal, round, and reactive to light. Extra ocular muscles intact. Conjunctivae/corneas clear. Neck: Supple, with full range of motion. No jugular venous distention. Trachea midline. Respiratory:  Normal respiratory effort. Clear to auscultation, bilaterally without Rales/Wheezes/Rhonchi. Cardiovascular:  regular rate and rhythm, sinus bradycardia with normal S1/S2 without murmurs, rubs or gallops. Abdomen: Soft, non-tender, non-distended with normal bowel sounds. Musculoskeletal:  No clubbing, cyanosis or edema bilaterally. Full range of motion without deformity. Skin: Skin color, texture, turgor normal.  No significant rashes or lesions. Neurologic:  Neurovascularly intact.  Cranial nerves: II-XII intact, grossly non-focal.  Psychiatric:  Alert and oriented, thought content plaque on BL carotid arteries with <50% diameter reduction, patent vertebral arteries  -cardiology consult - appreciate recs in advance  -tele monitoring  -up with assistance    Lactic acidosis, 2.3 on admission at [facility]  -repeat 1.3    Essential HTN, uncontrolled  -continue amlodipine    HLD  -continue zocor    GERD  -continue pepcid    CHF, stable  -does not appear to be in acute exacerbation  -BNP 2752 on admission  -ECHO from 8/11/2018 revealed: mildly reduced LV systolic function with EF of 46%; inferior basal akinesia, moderate concentric LV hypertrophy, grade I diastolic dysfunction, mild aortic regurgitation  -daily weights  -strict I&O    Leukocytosis, 11.6 on admission  -unknown etiology  -Ua negative  -cbc in am    DVT Prophylaxis: plavix, lovenox  Diet: DIET CARDIAC;  Code Status: Full Code    PT/OT Eval Status: no need indicated at this time; will consider if need shall arise    Dispo - 2-3 days pending clinical improvement     Monty Paget, APRN - CNP    Thank you Gill Quintanilla MD for the opportunity to be involved in this patient's care.  If you have any questions or concerns please feel free to contact me at [phone].  ----------------------------------------------------Dr. Amie Dotson---------------------------------------------------

## 2018-09-18 NOTE — CARE COORDINATION
Case Management Assessment  Initial Evaluation    Date/Time of Evaluation: 9/18/2018 10:36 AM  Assessment Completed by: Arnaldo Flores    Patient Name: Beth Doyle  YOB: 1927  Diagnosis: Sinus bradycardia [R00.1]  Date / Time: 9/17/2018  7:51 PM  Admission status/Date: Inpatient 9/17/2018  Chart Reviewed: Yes      Patient Interviewed: Yes   Family Interviewed:  No      Hospitalization in the last 30 days:  Yes    Contacts  :   Mattcarlos Villareal  Relationship to Patient:  daughter  Phone Number:   [phone]  Alternate Contact:   Jett Ordoñez  Relationship to Patient:   daughter  Phone Number:   [phone]    Current PCP- Anne Arana MD    Financial  [address]: Children, Home Care Staff  Transportation: self    Meal Preparation: self    Housing  Home Environment: single story house w/ basement  Steps: 2  Plans to Return to Present Housing: Yes  Other Identified Issues: NA    Home Care Information  Currently active with [address] : [facility] (recent new SOC)  Type of Home Care Services: OT, PT  Passport/Waiver : Homemaker/CSS  Passport/Waiver Services: NA    Durable Medical Equipment   DME Provider: NA  Equipment: cane, walker    Has Home O2 in place on admit:  No  Informed of need to bring portable home O2 tank on day of discharge for nursing to connect prior to leaving:   No  Verbalized agreement/Understanding:   No    Community Service Affiliation  Dialysis:  No  Outpatient PT/OT: No    Cancer Center: No     CHF Clinic: No     Pulmonary Rehab: No  Pain Clinic: No  Community Mental Health: No    Wound Clinic: No     Other: NA    DISCHARGE PLAN: Chart reviewed. Met with pt at bedside and explained the role of the CM. Plan: Return home w/ [facility]. (Recent new SOC). Sissy Hams [facility] liaison) notified and will follow. +eCOC, +CM following.

## 2018-09-18 NOTE — PROGRESS NOTES
Patient arrived to floor, transferred from [facility] at 19:45. Patient transported via [address] Patient greeted and oriented to call light, bed control, and TV remote. VSS.

## 2018-09-19 PROCEDURE — 97165 OT EVAL LOW COMPLEX 30 MIN: CPT

## 2018-09-19 PROCEDURE — 97530 THERAPEUTIC ACTIVITIES: CPT

## 2018-09-19 PROCEDURE — 6360000002 HC RX W HCPCS: Performed by: NURSE PRACTITIONER

## 2018-09-19 PROCEDURE — 97116 GAIT TRAINING THERAPY: CPT

## 2018-09-19 PROCEDURE — G8978 MOBILITY CURRENT STATUS: HCPCS

## 2018-09-19 PROCEDURE — 6370000000 HC RX 637 (ALT 250 FOR IP): Performed by: INTERNAL MEDICINE

## 2018-09-19 PROCEDURE — G8987 SELF CARE CURRENT STATUS: HCPCS

## 2018-09-19 PROCEDURE — 6370000000 HC RX 637 (ALT 250 FOR IP): Performed by: NURSE PRACTITIONER

## 2018-09-19 PROCEDURE — 2060000000 HC ICU INTERMEDIATE R&B

## 2018-09-19 PROCEDURE — 97535 SELF CARE MNGMENT TRAINING: CPT

## 2018-09-19 PROCEDURE — 97161 PT EVAL LOW COMPLEX 20 MIN: CPT

## 2018-09-19 PROCEDURE — 2580000003 HC RX 258: Performed by: NURSE PRACTITIONER

## 2018-09-19 PROCEDURE — G8979 MOBILITY GOAL STATUS: HCPCS

## 2018-09-19 PROCEDURE — G8988 SELF CARE GOAL STATUS: HCPCS

## 2018-09-19 PROCEDURE — 99233 SBSQ HOSP IP/OBS HIGH 50: CPT | Performed by: NURSE PRACTITIONER

## 2018-09-19 RX ORDER — AMLODIPINE BESYLATE 2.5 MG/1
5 TABLET ORAL NIGHTLY
Status: DISCONTINUED | OUTPATIENT
Start: 2018-09-19 | End: 2018-09-21 | Stop reason: HOSPADM

## 2018-09-19 RX ADMIN — Medication 10 ML: at 21:02

## 2018-09-19 RX ADMIN — FAMOTIDINE 40 MG: 20 TABLET ORAL at 08:29

## 2018-09-19 RX ADMIN — SIMVASTATIN 40 MG: 40 TABLET, FILM COATED ORAL at 08:30

## 2018-09-19 RX ADMIN — Medication 400 MG: at 08:30

## 2018-09-19 RX ADMIN — GABAPENTIN 100 MG: 100 CAPSULE ORAL at 21:01

## 2018-09-19 RX ADMIN — AMLODIPINE BESYLATE 5 MG: 2.5 TABLET ORAL at 21:00

## 2018-09-19 RX ADMIN — PREDNISONE 5 MG: 5 TABLET ORAL at 08:30

## 2018-09-19 RX ADMIN — VITAMIN D, TAB 1000IU (100/BT) 1000 UNITS: 25 TAB at 08:30

## 2018-09-19 RX ADMIN — METOPROLOL TARTRATE 12.5 MG: 25 TABLET ORAL at 08:29

## 2018-09-19 RX ADMIN — Medication 10 ML: at 08:31

## 2018-09-19 RX ADMIN — CLOPIDOGREL BISULFATE 75 MG: 75 TABLET ORAL at 08:30

## 2018-09-19 RX ADMIN — ENOXAPARIN SODIUM 40 MG: 40 INJECTION SUBCUTANEOUS at 08:30

## 2018-09-19 RX ADMIN — MIRTAZAPINE 15 MG: 15 TABLET, FILM COATED ORAL at 21:01

## 2018-09-19 RX ADMIN — ESCITALOPRAM OXALATE 10 MG: 10 TABLET ORAL at 08:30

## 2018-09-19 ASSESSMENT — PAIN SCALES - GENERAL: PAINLEVEL_OUTOF10: 0

## 2018-09-19 NOTE — PROGRESS NOTES
Physical Therapy    Facility/Department: [facility]  Initial Assessment    NAME: Beth Doyle  : 1927  MRN: [id number]    Date of Service: 2018    Discharge Recommendations:  Home with assist PRN, Home with Home health PT   PT Equipment Recommendations  Equipment Needed: No    Patient Diagnosis(es): There were no encounter diagnoses. has a past medical history of Arthralgia; Cancer ([facility] [address] Chronic back pain; Chronic diastolic (congestive) heart failure ([facility] [address] Diverticulitis; Dizziness; Fatigue; Fractures; Hearing decreased; Hyperlipidemia; Hypertension; Osteoarthritis of knee; Osteoarthritis of shoulder; TIA (transient ischemic attack); Vertigo; and Wears glasses. has a past surgical history that includes Inguinal hernia repair; Cataract removal (); Prostatectomy (); Colonoscopy; Upper gastrointestinal endoscopy (2011); Esophagus dilation; knee surgery (Bilateral); Total shoulder arthroplasty; shoulder surgery; Toe Surgery (Right, 2013); joint replacement; and Total knee arthroplasty.     Restrictions  Restrictions/Precautions  Restrictions/Precautions: General Precautions, Fall Risk  Position Activity Restriction  Other position/activity restrictions: High fall risk per nursing assessment (chair alarm not needed per RNAvinash, due to pt calling out appropriately), up with assistance, telemetry  Vision/Hearing  Vision: Impaired  Vision Exceptions: Wears glasses at all times     Subjective  General  Chart Reviewed: Yes  Patient assessed for rehabilitation services?: Yes  Family / Caregiver Present: No  Referral Date : 18  Follows Commands: Within Functional Limits  Pain Screening  Patient Currently in Pain: Denies  Intervention List: Patient able to continue with treatment    Orientation  Orientation  Overall Orientation Status: Within Normal Limits    Social/Functional History  Social/Functional History  Lives With: Alone  Type of Home: House  Home Layout: One level, Laundry in basement (senior services does laundry for pt)  Home Access: Stairs to enter with rails (2 GILBERTO)  Entrance Stairs - Rails: Left (ascending)  Bathroom Shower/Tub: Tub/Shower unit  Bathroom Toilet: Standard  Bathroom Equipment: Grab bars in [address]: Rolling walker, Cane, Grab bars  Receives Help From: Home health (aide from [address] for [facility], pt was scheduled to get home health RN & home PT but hadn't started receiving those services yet due to hospital readmission)  ADL Assistance: [address]: Needs assistance (pt (I) with cooking & grocery shopping, receives assist with cleaning & laundry)  Ambulation Assistance: Independent (using cane or RW as needed, although not all of the time (pt typically uses cane more often than RW))  Transfer Assistance: Independent  Active : Yes  Occupation: Retired  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)  Leisure & Hobbies: Spending time with 3 brothers  Objective  Observation/Palpation  Posture: Good    RLE AROM: WFL  LLE AROM : WFL  Strength RLE: Grossly 4-/5 to 4/5 throughout  Strength LLE: Grossly 4-/5 to 4/5 throughout    Bed mobility  Supine to Sit: Modified independent (HOB elevated ~45 degrees)  Scooting: Independent (to scoot forward to EOB)    Transfers  Sit to Stand: Standby assistance  Stand to Sit: Standby assistance  Bed to Chair: Standby assistance (using RW)    Ambulation 1  Surface: level tile  Device: No Device  Assistance: Contact guard assistance  Quality of Gait: Pt amb with slower-than-usual denise with decreased step length and foot clearance bilat.   Mildly unsteady, reaching outside NOÉ occasionally for added support from nearby furniture, walls, etc.  Distance: x 25 feet    Ambulation 2  Surface - 2: level tile  Device 2: Rolling Walker  Assistance 2: Contact guard assistance;Stand by assistance (CGA x 1 decreasing to SBA with repetition)  Quality of Gait 2: Pt amb with slow but steady denise, demonstrating increased step length and foot clearance when using RW compared to amb without AD. Distance: x 150 feet    Balance  Posture: Good  Sitting - Static: Good  Sitting - Dynamic: Good  Standing - Static: Good;-  Standing - Dynamic: Good;-    Assessment   Body structures, Functions, Activity limitations: Decreased functional mobility ; Decreased endurance;Decreased balance;Decreased strength  Assessment: Pt referred for PT evaluation during current hospital stay with dx of dizziness secondary to sinus bradycardia. Pt currently functioning just slightly below his baseline, requiring CGA/close SBA x 1 and use of RW for safe transfers/amb. Plan to continue to see pt in acute setting for general strengthening, endurance training, and gait training prior to returning home. Recommend home PT at D/C. Treatment Diagnosis: Decreased (I) with mobility  Specific instructions for Next Treatment: Progress ther ex and mobility as tolerated  Prognosis: Good  Decision Making: Low Complexity  Patient Education: Role of PT, benefits of mobility, safety in transfers/amb with RW, monitoring of vitals, D/C recs - pt verbalizes understanding  REQUIRES PT FOLLOW UP: Yes  Activity Tolerance: Patient Tolerated treatment well  Activity Tolerance: Vitals seated EOB: HR = 40 bpm, O2 sat = 93% on room air, BP = 129/74. Pt without c/o dizziness or lightheadedness with activity. After amb ~150 feet in hallway on room air, HR = 57 bpm, O2 sat = 95%, BP = 150/70.        Plan   Times per week: 3-5x/week in acute care  Times per day: Daily  Specific instructions for Next Treatment: Progress ther ex and mobility as tolerated  Current Treatment Recommendations: Strengthening, Balance Training, Endurance Training, Functional Mobility Training, Transfer Training, Gait Training, Safety Education & Training, Equipment Evaluation, Education, & procurement, Stair training  Safety Devices: All fall risk precautions in place, Left in chair, Call light within reach, Nurse notified, Gait belt, Patient at risk for falls (chair alarm not needed per RN, Josef Cao)    G-Code  PT G-Codes  Functional Assessment Tool Used: -[facility] Mobility Inpatient  Score: 21  Functional Limitation: Mobility: Walking and moving around  Mobility: Walking and Moving Around Current Status (): At least 20 percent but less than 40 percent impaired, limited or restricted  Mobility: Walking and Moving Around Goal Status (): At least 1 percent but less than 20 percent impaired, limited or restricted    AM-PAC Score  AM-PAC Inpatient Mobility Raw Score : 21  AM-PAC Inpatient T-Scale Score : 50.25  Mobility Inpatient CMS 0-100% Score: 28.97  Mobility Inpatient CMS G-Code Modifier : CJ    Goals  Short term goals  Time Frame for Short term goals: 1 week (unless otherwise specified)  Short term goal 1: Pt will transfer supine <-> sit with (I)  Short term goal 2: Pt will transfer sit <-> stand and bed>chair using least AD (cane vs. RW) with modified(I)  Short term goal 3: Pt will ambulate x 200 feet using least AD (cane vs. RW) with supervision/modified(I)  Short term goal 4: Pt will ambulate up/down 2 steps using 1 handrail (L ascending) with SBA/supervision  Short term goal 5: By 9/20/18: Pt will tolerate 12-15 reps BLE exercise for strengthening, endurance, and balance  Patient Goals   Patient goals :  \"To go home\"       Therapy Time   Individual Concurrent Group Co-treatment   Time In 1052         Time Out 1125         Minutes 33         Timed Code Treatment Minutes: [address] [place], [place] #[id number]

## 2018-09-19 NOTE — PROGRESS NOTES
Vitals:    09/19/18 0800   BP: (!) 168/72   Pulse: (!) 49   Resp: 16   Temp: 97.538 °F (36.4 °C)   SpO2: 96%     Patient resting quietly in bed, alert and oriented X4. Patient denies pain or discomfort at this time. Bed locked in lowest position, call light within reach, bedside table within reach. Will continue to monitor.

## 2018-09-19 NOTE — PROGRESS NOTES
dysfunction with normal filling pressure.   The aortic valve sclerosis but opens well.   Mild aortic regurgitation.   The aortic root is enlarged in size at 4.5cm. Lab Review     Renal Profile: Lab Results   Component Value Date    CREATININE 1.2 09/18/2018    BUN 22 09/18/2018     09/18/2018    K 4.4 09/18/2018     09/18/2018    CO2 27 09/18/2018     CBC:  Lab Results   Component Value Date    WBC 9.0 09/18/2018    RBC 4.49 09/18/2018    HGB 14.0 09/18/2018    HCT 41.4 09/18/2018    MCV 92.1 09/18/2018    RDW 14.0 09/18/2018     09/18/2018     BNP:  No results found for: BNP  Fasting Lipid Panel:  Lab Results   Component Value Date    CHOL 171 09/29/2016    HDL 53 06/27/2017    TRIG 147 09/29/2016     Cardiac Enzymes:  CK/MbTroponin  Lab Results   Component Value Date    CKTOTAL 59 09/04/2018    TROPONINI <0.01 09/17/2018     PT/ INR   Lab Results   Component Value Date    INR 1.16 09/17/2018    INR 1.10 09/04/2018    INR 1.16 08/17/2016    PROTIME 13.2 09/17/2018    PROTIME 12.5 09/04/2018    PROTIME 13.3 08/17/2016     PTT No results found for: PTT Lab Results   Component Value Date    MG 2.30 09/17/2018    Lab Results   Component Value Date    TSH 1.10 09/17/2018       Assessment:  1. Dizziness, acute on chronic: likely multifactorial but Remeron and gabapentin are likely contributing   -unable to have MRI due to bladder stimulator  2. Bradycardia: tolerating metoprolol  3. High PVC burden: 26% noted on Holter in 8/2018   -improved with metoprolol  4. 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 HTN: poorly controlled  6. History of TIA  7. CKD    Plan:   1. Increase amlodipine to 5mg po QD  2. Continue metoprolol 12.5mg po QD and magnesium oxide for PVCs  3. Recheck orthostatics today   4.  Continue telemetry monitoring    Joselyn Soliz [address]  [phone]

## 2018-09-19 NOTE — PROGRESS NOTES
Occupational Therapy   Occupational Therapy Initial Assessment and Treatment  Date: 2018   Patient Name: Courtney Delacruz  MRN: [id number]     : 1927    Date of Service: 2018    Discharge Recommendations:  Home with assist PRN, Home with Home health OT  OT Equipment Recommendations  Equipment Needed: No    Patient Diagnosis(es): There were no encounter diagnoses. has a past medical history of Arthralgia; Cancer ([facility] [address] Chronic back pain; Chronic diastolic (congestive) heart failure ([facility] [address] Diverticulitis; Dizziness; Fatigue; Fractures; Hearing decreased; Hyperlipidemia; Hypertension; Osteoarthritis of knee; Osteoarthritis of shoulder; TIA (transient ischemic attack); Vertigo; and Wears glasses. has a past surgical history that includes Inguinal hernia repair; Cataract removal (); Prostatectomy (); Colonoscopy; Upper gastrointestinal endoscopy (2011); Esophagus dilation; knee surgery (Bilateral); Total shoulder arthroplasty; shoulder surgery; Toe Surgery (Right, 2013); joint replacement; and Total knee arthroplasty. Restrictions  Restrictions/Precautions  Restrictions/Precautions: General Precautions, Fall Risk  Position Activity Restriction  Other position/activity restrictions: High fall risk per nursing assessment (chair alarm not needed per RN, Enedina Fonseca, due to pt calling out appropriately), up with assistance, telemetry    Subjective   General  Chart Reviewed: Yes  Patient assessed for rehabilitation services?: Yes  Family / Caregiver Present: No  Referring Practitioner: Brynn Morris MD  Diagnosis: Dizziness secondary to sinus bradycardia  Subjective  Subjective: RN cleared pt for tx. Pt supine at session start.   Pain Assessment  Patient Currently in Pain: Denies       Social/Functional History  Social/Functional History  Lives With: Alone  Type of Home: House  Home Layout: One level, Laundry in basement ([employer] services does laundry for pt)  Home Access: Stairs to enter elevated, use of bed rail)  Sit to Supine: Modified independent  Scooting: Independent (to EOB)     Transfers  Sit to stand: Stand by assistance  Stand to sit: Stand by assistance     Cognition  Overall Cognitive Status: WFL     LUE AROM (degrees)  LUE AROM : WFL  RUE AROM (degrees)  RUE AROM : WFL     LUE Strength  Gross LUE Strength: WFL  RUE Strength  Gross RUE Strength: WFL      Education: Role of OT, safe t/f training, safe use of DME, awareness of deficits, discharge planning, ADL as therapeutic exercise, importance of OOB, energy conservation    Assessment   Performance deficits / Impairments: Decreased functional mobility ; Decreased ADL status; Decreased endurance;Decreased high-level IADLs  After evaluation, pt found to be presenting with the above mentioned deficits. Pt would benefit from continued skilled occupational therapy to address these deficits, increasing safety and independence with ADL and functional mobility. Pt has good potential to meet therapy goals. Will continue to assess for discharge needs. Prognosis: Good  Decision Making: Low Complexity  REQUIRES OT FOLLOW UP: Yes  Activity Tolerance  Activity Tolerance: Patient Tolerated treatment well  Safety Devices  Safety Devices in place: Yes  Type of devices: Gait belt;Left in bed;Nurse notified;Call light within reach         Plan   Plan  Times per week: 3-5    G-Code  OT G-codes  Functional Assessment Tool Used: -PAC  Score: 21  Functional Limitation: Self care  Self Care Current Status (): At least 20 percent but less than 40 percent impaired, limited or restricted  Self Care Goal Status ():  At least 1 percent but less than 20 percent impaired, limited or restricted    AM-PAC Score   AM-[facility] Inpatient Daily Activity Raw Score: 21  AM-PAC Inpatient ADL T-Scale Score : 44.27  ADL Inpatient CMS 0-100% Score: 32.79  ADL Inpatient CMS G-Code Modifier : CJ    Goals  Short term goals  Time Frame for Short term goals: 1 week unless otherwise specified  Short term goal 1: LB dressing with I by 9/24/18  Short term goal 2: Bed mobility with I  Short term goal 3: Toilet t/f and hygiene with I  Short term goal 4: Tolerate 8 mins of functional standing activity with supervision  Short term goal 5: Tub t/f or simulation with supervision  Patient Goals   Patient goals : \"Get home and not need help and not need to come back to the hospital.\"       Therapy Time   Individual Concurrent Group Co-treatment   Time In 1351         Time Out 1411         Minutes 20         Timed Code Treatment Minutes: 10 Minutes     If patient is discharged prior to next treatment session, this note will serve as the discharge summary.   Bakari Lozano, OTR/L #[id number]

## 2018-09-20 ENCOUNTER — APPOINTMENT (OUTPATIENT)
Dept: GENERAL RADIOLOGY | Age: 83
DRG: 308 | End: 2018-09-20
Attending: INTERNAL MEDICINE
Payer: MEDICARE

## 2018-09-20 LAB
ANION GAP SERPL CALCULATED.3IONS-SCNC: 15 MMOL/L (ref 3–16)
BUN BLDV-MCNC: 28 MG/DL (ref 7–20)
CALCIUM SERPL-MCNC: 9 MG/DL (ref 8.3–10.6)
CHLORIDE BLD-SCNC: 103 MMOL/L (ref 99–110)
CO2: 16 MMOL/L (ref 21–32)
CREAT SERPL-MCNC: 1.1 MG/DL (ref 0.8–1.3)
GFR AFRICAN AMERICAN: >60
GFR NON-AFRICAN AMERICAN: >60
GLUCOSE BLD-MCNC: 139 MG/DL (ref 70–99)
POTASSIUM SERPL-SCNC: 4.4 MMOL/L (ref 3.5–5.1)
SODIUM BLD-SCNC: 134 MMOL/L (ref 136–145)
URIC ACID, SERUM: 5 MG/DL (ref 3.5–7.2)

## 2018-09-20 PROCEDURE — 84550 ASSAY OF BLOOD/URIC ACID: CPT

## 2018-09-20 PROCEDURE — 97110 THERAPEUTIC EXERCISES: CPT

## 2018-09-20 PROCEDURE — 6360000002 HC RX W HCPCS: Performed by: NURSE PRACTITIONER

## 2018-09-20 PROCEDURE — 97116 GAIT TRAINING THERAPY: CPT

## 2018-09-20 PROCEDURE — 97530 THERAPEUTIC ACTIVITIES: CPT

## 2018-09-20 PROCEDURE — 73630 X-RAY EXAM OF FOOT: CPT

## 2018-09-20 PROCEDURE — 80048 BASIC METABOLIC PNL TOTAL CA: CPT

## 2018-09-20 PROCEDURE — 6370000000 HC RX 637 (ALT 250 FOR IP): Performed by: NURSE PRACTITIONER

## 2018-09-20 PROCEDURE — 99233 SBSQ HOSP IP/OBS HIGH 50: CPT | Performed by: NURSE PRACTITIONER

## 2018-09-20 PROCEDURE — 97535 SELF CARE MNGMENT TRAINING: CPT

## 2018-09-20 PROCEDURE — 6370000000 HC RX 637 (ALT 250 FOR IP): Performed by: INTERNAL MEDICINE

## 2018-09-20 PROCEDURE — 36415 COLL VENOUS BLD VENIPUNCTURE: CPT

## 2018-09-20 PROCEDURE — 2060000000 HC ICU INTERMEDIATE R&B

## 2018-09-20 PROCEDURE — 2580000003 HC RX 258: Performed by: NURSE PRACTITIONER

## 2018-09-20 RX ORDER — TRAMADOL HYDROCHLORIDE 50 MG/1
50 TABLET ORAL EVERY 6 HOURS PRN
Status: DISCONTINUED | OUTPATIENT
Start: 2018-09-20 | End: 2018-09-21 | Stop reason: HOSPADM

## 2018-09-20 RX ADMIN — Medication 10 ML: at 21:26

## 2018-09-20 RX ADMIN — ASPIRIN 81 MG 162 MG: 81 TABLET ORAL at 09:41

## 2018-09-20 RX ADMIN — PREDNISONE 5 MG: 5 TABLET ORAL at 09:41

## 2018-09-20 RX ADMIN — SIMVASTATIN 40 MG: 40 TABLET, FILM COATED ORAL at 09:41

## 2018-09-20 RX ADMIN — AMLODIPINE BESYLATE 5 MG: 2.5 TABLET ORAL at 21:22

## 2018-09-20 RX ADMIN — METOPROLOL TARTRATE 25 MG: 25 TABLET ORAL at 09:41

## 2018-09-20 RX ADMIN — TRAMADOL HYDROCHLORIDE 50 MG: 50 TABLET, FILM COATED ORAL at 00:52

## 2018-09-20 RX ADMIN — ESCITALOPRAM OXALATE 10 MG: 10 TABLET ORAL at 09:41

## 2018-09-20 RX ADMIN — Medication 10 ML: at 09:42

## 2018-09-20 RX ADMIN — ENOXAPARIN SODIUM 40 MG: 40 INJECTION SUBCUTANEOUS at 09:41

## 2018-09-20 RX ADMIN — MIRTAZAPINE 15 MG: 15 TABLET, FILM COATED ORAL at 21:21

## 2018-09-20 RX ADMIN — FAMOTIDINE 40 MG: 20 TABLET ORAL at 09:41

## 2018-09-20 RX ADMIN — VITAMIN D, TAB 1000IU (100/BT) 1000 UNITS: 25 TAB at 09:41

## 2018-09-20 RX ADMIN — Medication 400 MG: at 09:41

## 2018-09-20 RX ADMIN — CLOPIDOGREL BISULFATE 75 MG: 75 TABLET ORAL at 09:41

## 2018-09-20 ASSESSMENT — PAIN SCALES - GENERAL
PAINLEVEL_OUTOF10: 0
PAINLEVEL_OUTOF10: 2
PAINLEVEL_OUTOF10: 2
PAINLEVEL_OUTOF10: 0
PAINLEVEL_OUTOF10: 3
PAINLEVEL_OUTOF10: 9

## 2018-09-20 ASSESSMENT — PAIN DESCRIPTION - FREQUENCY: FREQUENCY: INTERMITTENT

## 2018-09-20 ASSESSMENT — PAIN DESCRIPTION - LOCATION
LOCATION: FOOT
LOCATION: TOE (COMMENT WHICH ONE)
LOCATION: TOE (COMMENT WHICH ONE)

## 2018-09-20 ASSESSMENT — PAIN DESCRIPTION - PAIN TYPE
TYPE: ACUTE PAIN
TYPE: CHRONIC PAIN
TYPE: ACUTE PAIN

## 2018-09-20 ASSESSMENT — PAIN DESCRIPTION - ORIENTATION
ORIENTATION: LEFT

## 2018-09-20 ASSESSMENT — PAIN DESCRIPTION - DESCRIPTORS
DESCRIPTORS: ACHING;SORE
DESCRIPTORS: SORE

## 2018-09-20 NOTE — PROGRESS NOTES
75 mg  75 mg Oral Daily KAREN Carlisle - CNP   75 mg at 09/20/18 0941    escitalopram (LEXAPRO) tablet 10 mg  10 mg Oral Daily Yoli Forrester APRN - CNP   10 mg at 09/20/18 0941    famotidine (PEPCID) tablet 40 mg  40 mg Oral Daily Yoli Forrester APRN - CNP   40 mg at 09/20/18 0941    gabapentin (NEURONTIN) capsule 100 mg  100 mg Oral BID Yoli Forrester APRN - CNP   100 mg at 09/19/18 2101    mirtazapine (REMERON) tablet 15 mg  15 mg Oral Nightly KAREN Carlisle - CNP   15 mg at 09/19/18 2101    predniSONE (DELTASONE) tablet 5 mg  5 mg Oral Daily KAREN Carlisle - CNP   5 mg at 09/20/18 0941    simvastatin (ZOCOR) tablet 40 mg  40 mg Oral Daily KAREN Carlisle - CNP   40 mg at 09/20/18 0941    vitamin D (CHOLECALCIFEROL) tablet 1,000 Units  1,000 Units Oral Daily KAREN Carlisle - CNP   1,000 Units at 09/20/18 0941    sodium chloride flush 0.9 % injection 10 mL  10 mL Intravenous 2 times per day KAREN Carlisle - CNP   10 mL at 09/20/18 4442    sodium chloride flush 0.9 % injection 10 mL  10 mL Intravenous PRN KAREN Carlisle - CNP   10 mL at 09/18/18 0218    magnesium hydroxide (MILK OF MAGNESIA) 400 MG/5ML suspension 30 mL  30 mL Oral Daily PRN Yoli Forrester APRN - CNP        ondansetron [facility]) injection 4 mg  4 mg Intravenous Q6H PRN Yoli Forrester APRN - CNP        enoxaparin (LOVENOX) injection 40 mg  40 mg Subcutaneous Daily KAREN Carlisle - CNP   40 mg at 09/20/18 0941       Objective:     Telemetry monitor: sinus maciel with PVCs    Physical Exam:  Constitutional:  Comfortable, NAD, up in chair, + [place]  Skin:  Warm and dry; no rash or lesions  Neck:  Supple, no JVD  Heart:  Irregular, normal apex, S1 and S2 normal  Lungs:  Clear; no wheezing/rhonchi/rales  Abdomen: soft, non tender, + bowel sounds  Extremities:  No edema or cyanosis  Neuro: alert and oriented, moves all extremities equally    Data  Echo 8/11/2018:  Left ventricle systolic function is mildly reduced with ejection fraction of 46%. Inferior basal akinesia is present.   Normal left ventricle size.   Moderate concentric left ventricular hypertrophy.   Grade I diastolic dysfunction with normal filling pressure.   The aortic valve sclerosis but opens well.   Mild aortic regurgitation.   The aortic root is enlarged in size at 4.5cm. Lab Review     Renal Profile:   Lab Results   Component Value Date    CREATININE 1.1 09/20/2018    BUN 28 09/20/2018     09/20/2018    K 4.4 09/20/2018    K 4.4 09/18/2018     09/20/2018    CO2 16 09/20/2018     CBC:    Lab Results   Component Value Date    WBC 9.0 09/18/2018    RBC 4.49 09/18/2018    HGB 14.0 09/18/2018    HCT 41.4 09/18/2018    MCV 92.1 09/18/2018    RDW 14.0 09/18/2018     09/18/2018     BNP:  No results found for: BNP  Fasting Lipid Panel:    Lab Results   Component Value Date    CHOL 171 09/29/2016    HDL 53 06/27/2017    TRIG 147 09/29/2016     Cardiac Enzymes:  CK/MbTroponin  Lab Results   Component Value Date    CKTOTAL 59 09/04/2018    TROPONINI <0.01 09/17/2018     PT/ INR   Lab Results   Component Value Date    INR 1.16 09/17/2018    INR 1.10 09/04/2018    INR 1.16 08/17/2016    PROTIME 13.2 09/17/2018    PROTIME 12.5 09/04/2018    PROTIME 13.3 08/17/2016     PTT No results found for: PTT   Lab Results   Component Value Date    MG 2.30 09/17/2018      Lab Results   Component Value Date    TSH 1.10 09/17/2018       Assessment:  1. Dizziness, acute on chronic: likely multifactorial but Remeron and gabapentin are likely contributing   -unable to have MRI due to bladder stimulator   -orthostatics remain negative   2. Bradycardia: tolerating metoprolol  3. High PVC burden: 26% noted on Holter in 8/2018   -improved with metoprolol  4. 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 HTN: poorly controlled  6. History of TIA  7. CKD    Plan:   1. No cardiac cause of dizziness identified   2.  Continue metoprolol 25mg po BID (increased on 9/20) and magnesium oxide for PVCs  3. Recommend using apical pulse for vital signs, not pulse ox, as PVC perfusion makes HR on pulse ox inaccurate  4. Continue telemetry monitoring    Discussed at length with patient and multiple family members, including daughter. They state the patient was started on gabapentin 6-7 weeks ago and feel the patient's dizziness became acutely worse since that time. He was not dizzy on 9/19 and had not had gabapentin in over 24 hours. Will discuss with Dr. Kvng Bowman.      Yaneth Marino, APRN-CNP  [facility]  [phone]

## 2018-09-20 NOTE — PROGRESS NOTES
New order rec'd for tramadol. Pt made aware education provided regarding new medication. Pt voices understanding.

## 2018-09-20 NOTE — PROGRESS NOTES
9/24/18  Short term goal 2: Bed mobility with I  Short term goal 3: Toilet t/f and hygiene with I  Short term goal 4: Tolerate 8 mins of functional standing activity with supervision  Short term goal 5: Tub t/f or simulation with supervision  Patient Goals   Patient goals :  \"Get home and not need help and not need to come back to the hospital.\"       Therapy Time   Individual Concurrent Group Co-treatment   Time In 0943         Time Out 1008         Minutes 25         Timed Code Treatment Minutes: 25 Minutes       Jalil Ham OTR/L

## 2018-09-20 NOTE — PROGRESS NOTES
slower-than-usual denise with decreased step length and foot clearance bilat. Mildly unsteady, ambulates on heel to avoid toe pain. Distance: x100 ft      Balance  Sitting - Static: Good  Sitting - Dynamic: Good  Standing - Static: Good;-  Standing - Dynamic: Good;-  Exercises  Quad Sets: 15x5 sec B  Heelslides: x15 B   Gluteal Sets: 15x5 sec B   Hip Abduction: x15 B   Ankle Pumps: x20 B    Assessment   Assessment: Pt limited due to pain and generalized deconditioning. Presented with good trunk and LE control during bed mobility with the bed flat and without use of the handrails. Transfers and gait training were mildly unsteady requiring sequencing cues throughout; no LOB noted. Gait was more unsteady today due to sore toe. Spent additional time educating pt on pain meds and. Pt seems mildly confused and may have mild short term memory loss. Treatment Diagnosis: Decreased (I) with mobility  Specific instructions for Next Treatment: Progress ther ex and mobility as tolerated  Prognosis: Good  Patient Education: Role of PT, benefits of mobility, safety in transfers/amb with RW, monitoring of vitals, D/C recs - pt verbalizes understanding. And taking meds and and toe pain. REQUIRES PT FOLLOW UP: Yes     AM-PAC Score  AM-PAC Inpatient Mobility Raw Score : 21  AM-PAC Inpatient T-Scale Score : 50.25  Mobility Inpatient CMS 0-100% Score: 28.97  Mobility Inpatient CMS G-Code Modifier : CJ          Goals  Short term goals  Time Frame for Short term goals: 1 week (unless otherwise specified)  Short term goal 1: Pt will transfer supine <-> sit with (I)  Short term goal 2: Pt will transfer sit <-> stand and bed>chair using least AD (cane vs. RW) with modified(I)  Short term goal 3: Pt will ambulate x 200 feet using least AD (cane vs. RW) with supervision/modified(I)  Short term goal 4: Pt will ambulate up/down 2 steps using 1 handrail (L ascending) with SBA/supervision  Short term goal 5: By 9/20/18:  Pt will

## 2018-09-20 NOTE — PROGRESS NOTES
Hospitalist Progress Note      PCP: Martin Vega MD    Date of Admission: 9/17/2018    Chief Complaint: Dizziness    Hospital Course:      Subjective:   Patient is up in bed, comfortable, not in distress. Denies any chest pain or shortness of breath. No new event overnight noted. Medications:  Reviewed    Infusion Medications   Scheduled Medications    amLODIPine  5 mg Oral Nightly    metoprolol tartrate  25 mg Oral BID    magnesium oxide  400 mg Oral Daily    aspirin  162 mg Oral Daily    clopidogrel  75 mg Oral Daily    escitalopram  10 mg Oral Daily    famotidine  40 mg Oral Daily    gabapentin  100 mg Oral BID    mirtazapine  15 mg Oral Nightly    predniSONE  5 mg Oral Daily    simvastatin  40 mg Oral Daily    vitamin D  1,000 Units Oral Daily    sodium chloride flush  10 mL Intravenous 2 times per day    enoxaparin  40 mg Subcutaneous Daily     PRN Meds: traMADol, sodium chloride flush, magnesium hydroxide, ondansetron      Intake/Output Summary (Last 24 hours) at 09/20/18 1344  Last data filed at 09/20/18 4379   Gross per 24 hour   Intake              730 ml   Output              350 ml   Net              380 ml       Physical Exam Performed:    BP (!) 152/60   Pulse 69   Temp 98.006 °F (36.7 °C) (Oral)   Resp 14   Ht 6' (1.829 m)   Wt 183 lb 1.6 oz (83.1 kg)   SpO2 95%   BMI 24.83 kg/m²     General appearance: No apparent distress, appears stated age and cooperative. HEENT: Pupils equal, round, and reactive to light. Conjunctivae/corneas clear. Neck: Supple, with full range of motion. No jugular venous distention. Trachea midline. Respiratory:  Normal respiratory effort. Clear to auscultation, bilaterally without Rales/Wheezes/Rhonchi. Cardiovascular: Regular rate and rhythm with normal S1/S2 without murmurs, rubs or gallops. Abdomen: Soft, non-tender, non-distended with normal bowel sounds. Musculoskeletal: No clubbing, cyanosis or edema bilaterally.   Full range of motion without deformity. Skin: Skin color, texture, turgor normal.  No rashes or lesions. Neurologic:  Neurovascularly intact without any focal sensory/motor deficits. Cranial nerves: II-XII intact, grossly non-focal.  Psychiatric: Alert and oriented, thought content appropriate, normal insight  Capillary Refill: Brisk,< 3 seconds   Peripheral Pulses: +2 palpable, equal bilaterally       Labs:   Recent Labs      09/18/18   0442   WBC  9.0   HGB  14.0   HCT  41.4   PLT  185     Recent Labs      09/18/18   0442  09/20/18   0911   NA  141  134*   K  4.4  4.4   CL  105  103   CO2  27  16*   BUN  22*  28*   CREATININE  1.2  1.1   CALCIUM  8.8  9.0       Urinalysis:      Lab Results   Component Value Date    NITRU Negative 09/17/2018    WBCUA 0-2 09/04/2018    RBCUA 0-2 09/04/2018    BLOODU Negative 09/17/2018    SPECGRAV 1.020 09/17/2018    GLUCOSEU Negative 09/17/2018       Radiology:  XR FOOT LEFT (MIN 3 VIEWS)   Final Result   Severe degenerative change in the 1st MTP. No erosive arthropathy identified.                  Assessment/Plan:    Active Hospital Problems    Diagnosis Date Noted    Sinus bradycardia [R00.1] 09/17/2018    Chronic diastolic congestive heart failure (HCC) [I50.32] 08/19/2017    HTN (hypertension), benign [I10]     Hyperlipidemia [E78.5]     CAD in native artery [I25.10]     GERD (gastroesophageal reflux disease) [K21.9] 08/18/2016    TIA (transient ischemic attack) [G45.9] 08/18/2016    Dizziness [R42]      Dizziness/lightheadedness in setting of sinus bradycardia  -MIVFs x 1L ordered  -orthostatics ordered  -ECHO from 8/11/2018 revealed: mildly reduced LV systolic function with EF of 46%; inferior basal akinesia, moderate concentric LV hypertrophy, grade I diastolic dysfunction, mild aortic regurgitation  -BL carotid duplex from 8/18/2018 revealed: moderate plaque on BL carotid arteries with <50% diameter reduction, patent vertebral arteries  -cardiology consult - appreciate recs in advance, input is pending.  -tele monitoring  -Cardiology input noted, patient was started on low-dose of metoprolol for high burden of PVC, continue to monitor.  -Patient was Bradycardic off and on, further management as per Cardiology , continue to monitor.     Lactic acidosis, 2.3 on admission at [facility]  -repeat 1.3     Essential HTN, uncontrolled  -continue amlodipine     HLD  -continue zocor     GERD  -continue pepcid     CHF, stable  -does not appear to be in acute exacerbation  -BNP 2752 on admission  -ECHO from 8/11/2018 revealed: mildly reduced LV systolic function with EF of 46%; inferior basal akinesia, moderate concentric LV hypertrophy, grade I diastolic dysfunction, mild aortic regurgitation       DVT Prophylaxis: Lovenox  Diet: DIET CARDIAC;  Dental Soft  Dietary Nutrition Supplements: Standard High Calorie Oral Supplement  Code Status: Full Code    PT/OT Eval Status: Ordered    Dispo - in 2-3 days    Anderson Tesfaye MD

## 2018-09-21 VITALS
RESPIRATION RATE: 16 BRPM | DIASTOLIC BLOOD PRESSURE: 68 MMHG | WEIGHT: 183.3 LBS | HEART RATE: 42 BPM | BODY MASS INDEX: 24.83 KG/M2 | HEIGHT: 72 IN | TEMPERATURE: 97.3 F | SYSTOLIC BLOOD PRESSURE: 151 MMHG | OXYGEN SATURATION: 93 %

## 2018-09-21 PROCEDURE — 99233 SBSQ HOSP IP/OBS HIGH 50: CPT | Performed by: NURSE PRACTITIONER

## 2018-09-21 PROCEDURE — 2580000003 HC RX 258: Performed by: NURSE PRACTITIONER

## 2018-09-21 PROCEDURE — 6370000000 HC RX 637 (ALT 250 FOR IP): Performed by: INTERNAL MEDICINE

## 2018-09-21 PROCEDURE — 99222 1ST HOSP IP/OBS MODERATE 55: CPT | Performed by: PSYCHIATRY & NEUROLOGY

## 2018-09-21 PROCEDURE — 6360000002 HC RX W HCPCS: Performed by: NURSE PRACTITIONER

## 2018-09-21 PROCEDURE — 6370000000 HC RX 637 (ALT 250 FOR IP): Performed by: NURSE PRACTITIONER

## 2018-09-21 RX ORDER — TRAMADOL HYDROCHLORIDE 50 MG/1
50 TABLET ORAL EVERY 6 HOURS PRN
Qty: 7 TABLET | Refills: 0 | Status: SHIPPED | OUTPATIENT
Start: 2018-09-21 | End: 2018-09-21

## 2018-09-21 RX ORDER — TRAMADOL HYDROCHLORIDE 50 MG/1
50 TABLET ORAL EVERY 6 HOURS PRN
Qty: 7 TABLET | Refills: 0
Start: 2018-09-21 | End: 2018-09-28

## 2018-09-21 RX ORDER — METHYLPREDNISOLONE 4 MG/1
TABLET ORAL
Qty: 1 KIT | Refills: 0 | Status: SHIPPED | OUTPATIENT
Start: 2018-09-21 | End: 2018-09-27

## 2018-09-21 RX ADMIN — ENOXAPARIN SODIUM 40 MG: 40 INJECTION SUBCUTANEOUS at 09:25

## 2018-09-21 RX ADMIN — CLOPIDOGREL BISULFATE 75 MG: 75 TABLET ORAL at 09:24

## 2018-09-21 RX ADMIN — ASPIRIN 81 MG 162 MG: 81 TABLET ORAL at 09:25

## 2018-09-21 RX ADMIN — TRAMADOL HYDROCHLORIDE 50 MG: 50 TABLET, FILM COATED ORAL at 09:24

## 2018-09-21 RX ADMIN — Medication 400 MG: at 09:24

## 2018-09-21 RX ADMIN — VITAMIN D, TAB 1000IU (100/BT) 1000 UNITS: 25 TAB at 09:23

## 2018-09-21 RX ADMIN — PREDNISONE 5 MG: 5 TABLET ORAL at 09:24

## 2018-09-21 RX ADMIN — FAMOTIDINE 40 MG: 20 TABLET ORAL at 09:24

## 2018-09-21 RX ADMIN — SIMVASTATIN 40 MG: 40 TABLET, FILM COATED ORAL at 09:24

## 2018-09-21 RX ADMIN — ESCITALOPRAM OXALATE 10 MG: 10 TABLET ORAL at 09:24

## 2018-09-21 RX ADMIN — TRAMADOL HYDROCHLORIDE 50 MG: 50 TABLET, FILM COATED ORAL at 02:37

## 2018-09-21 RX ADMIN — METOPROLOL TARTRATE 25 MG: 25 TABLET ORAL at 09:24

## 2018-09-21 RX ADMIN — Medication 10 ML: at 09:29

## 2018-09-21 ASSESSMENT — PAIN SCALES - GENERAL
PAINLEVEL_OUTOF10: 6
PAINLEVEL_OUTOF10: 4

## 2018-09-21 NOTE — PROGRESS NOTES
Discharge instructions, disease and medication education, and equipment usage reviewed with patient and patient's daughter. Pt given opportunity to ask questions. No questions asked. Paper script for tramadol given and signed for. IV and telemetry removed cmu notified of dc and pt d/c'd via wheelchair to private vehicle, home with home health care.

## 2018-09-21 NOTE — PROGRESS NOTES
2133- discussing symptoms with pt at bedside. HR apical ausc. With radial palp. is 40/min regular. Asked pt to sit up abruptly and he said he felt \"lightheaded\" and that his symptom is always \"lightheadedness, NOT dizziness\". States that when he gets up it stays with him for first 5 or so minutes and then subsides, however at home it will appear at random and pt states, \"it seemed like clock work I would wake up 8am and lightheadedness would start at 0930 and not subside until 5 or 6 pm. This happened on more than one day. I would have to lay down to feel better, so I started laying down from 9-5 everyday. \"

## 2018-09-21 NOTE — PROGRESS NOTES
Hospitalist Progress Note      PCP: Esa Pleitez MD    Date of Admission: 9/17/2018    Chief Complaint: Dizziness    Hospital Course:      Subjective:   Feels okay having some anxiety about being discharged. Dizziness has been worked up pretty extensively here based on my review of notes by Dr. Jie Mosqueda and [address] [place] [address] heart cardiology  He is now complaining of pain in the left great toe. This is been going on since he's been here in the hospital.    Medications:  Reviewed    Infusion Medications   Scheduled Medications    amLODIPine  5 mg Oral Nightly    metoprolol tartrate  25 mg Oral BID    magnesium oxide  400 mg Oral Daily    aspirin  162 mg Oral Daily    clopidogrel  75 mg Oral Daily    escitalopram  10 mg Oral Daily    famotidine  40 mg Oral Daily    mirtazapine  15 mg Oral Nightly    predniSONE  5 mg Oral Daily    simvastatin  40 mg Oral Daily    vitamin D  1,000 Units Oral Daily    sodium chloride flush  10 mL Intravenous 2 times per day    enoxaparin  40 mg Subcutaneous Daily     PRN Meds: traMADol, sodium chloride flush, magnesium hydroxide, ondansetron      Intake/Output Summary (Last 24 hours) at 09/21/18 1506  Last data filed at 09/21/18 0549   Gross per 24 hour   Intake              540 ml   Output              100 ml   Net              440 ml       Physical Exam Performed:    BP (!) 151/68   Pulse (!) 42   Temp 97.3 °F (36.3 °C) (Oral)   Resp 16   Ht 6' (1.829 m)   Wt 183 lb 4.8 oz (83.1 kg)   SpO2 93%   BMI 24.86 kg/m²     General appearance: No apparent distress, appears stated age and cooperative. HEENT: Pupils equal, round, and reactive to light. Conjunctivae/corneas clear. Neck: Supple, with full range of motion. No jugular venous distention. Trachea midline. Respiratory:  Normal respiratory effort. Clear to auscultation, bilaterally without Rales/Wheezes/Rhonchi. Cardiovascular: Regular rate and rhythm with normal S1/S2 without murmurs, rubs or gallops.   Abdomen: Soft, non-tender, non-distended with normal bowel sounds. Musculoskeletal: Left great toe exquisite tenderness with palpation of the left great toe not erythematous pain with flexion  Skin: Skin color, texture, turgor normal.  No rashes or lesions. Neurologic:  Neurovascularly intact without any focal sensory/motor deficits. Cranial nerves: II-XII intact, grossly non-focal.  Psychiatric: Alert and oriented, thought content appropriate, normal insight  Capillary Refill: Brisk,< 3 seconds   Peripheral Pulses: +2 palpable, equal bilaterally       Labs:   No results for input(s): WBC, HGB, HCT, PLT in the last 72 hours. Recent Labs      09/20/18   0911   NA  134*   K  4.4   CL  103   CO2  16*   BUN  28*   CREATININE  1.1   CALCIUM  9.0       Urinalysis:      Lab Results   Component Value Date    NITRU Negative 09/17/2018    WBCUA 0-2 09/04/2018    RBCUA 0-2 09/04/2018    BLOODU Negative 09/17/2018    SPECGRAV 1.020 09/17/2018    GLUCOSEU Negative 09/17/2018       Radiology:  XR FOOT LEFT (MIN 3 VIEWS)   Final Result   Severe degenerative change in the 1st MTP. No erosive arthropathy identified. Assessment/Plan:    Active Hospital Problems    Diagnosis Date Noted    Sinus bradycardia [R00.1] 09/17/2018    Chronic diastolic congestive heart failure (HCC) [I50.32] 08/19/2017    HTN (hypertension), benign [I10]     Hyperlipidemia [E78.5]     CAD in native artery [I25.10]     GERD (gastroesophageal reflux disease) [K21.9] 08/18/2016    TIA (transient ischemic attack) [G45.9] 08/18/2016    Dizziness [R42]      Dizziness/lightheadedness in setting of sinus bradycardia  Extensive workup here. At this point radiology is recommending low-dose beta blocker. Some component of dizziness could be related to Neurontin which has been discontinued. No further inpatient workup recommended. He can safely be discharged home today.   He needs to follow-up with his primary cardiologist and also with his PCP  Espiridion Less  Despite multiple explanations patient remains somewhat anxious about dizziness and potential falls. Suggest outpatient home health care. He will use a walker. Left great toe pain-could be related to acute gouty arthritis or osteoarthritis. He is on chronic prednisone. X-ray shows some degenerative changes. I suspect there may be a gout component here. I will ask podiatry if they can consider a possible intra-articular steroid injection or something to help with more immediate pain. I explained to the patient that he might end up needing to follow up with podiatry as an outpatient along with PCP.       Lactic acidosis, 2.3 on admission at [facility]  -repeat 1.3     Essential HTN, uncontrolled  -continue amlodipine     HLD  -continue zocor     GERD  -continue pepcid     CHF, stable  -does not appear to be in acute exacerbation  -BNP 2752 on admission  -ECHO from 8/11/2018 revealed: mildly reduced LV systolic function with EF of 46%; inferior basal akinesia, moderate concentric LV hypertrophy, grade I diastolic dysfunction, mild aortic regurgitation       DVT Prophylaxis: Lovenox  Diet: DIET CARDIAC; Dental Soft  Dietary Nutrition Supplements: Standard High Calorie Oral Supplement  Code Status: Full Code    PT/OT Eval Status: Ordered    Dispo -home today with home health care. I discussed the case with patient's nurse on bedside rounds.   Chon Holland MD

## 2018-09-21 NOTE — CARE COORDINATION
Case Management  Discharge Summary      DISCHARGE TO: Home with [address]: Gabriel Hogan FAXED TO: [facility] per Liaison     HENS: N-A    PRE-CERTIFICATION OBTAINED: N-A    NURSE RESPONSIBLE FOR COMPLETION OF PAGE ONE OF CONTINUITY OF CARE (JERONIMO) FORM, RECONCILIATION OF MEDICATIONS, AND TO PLACE A COPY OF FORMS IN PATIENT'S HARD CHART. NURSE TO ENSURE THAT ANY WRITTEN PRESCRIPTIONS ARE GIVEN TO PT UPON DISCHARGE. Patient aware of and agreeable to all arrangements and states no further discharge planning needs.

## 2018-09-21 NOTE — PLAN OF CARE
Problem: Falls - Risk of:  Goal: Absence of physical injury  Absence of physical injury   Outcome: Ongoing      Problem: OXYGENATION/RESPIRATORY FUNCTION  Goal: Patient will maintain patent airway  Outcome: Ongoing    Goal: Patient will achieve/maintain normal respiratory rate/effort  Respiratory rate and effort will be within normal limits for the patient   Outcome: Ongoing      Problem: HEMODYNAMIC STATUS  Goal: Patient has stable vital signs and fluid balance  Outcome: Ongoing      Problem: FLUID AND ELECTROLYTE IMBALANCE  Goal: Fluid and electrolyte balance are achieved/maintained  Outcome: Ongoing      Problem: ACTIVITY INTOLERANCE/IMPAIRED MOBILITY  Goal: Mobility/activity is maintained at optimum level for patient  Outcome: Ongoing      Problem: Pain:  Goal: Pain level will decrease  Pain level will decrease   Outcome: Ongoing    Goal: Control of acute pain  Control of acute pain   Outcome: Ongoing    Goal: Control of chronic pain  Control of chronic pain   Outcome: Ongoing
Problem: Falls - Risk of:  Goal: Will remain free from falls  Will remain free from falls   Outcome: Ongoing  Patient will remain free from falls this shift. Call light within reach, bedside table within reach, bed in lowest position, bed alarm on, 2/4 rails raised, bed in locked position, phone within patient's reach, non-skid socks on, fall risk wristband applied. Patient instructed to call out if he needs to get up or needs any assistance, RN instructed patient not to ambulate without assistance. Patient verbalized understanding. RN will continue to monitor.
Problem: Falls - Risk of:  Goal: Will remain free from falls  Will remain free from falls   Outcome: Ongoing  Safety/fall prevention maintained. Bed locked on lowest position, side rails up 2 x 4, Alarm on, personal belongings and call light within reach. Pt free from falls.  EDILIARN
Problem: HEMODYNAMIC STATUS  Goal: Patient has stable vital signs and fluid balance  Outcome: Ongoing  Patient's EF (Ejection Fraction) is greater than 40%    Patient has a past medical history of Arthralgia; Cancer ([facility] [address] Chronic back pain; Chronic diastolic (congestive) heart failure ([address] Diverticulitis; Dizziness; Fatigue; Fractures; Hearing decreased; Hyperlipidemia; Hypertension; Osteoarthritis of knee; Osteoarthritis of shoulder; TIA (transient ischemic attack); Vertigo; and Wears glasses. Comorbidities reviewed and education provided. Patient and family's stated goal of care: increase activity tolerance prior to discharge    Patient's current functional capacity:  Marked limitation of physical activity. Comfortable at rest. Less than ordinary activity causes fatigue, palpitation, or dyspnea. Pt resting in bed at this time on room air. Pt denies shortness of breath. Pt without lower extremity edema. Patient's weights and intake/output reviewed:    Patient Vitals for the past 96 hrs (Last 3 readings):   Weight   09/20/18 0528 183 lb 1.6 oz (83.1 kg)   09/19/18 0700 182 lb 3.2 oz (82.6 kg)   09/18/18 0528 184 lb (83.5 kg)       Intake/Output Summary (Last 24 hours) at 09/20/18 1120  Last data filed at 09/20/18 0528   Gross per 24 hour   Intake              490 ml   Output              350 ml   Net              140 ml       Patient provided with education on CHF signs/symptoms, causes, discharge medications, daily weights, low sodium diet, activity, and follow-up. Notified patient to call the doctor post discharge if patient experiences shortness of breath, chest pain, swelling, cough, or weight gain of three pounds in a day/five pounds in a week. Also notified patient to call the doctor with dizziness, increased fatigue, decreased or difficulty urinating. Pt verbalized understanding. No additional questions at this time.     Education Time: 30 Minutes
Problem: HEMODYNAMIC STATUS  Goal: Patient has stable vital signs and fluid balance  Outcome: Ongoing  Patient's EF (Ejection Fraction) is greater than 40%    Patient has a past medical history of Arthralgia; Cancer ([address] Chronic back pain; Chronic diastolic (congestive) heart failure ([address] Diverticulitis; Dizziness; Fatigue; Fractures; Hearing decreased; Hyperlipidemia; Hypertension; Osteoarthritis of knee; Osteoarthritis of shoulder; TIA (transient ischemic attack); Vertigo; and Wears glasses. Comorbidities reviewed and education provided. Patient and family's stated goal of care: increase activity tolerance prior to discharge    Patient's current functional capacity:  Marked limitation of physical activity. Comfortable at rest. Less than ordinary activity causes fatigue, palpitation, or dyspnea. Pt resting in bed at this time on room air. Pt denies shortness of breath. Pt without lower extremity edema. Patient's weights and intake/output reviewed:    Patient Vitals for the past 96 hrs (Last 3 readings):   Weight   09/19/18 0700 182 lb 3.2 oz (82.6 kg)   09/18/18 0528 184 lb (83.5 kg)   09/17/18 2115 184 lb (83.5 kg)       Intake/Output Summary (Last 24 hours) at 09/19/18 1004  Last data filed at 09/19/18 0837   Gross per 24 hour   Intake              760 ml   Output             1300 ml   Net             -540 ml       Patient provided with education on CHF signs/symptoms, causes, discharge medications, daily weights, low sodium diet, activity, and follow-up. Notified patient to call the doctor post discharge if patient experiences shortness of breath, chest pain, swelling, cough, or weight gain of three pounds in a day/five pounds in a week. Also notified patient to call the doctor with dizziness, increased fatigue, decreased or difficulty urinating. Pt verbalized understanding. No additional questions at this time.     Education Time: 30 Minutes
verbalized understanding. No additional questions at this time.     Education Time: 15 Minutes

## 2018-09-21 NOTE — PROGRESS NOTES
clopidogrel (PLAVIX) tablet 75 mg  75 mg Oral Daily Maggie Obregon, APRN - CNP   75 mg at 09/21/18 0924    escitalopram (LEXAPRO) tablet 10 mg  10 mg Oral Daily Maggie KYLE ObregonN - CNP   10 mg at 09/21/18 0924    famotidine (PEPCID) tablet 40 mg  40 mg Oral Daily Maggie Obregon, APRN - CNP   40 mg at 09/21/18 3145    mirtazapine (REMERON) tablet 15 mg  15 mg Oral Nightly Maggiekulwant Obregon APRN - CNP   15 mg at 09/20/18 2121    predniSONE (DELTASONE) tablet 5 mg  5 mg Oral Daily Maggie Obregon, APRN - CNP   5 mg at 09/21/18 6214    simvastatin (ZOCOR) tablet 40 mg  40 mg Oral Daily Maggie KYLE ObregonN - CNP   40 mg at 09/21/18 2828    vitamin D (CHOLECALCIFEROL) tablet 1,000 Units  1,000 Units Oral Daily Maggie ObregonKAREN - CNP   1,000 Units at 09/21/18 6564    sodium chloride flush 0.9 % injection 10 mL  10 mL Intravenous 2 times per day Maggiekulwant Obregon APRN - CNP   10 mL at 09/21/18 0929    sodium chloride flush 0.9 % injection 10 mL  10 mL Intravenous PRN Maggie ObregonKAREN - CNP   10 mL at 09/18/18 0218    magnesium hydroxide (MILK OF MAGNESIA) 400 MG/5ML suspension 30 mL  30 mL Oral Daily PRN Maggiekulwant Obregon APRN - CNP        ondansetron [facility]) injection 4 mg  4 mg Intravenous Q6H PRN Maggiekulwant Obregon APRN - CNP        enoxaparin (LOVENOX) injection 40 mg  40 mg Subcutaneous Daily Maggie Obregon, APRN - CNP   40 mg at 09/21/18 6544       Objective:     Telemetry monitor: sinus maciel with PVCs    Physical Exam:  Constitutional:  Comfortable, NAD,  + [place]  Skin:  Warm and dry; no rash or lesions  Neck:  Supple, no JVD  Heart:  Regular, normal apex, S1 and S2 normal  Lungs:  Clear; no wheezing/rhonchi/rales  Abdomen: soft, non tender, + bowel sounds  Extremities:  No edema or cyanosis  Neuro: alert and oriented, moves all extremities equally    Data  Echo 8/11/2018:  Left ventricle systolic function is mildly reduced with ejection fraction of 46%.  Inferior basal akinesia is present.   Normal left ventricle size.   Moderate concentric left ventricular hypertrophy.   Grade I diastolic dysfunction with normal filling pressure.   The aortic valve sclerosis but opens well.   Mild aortic regurgitation.   The aortic root is enlarged in size at 4.5cm. Lab Review     Renal Profile:   Lab Results   Component Value Date    CREATININE 1.1 09/20/2018    BUN 28 09/20/2018     09/20/2018    K 4.4 09/20/2018    K 4.4 09/18/2018     09/20/2018    CO2 16 09/20/2018     CBC:    Lab Results   Component Value Date    WBC 9.0 09/18/2018    RBC 4.49 09/18/2018    HGB 14.0 09/18/2018    HCT 41.4 09/18/2018    MCV 92.1 09/18/2018    RDW 14.0 09/18/2018     09/18/2018     BNP:  No results found for: BNP  Fasting Lipid Panel:    Lab Results   Component Value Date    CHOL 171 09/29/2016    HDL 53 06/27/2017    TRIG 147 09/29/2016     Cardiac Enzymes:  CK/MbTroponin  Lab Results   Component Value Date    CKTOTAL 59 09/04/2018    TROPONINI <0.01 09/17/2018     PT/ INR   Lab Results   Component Value Date    INR 1.16 09/17/2018    INR 1.10 09/04/2018    INR 1.16 08/17/2016    PROTIME 13.2 09/17/2018    PROTIME 12.5 09/04/2018    PROTIME 13.3 08/17/2016     PTT No results found for: PTT   Lab Results   Component Value Date    MG 2.30 09/17/2018      Lab Results   Component Value Date    TSH 1.10 09/17/2018       Assessment:  1. Dizziness, acute on chronic: likely multifactorial    -patient's family reports dizziness worsened significantly with gabapentin, was stopped on 9/20   -unable to have MRI due to bladder stimulator   -orthostatics remain negative   2. Bradycardia: tolerating metoprolol  3. High PVC burden: 26% noted on Holter in 8/2018   -improved with metoprolol  4. 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 HTN: poorly controlled  6. History of TIA  7. CKD    Plan:   1. No cardiac cause of dizziness identified   2. Continue metoprolol 25mg po BID (increased on 9/20) and magnesium oxide for PVCs  3.

## 2018-09-21 NOTE — DISCHARGE INSTR - DIET

## 2018-09-21 NOTE — PROGRESS NOTES
rn to pt room for hourly round. Pt dtr visiting and asked about possible consults and treatment plan. Stated that cardiology \"person\" cleared pt for discharge. Shared background of events leading up to hospitalization and expressed concern about pt being discharged without an answer to current problem. dtr and pt confused by intervention of  rn who regularly states to both that pt needs to come to the hospital r/t hr in 45s (known, documented by cardiology). Attribution of \"lighteheadedness\" to gabapentin is acceptable to pt and dtr, but they would like to have a new med prescribed to address pain in pt's foot. Would like neuro consult. Would like additional explanation for pt's need to \"lay around all day. \"  Hospitalist notified.

## 2018-10-10 PROBLEM — J18.9 PNEUMONIA DUE TO ORGANISM: Status: RESOLVED | Noted: 2017-08-19 | Resolved: 2018-10-10

## 2018-10-11 ENCOUNTER — TELEPHONE (OUTPATIENT)
Dept: CARDIOLOGY CLINIC | Age: 83
End: 2018-10-11

## 2018-10-11 ENCOUNTER — INITIAL CONSULT (OUTPATIENT)
Dept: CARDIOLOGY CLINIC | Age: 83
End: 2018-10-11
Payer: MEDICARE

## 2018-10-11 VITALS
DIASTOLIC BLOOD PRESSURE: 60 MMHG | SYSTOLIC BLOOD PRESSURE: 120 MMHG | WEIGHT: 185 LBS | OXYGEN SATURATION: 97 % | HEART RATE: 46 BPM | HEIGHT: 72 IN | BODY MASS INDEX: 25.06 KG/M2

## 2018-10-11 DIAGNOSIS — R00.1 BRADYCARDIA: Primary | ICD-10-CM

## 2018-10-11 DIAGNOSIS — R06.02 SOB (SHORTNESS OF BREATH): ICD-10-CM

## 2018-10-11 DIAGNOSIS — I49.5 SICK SINUS SYNDROME (HCC): ICD-10-CM

## 2018-10-11 DIAGNOSIS — E78.5 HYPERLIPIDEMIA WITH TARGET LDL LESS THAN 100: ICD-10-CM

## 2018-10-11 PROCEDURE — G8484 FLU IMMUNIZE NO ADMIN: HCPCS | Performed by: INTERNAL MEDICINE

## 2018-10-11 PROCEDURE — 4040F PNEUMOC VAC/ADMIN/RCVD: CPT | Performed by: INTERNAL MEDICINE

## 2018-10-11 PROCEDURE — 1101F PT FALLS ASSESS-DOCD LE1/YR: CPT | Performed by: INTERNAL MEDICINE

## 2018-10-11 PROCEDURE — G8427 DOCREV CUR MEDS BY ELIG CLIN: HCPCS | Performed by: INTERNAL MEDICINE

## 2018-10-11 PROCEDURE — 99214 OFFICE O/P EST MOD 30 MIN: CPT | Performed by: INTERNAL MEDICINE

## 2018-10-11 PROCEDURE — G8598 ASA/ANTIPLAT THER USED: HCPCS | Performed by: INTERNAL MEDICINE

## 2018-10-11 PROCEDURE — 1111F DSCHRG MED/CURRENT MED MERGE: CPT | Performed by: INTERNAL MEDICINE

## 2018-10-11 PROCEDURE — 1123F ACP DISCUSS/DSCN MKR DOCD: CPT | Performed by: INTERNAL MEDICINE

## 2018-10-11 PROCEDURE — 1036F TOBACCO NON-USER: CPT | Performed by: INTERNAL MEDICINE

## 2018-10-11 PROCEDURE — G8419 CALC BMI OUT NRM PARAM NOF/U: HCPCS | Performed by: INTERNAL MEDICINE

## 2018-10-11 PROCEDURE — 93000 ELECTROCARDIOGRAM COMPLETE: CPT | Performed by: INTERNAL MEDICINE

## 2018-10-11 RX ORDER — GABAPENTIN 100 MG/1
100 CAPSULE ORAL 2 TIMES DAILY
Status: ON HOLD | COMMUNITY
End: 2018-10-19 | Stop reason: HOSPADM

## 2018-10-11 RX ORDER — MECLIZINE HCL 12.5 MG/1
12.5 TABLET ORAL 3 TIMES DAILY PRN
COMMUNITY
End: 2019-04-03

## 2018-10-11 RX ORDER — MAGNESIUM OXIDE 400 MG/1
400 TABLET ORAL 2 TIMES DAILY
Qty: 60 TABLET | Refills: 5 | Status: SHIPPED | OUTPATIENT
Start: 2018-10-11

## 2018-10-11 NOTE — PROGRESS NOTES
Osteoarthritis of knee     Osteoarthritis of shoulder     TIA (transient ischemic attack) 2006    Vertigo     Wears glasses      Past Surgical History:   Procedure Laterality Date    CATARACT REMOVAL  1997    bilaterally    COLONOSCOPY      ESOPHAGEAL DILATATION      X 2    INGUINAL HERNIA REPAIR      right    JOINT REPLACEMENT      KNEE SURGERY Bilateral     PROSTATECTOMY  1998    carcinoma    SHOULDER ARTHROPLASTY      SHOULDER SURGERY      TOE SURGERY Right 06/03/2013    PERMANENT RIGHT GREAT TOENAIL ABLATION AND REMOVAL    TOTAL KNEE ARTHROPLASTY      UPPER GASTROINTESTINAL ENDOSCOPY  jan 2011       Objective:   /60   Pulse (!) 46   Ht 6' (1.829 m)   Wt 185 lb (83.9 kg)   SpO2 97%   BMI 25.09 kg/m²     Wt Readings from Last 3 Encounters:   10/11/18 185 lb (83.9 kg)   09/21/18 183 lb 4.8 oz (83.1 kg)   09/17/18 183 lb (83 kg)       Physical Exam:  General: No Respiratory distress, appears well developed and well nourished. Eyes:  Sclera nonicteric  Nose/Sinuses:  negative findings: nose shows no deformity, asymmetry, or inflammation, nasal mucosa normal, septum midline with no perforation or bleeding  Back:  no pain to palpation  Joint:  no active joint inflammation  Musculoskeletal:  negative  Skin:  Warm and dry  Neck:  Negative for JVD and Carotid Bruits. Chest:  Clear to auscultation, respiration easy  Cardiovascular:  Irregular with ectopy, S1S2 normal, no murmur, no rub or thrill. Abdomen:  Soft normal liver and spleen  Extremities:   No edema, clubbing, cyanosis,  Pulses:   1+ right foot pulse, 1+ left foot pulse  Neuro: intact    Medications:   Outpatient Encounter Prescriptions as of 10/11/2018   Medication Sig Dispense Refill    gabapentin (NEURONTIN) 100 MG capsule Take 100 mg by mouth 2 times daily. Leora Nina Diphenhydramine-APAP, sleep, (TYLENOL PM EXTRA STRENGTH PO) Take by mouth      meclizine (ANTIVERT) 12.5 MG tablet Take 12.5 mg by mouth 3 times daily as needed LDLCALC 84 06/27/2017    LDLCALC 87 09/29/2016    LDLCALC 60 08/18/2016     Lab Results   Component Value Date    LABVLDL 27 06/27/2017    LABVLDL 29 09/29/2016    LABVLDL 19 08/18/2016     No results found for: CHOLHDLRATIO  PT/INR: No results for input(s): PROTIME, INR in the last 72 hours. A1C:   Lab Results   Component Value Date    LABA1C 5.6 09/29/2016     BNP:  No results for input(s): BNP in the last 72 hours. IMAGING:   ECHO 8/11/18  Summary   Left ventricle systolic function is mildly reduced with ejection fraction of   46%. Inferior basal akinesia is present. Normal left ventricle size. Moderate concentric left ventricular hypertrophy. Grade I diastolic dysfunction with normal filling pressure. The aortic valve sclerosis but opens well. Mild aortic regurgitation. The aortic root is enlarged in size at 4.5cm    Chest xray 9.21.17  No acute cardiopulmonary disease. Hyperinflation of the lungs. 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 There is hypokinesis of the basal inferior wall.   Normal left ventricle size.   No wall motion abnormalities noted.   Grade I diastolic dysfunction with normal filling pressure.   Mild mitral, aortic and tricuspid regurgitation.   Systolic pulmonary artery pressure (SPAP) is estimated at 40 mmHg consistent   with mild pulmonary hypertension (RA pressure 3 mmHg).  Right atrial size is mildly dilated . ECHO 8/19/16  Summary   Normal left ventricle size,normal systolic function with an estimated   ejection fraction of 55-60%. No regional bilaterally. Assessment:  Loida Bernal was seen today for  Fatigue and weakness  He had EKG today which  Shows bradycardia PVC first degree AV block IVCD  Last lipids 9/29/16  Cholesterol, [id number] - 199 mg/dLFinal [id number] - 150 mg/dLFinal [id number] - 60 mg/dLFinal LDL [id number]  EKG today shows occasional PVC possible old ant MI but work up 5 years ago in [place] with stress test was negative. Plan:   1. I  will discuss with Dr. West Oswald regarding pacemaker placement   2. Start Magnesium 400 mg twice daily   3. I will call you with the decision   4. Follow up after possible pacemaker placement      QUALITY MEASURES  1. Tobacco Cessation Counseling: NA  2. Retake of BP if >140/90:   NA  3. Documentation to PCP/referring for new patient:  Sent to PCP at close of office visit  4. CAD patient on anti-platelet: Yes  5. CAD patient on STATIN therapy:  Yes  6.  Patient with CHF and aFib on anticoagulation:  CHECO Garrett MD 10/11/2018 1:56 PM

## 2018-10-12 ENCOUNTER — HOSPITAL ENCOUNTER (INPATIENT)
Dept: CARDIAC CATH/INVASIVE PROCEDURES | Age: 83
LOS: 1 days | Discharge: NURSING, REHAB OR LONG-TERM CARE | DRG: 243 | End: 2018-10-14
Attending: INTERNAL MEDICINE | Admitting: INTERNAL MEDICINE
Payer: MEDICARE

## 2018-10-12 PROBLEM — I49.5 SINUS NODE DYSFUNCTION (HCC): Status: ACTIVE | Noted: 2018-10-12

## 2018-10-12 PROBLEM — Z95.0 PACEMAKER: Status: ACTIVE | Noted: 2018-10-12

## 2018-10-12 LAB
ANION GAP SERPL CALCULATED.3IONS-SCNC: 10 MMOL/L (ref 3–16)
BUN BLDV-MCNC: 23 MG/DL (ref 7–20)
CALCIUM SERPL-MCNC: 8.9 MG/DL (ref 8.3–10.6)
CHLORIDE BLD-SCNC: 104 MMOL/L (ref 99–110)
CO2: 27 MMOL/L (ref 21–32)
CREAT SERPL-MCNC: 1.4 MG/DL (ref 0.8–1.3)
GFR AFRICAN AMERICAN: 57
GFR NON-AFRICAN AMERICAN: 47
GLUCOSE BLD-MCNC: 81 MG/DL (ref 70–99)
HCT VFR BLD CALC: 44.5 % (ref 40.5–52.5)
HEMOGLOBIN: 14.7 G/DL (ref 13.5–17.5)
INR BLD: 1.17 (ref 0.86–1.14)
MCH RBC QN AUTO: 30.9 PG (ref 26–34)
MCHC RBC AUTO-ENTMCNC: 33.1 G/DL (ref 31–36)
MCV RBC AUTO: 93.3 FL (ref 80–100)
PDW BLD-RTO: 14.4 % (ref 12.4–15.4)
PLATELET # BLD: 160 K/UL (ref 135–450)
PMV BLD AUTO: 8.8 FL (ref 5–10.5)
POTASSIUM SERPL-SCNC: 4.2 MMOL/L (ref 3.5–5.1)
PROTHROMBIN TIME: 13.3 SEC (ref 9.8–13)
RBC # BLD: 4.77 M/UL (ref 4.2–5.9)
SODIUM BLD-SCNC: 141 MMOL/L (ref 136–145)
WBC # BLD: 7.8 K/UL (ref 4–11)

## 2018-10-12 PROCEDURE — 80048 BASIC METABOLIC PNL TOTAL CA: CPT

## 2018-10-12 PROCEDURE — C1785 PMKR, DUAL, RATE-RESP: HCPCS

## 2018-10-12 PROCEDURE — 02H63JZ INSERTION OF PACEMAKER LEAD INTO RIGHT ATRIUM, PERCUTANEOUS APPROACH: ICD-10-PCS | Performed by: INTERNAL MEDICINE

## 2018-10-12 PROCEDURE — 33208 INSRT HEART PM ATRIAL & VENT: CPT

## 2018-10-12 PROCEDURE — C1898 LEAD, PMKR, OTHER THAN TRANS: HCPCS

## 2018-10-12 PROCEDURE — 02HK3JZ INSERTION OF PACEMAKER LEAD INTO RIGHT VENTRICLE, PERCUTANEOUS APPROACH: ICD-10-PCS | Performed by: INTERNAL MEDICINE

## 2018-10-12 PROCEDURE — G0378 HOSPITAL OBSERVATION PER HR: HCPCS

## 2018-10-12 PROCEDURE — 33208 INSRT HEART PM ATRIAL & VENT: CPT | Performed by: INTERNAL MEDICINE

## 2018-10-12 PROCEDURE — 93005 ELECTROCARDIOGRAM TRACING: CPT | Performed by: INTERNAL MEDICINE

## 2018-10-12 PROCEDURE — 99152 MOD SED SAME PHYS/QHP 5/>YRS: CPT

## 2018-10-12 PROCEDURE — 99152 MOD SED SAME PHYS/QHP 5/>YRS: CPT | Performed by: INTERNAL MEDICINE

## 2018-10-12 PROCEDURE — 6360000002 HC RX W HCPCS

## 2018-10-12 PROCEDURE — 6370000000 HC RX 637 (ALT 250 FOR IP): Performed by: INTERNAL MEDICINE

## 2018-10-12 PROCEDURE — 0JH606Z INSERTION OF PACEMAKER, DUAL CHAMBER INTO CHEST SUBCUTANEOUS TISSUE AND FASCIA, OPEN APPROACH: ICD-10-PCS | Performed by: INTERNAL MEDICINE

## 2018-10-12 PROCEDURE — 2580000003 HC RX 258

## 2018-10-12 PROCEDURE — 2500000003 HC RX 250 WO HCPCS

## 2018-10-12 PROCEDURE — 85027 COMPLETE CBC AUTOMATED: CPT

## 2018-10-12 PROCEDURE — C1894 INTRO/SHEATH, NON-LASER: HCPCS

## 2018-10-12 PROCEDURE — 99153 MOD SED SAME PHYS/QHP EA: CPT

## 2018-10-12 PROCEDURE — 85610 PROTHROMBIN TIME: CPT

## 2018-10-12 PROCEDURE — 2580000003 HC RX 258: Performed by: INTERNAL MEDICINE

## 2018-10-12 RX ORDER — GABAPENTIN 100 MG/1
100 CAPSULE ORAL 2 TIMES DAILY
Status: DISCONTINUED | OUTPATIENT
Start: 2018-10-12 | End: 2018-10-14 | Stop reason: HOSPADM

## 2018-10-12 RX ORDER — ESCITALOPRAM OXALATE 10 MG/1
10 TABLET ORAL DAILY
Status: DISCONTINUED | OUTPATIENT
Start: 2018-10-12 | End: 2018-10-14 | Stop reason: HOSPADM

## 2018-10-12 RX ORDER — PREDNISONE 1 MG/1
5 TABLET ORAL DAILY
Status: DISCONTINUED | OUTPATIENT
Start: 2018-10-13 | End: 2018-10-14 | Stop reason: HOSPADM

## 2018-10-12 RX ORDER — BRAN 5 G
1 WAFER ORAL
Status: DISCONTINUED | OUTPATIENT
Start: 2018-10-13 | End: 2018-10-14 | Stop reason: HOSPADM

## 2018-10-12 RX ORDER — LOPERAMIDE HYDROCHLORIDE 2 MG/1
2 CAPSULE ORAL 4 TIMES DAILY PRN
Status: DISCONTINUED | OUTPATIENT
Start: 2018-10-12 | End: 2018-10-14 | Stop reason: HOSPADM

## 2018-10-12 RX ORDER — SODIUM CHLORIDE 0.9 % (FLUSH) 0.9 %
10 SYRINGE (ML) INJECTION PRN
Status: DISCONTINUED | OUTPATIENT
Start: 2018-10-12 | End: 2018-10-14 | Stop reason: HOSPADM

## 2018-10-12 RX ORDER — SODIUM CHLORIDE 0.9 % (FLUSH) 0.9 %
10 SYRINGE (ML) INJECTION EVERY 12 HOURS SCHEDULED
Status: DISCONTINUED | OUTPATIENT
Start: 2018-10-12 | End: 2018-10-14 | Stop reason: HOSPADM

## 2018-10-12 RX ORDER — SODIUM CHLORIDE 9 MG/ML
1000 INJECTION, SOLUTION INTRAVENOUS CONTINUOUS
Status: DISCONTINUED | OUTPATIENT
Start: 2018-10-12 | End: 2018-10-13

## 2018-10-12 RX ORDER — ACETAMINOPHEN 325 MG/1
650 TABLET ORAL EVERY 4 HOURS PRN
Status: DISCONTINUED | OUTPATIENT
Start: 2018-10-12 | End: 2018-10-14 | Stop reason: HOSPADM

## 2018-10-12 RX ORDER — DIPHENHYDRAMINE HCL 25 MG
25 TABLET ORAL NIGHTLY PRN
Status: DISCONTINUED | OUTPATIENT
Start: 2018-10-12 | End: 2018-10-14 | Stop reason: HOSPADM

## 2018-10-12 RX ORDER — MECLIZINE HCL 12.5 MG/1
12.5 TABLET ORAL 3 TIMES DAILY PRN
Status: DISCONTINUED | OUTPATIENT
Start: 2018-10-12 | End: 2018-10-14 | Stop reason: HOSPADM

## 2018-10-12 RX ORDER — AMLODIPINE BESYLATE 5 MG/1
2.5 TABLET ORAL NIGHTLY
Status: DISCONTINUED | OUTPATIENT
Start: 2018-10-12 | End: 2018-10-12

## 2018-10-12 RX ORDER — ONDANSETRON 2 MG/ML
4 INJECTION INTRAMUSCULAR; INTRAVENOUS EVERY 6 HOURS PRN
Status: DISCONTINUED | OUTPATIENT
Start: 2018-10-12 | End: 2018-10-14 | Stop reason: HOSPADM

## 2018-10-12 RX ORDER — SIMVASTATIN 40 MG
40 TABLET ORAL DAILY
Status: DISCONTINUED | OUTPATIENT
Start: 2018-10-12 | End: 2018-10-12 | Stop reason: SDUPTHER

## 2018-10-12 RX ORDER — FAMOTIDINE 20 MG/1
40 TABLET, FILM COATED ORAL DAILY
Status: DISCONTINUED | OUTPATIENT
Start: 2018-10-12 | End: 2018-10-14 | Stop reason: HOSPADM

## 2018-10-12 RX ORDER — ASPIRIN 81 MG/1
162 TABLET, CHEWABLE ORAL DAILY
Status: DISCONTINUED | OUTPATIENT
Start: 2018-10-12 | End: 2018-10-13

## 2018-10-12 RX ORDER — SIMVASTATIN 40 MG
40 TABLET ORAL NIGHTLY
Status: DISCONTINUED | OUTPATIENT
Start: 2018-10-12 | End: 2018-10-14 | Stop reason: HOSPADM

## 2018-10-12 RX ORDER — MIRTAZAPINE 15 MG/1
15 TABLET, FILM COATED ORAL NIGHTLY
Status: DISCONTINUED | OUTPATIENT
Start: 2018-10-12 | End: 2018-10-14 | Stop reason: HOSPADM

## 2018-10-12 RX ORDER — ACETAMINOPHEN,DIPHENHYDRAMINE HCL 500; 25 MG/1; MG/1
1 TABLET, FILM COATED ORAL NIGHTLY PRN
Status: DISCONTINUED | OUTPATIENT
Start: 2018-10-12 | End: 2018-10-12 | Stop reason: CLARIF

## 2018-10-12 RX ORDER — ACETAMINOPHEN 500 MG
500 TABLET ORAL NIGHTLY PRN
Status: DISCONTINUED | OUTPATIENT
Start: 2018-10-12 | End: 2018-10-14 | Stop reason: HOSPADM

## 2018-10-12 RX ORDER — CLOPIDOGREL BISULFATE 75 MG/1
75 TABLET ORAL DAILY
Status: DISCONTINUED | OUTPATIENT
Start: 2018-10-12 | End: 2018-10-14 | Stop reason: HOSPADM

## 2018-10-12 RX ORDER — DILTIAZEM HYDROCHLORIDE 120 MG/1
120 CAPSULE, COATED, EXTENDED RELEASE ORAL DAILY
Status: DISCONTINUED | OUTPATIENT
Start: 2018-10-12 | End: 2018-10-14 | Stop reason: HOSPADM

## 2018-10-12 RX ORDER — UBIDECARENONE 75 MG
50 CAPSULE ORAL DAILY
Status: DISCONTINUED | OUTPATIENT
Start: 2018-10-13 | End: 2018-10-14 | Stop reason: HOSPADM

## 2018-10-12 RX ORDER — PREDNISONE 1 MG/1
5 TABLET ORAL DAILY
Status: DISCONTINUED | OUTPATIENT
Start: 2018-10-12 | End: 2018-10-12 | Stop reason: SDUPTHER

## 2018-10-12 RX ADMIN — SODIUM CHLORIDE 1000 ML: 9 INJECTION, SOLUTION INTRAVENOUS at 11:53

## 2018-10-12 RX ADMIN — Medication 400 MG: at 21:44

## 2018-10-12 RX ADMIN — DILTIAZEM HYDROCHLORIDE 120 MG: 120 CAPSULE, COATED, EXTENDED RELEASE ORAL at 17:57

## 2018-10-12 RX ADMIN — MIRTAZAPINE 15 MG: 15 TABLET, FILM COATED ORAL at 21:45

## 2018-10-12 RX ADMIN — GABAPENTIN 100 MG: 100 CAPSULE ORAL at 21:44

## 2018-10-12 RX ADMIN — SODIUM CHLORIDE, PRESERVATIVE FREE 10 ML: 5 INJECTION INTRAVENOUS at 21:45

## 2018-10-12 RX ADMIN — SIMVASTATIN 40 MG: 40 TABLET, FILM COATED ORAL at 21:45

## 2018-10-12 RX ADMIN — VITAMIN D, TAB 1000IU (100/BT) 1000 UNITS: 25 TAB at 17:56

## 2018-10-12 RX ADMIN — METOPROLOL TARTRATE 25 MG: 25 TABLET ORAL at 21:44

## 2018-10-12 ASSESSMENT — PAIN SCALES - GENERAL
PAINLEVEL_OUTOF10: 0

## 2018-10-13 ENCOUNTER — APPOINTMENT (OUTPATIENT)
Dept: GENERAL RADIOLOGY | Age: 83
DRG: 243 | End: 2018-10-13
Attending: INTERNAL MEDICINE
Payer: MEDICARE

## 2018-10-13 PROBLEM — Z95.0 STATUS POST PLACEMENT OF CARDIAC PACEMAKER: Status: ACTIVE | Noted: 2018-10-13

## 2018-10-13 LAB
ANION GAP SERPL CALCULATED.3IONS-SCNC: 7 MMOL/L (ref 3–16)
BUN BLDV-MCNC: 26 MG/DL (ref 7–20)
CALCIUM SERPL-MCNC: 9.1 MG/DL (ref 8.3–10.6)
CHLORIDE BLD-SCNC: 102 MMOL/L (ref 99–110)
CO2: 28 MMOL/L (ref 21–32)
CREAT SERPL-MCNC: 1.3 MG/DL (ref 0.8–1.3)
EKG ATRIAL RATE: 45 BPM
EKG ATRIAL RATE: 72 BPM
EKG DIAGNOSIS: NORMAL
EKG DIAGNOSIS: NORMAL
EKG P AXIS: -10 DEGREES
EKG P AXIS: 28 DEGREES
EKG P-R INTERVAL: 158 MS
EKG P-R INTERVAL: 220 MS
EKG Q-T INTERVAL: 438 MS
EKG Q-T INTERVAL: 452 MS
EKG QRS DURATION: 108 MS
EKG QRS DURATION: 148 MS
EKG QTC CALCULATION (BAZETT): 390 MS
EKG QTC CALCULATION (BAZETT): 479 MS
EKG R AXIS: -58 DEGREES
EKG R AXIS: -75 DEGREES
EKG T AXIS: 38 DEGREES
EKG T AXIS: 76 DEGREES
EKG VENTRICULAR RATE: 45 BPM
EKG VENTRICULAR RATE: 72 BPM
GFR AFRICAN AMERICAN: >60
GFR NON-AFRICAN AMERICAN: 52
GLUCOSE BLD-MCNC: 120 MG/DL (ref 70–99)
HCT VFR BLD CALC: 42.8 % (ref 40.5–52.5)
HEMOGLOBIN: 14.7 G/DL (ref 13.5–17.5)
MCH RBC QN AUTO: 31.5 PG (ref 26–34)
MCHC RBC AUTO-ENTMCNC: 34.4 G/DL (ref 31–36)
MCV RBC AUTO: 91.6 FL (ref 80–100)
PDW BLD-RTO: 13.7 % (ref 12.4–15.4)
PLATELET # BLD: 135 K/UL (ref 135–450)
PMV BLD AUTO: 8.9 FL (ref 5–10.5)
POTASSIUM REFLEX MAGNESIUM: 4.9 MMOL/L (ref 3.5–5.1)
RBC # BLD: 4.67 M/UL (ref 4.2–5.9)
SODIUM BLD-SCNC: 137 MMOL/L (ref 136–145)
WBC # BLD: 8.3 K/UL (ref 4–11)

## 2018-10-13 PROCEDURE — 2580000003 HC RX 258: Performed by: INTERNAL MEDICINE

## 2018-10-13 PROCEDURE — 93010 ELECTROCARDIOGRAM REPORT: CPT | Performed by: INTERNAL MEDICINE

## 2018-10-13 PROCEDURE — 97530 THERAPEUTIC ACTIVITIES: CPT

## 2018-10-13 PROCEDURE — 97116 GAIT TRAINING THERAPY: CPT

## 2018-10-13 PROCEDURE — 71046 X-RAY EXAM CHEST 2 VIEWS: CPT

## 2018-10-13 PROCEDURE — G0378 HOSPITAL OBSERVATION PER HR: HCPCS

## 2018-10-13 PROCEDURE — G8987 SELF CARE CURRENT STATUS: HCPCS

## 2018-10-13 PROCEDURE — 97162 PT EVAL MOD COMPLEX 30 MIN: CPT

## 2018-10-13 PROCEDURE — 99024 POSTOP FOLLOW-UP VISIT: CPT | Performed by: NURSE PRACTITIONER

## 2018-10-13 PROCEDURE — G8978 MOBILITY CURRENT STATUS: HCPCS

## 2018-10-13 PROCEDURE — 6370000000 HC RX 637 (ALT 250 FOR IP): Performed by: INTERNAL MEDICINE

## 2018-10-13 PROCEDURE — 97166 OT EVAL MOD COMPLEX 45 MIN: CPT

## 2018-10-13 PROCEDURE — G8988 SELF CARE GOAL STATUS: HCPCS

## 2018-10-13 PROCEDURE — 36415 COLL VENOUS BLD VENIPUNCTURE: CPT

## 2018-10-13 PROCEDURE — G8979 MOBILITY GOAL STATUS: HCPCS

## 2018-10-13 PROCEDURE — 80048 BASIC METABOLIC PNL TOTAL CA: CPT

## 2018-10-13 PROCEDURE — 85027 COMPLETE CBC AUTOMATED: CPT

## 2018-10-13 PROCEDURE — 1200000000 HC SEMI PRIVATE

## 2018-10-13 PROCEDURE — 6370000000 HC RX 637 (ALT 250 FOR IP): Performed by: NURSE PRACTITIONER

## 2018-10-13 RX ORDER — METOPROLOL TARTRATE 50 MG/1
50 TABLET, FILM COATED ORAL 2 TIMES DAILY
Status: DISCONTINUED | OUTPATIENT
Start: 2018-10-13 | End: 2018-10-14 | Stop reason: HOSPADM

## 2018-10-13 RX ORDER — ASPIRIN 81 MG/1
81 TABLET, CHEWABLE ORAL DAILY
Status: DISCONTINUED | OUTPATIENT
Start: 2018-10-20 | End: 2018-10-14 | Stop reason: HOSPADM

## 2018-10-13 RX ADMIN — SODIUM CHLORIDE, PRESERVATIVE FREE 10 ML: 5 INJECTION INTRAVENOUS at 09:52

## 2018-10-13 RX ADMIN — SIMVASTATIN 40 MG: 40 TABLET, FILM COATED ORAL at 21:31

## 2018-10-13 RX ADMIN — Medication 400 MG: at 09:52

## 2018-10-13 RX ADMIN — METOPROLOL TARTRATE 50 MG: 50 TABLET ORAL at 09:52

## 2018-10-13 RX ADMIN — VITAMIN D, TAB 1000IU (100/BT) 1000 UNITS: 25 TAB at 09:52

## 2018-10-13 RX ADMIN — SODIUM CHLORIDE, PRESERVATIVE FREE 10 ML: 5 INJECTION INTRAVENOUS at 21:31

## 2018-10-13 RX ADMIN — FAMOTIDINE 40 MG: 20 TABLET ORAL at 09:52

## 2018-10-13 RX ADMIN — GABAPENTIN 100 MG: 100 CAPSULE ORAL at 09:54

## 2018-10-13 RX ADMIN — MIRTAZAPINE 15 MG: 15 TABLET, FILM COATED ORAL at 21:31

## 2018-10-13 RX ADMIN — Medication 400 MG: at 21:31

## 2018-10-13 RX ADMIN — METOPROLOL TARTRATE 50 MG: 50 TABLET ORAL at 21:31

## 2018-10-13 RX ADMIN — GABAPENTIN 100 MG: 100 CAPSULE ORAL at 21:31

## 2018-10-13 RX ADMIN — DILTIAZEM HYDROCHLORIDE 120 MG: 120 CAPSULE, COATED, EXTENDED RELEASE ORAL at 09:54

## 2018-10-13 RX ADMIN — PREDNISONE 5 MG: 5 TABLET ORAL at 09:52

## 2018-10-13 RX ADMIN — ESCITALOPRAM OXALATE 10 MG: 10 TABLET ORAL at 09:54

## 2018-10-13 ASSESSMENT — PAIN SCALES - GENERAL
PAINLEVEL_OUTOF10: 0

## 2018-10-13 NOTE — H&P
[address] H&P  10/12/2018      Subjective:  Mr. Karolina Martínez is a 80 year male seen for HTN, HLD,  CHF recent hospitalization for multiple issues. Today he reports to feeling lethargic with lack of stamina. Denies chest pain, edema, dizziness, palpitations and syncope. He is short of breath with minimal exertion. He cant do any activity as he feels he is going to fall down if he did not lay down. He almost comes to passing out but does not lose consciousness. Once he lays down he feels back to normal.  [place]:   He was recently in the hospital in August 2017  With pneumonia. TIA in 2011 vs CVA - presented w/ lightheadedness/dizziness (still present) and expressive aphasia (now resolved). 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 He is planning on returning to [place] for winter. Today he presents to the office very fatigued. His daughter is present upon visit. She states he has been on the couch since middle of August. She states he has been in and out of the hospital 3 times now and was in rehab for 11 days after last visit. He states his appetite is ok. He states he has not been dizzy. He states he does get SOB with exertion. (ex: showering, walking to the bathroom). He denies chest pain, palpitations, or syncope.      Review of Systems:  12 point ROS negative in all areas as listed below except as in [place]  Constitutional, EENT, Cardiovascular, pulmonary, GI, , Musculoskeletal, skin, neurological, hematological, endocrine, Psychiatric     Reviewed past medical history, social, and family history.  nonsmoker          Past Medical History:   Diagnosis Date    Arthralgia      Cancer (HCC)       prostate    Chronic back pain       x40 years    Chronic diastolic (congestive) heart failure (HCC)      Diverticulitis      Dizziness      Fatigue      Fractures      Hearing decreased      Hyperlipidemia  
used smokeless tobacco.  ETOH:   reports that he does not drink alcohol. Family History:      Reviewed in detail. Positive as follows:    Family History   Problem Relation Age of Onset    Heart Disease Mother         MI    Parkinsonism Father        REVIEW OF SYSTEMS:   Pertinent positives as noted in the HPI. All other systems reviewed and negative. PHYSICAL EXAM PERFORMED:    BP (!) 112/58   Pulse 76   Temp 98.4 °F (36.9 °C) (Oral)   Resp 16   Ht 6' (1.829 m)   Wt 185 lb (83.9 kg)   SpO2 94%   BMI 25.09 kg/m²     General appearance:  Elderly male in no apparent distress, appears stated age and cooperative. HEENT:  Normal cephalic, atraumatic without obvious deformity. Pupils equal, round, and reactive to light. Extra ocular muscles intact. Conjunctivae/corneas clear. Neck: Supple, with full range of motion. No jugular venous distention. Trachea midline. Respiratory:  Normal respiratory effort. Clear to auscultation, bilaterally without Rales/Wheezes/Rhonchi. Cardiovascular:  Regular rate and rhythm with normal S1/S2 without murmurs, rubs or gallops. Left chest wall hematoma. Abdomen: Soft, non-tender, non-distended with normal bowel sounds. Musculoskeletal:  No clubbing, cyanosis or edema bilaterally. Full range of motion without deformity. Skin: Skin color, texture, turgor normal.  No rashes or lesions. Neurologic:  Neurovascularly intact without any focal sensory/motor deficits.  Cranial nerves: II-XII intact, grossly non-focal.  Psychiatric:  Alert and oriented, thought content appropriate, normal insight  Capillary Refill: Brisk,< 3 seconds   Peripheral Pulses: +2 palpable, equal bilaterally       Labs:     Recent Labs      10/12/18   0730  10/13/18   1000   WBC  7.8  8.3   HGB  14.7  14.7   HCT  44.5  42.8   PLT  160  135     Recent Labs      10/12/18   0730  10/13/18   1000   NA  141  137   K  4.2  4.9   CL  104  102   CO2  27  28   BUN  23*  26*   CREATININE  1.4*  1.3   CALCIUM

## 2018-10-13 NOTE — PROGRESS NOTES
Consult sent thru perfect serve to Dr Christian Cobb on 10/13/2018 @ [phone].  Rn Aware,   Lisa Morin

## 2018-10-13 NOTE — PROGRESS NOTES
for some  Sequencing: Requires cues for some  Cognition Comment: poor insight to pacemaker precautions        Sensation  Overall Sensation Status: WNL (in hands)  Light Touch:  (peripheral neuropathy in BLEs )        LUE AROM (degrees)  LUE General AROM: baseline WFL not tested 2/2 precautions  RUE AROM (degrees)  RUE AROM : WNL  RUE Strength  Gross RUE Strength: WFL    Assessment   Performance deficits / Impairments: Decreased functional mobility ; Decreased safe awareness;Decreased balance;Decreased ADL status; Decreased endurance;Decreased high-level IADLs  Assessment: Pt is 79 y/o male s/p elective dual chamber pacemaker implant. Pt reports being independent at home PTA with use of SPC. This date, pt ambulated with CGA and SPC yet demo'd poor safety awareness and recall of pacemaker precautions during functional mobility and ADL. At this time, OT DC recommendations are for inpatient skilled OT to maximize safety and independence s/p pacemaker. Prognosis: Good  Decision Making: Medium Complexity  Patient Education: role of OT, DC recommendations  REQUIRES OT FOLLOW UP: Yes  Activity Tolerance  Activity Tolerance: Patient Tolerated treatment well  Safety Devices  Safety Devices in place: Yes  Type of devices: Bed alarm in place;Gait belt;Nurse notified; Left in bed;Call light within reach         Plan   Plan  Times per week: 3-5x/week    G-Code  OT G-codes  Functional Assessment Tool Used: -PAC  Score: 18  Functional Limitation: Self care  Self Care Current Status (): At least 40 percent but less than 60 percent impaired, limited or restricted  Self Care Goal Status ():  At least 20 percent but less than 40 percent impaired, limited or restricted    AM-PAC Score        AM-[facility] Inpatient Daily Activity Raw Score: 18  AM-PAC Inpatient ADL T-Scale Score : 38.66  ADL Inpatient CMS 0-100% Score: 46.65  ADL Inpatient CMS G-Code Modifier : CK    Goals  Short term goals  Time Frame for Short term goals: Until DC  Short term goal 1: S ADL transfer with use of SPC  Short term goal 2: S LB dressing  Short term goal 3: S stand sink side x5' for groomin task  Short term goal 4: Pt to verbalize and adhere to pacer precautions with 100% accuracy  Patient Goals   Patient goals :  To regain independence       Therapy Time   Individual Concurrent Group Co-treatment   Time In 1055         Time Out 1125         Minutes 30         Timed Code Treatment Minutes: 1 Trent Stewart, OT

## 2018-10-13 NOTE — PROGRESS NOTES
Score: 41.77  Mobility Inpatient CMS G-Code Modifier : CK          Goals  Short term goals  Time Frame for Short term goals: 5 days   Short term goal 1: Pt to be I with bed mobility from flat bed  Short term goal 2: Pt to be SBA with transfers  Short term goal 3: Pt to ambulate 200 ft with SPC at SBA  Short term goal 4: Pt to tolerate X10-15 reps of BLE ex to improve strength  Patient Goals   Patient goals : go home       Therapy Time   Individual Concurrent Group Co-treatment   Time In 1125         Time Out 1200         Minutes 35         Timed Code Treatment Minutes: 25 Minutes       Norma Carroll, PT

## 2018-10-13 NOTE — PLAN OF CARE
Problem: Falls - Risk of:  Goal: Will remain free from falls  Will remain free from falls   Patient remains free from falls and injuries.  Hal Arauz

## 2018-10-13 NOTE — PROGRESS NOTES
Patient resting in bed. vss BP (!) 112/57   Pulse 56   Temp 98.5 °F (36.9 °C) (Oral)   Resp 18   Ht 6' (1.829 m)   Wt 185 lb (83.9 kg)   SpO2 92%   BMI 25.09 kg/m²  no new signs of bleeding at incision site. Arm in sling, Patient c/o no pain. Xray called for patient awaiting trasnport to verify lead placement.  Carlota Flores

## 2018-10-14 ENCOUNTER — HOSPITAL ENCOUNTER (INPATIENT)
Age: 83
LOS: 7 days | Discharge: HOME HEALTH | DRG: 949 | End: 2018-10-21
Attending: PHYSICAL MEDICINE & REHABILITATION | Admitting: PHYSICAL MEDICINE & REHABILITATION
Payer: MEDICARE

## 2018-10-14 VITALS
OXYGEN SATURATION: 96 % | DIASTOLIC BLOOD PRESSURE: 76 MMHG | WEIGHT: 181.7 LBS | BODY MASS INDEX: 24.61 KG/M2 | HEIGHT: 72 IN | RESPIRATION RATE: 18 BRPM | HEART RATE: 70 BPM | SYSTOLIC BLOOD PRESSURE: 133 MMHG | TEMPERATURE: 97.5 F

## 2018-10-14 PROBLEM — Z95.0 S/P PLACEMENT OF CARDIAC PACEMAKER: Status: ACTIVE | Noted: 2018-10-14

## 2018-10-14 PROCEDURE — 6370000000 HC RX 637 (ALT 250 FOR IP): Performed by: NURSE PRACTITIONER

## 2018-10-14 PROCEDURE — 1280000000 HC REHAB R&B

## 2018-10-14 PROCEDURE — 6370000000 HC RX 637 (ALT 250 FOR IP): Performed by: INTERNAL MEDICINE

## 2018-10-14 PROCEDURE — 6370000000 HC RX 637 (ALT 250 FOR IP): Performed by: PHYSICAL MEDICINE & REHABILITATION

## 2018-10-14 PROCEDURE — 2580000003 HC RX 258: Performed by: INTERNAL MEDICINE

## 2018-10-14 RX ORDER — SIMVASTATIN 40 MG
40 TABLET ORAL NIGHTLY
Status: CANCELLED | OUTPATIENT
Start: 2018-10-14

## 2018-10-14 RX ORDER — METOPROLOL TARTRATE 50 MG/1
50 TABLET, FILM COATED ORAL 2 TIMES DAILY
Status: DISCONTINUED | OUTPATIENT
Start: 2018-10-14 | End: 2018-10-21 | Stop reason: HOSPADM

## 2018-10-14 RX ORDER — ONDANSETRON HYDROCHLORIDE 8 MG/1
4 TABLET, FILM COATED ORAL EVERY 8 HOURS PRN
Status: CANCELLED | OUTPATIENT
Start: 2018-10-14

## 2018-10-14 RX ORDER — DOCUSATE SODIUM 100 MG/1
100 CAPSULE, LIQUID FILLED ORAL 2 TIMES DAILY
Status: DISCONTINUED | OUTPATIENT
Start: 2018-10-14 | End: 2018-10-21 | Stop reason: HOSPADM

## 2018-10-14 RX ORDER — PREDNISONE 1 MG/1
5 TABLET ORAL DAILY
Status: DISCONTINUED | OUTPATIENT
Start: 2018-10-15 | End: 2018-10-21 | Stop reason: HOSPADM

## 2018-10-14 RX ORDER — GABAPENTIN 100 MG/1
100 CAPSULE ORAL 2 TIMES DAILY
Status: DISCONTINUED | OUTPATIENT
Start: 2018-10-14 | End: 2018-10-21 | Stop reason: HOSPADM

## 2018-10-14 RX ORDER — ASPIRIN 81 MG/1
81 TABLET, CHEWABLE ORAL DAILY
Status: DISCONTINUED | OUTPATIENT
Start: 2018-10-20 | End: 2018-10-21 | Stop reason: HOSPADM

## 2018-10-14 RX ORDER — MECLIZINE HCL 12.5 MG/1
12.5 TABLET ORAL 3 TIMES DAILY PRN
Status: CANCELLED | OUTPATIENT
Start: 2018-10-14

## 2018-10-14 RX ORDER — FAMOTIDINE 20 MG/1
40 TABLET, FILM COATED ORAL DAILY
Status: CANCELLED | OUTPATIENT
Start: 2018-10-15

## 2018-10-14 RX ORDER — ESCITALOPRAM OXALATE 10 MG/1
10 TABLET ORAL DAILY
Status: DISCONTINUED | OUTPATIENT
Start: 2018-10-15 | End: 2018-10-21 | Stop reason: HOSPADM

## 2018-10-14 RX ORDER — POLYETHYLENE GLYCOL 3350 17 G/17G
17 POWDER, FOR SOLUTION ORAL DAILY PRN
Status: CANCELLED | OUTPATIENT
Start: 2018-10-14

## 2018-10-14 RX ORDER — ACETAMINOPHEN 325 MG/1
650 TABLET ORAL EVERY 6 HOURS PRN
Status: DISCONTINUED | OUTPATIENT
Start: 2018-10-14 | End: 2018-10-21 | Stop reason: HOSPADM

## 2018-10-14 RX ORDER — BISACODYL 10 MG
10 SUPPOSITORY, RECTAL RECTAL DAILY PRN
Status: CANCELLED | OUTPATIENT
Start: 2018-10-14

## 2018-10-14 RX ORDER — LOPERAMIDE HYDROCHLORIDE 2 MG/1
2 CAPSULE ORAL 4 TIMES DAILY PRN
Status: DISCONTINUED | OUTPATIENT
Start: 2018-10-14 | End: 2018-10-21 | Stop reason: HOSPADM

## 2018-10-14 RX ORDER — CHOLECALCIFEROL (VITAMIN D3) 125 MCG
250 CAPSULE ORAL DAILY
Status: DISCONTINUED | OUTPATIENT
Start: 2018-10-15 | End: 2018-10-21 | Stop reason: HOSPADM

## 2018-10-14 RX ORDER — ACETAMINOPHEN 325 MG/1
650 TABLET ORAL EVERY 6 HOURS PRN
Status: CANCELLED | OUTPATIENT
Start: 2018-10-14

## 2018-10-14 RX ORDER — DIPHENHYDRAMINE HCL 25 MG
25 TABLET ORAL NIGHTLY PRN
Status: CANCELLED | OUTPATIENT
Start: 2018-10-14

## 2018-10-14 RX ORDER — CLOPIDOGREL BISULFATE 75 MG/1
75 TABLET ORAL DAILY
Status: DISCONTINUED | OUTPATIENT
Start: 2018-10-15 | End: 2018-10-21 | Stop reason: HOSPADM

## 2018-10-14 RX ORDER — POLYETHYLENE GLYCOL 3350 17 G/17G
17 POWDER, FOR SOLUTION ORAL DAILY PRN
Status: DISCONTINUED | OUTPATIENT
Start: 2018-10-14 | End: 2018-10-21 | Stop reason: HOSPADM

## 2018-10-14 RX ORDER — BRAN 5 G
1 WAFER ORAL
Status: CANCELLED | OUTPATIENT
Start: 2018-10-15

## 2018-10-14 RX ORDER — LOPERAMIDE HYDROCHLORIDE 2 MG/1
2 CAPSULE ORAL 4 TIMES DAILY PRN
Status: CANCELLED | OUTPATIENT
Start: 2018-10-14

## 2018-10-14 RX ORDER — METOPROLOL TARTRATE 50 MG/1
50 TABLET, FILM COATED ORAL 2 TIMES DAILY
Status: CANCELLED | OUTPATIENT
Start: 2018-10-14

## 2018-10-14 RX ORDER — ESCITALOPRAM OXALATE 10 MG/1
10 TABLET ORAL DAILY
Status: CANCELLED | OUTPATIENT
Start: 2018-10-15

## 2018-10-14 RX ORDER — PREDNISONE 1 MG/1
5 TABLET ORAL DAILY
Status: CANCELLED | OUTPATIENT
Start: 2018-10-15

## 2018-10-14 RX ORDER — ONDANSETRON 4 MG/1
4 TABLET, FILM COATED ORAL EVERY 8 HOURS PRN
Status: DISCONTINUED | OUTPATIENT
Start: 2018-10-14 | End: 2018-10-21 | Stop reason: HOSPADM

## 2018-10-14 RX ORDER — DILTIAZEM HYDROCHLORIDE 120 MG/1
120 CAPSULE, COATED, EXTENDED RELEASE ORAL DAILY
Qty: 30 CAPSULE | Refills: 3 | DISCHARGE
Start: 2018-10-15 | End: 2018-11-13 | Stop reason: SDUPTHER

## 2018-10-14 RX ORDER — METOPROLOL TARTRATE 50 MG/1
50 TABLET, FILM COATED ORAL 2 TIMES DAILY
Qty: 60 TABLET | Refills: 3 | Status: ON HOLD | DISCHARGE
Start: 2018-10-14 | End: 2018-10-19 | Stop reason: HOSPADM

## 2018-10-14 RX ORDER — MECLIZINE HCL 12.5 MG/1
12.5 TABLET ORAL 3 TIMES DAILY PRN
Status: DISCONTINUED | OUTPATIENT
Start: 2018-10-14 | End: 2018-10-21 | Stop reason: HOSPADM

## 2018-10-14 RX ORDER — DILTIAZEM HYDROCHLORIDE 120 MG/1
120 CAPSULE, COATED, EXTENDED RELEASE ORAL DAILY
Status: DISCONTINUED | OUTPATIENT
Start: 2018-10-15 | End: 2018-10-21 | Stop reason: HOSPADM

## 2018-10-14 RX ORDER — ACETAMINOPHEN 500 MG
500 TABLET ORAL NIGHTLY PRN
Status: DISCONTINUED | OUTPATIENT
Start: 2018-10-14 | End: 2018-10-21 | Stop reason: HOSPADM

## 2018-10-14 RX ORDER — MIRTAZAPINE 15 MG/1
15 TABLET, FILM COATED ORAL NIGHTLY
Status: CANCELLED | OUTPATIENT
Start: 2018-10-14

## 2018-10-14 RX ORDER — GABAPENTIN 100 MG/1
100 CAPSULE ORAL 2 TIMES DAILY
Status: CANCELLED | OUTPATIENT
Start: 2018-10-14

## 2018-10-14 RX ORDER — BISACODYL 10 MG
10 SUPPOSITORY, RECTAL RECTAL DAILY PRN
Status: DISCONTINUED | OUTPATIENT
Start: 2018-10-14 | End: 2018-10-21 | Stop reason: HOSPADM

## 2018-10-14 RX ORDER — DILTIAZEM HYDROCHLORIDE 120 MG/1
120 CAPSULE, COATED, EXTENDED RELEASE ORAL DAILY
Status: CANCELLED | OUTPATIENT
Start: 2018-10-15

## 2018-10-14 RX ORDER — BRAN 5 G
1 WAFER ORAL
Status: DISCONTINUED | OUTPATIENT
Start: 2018-10-15 | End: 2018-10-14 | Stop reason: RX

## 2018-10-14 RX ORDER — CLOPIDOGREL BISULFATE 75 MG/1
75 TABLET ORAL DAILY
Status: CANCELLED | OUTPATIENT
Start: 2018-10-15

## 2018-10-14 RX ORDER — DIPHENHYDRAMINE HCL 25 MG
25 TABLET ORAL NIGHTLY PRN
Status: DISCONTINUED | OUTPATIENT
Start: 2018-10-14 | End: 2018-10-21 | Stop reason: HOSPADM

## 2018-10-14 RX ORDER — FAMOTIDINE 20 MG/1
40 TABLET, FILM COATED ORAL DAILY
Status: DISCONTINUED | OUTPATIENT
Start: 2018-10-15 | End: 2018-10-21 | Stop reason: HOSPADM

## 2018-10-14 RX ORDER — UBIDECARENONE 75 MG
50 CAPSULE ORAL DAILY
Status: CANCELLED | OUTPATIENT
Start: 2018-10-15

## 2018-10-14 RX ORDER — SIMVASTATIN 40 MG
40 TABLET ORAL NIGHTLY
Status: DISCONTINUED | OUTPATIENT
Start: 2018-10-14 | End: 2018-10-21 | Stop reason: HOSPADM

## 2018-10-14 RX ORDER — DOCUSATE SODIUM 100 MG/1
100 CAPSULE, LIQUID FILLED ORAL 2 TIMES DAILY
Status: CANCELLED | OUTPATIENT
Start: 2018-10-14

## 2018-10-14 RX ORDER — ASPIRIN 81 MG/1
81 TABLET, CHEWABLE ORAL DAILY
Status: CANCELLED | OUTPATIENT
Start: 2018-10-20

## 2018-10-14 RX ORDER — MIRTAZAPINE 15 MG/1
15 TABLET, FILM COATED ORAL NIGHTLY
Status: DISCONTINUED | OUTPATIENT
Start: 2018-10-14 | End: 2018-10-21 | Stop reason: HOSPADM

## 2018-10-14 RX ORDER — ACETAMINOPHEN 500 MG
500 TABLET ORAL NIGHTLY PRN
Status: CANCELLED | OUTPATIENT
Start: 2018-10-14

## 2018-10-14 RX ADMIN — METOPROLOL TARTRATE 50 MG: 50 TABLET ORAL at 23:00

## 2018-10-14 RX ADMIN — SODIUM CHLORIDE, PRESERVATIVE FREE 10 ML: 5 INJECTION INTRAVENOUS at 08:53

## 2018-10-14 RX ADMIN — FAMOTIDINE 40 MG: 20 TABLET ORAL at 08:52

## 2018-10-14 RX ADMIN — VITAMIN D, TAB 1000IU (100/BT) 1000 UNITS: 25 TAB at 08:52

## 2018-10-14 RX ADMIN — DOCUSATE SODIUM 100 MG: 100 CAPSULE, LIQUID FILLED ORAL at 23:00

## 2018-10-14 RX ADMIN — METOPROLOL TARTRATE 50 MG: 50 TABLET ORAL at 08:53

## 2018-10-14 RX ADMIN — GABAPENTIN 100 MG: 100 CAPSULE ORAL at 23:30

## 2018-10-14 RX ADMIN — GABAPENTIN 100 MG: 100 CAPSULE ORAL at 08:52

## 2018-10-14 RX ADMIN — ESCITALOPRAM OXALATE 10 MG: 10 TABLET ORAL at 08:52

## 2018-10-14 RX ADMIN — PREDNISONE 5 MG: 5 TABLET ORAL at 08:52

## 2018-10-14 RX ADMIN — ACETAMINOPHEN 500 MG: 500 TABLET ORAL at 22:59

## 2018-10-14 RX ADMIN — Medication 400 MG: at 08:52

## 2018-10-14 RX ADMIN — DILTIAZEM HYDROCHLORIDE 120 MG: 120 CAPSULE, COATED, EXTENDED RELEASE ORAL at 08:53

## 2018-10-14 RX ADMIN — Medication 400 MG: at 23:30

## 2018-10-14 RX ADMIN — MIRTAZAPINE 15 MG: 15 TABLET, FILM COATED ORAL at 23:30

## 2018-10-14 RX ADMIN — SIMVASTATIN 40 MG: 40 TABLET, FILM COATED ORAL at 22:59

## 2018-10-14 ASSESSMENT — PAIN SCALES - GENERAL
PAINLEVEL_OUTOF10: 0

## 2018-10-15 ENCOUNTER — TELEPHONE (OUTPATIENT)
Dept: CARDIOLOGY CLINIC | Age: 83
End: 2018-10-15

## 2018-10-15 LAB
ANION GAP SERPL CALCULATED.3IONS-SCNC: 11 MMOL/L (ref 3–16)
BASOPHILS ABSOLUTE: 0.1 K/UL (ref 0–0.2)
BASOPHILS RELATIVE PERCENT: 0.8 %
BUN BLDV-MCNC: 28 MG/DL (ref 7–20)
CALCIUM SERPL-MCNC: 9.3 MG/DL (ref 8.3–10.6)
CHLORIDE BLD-SCNC: 104 MMOL/L (ref 99–110)
CO2: 29 MMOL/L (ref 21–32)
CREAT SERPL-MCNC: 1.3 MG/DL (ref 0.8–1.3)
EOSINOPHILS ABSOLUTE: 0.2 K/UL (ref 0–0.6)
EOSINOPHILS RELATIVE PERCENT: 2.4 %
GFR AFRICAN AMERICAN: >60
GFR NON-AFRICAN AMERICAN: 52
GLUCOSE BLD-MCNC: 83 MG/DL (ref 70–99)
HCT VFR BLD CALC: 44.7 % (ref 40.5–52.5)
HEMOGLOBIN: 14.9 G/DL (ref 13.5–17.5)
LYMPHOCYTES ABSOLUTE: 1.4 K/UL (ref 1–5.1)
LYMPHOCYTES RELATIVE PERCENT: 15.9 %
MCH RBC QN AUTO: 31 PG (ref 26–34)
MCHC RBC AUTO-ENTMCNC: 33.2 G/DL (ref 31–36)
MCV RBC AUTO: 93.3 FL (ref 80–100)
MONOCYTES ABSOLUTE: 0.9 K/UL (ref 0–1.3)
MONOCYTES RELATIVE PERCENT: 10.2 %
NEUTROPHILS ABSOLUTE: 6.4 K/UL (ref 1.7–7.7)
NEUTROPHILS RELATIVE PERCENT: 70.7 %
PDW BLD-RTO: 13.4 % (ref 12.4–15.4)
PLATELET # BLD: 120 K/UL (ref 135–450)
PMV BLD AUTO: 9.2 FL (ref 5–10.5)
POTASSIUM REFLEX MAGNESIUM: 4.9 MMOL/L (ref 3.5–5.1)
PREALBUMIN: 23.8 MG/DL (ref 20–40)
RBC # BLD: 4.8 M/UL (ref 4.2–5.9)
SODIUM BLD-SCNC: 144 MMOL/L (ref 136–145)
WBC # BLD: 9.1 K/UL (ref 4–11)

## 2018-10-15 PROCEDURE — 6370000000 HC RX 637 (ALT 250 FOR IP): Performed by: PHYSICAL MEDICINE & REHABILITATION

## 2018-10-15 PROCEDURE — 84134 ASSAY OF PREALBUMIN: CPT

## 2018-10-15 PROCEDURE — 97530 THERAPEUTIC ACTIVITIES: CPT

## 2018-10-15 PROCEDURE — 97110 THERAPEUTIC EXERCISES: CPT

## 2018-10-15 PROCEDURE — 80048 BASIC METABOLIC PNL TOTAL CA: CPT

## 2018-10-15 PROCEDURE — 1280000000 HC REHAB R&B

## 2018-10-15 PROCEDURE — 97116 GAIT TRAINING THERAPY: CPT

## 2018-10-15 PROCEDURE — 97535 SELF CARE MNGMENT TRAINING: CPT

## 2018-10-15 PROCEDURE — 97163 PT EVAL HIGH COMPLEX 45 MIN: CPT

## 2018-10-15 PROCEDURE — 36415 COLL VENOUS BLD VENIPUNCTURE: CPT

## 2018-10-15 PROCEDURE — 97166 OT EVAL MOD COMPLEX 45 MIN: CPT

## 2018-10-15 PROCEDURE — 97542 WHEELCHAIR MNGMENT TRAINING: CPT

## 2018-10-15 PROCEDURE — 85025 COMPLETE CBC W/AUTO DIFF WBC: CPT

## 2018-10-15 RX ADMIN — DILTIAZEM HYDROCHLORIDE 120 MG: 120 CAPSULE, COATED, EXTENDED RELEASE ORAL at 08:55

## 2018-10-15 RX ADMIN — VITAMIN D, TAB 1000IU (100/BT) 1000 UNITS: 25 TAB at 08:54

## 2018-10-15 RX ADMIN — PREDNISONE 5 MG: 5 TABLET ORAL at 08:55

## 2018-10-15 RX ADMIN — SIMVASTATIN 40 MG: 40 TABLET, FILM COATED ORAL at 21:33

## 2018-10-15 RX ADMIN — METOPROLOL TARTRATE 50 MG: 50 TABLET ORAL at 08:55

## 2018-10-15 RX ADMIN — ESCITALOPRAM OXALATE 10 MG: 10 TABLET ORAL at 08:54

## 2018-10-15 RX ADMIN — GABAPENTIN 100 MG: 100 CAPSULE ORAL at 08:54

## 2018-10-15 RX ADMIN — DOCUSATE SODIUM 100 MG: 100 CAPSULE, LIQUID FILLED ORAL at 08:54

## 2018-10-15 RX ADMIN — FAMOTIDINE 40 MG: 20 TABLET ORAL at 08:54

## 2018-10-15 RX ADMIN — Medication 400 MG: at 08:54

## 2018-10-15 RX ADMIN — MIRTAZAPINE 15 MG: 15 TABLET, FILM COATED ORAL at 21:33

## 2018-10-15 RX ADMIN — METOPROLOL TARTRATE 50 MG: 50 TABLET ORAL at 21:34

## 2018-10-15 RX ADMIN — GABAPENTIN 100 MG: 100 CAPSULE ORAL at 21:33

## 2018-10-15 RX ADMIN — Medication 400 MG: at 21:34

## 2018-10-15 ASSESSMENT — PAIN SCALES - GENERAL: PAINLEVEL_OUTOF10: 0

## 2018-10-15 NOTE — PROGRESS NOTES
mins (exact) per day for 10 days (exact)                   Current Treatment Recommendations: Strengthening, ROM, Balance Training, Endurance Training, Transfer Training, Gait Training, Stair training, Safety Education & Training, Functional Mobility Training, Home Exercise Program, Patient/Caregiver Education & Training, Equipment Evaluation, Education, & procurement, Positioning      OCCUPATIONAL THERAPY:  Goals:             Short term goals  Time Frame for Short term goals: 5 days   Short term goal 1: Perform bathing Mod I  Short term goal 2: Perform toileting independently   Short term goal 3: Perform toilet transfers Mod I  Short term goal 4: Perform UE dressing independently  :  Long term goals  Time Frame for Long term goals : 10 days   Long term goal 1: Perform tub/shower transfers with Supervision  Long term goal 2: Perform simple meal prep/kitchen activities Mod I  Long term goal 3: Perform LE dressing independently  : These goals were reviewed with this patient at the time of assessment and Michael Andino is in agreement    Plan of Care:  Pt to be seen 5 out of 7 days per week, 90 mins (exact) per day for 10 days (exact)         SPEECH THERAPY: Goals will be left blank if speech is not following this patient. Goals: These goals were reviewed with this patient at the time of assessment and Michael Andino is in agreement    Plan of Care: Pt to be seen 5 out of 7 days per week,    mins (exact) per day for  days (exact)             Dysphagia:             Speech/Language/Cognition: *      CASE MANAGEMENT:  Goals:   Assist patient/family with discharge planning, patient/family counseling,   and coordination with insurance during ARU stay.       Admission Period/Goal FIM SCORES  FIM Admit/Goal Score   Eating/Swallowing 7/7   Grooming 5/7   Bathing 4/6   Upper Body Dressing 5/7   Lower Body Dressing 4/7   Toileting 4/7   Bladder Melina,

## 2018-10-15 NOTE — TELEPHONE ENCOUNTER
Spoke with JOHN Dixon in acute rehab. Mr Beryl Pitts is due to start Plavix today however she states his hematoma at surgery site is still very red and swollen. She would like to know if she should continue to hold Plavix or go ahead and give it. Hospital note 10/13/18    Assessment:  1. Sinus node dysfunction: s/p dual chamber pacemaker implant on 10/12/2018  2. High PVC burden: known problem  3. Dizziness: chronic  4. 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 HTN: poorly controlled  6. History of TIA  7. CKD     Plan:   1. Increase metoprolol to 50mg po BID for PVCs  2. Continue Cardizem  3. Decrease ASA to 81mg po QD and hold for one week  4. Hold Plavix today due to hematoma  5. Recommend ECF since patient lives alone and requires assistance due to limited mobility post pacemaker implant. Patient and daughter are agreeable. 6. Due to high PVC burden, his pulse ox continues to read HR 35. Due to decreased PVC perfusion, this issue will not be solved with a pacemaker. Medications are now able to be adjusted to aid in suppressing PVCs. 7. Post procedure instructions reviewed. CXR normal from device standpoint. Device check reviewed. 8. PT/OT evaluation  9.  Consult hospitalist service for medical management/workup for [facility]

## 2018-10-15 NOTE — PROGRESS NOTES
ambulation of community distances with use of SPC. Pt currently requires SBA to supervision for safety with transfers, ambulation, and stair negotiation with occasional cues for safety. Pt is limited by reduced balance and decreased activity tolerance from baseline. Pt will benefit from continued PT services for increased safety and independence with functional mobility to facilitate safe return home at D/C. Treatment Diagnosis: Difficulty walking R26.2  Prognosis: Good  Decision Making: High Complexity  Patient Education: Role of PT, PPM precautions, safety with transfers, gait and stair training, w/c training, POC  Barriers to Learning: None, pt verbalized his understanding  REQUIRES PT FOLLOW UP: Yes  Activity Tolerance: Patient Tolerated treatment well  Activity Tolerance: SpO2 95-97% on RA throughout session. HR 88-90bpm throughout session.  Pt with no complaints     Plan   Plan  Times per week: 5 out 7 days/wk  Times per day: Daily  Current Treatment Recommendations: Strengthening, ROM, Balance Training, Endurance Training, Transfer Training, Gait Training, Stair training, Safety Education & Training, Functional Mobility Training, Home Exercise Program, Patient/Caregiver Education & Training, Equipment Evaluation, Education, & procurement, Positioning  Safety Devices  Type of devices: Bed alarm in place, Left in bed, Patient at risk for falls, Call light within reach, Gait belt     OutComes Score  [place] Balance Score: 49  Dynamic Gait Total Score: 19              Goals  Short term goals  Time Frame for Short term goals: 10/19/18  Short term goal 1: Pt will demonstrate independence with bed mobility  Short term goal 2: Pt will safely ascend and descend 1 flight of stairs with use of cane and and rail with CGA  Short term goal 3: Pt will safely transfer sit<>stand with independence  Long term goals  Time Frame for Long term goals : 10/22/18  Long term goal 1: Pt will demonstrate independence with car

## 2018-10-15 NOTE — DISCHARGE SUMMARY
Hospital Medicine Discharge Summary    Patient ID: Brianne Lopez      Patient's PCP: Kavon Tineo MD    Admit Date: 10/12/2018     Discharge Date: 10/14/2018      Admitting Physician: Milda Goldberg, MD     Discharge Physician: KAREN Arcos - CNP     Discharge Diagnoses: Active Hospital Problems    Diagnosis Date Noted    Status post placement of cardiac pacemaker [Z95.0] 10/13/2018    Sinus node dysfunction ([address] [I49.5] 10/12/2018    Essential hypertension [I10] 09/06/2012       The patient was seen and examined on day of discharge and this discharge summary is in conjunction with any daily progress note from day of discharge. Hospital Course:     80 y.o. male who presented to [facility] for elective dual chamber pacemaker implant due to bradycardia on 10/13/18. Pt doing well postoperatively. Has a hematoma and tenderness at pacemaker site. He denies dyspnea or chest pain. No N/V/D. No fever or chills. Hospitalist service consulted to assume care. Sinus node dysfunction status post dual chamber pacemaker implant on 10/12/18:   - EP following. Metoprolol increased to 50 mg PO BID for PVCs. Continue cardizem. Aspirin decreased to 81 mg daily and to be held for one week post-pacer placement. Ok to resume Plavix. Continue to monitor site.      Chronic combined systolic and diastolic CHF:  - Pt is currently compensated. Daily weights. Close I's and O's.      Hypertension, controlled:  - Continue metoprolol and cardizem. Amlodipine stopped per cards.     History of TIA:  - Monitor. Continue aspirin and statin.      Hyperlipidemia:  - Continue statin.      Chronic kidney disease:   - Monitor. Avoid nephrotoxic agents.     Polymyalgia rheumatica:  - 5 mg daily. Uncertain why he is on this, possibly for arthritis.      GERD:  - Continue Pepcid.        Physical Exam Performed:     /76   Pulse 70   Temp 97.5 °F (36.4 °C) (Oral)   Resp 18   Ht 6' (1.829 m)   Wt 181 lb 11.2 oz

## 2018-10-16 PROCEDURE — 97530 THERAPEUTIC ACTIVITIES: CPT

## 2018-10-16 PROCEDURE — 97110 THERAPEUTIC EXERCISES: CPT

## 2018-10-16 PROCEDURE — 6370000000 HC RX 637 (ALT 250 FOR IP): Performed by: PHYSICAL MEDICINE & REHABILITATION

## 2018-10-16 PROCEDURE — 97116 GAIT TRAINING THERAPY: CPT

## 2018-10-16 PROCEDURE — 1280000000 HC REHAB R&B

## 2018-10-16 PROCEDURE — 97535 SELF CARE MNGMENT TRAINING: CPT

## 2018-10-16 RX ADMIN — CLOPIDOGREL BISULFATE 75 MG: 75 TABLET ORAL at 08:16

## 2018-10-16 RX ADMIN — PREDNISONE 5 MG: 5 TABLET ORAL at 08:15

## 2018-10-16 RX ADMIN — Medication 400 MG: at 08:16

## 2018-10-16 RX ADMIN — METOPROLOL TARTRATE 50 MG: 50 TABLET ORAL at 21:07

## 2018-10-16 RX ADMIN — MIRTAZAPINE 15 MG: 15 TABLET, FILM COATED ORAL at 21:07

## 2018-10-16 RX ADMIN — GABAPENTIN 100 MG: 100 CAPSULE ORAL at 21:07

## 2018-10-16 RX ADMIN — VITAMIN D, TAB 1000IU (100/BT) 1000 UNITS: 25 TAB at 08:16

## 2018-10-16 RX ADMIN — DILTIAZEM HYDROCHLORIDE 120 MG: 120 CAPSULE, COATED, EXTENDED RELEASE ORAL at 08:16

## 2018-10-16 RX ADMIN — Medication 250 MCG: at 08:17

## 2018-10-16 RX ADMIN — GABAPENTIN 100 MG: 100 CAPSULE ORAL at 08:15

## 2018-10-16 RX ADMIN — Medication 400 MG: at 21:07

## 2018-10-16 RX ADMIN — ESCITALOPRAM OXALATE 10 MG: 10 TABLET ORAL at 08:15

## 2018-10-16 RX ADMIN — SIMVASTATIN 40 MG: 40 TABLET, FILM COATED ORAL at 21:07

## 2018-10-16 RX ADMIN — FAMOTIDINE 40 MG: 20 TABLET ORAL at 08:15

## 2018-10-16 ASSESSMENT — PAIN DESCRIPTION - PAIN TYPE: TYPE: CHRONIC PAIN

## 2018-10-16 ASSESSMENT — PAIN SCALES - GENERAL
PAINLEVEL_OUTOF10: 2
PAINLEVEL_OUTOF10: 0
PAINLEVEL_OUTOF10: 0

## 2018-10-16 ASSESSMENT — PAIN DESCRIPTION - LOCATION: LOCATION: FOOT

## 2018-10-16 ASSESSMENT — PAIN DESCRIPTION - ORIENTATION: ORIENTATION: LEFT;RIGHT

## 2018-10-16 NOTE — PROGRESS NOTES
Occupational Therapy  Facility/Department: [facility]  Daily Treatment Note  NAME: Austen Mays  : 1927  MRN: [id number]    Date of Service: 10/16/2018    Discharge Recommendations:  Home with assist PRN  OT Equipment Recommendations  Other: Shower chair    Patient Diagnosis(es): There were no encounter diagnoses. has a past medical history of Arthralgia; Cancer ([facility] [address] Chronic back pain; Chronic diastolic (congestive) heart failure ([facility] [address] Diverticulitis; Dizziness; Fatigue; Fractures; Hearing decreased; Hyperlipidemia; Hypertension; Osteoarthritis of knee; Osteoarthritis of shoulder; TIA (transient ischemic attack); Vertigo; and Wears glasses. has a past surgical history that includes Inguinal hernia repair; Cataract removal (); Prostatectomy (); Colonoscopy; Upper gastrointestinal endoscopy (2011); Esophagus dilation; knee surgery (Bilateral); Total shoulder arthroplasty; shoulder surgery; Toe Surgery (Right, 2013); joint replacement; and Total knee arthroplasty.     Restrictions  Restrictions/Precautions  Restrictions/Precautions: Fall Risk, General Precautions  Implants present? : Pacemaker  Position Activity Restriction  Sternal Precautions: 5# Lifting Restrictions, No Pulling, No Pushing (LUE only)  Other position/activity restrictions: LUE pacemaker precautions  Subjective   General  Chart Reviewed: Yes  Patient assessed for rehabilitation services?: Yes  Family / Caregiver Present: No  Referring Practitioner: Brendon Hogan  Diagnosis: cardiac debility  Subjective  Subjective: Pt. supine in bed, agreeable to therapy  General Comment  Comments: RN approved therapy  Pain Assessment  Patient Currently in Pain: Denies  Vital Signs  Patient Currently in Pain: Denies   Orientation   WFL  Objective    ADL  Grooming: Stand by assistance (Grooming at sink)  LE Dressing: Stand by assistance (to don/doff shoes)  Instrumental ADL's  Instrumental ADLs: Yes  Meal Prep  Meal Prep Level: Cane  Meal Prep Level of Assistance: Stand by assistance  Meal Preparation: Pt. performed simulated item retrieval and transportation of items from cabinet to microwave. Pt. was educated on placement of items and reported that he does not perform much cooking tasks at home. Pt. was able to adhere to precautions throughout. Balance  Sitting Balance: Supervision  Standing Balance: Stand by assistance  Standing Balance  Sit to stand: Stand by assistance  Stand to sit: Stand by assistance  Functional Mobility  Functional - Mobility Device: Cane  Activity: To/from bathroom  Assist Level: Stand by assistance     Transfers  Sit to stand: Stand by assistance  Stand to sit: Stand by assistance        Coordination  Gross Motor: Pt. performed dynamic standing task w/ lateral step over cones and ball toss to rebounder. Pt. performed 2x3 in each direction with seated rest breaks in between sets, CGA and increased time to recover. Cognition  Overall Cognitive Status: Exceptions  Memory: Decreased recall of precautions  Safety Judgement: Decreased awareness of need for assistance;Decreased awareness of need for safety     Second Session Assessment: Pt performed standing tasks with good endurance and balance with 1-2 minor LOB but able to correct with SBA. Pt performed therex on BUEs with 3# weight while maintaining to LUE pacemaker precautions. Pt required mod VCs for maintaining precautions during sit<>stand. Pt able to stand from floor x2 with supervision and min VCs for technique while maintaining pacemaker precautions.     Balance  Sitting Balance: Supervision  Standing Balance: Stand by assistance  Standing Balance  Time: 5-6 minutes x2, 2-3 minutes x3  Activity: collection and placing of large beads on post, functional mobility, bathroom mobility  Sit to stand: Stand by assistance  Stand to sit: Stand by assistance  Functional Mobility  Functional - Mobility Device: Cane  Activity: To/from bathroomOlya Items, Transport Items, Other  Assist Level: Stand by assistance     Transfers  Sit to stand: Stand by assistance  Stand to sit: Stand by assistance      Coordination  Gross Motor: good coordination for collection of large beads and placing on post, good coordination for fall recovery from floor without use of LUE    Exercises:  3#  Shoulder Flexion: x15, RUE, LUE to 90*  Shoulder Horizontal Abduction: x15 RUE, to 90* LUE  Elbow Flexion: x15  Wrist Flexion: x15  Wrist Extension: x15  Supination: x15  Pronation: x15    Assessment   Performance deficits / Impairments: Decreased functional mobility ; Decreased ADL status; Decreased high-level IADLs;Decreased endurance  Assessment: Pt. was agreeable to therapy this date. Pt. demo'd improved activity tolerance and dynamic standing balance. Pt. required decreased assist for functional mobility w/ occasional LOB, but was able to self correct. Pt. was provided w/ elastic shoe laces for safety. Pt. would cont to benefit from OT. Cont. POC.   Prognosis: Good  Patient Education: role of OT, transfers, ADLs, safety   REQUIRES OT FOLLOW UP: Yes  Safety Devices  Safety Devices in place: Yes  Type of devices:  (left w/ PT)          Plan   Plan  Times per week: 5-7x/week   Plan weeks: 1 week  Current Treatment Recommendations: Strengthening, Patient/Caregiver Education & Training, Equipment Evaluation, Education, & procurement, Balance Training, ROM, Functional Mobility Training, Endurance Training, Safety Education & Training, Self-Care / ADL       Goals  Short term goals  Time Frame for Short term goals: 5 days   Short term goal 1: Perform bathing Mod I  Short term goal 2: Perform toileting independently   Short term goal 3: Perform toilet transfers Mod I  Short term goal 4: Perform UE dressing independently   Long term goals  Time Frame for Long term goals : 10 days   Long term goal 1: Perform tub/shower transfers with Supervision  Long term goal 2: Perform simple meal prep/kitchen

## 2018-10-16 NOTE — PROGRESS NOTES
strength, compensatory strategies, equipment    Bradycardia status post pacemaker placement. Continue Plavix. Hold aspirin for 1 week, resume on October 19. Continue metoprolol at current dose per EP recs.      Chronic combined systolic and diastolic CHF. Continue daily weights.     HTN. Continue metoprolol, Cardizem      Depression/Insomnia. Escitalopram, Remeron, tylenol pm     Bowels: Schedule colace, prn miralax and MoM. Follow bowel movements. Enema or suppository if needed.      Bladder: Check PVR x 3. [address] if PVR > 300ml. Rehab Progress: Interdisciplinary team conference was held today with entire rehab treatment team including PT, OT, Dietician, RN, and SW. Discussion focused on progress toward rehab goals and discharge planning. Making good progress with functional mobility. Barriers - balance, endurance. Total treatment time >35 min with greater than 50% spent in care coordination. Anticipated Dispo: home alone  Services:  PT, OT, RN  DME: shower chair  ELOS: 10/21      Frankey Lame.  Ana Dwyer MD 10/16/2018, 8:49 AM

## 2018-10-16 NOTE — PATIENT CARE CONFERENCE
[facility]  Inpatient Rehabilitation  Weekly Team Conference Note    Date:   Patient Name: Marylee France        MRN: [id number]    : 1927  (80 y.o.)  Gender: male   Referring Practitioner: Claudia Louis MD  Diagnosis: bradycardia s/p pacemaker implant 10/12      Interventions to be utilized toward barriers to discharge, per discipline:  [address] observed barriers to dc: Medication managment  Nursing interventions:  Family Education:   Fall Risk:  Yes      Physical therapy observed barriers to dc:    Baseline: Independent to modified independent with SPC, family and neighbors available to help out as needed   Current level: Mild balance impairment with dynamic standing and gait, requiring SBA to CGA for safety   Barriers to DC: Lives alone   Needs in order to achieve dc home/next level of care: Improved dynamic balance, modified independent with use of SPC with ambulation without supervision/cues for safety.       Physical therapy interventions:   Current Treatment Recommendations: Strengthening, ROM, Balance Training, Endurance Training, Transfer Training, Gait Training, Stair training, Safety Education & Training, Functional Mobility Training, Home Exercise Program, Patient/Caregiver Education & Training, Equipment Evaluation, Education, & procurement, Positioning      PHYSICAL THERAPY  FIMs last conference: N/A      FIMS current conference:  Bed, Chair, Wheel Chair: 5 - Requires setup/supervision/cues  Walk: 5 - Supervision Requires standby supervision or cuing to walk at least 150 feet  Distance Walked: 330'  Wheel Chair: 5 - [address] standby supervision or cuing to operate wheelchair at least 150 feet  Distance Traveled in [address] [place] [address] Street: 200'  Stairs: 5- Supervision Requires supervision(e.g., standing by, cuing, or coaxing) to go up and down one flight of stairs    PT Equipment Recommendations  Equipment Needed: No    Assessment: The pt is a 80year old male

## 2018-10-16 NOTE — PROGRESS NOTES
Stand: Supervision  Stand to sit: Supervision     Ambulation  Surface: level tile  Device: Single point cane  Assistance: Stand by assistance  Quality of Gait: decreased denise and B step length, slight forward trunk flexion. No LOB. Good sequencing with cane  Distance: 430' + 180'  Comments: Pt tolerated ambulation of increased distance in open environment near main area of hospital.  Leda Murray  # Steps : 13 (One flight of 13 continuous steps)  Stairs Height: 6\"  Rails: Left ascending  Device: Single pt cane  Assistance: Stand by assistance  Comment: Step-to pattern, leading with RLE when ascending and LLE when descending. Exercises  Standing balance: Anterior, unilateral, and contralateral UE reaching standing on dynadisc in parallel bars  Romberg EO and EC standing on Airex in parallel bars  Ball toss on Airex in parallel bars  Sit-to-Stands: 10 reps x2  Forward lungs onto Airex 10 reps BLE  Lateral walking with green band around ankles: length of parallel bars each direction x2     Assessment   Body structures, Functions, Activity limitations: Decreased functional mobility ; Decreased balance;Decreased strength;Decreased endurance  Assessment: Pt demonstrates increased activity tolerance with increased distance ambulated with SPC. Pt participated in dynamic standing LE ther ex for LE strengthening and balance training and tolerated well. Pt reporting feeling out of breath at end of afternoon treatment session, vitals WNL. Will continue to progress functional mobility as appropriate.   Treatment Diagnosis: Difficulty walking R26.2  Prognosis: Good  Patient Education: PPM precautions, safety with transfers, gait and stair training, ther ex  REQUIRES PT FOLLOW UP: Yes  Activity Tolerance: Patient Tolerated treatment well  Activity Tolerance: Pt reporting some disappointment in himself for feeling somewhat out of breath by end of afternoon treatment session, SpO2 96% on RA, HR 53bpm      Goals  Short term

## 2018-10-16 NOTE — PROGRESS NOTES
Physical Therapy  Facility/Department: [facility]  Daily Treatment Note  NAME: Adolfo Delatorre  : 1927  MRN: [id number]    Date of Service: 10/16/2018    Discharge Recommendations:  Home with assist PRN   PT Equipment Recommendations  Equipment Needed: No    Patient Diagnosis(es): There were no encounter diagnoses. has a past medical history of Arthralgia; Cancer ([facility] [address] Chronic back pain; Chronic diastolic (congestive) heart failure ([facility] [address] Diverticulitis; Dizziness; Fatigue; Fractures; Hearing decreased; Hyperlipidemia; Hypertension; Osteoarthritis of knee; Osteoarthritis of shoulder; TIA (transient ischemic attack); Vertigo; and Wears glasses. has a past surgical history that includes Inguinal hernia repair; Cataract removal (); Prostatectomy (); Colonoscopy; Upper gastrointestinal endoscopy (2011); Esophagus dilation; knee surgery (Bilateral); Total shoulder arthroplasty; shoulder surgery; Toe Surgery (Right, 2013); joint replacement; and Total knee arthroplasty. Restrictions  Restrictions/Precautions  Restrictions/Precautions: Fall Risk, General Precautions  Implants present? : Pacemaker  Upper Extremity Weight Bearing Restrictions  Left Upper Extremity Weight Bearing: Non Weight Bearing  Position Activity Restriction  Sternal Precautions: 5# Lifting Restrictions, No Pulling, No Pushing  Other position/activity restrictions: LUE pacemaker precautions     Subjective   General  Chart Reviewed: Yes  Response To Previous Treatment: Patient with no complaints from previous session.   Family / Caregiver Present: No  Referring Practitioner: Savannah Woodward MD  Subjective  Subjective: Pt seated in chair in gym upon arrival and agreeable to PT session  Pain Screening  Patient Currently in Pain: Denies  Vital Signs  Patient Currently in Pain: Denies          Orientation  Orientation  Overall Orientation Status: Within Normal Limits     Objective   Bed mobility  Supine to Sit: 10/19/18  Short term goal 1: Pt will demonstrate independence with bed mobility-- GOAL MET 10/16  Short term goal 2: Pt will safely ascend and descend 1 flight of stairs with use of cane and and rail with CGA-- GOAL MET 10/16  Short term goal 3: Pt will safely transfer sit<>stand with independence  Long term goals  Time Frame for Long term goals : 10/22/18  Long term goal 1: Pt will demonstrate independence with car transfer  Long term goal 2: Pt will demonstrate safety and independence with LE ther ex program for LE strength maintenance and balance training  Long term goal 3: Pt will ambulate 500' with SPC with modified independence  Patient Goals   Patient goals : To get stronger and get home by this Friday`    Plan    Plan  Times per week: 5 out 7 days/wk  Times per day: Daily  Current Treatment Recommendations: Strengthening, ROM, Balance Training, Endurance Training, Transfer Training, Gait Training, Stair training, Safety Education & Training, Functional Mobility Training, Home Exercise Program, Patient/Caregiver Education & Training, Equipment Evaluation, Education, & procurement, Positioning  Safety Devices  Type of devices:  All fall risk precautions in place, Call light within reach, Bed alarm in place, Left in bed, Nurse notified     Therapy Time   Individual Concurrent Group Co-treatment   Time In 1000         Time Out 1030         Minutes 30         Timed Code Treatment Minutes: [address], [place], [address] [facility]

## 2018-10-17 PROCEDURE — 6370000000 HC RX 637 (ALT 250 FOR IP): Performed by: PHYSICAL MEDICINE & REHABILITATION

## 2018-10-17 PROCEDURE — 97110 THERAPEUTIC EXERCISES: CPT

## 2018-10-17 PROCEDURE — 97116 GAIT TRAINING THERAPY: CPT

## 2018-10-17 PROCEDURE — 97530 THERAPEUTIC ACTIVITIES: CPT

## 2018-10-17 PROCEDURE — 1280000000 HC REHAB R&B

## 2018-10-17 PROCEDURE — 97535 SELF CARE MNGMENT TRAINING: CPT

## 2018-10-17 RX ADMIN — DILTIAZEM HYDROCHLORIDE 120 MG: 120 CAPSULE, COATED, EXTENDED RELEASE ORAL at 08:39

## 2018-10-17 RX ADMIN — GABAPENTIN 100 MG: 100 CAPSULE ORAL at 20:28

## 2018-10-17 RX ADMIN — PREDNISONE 5 MG: 5 TABLET ORAL at 08:39

## 2018-10-17 RX ADMIN — METOPROLOL TARTRATE 50 MG: 50 TABLET ORAL at 20:27

## 2018-10-17 RX ADMIN — Medication 400 MG: at 08:39

## 2018-10-17 RX ADMIN — VITAMIN D, TAB 1000IU (100/BT) 1000 UNITS: 25 TAB at 08:39

## 2018-10-17 RX ADMIN — GABAPENTIN 100 MG: 100 CAPSULE ORAL at 08:39

## 2018-10-17 RX ADMIN — METOPROLOL TARTRATE 50 MG: 50 TABLET ORAL at 08:39

## 2018-10-17 RX ADMIN — SIMVASTATIN 40 MG: 40 TABLET, FILM COATED ORAL at 20:28

## 2018-10-17 RX ADMIN — ESCITALOPRAM OXALATE 10 MG: 10 TABLET ORAL at 08:39

## 2018-10-17 RX ADMIN — FAMOTIDINE 40 MG: 20 TABLET ORAL at 08:39

## 2018-10-17 RX ADMIN — MIRTAZAPINE 15 MG: 15 TABLET, FILM COATED ORAL at 20:28

## 2018-10-17 RX ADMIN — Medication 400 MG: at 20:28

## 2018-10-17 RX ADMIN — CLOPIDOGREL BISULFATE 75 MG: 75 TABLET ORAL at 08:39

## 2018-10-17 NOTE — PROGRESS NOTES
independently - progressing  Short term goal 3: Perform toilet transfers Mod I - progressing  Short term goal 4: Perform UE dressing independently   Long term goals  Time Frame for Long term goals : 10 days   Long term goal 1: Perform tub/shower transfers with Supervision  Long term goal 2: Perform simple meal prep/kitchen activities Mod I  Long term goal 3: Perform LE dressing independently   Patient Goals   Patient goals : \"Be independent. Doing required things by myself. \"       Therapy Time   Individual Concurrent Group Co-treatment   Time In 0900         Time Out 1000         Minutes 60         Timed Code Treatment Minutes: 60 Minutes     Second Session Therapy Time:   Individual Concurrent Group Co-treatment   Time In 1230         Time Out 1300         Minutes 30           Timed Code Treatment Minutes:  30     Total Treatment Minutes:  [address], OTR/L

## 2018-10-17 NOTE — PROGRESS NOTES
equipment    Bradycardia status post pacemaker placement. Continue Plavix. Hold aspirin for 1 week, resume on October 19. Continue metoprolol at current dose per EP recs.      Chronic combined systolic and diastolic CHF. Continue daily weights.     HTN. Continue metoprolol, Cardizem      Depression/Insomnia. Escitalopram, Remeron, tylenol pm     Bowels: Schedule colace, prn miralax and MoM. Follow bowel movements. Enema or suppository if needed.      Bladder: Check PVR x 3. [facility] if PVR > 300ml.      Anticipated Dispo: home alone  Services: HH PT, OT, RN  DME: shower chair  ELOS: 10/21

## 2018-10-17 NOTE — PROGRESS NOTES
Independent  Sit to Supine: Independent     Transfers  Sit to Stand: Supervision  Stand to sit: Supervision  Bed to Chair: Supervision (using cane)  Stand Pivot Transfers: Supervision (using cane)     Ambulation  Ambulation?: Yes  Ambulation 1  Surface: level tile  Device: Single point cane  Assistance: Supervision  Quality of Gait: decreased denise and B step length, slight forward trunk flexion. No LOB. Good sequencing with cane  Distance: 385ft + 198ft + 175ft  Comments: pt completes multiple turns in hallway and therapy gym and safety navigates around obstacles and through 2 doorways    Stairs/Curb  Stairs?: Yes  Stairs  # Steps : 12  Stairs Height: 6\"  Rails: Left ascending  Device: Single pt cane  Assistance: Stand by assistance  Comment: via non-reciprocal pattern, no LOB        Balance  Ambulating around gym, collecting cones from various height surfaces requiring pt to step on dynamic foam disc when reaching for cones-- pt completes x 12 cones in prescribed color order with SBA and cane for balance. Weaving in/out of cones x 5 (4 sets) with SBA using cane. Picking up 5 cones from floor with SBA and cane for balance. Ball toss x 2 mins on level tile and x 2 mins on dynamic foam disc-- SBA for balance without UE support on level tile, CGA for balance without UE support on foam     Exercises  Hamstring Sets: resisted knee flexion x 20 reps (blue theraband)  Hip Flexion: seated marching x 20 reps BLE (3#)  Hip Abduction: seated clamshells x 20 reps (squeezing ball); resisted hip abd x 20 reps (blue theraband)  Knee Long Arc Quad: x 20 reps BLE (3#)  Ankle Pumps: x 20 reps BLE  Other exercises  Other exercises?: Yes  Other exercises 1: sci-fit stepper x 5 mins (level 1), average 90 steps/min, using BLE only (d/t LUE pacemaker precautions)-- in order to increase strength and activity tolerance.  Pt reports mild fatigue after completion                        Addendum: 2nd Session  *Pt seated EOB upon arrival

## 2018-10-18 ENCOUNTER — TELEPHONE (OUTPATIENT)
Dept: CARDIOLOGY CLINIC | Age: 83
End: 2018-10-18

## 2018-10-18 LAB
ANION GAP SERPL CALCULATED.3IONS-SCNC: 10 MMOL/L (ref 3–16)
BASOPHILS ABSOLUTE: 0.1 K/UL (ref 0–0.2)
BASOPHILS RELATIVE PERCENT: 0.6 %
BUN BLDV-MCNC: 25 MG/DL (ref 7–20)
CALCIUM SERPL-MCNC: 9 MG/DL (ref 8.3–10.6)
CHLORIDE BLD-SCNC: 102 MMOL/L (ref 99–110)
CO2: 28 MMOL/L (ref 21–32)
CREAT SERPL-MCNC: 1.1 MG/DL (ref 0.8–1.3)
EOSINOPHILS ABSOLUTE: 0.2 K/UL (ref 0–0.6)
EOSINOPHILS RELATIVE PERCENT: 2.5 %
GFR AFRICAN AMERICAN: >60
GFR NON-AFRICAN AMERICAN: >60
GLUCOSE BLD-MCNC: 79 MG/DL (ref 70–99)
HCT VFR BLD CALC: 45.9 % (ref 40.5–52.5)
HEMOGLOBIN: 15.4 G/DL (ref 13.5–17.5)
LYMPHOCYTES ABSOLUTE: 1.6 K/UL (ref 1–5.1)
LYMPHOCYTES RELATIVE PERCENT: 17.1 %
MCH RBC QN AUTO: 31.2 PG (ref 26–34)
MCHC RBC AUTO-ENTMCNC: 33.5 G/DL (ref 31–36)
MCV RBC AUTO: 93.1 FL (ref 80–100)
MONOCYTES ABSOLUTE: 0.9 K/UL (ref 0–1.3)
MONOCYTES RELATIVE PERCENT: 10.1 %
NEUTROPHILS ABSOLUTE: 6.5 K/UL (ref 1.7–7.7)
NEUTROPHILS RELATIVE PERCENT: 69.7 %
PDW BLD-RTO: 13.8 % (ref 12.4–15.4)
PLATELET # BLD: 140 K/UL (ref 135–450)
PMV BLD AUTO: 9.5 FL (ref 5–10.5)
POTASSIUM REFLEX MAGNESIUM: 4.9 MMOL/L (ref 3.5–5.1)
RBC # BLD: 4.93 M/UL (ref 4.2–5.9)
SODIUM BLD-SCNC: 140 MMOL/L (ref 136–145)
WBC # BLD: 9.3 K/UL (ref 4–11)

## 2018-10-18 PROCEDURE — 36415 COLL VENOUS BLD VENIPUNCTURE: CPT

## 2018-10-18 PROCEDURE — 6370000000 HC RX 637 (ALT 250 FOR IP): Performed by: PHYSICAL MEDICINE & REHABILITATION

## 2018-10-18 PROCEDURE — 97116 GAIT TRAINING THERAPY: CPT

## 2018-10-18 PROCEDURE — 1280000000 HC REHAB R&B

## 2018-10-18 PROCEDURE — 97530 THERAPEUTIC ACTIVITIES: CPT

## 2018-10-18 PROCEDURE — 97535 SELF CARE MNGMENT TRAINING: CPT

## 2018-10-18 PROCEDURE — 85025 COMPLETE CBC W/AUTO DIFF WBC: CPT

## 2018-10-18 PROCEDURE — 97110 THERAPEUTIC EXERCISES: CPT

## 2018-10-18 PROCEDURE — 80048 BASIC METABOLIC PNL TOTAL CA: CPT

## 2018-10-18 RX ADMIN — CLOPIDOGREL BISULFATE 75 MG: 75 TABLET ORAL at 08:12

## 2018-10-18 RX ADMIN — GABAPENTIN 100 MG: 100 CAPSULE ORAL at 08:12

## 2018-10-18 RX ADMIN — FAMOTIDINE 40 MG: 20 TABLET ORAL at 08:12

## 2018-10-18 RX ADMIN — Medication 400 MG: at 08:12

## 2018-10-18 RX ADMIN — PREDNISONE 5 MG: 5 TABLET ORAL at 08:12

## 2018-10-18 RX ADMIN — METOPROLOL TARTRATE 50 MG: 50 TABLET ORAL at 21:38

## 2018-10-18 RX ADMIN — METOPROLOL TARTRATE 50 MG: 50 TABLET ORAL at 08:12

## 2018-10-18 RX ADMIN — ESCITALOPRAM OXALATE 10 MG: 10 TABLET ORAL at 08:12

## 2018-10-18 RX ADMIN — DILTIAZEM HYDROCHLORIDE 120 MG: 120 CAPSULE, COATED, EXTENDED RELEASE ORAL at 08:12

## 2018-10-18 RX ADMIN — SIMVASTATIN 40 MG: 40 TABLET, FILM COATED ORAL at 21:38

## 2018-10-18 RX ADMIN — DOCUSATE SODIUM 100 MG: 100 CAPSULE, LIQUID FILLED ORAL at 21:38

## 2018-10-18 RX ADMIN — VITAMIN D, TAB 1000IU (100/BT) 1000 UNITS: 25 TAB at 08:12

## 2018-10-18 RX ADMIN — Medication 400 MG: at 21:38

## 2018-10-18 RX ADMIN — GABAPENTIN 100 MG: 100 CAPSULE ORAL at 21:38

## 2018-10-18 RX ADMIN — MIRTAZAPINE 15 MG: 15 TABLET, FILM COATED ORAL at 21:38

## 2018-10-18 ASSESSMENT — PAIN SCALES - GENERAL
PAINLEVEL_OUTOF10: 0

## 2018-10-18 NOTE — PROGRESS NOTES
Physical Therapy  Facility/Department: [facility]  Daily Treatment Note  NAME: Brianne Lopez  : 1927  MRN: [id number]    Date of Service: 10/18/2018    Discharge Recommendations:  Home with assist PRN   PT Equipment Recommendations  Equipment Needed: No    Patient Diagnosis(es): There were no encounter diagnoses. has a past medical history of Arthralgia; Cancer ([facility] [address] Chronic back pain; Chronic diastolic (congestive) heart failure ([facility] [address] Diverticulitis; Dizziness; Fatigue; Fractures; Hearing decreased; Hyperlipidemia; Hypertension; Osteoarthritis of knee; Osteoarthritis of shoulder; TIA (transient ischemic attack); Vertigo; and Wears glasses. has a past surgical history that includes Inguinal hernia repair; Cataract removal (); Prostatectomy (); Colonoscopy; Upper gastrointestinal endoscopy (2011); Esophagus dilation; knee surgery (Bilateral); Total shoulder arthroplasty; shoulder surgery; Toe Surgery (Right, 2013); joint replacement; and Total knee arthroplasty. Restrictions  Restrictions/Precautions  Restrictions/Precautions: Fall Risk, General Precautions  Implants present? : Pacemaker  Upper Extremity Weight Bearing Restrictions  Left Upper Extremity Weight Bearing: Non Weight Bearing  Position Activity Restriction  Sternal Precautions: 5# Lifting Restrictions, No Pulling, No Pushing  Other position/activity restrictions: LUE pacemaker precautions     Subjective   General  Chart Reviewed: Yes  Response To Previous Treatment: Patient with no complaints from previous session.   Family / Caregiver Present: No  Referring Practitioner: Carmella Ag MD  Subjective  Subjective: Pt supine in bed in room upon arrival and agreeable to PT session  Pain Screening  Patient Currently in Pain: Denies  Vital Signs  Patient Currently in Pain: Denies          Orientation  Orientation  Overall Orientation Status: Within Normal Limits     Objective   Bed mobility  Supine to Sit: Group Co-treatment   Time In 1000         Time Out 1100         Minutes 60         Timed Code Treatment Minutes: [address] [place], [place], T #[id number]

## 2018-10-18 NOTE — CARE COORDINATION
[facility]  Attempted to speak with pt at bedside in regards to [facility] with [facility] upon DC. Anticipated DC 10/21. Will attempt to speak with pt again, pt was active with [facility] PTA to ARU. Update: 11: 54- [facility]    Spoke with pt at bedside about MARIA D with [facility], all questions answered. Demographics verified. Orders will be faxed to [facility] upon DC. -If pt DC's over wkend please send facesheet, order, viktor and H&P to [facility]- [phone].      Candi Johnson RN CTN with [address] [facility]  FHD:[phone]

## 2018-10-18 NOTE — PROGRESS NOTES
Department of Physical Medicine & Rehabilitation  Progress Note    Patient Identification:  Dangelo Saini  [id number]  : 1927  Admit date: 10/14/2018    Chief Complaint: S/P placement of cardiac pacemaker    Subjective:   No acute events overnight. Patient seen this am sitting up in room. He is feeling well. Noting some improvements in his RUELAS.     ROS: No f/c, n/v, cp     Objective:  Patient Vitals for the past 24 hrs:   BP Temp Temp src Pulse Resp SpO2   10/18/18 0547 (!) 166/76 97.9 °F (36.6 °C) Oral (!) 40 16 96 %   10/17/18 2026 (!) 158/82 98.3 °F (36.8 °C) Oral 69 16 98 %     Const: Alert. No distress, pleasant. HEENT: Normocephalic, atraumatic. Normal sclera/conjunctiva. MMM. CV: Regular rate and rhythm. PPM pocket c/d/i  Resp: No respiratory distress. Lungs CTAB. Abd: NABS+. Soft, nontender, nondistended   Ext: No edema. Neuro: Alert, oriented, appropriately interactive. Psych: Cooperative, appropriate mood and affect    Laboratory data: Available via EMR.    Last 24 hour lab  Recent Results (from the past 24 hour(s))   Basic Metabolic Panel w/ Reflex to MG    Collection Time: 10/18/18  7:18 AM   Result Value Ref Range    Sodium 140 136 - 145 mmol/L    Potassium reflex Magnesium 4.9 3.5 - 5.1 mmol/L    Chloride 102 99 - 110 mmol/L    CO2 28 21 - 32 mmol/L    Anion Gap 10 3 - 16    Glucose 79 70 - 99 mg/dL    BUN 25 (H) 7 - 20 mg/dL    CREATININE 1.1 0.8 - 1.3 mg/dL    GFR Non-African American >60 >60    GFR African American >60 >60    Calcium 9.0 8.3 - 10.6 mg/dL   CBC Auto Differential    Collection Time: 10/18/18  7:18 AM   Result Value Ref Range    WBC 9.3 4.0 - 11.0 K/uL    RBC 4.93 4.20 - 5.90 M/uL    Hemoglobin 15.4 13.5 - 17.5 g/dL    Hematocrit 45.9 40.5 - 52.5 %    MCV 93.1 80.0 - 100.0 fL    MCH 31.2 26.0 - 34.0 pg    MCHC 33.5 31.0 - 36.0 g/dL    RDW 13.8 12.4 - 15.4 %    Platelets 205 946 - 662 K/uL    MPV 9.5 5.0 - 10.5 fL    Neutrophils % 69.7 %    Lymphocytes % 17.1 %

## 2018-10-18 NOTE — PROGRESS NOTES
Physical Therapy  Facility/Department: [facility]  Daily Treatment Note  NAME: Dangelo Saini  : 1927  MRN: [id number]    Date of Service: 10/18/2018    Discharge Recommendations:  Home with assist PRN   PT Equipment Recommendations  Equipment Needed: No    Patient Diagnosis(es): There were no encounter diagnoses. has a past medical history of Arthralgia; Cancer ([facility] [address] Chronic back pain; Chronic diastolic (congestive) heart failure ([facility] [address] Diverticulitis; Dizziness; Fatigue; Fractures; Hearing decreased; Hyperlipidemia; Hypertension; Osteoarthritis of knee; Osteoarthritis of shoulder; TIA (transient ischemic attack); Vertigo; and Wears glasses. has a past surgical history that includes Inguinal hernia repair; Cataract removal (); Prostatectomy (); Colonoscopy; Upper gastrointestinal endoscopy (2011); Esophagus dilation; knee surgery (Bilateral); Total shoulder arthroplasty; shoulder surgery; Toe Surgery (Right, 2013); joint replacement; and Total knee arthroplasty. Restrictions  Restrictions/Precautions  Restrictions/Precautions: Fall Risk, General Precautions  Implants present? : Pacemaker  Upper Extremity Weight Bearing Restrictions  Left Upper Extremity Weight Bearing: Non Weight Bearing  Position Activity Restriction  Sternal Precautions: 5# Lifting Restrictions, No Pulling, No Pushing  Other position/activity restrictions: LUE pacemaker precautions  Subjective   General  Chart Reviewed: Yes  Response To Previous Treatment: Patient with no complaints from previous session.   Family / Caregiver Present: No  Referring Practitioner: Rachel Smith MD  Subjective  Subjective: Pt supine in bed in room upon arrival and agreeable to PT session  Pain Screening  Patient Currently in Pain: Denies       Objective   Bed mobility  Supine to Sit: Independent  Sit to Supine: Independent     Transfers  Sit to Stand: Supervision  Stand to sit: Supervision     Ambulation  Ambulation?:

## 2018-10-18 NOTE — COMMUNICATION BODY
[address] Office Note  10/11/2018     Subjective:  Mr. Anna Lopez is a 80 year male seen for HTN, HLD,  CHF recent hospitalization for multiple issues. Today he reports to feeling lethargic with lack of stamina. Denies chest pain, edema, dizziness, palpitations and syncope. He is short of breath with minimal exertion. He cant do any activity as he feels he is going to fall down if he did not lay down. He almost comes to passing out but does not lose consciousness. Once he lays down he feels back to normal.  [place]:   He was recently in the hospital in August 2017  With pneumonia. TIA in 2011 vs CVA - presented w/ lightheadedness/dizziness (still present) and expressive aphasia (now resolved). 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 He is planning on returning to [place] for winter. Today he presents to the office very fatigued. His daughter is present upon visit. She states he has been on the couch since middle of August. She states he has been in and out of the hospital 3 times now and was in rehab for 11 days after last visit. He states his appetite is ok. He states he has not been dizzy. He states he does get SOB with exertion. (ex: showering, walking to the bathroom). He denies chest pain, palpitations, or syncope. Review of Systems:  12 point ROS negative in all areas as listed below except as in [place]  Constitutional, EENT, Cardiovascular, pulmonary, GI, , Musculoskeletal, skin, neurological, hematological, endocrine, Psychiatric    Reviewed past medical history, social, and family history.  nonsmoker    Past Medical History:   Diagnosis Date    Arthralgia     Cancer [facility])     prostate    Chronic back pain     x40 years    Chronic diastolic (congestive) heart failure (HCC)     Diverticulitis     Dizziness     Fatigue     Fractures     Hearing decreased     Hyperlipidemia     Hypertension     Osteoarthritis of knee     Osteoarthritis of shoulder     TIA (transient ischemic attack) 2006    Vertigo     Wears glasses      Past Surgical History:   Procedure Laterality Date    CATARACT REMOVAL  1997    bilaterally    COLONOSCOPY      ESOPHAGEAL DILATATION      X 2    INGUINAL HERNIA REPAIR      right    JOINT REPLACEMENT      KNEE SURGERY Bilateral     PROSTATECTOMY  1998    carcinoma    SHOULDER ARTHROPLASTY      SHOULDER SURGERY      TOE SURGERY Right 06/03/2013    PERMANENT RIGHT GREAT TOENAIL ABLATION AND REMOVAL    TOTAL KNEE ARTHROPLASTY      UPPER GASTROINTESTINAL ENDOSCOPY  jan 2011       Objective:   /60   Pulse (!) 46   Ht 6' (1.829 m)   Wt 185 lb (83.9 kg)   SpO2 97%   BMI 25.09 kg/m²      Wt Readings from Last 3 Encounters:   10/11/18 185 lb (83.9 kg)   09/21/18 183 lb 4.8 oz (83.1 kg)   09/17/18 183 lb (83 kg)       Physical Exam:  General: No Respiratory distress, appears well developed and well nourished. Eyes:  Sclera nonicteric  Nose/Sinuses:  negative findings: nose shows no deformity, asymmetry, or inflammation, nasal mucosa normal, septum midline with no perforation or bleeding  Back:  no pain to palpation  Joint:  no active joint inflammation  Musculoskeletal:  negative  Skin:  Warm and dry  Neck:  Negative for JVD and Carotid Bruits. Chest:  Clear to auscultation, respiration easy  Cardiovascular:  Irregular with ectopy, S1S2 normal, no murmur, no rub or thrill. Abdomen:  Soft normal liver and spleen  Extremities:   No edema, clubbing, cyanosis,  Pulses:   1+ right foot pulse, 1+ left foot pulse  Neuro: intact    Medications:   Outpatient Encounter Prescriptions as of 10/11/2018   Medication Sig Dispense Refill    gabapentin (NEURONTIN) 100 MG capsule Take 100 mg by mouth 2 times daily. Makenna Hussein Diphenhydramine-APAP, sleep, (TYLENOL PM EXTRA STRENGTH PO) Take by mouth      meclizine (ANTIVERT) 12.5 MG tablet Take 12.5 mg by mouth 3 times daily as needed  metoprolol tartrate (LOPRESSOR) 25 MG tablet Take 1 tablet by mouth 2 times daily 60 tablet 3    amLODIPine (NORVASC) 5 MG tablet Take 0.5 tablets by mouth nightly 30 tablet 0    famotidine (PEPCID) 40 MG tablet Take 40 mg by mouth daily      loperamide (IMODIUM) 2 MG capsule Take 2 mg by mouth 4 times daily as needed for Diarrhea      mirtazapine (REMERON) 15 MG tablet Take 15 mg by mouth nightly      Vitamins-Lipotropics (LIPOFLAVONOID) TABS Take 1 tablet by mouth Daily with lunch Indications: vertigo      clopidogrel (PLAVIX) 75 MG tablet Take 75 mg by mouth daily      escitalopram (LEXAPRO) 10 MG tablet Take 10 mg by mouth daily      vitamin B-12 (CYANOCOBALAMIN) 100 MCG tablet Take 50 mcg by mouth daily      predniSONE (DELTASONE) 5 MG tablet Take 5 mg by mouth daily.  simvastatin (ZOCOR) 40 MG tablet Take 40 mg by mouth daily.  CALCIUM PO Take 1,000 mg by mouth daily.  vitamin D (CHOLECALCIFEROL) 1000 UNIT TABS tablet Take 1,000 Units by mouth daily.  Multiple Vitamins-Minerals (MULTIVITAMIN PO) Take 1 tablet by mouth daily       aspirin 81 MG chewable tablet Take 2 tablets by mouth daily 30 tablet 0     No facility-administered encounter medications on file as of 10/11/2018. Lab Data:  CBC: No results for input(s): WBC, HGB, HCT, MCV, PLT in the last 72 hours. BMP: No results for input(s): NA, K, CL, CO2, PHOS, BUN, CREATININE in the last 72 hours. Invalid input(s): CA  LIVER PROFILE: No results for input(s): AST, ALT, LIPASE, BILIDIR, BILITOT, ALKPHOS in the last 72 hours. Invalid input(s):   AMYLASE,  ALB  LIPID:   Lab Results   Component Value Date    CHOL 171 09/29/2016    CHOL 124 08/18/2016    CHOL 147 09/10/2012     Lab Results   Component Value Date    TRIG 147 09/29/2016    TRIG 94 08/18/2016    TRIG 113 09/10/2012     Lab Results   Component Value Date    HDL 53 06/27/2017    HDL 55 09/29/2016    HDL 45 08/18/2016     Lab Results   Component Value Date

## 2018-10-19 PROCEDURE — 97535 SELF CARE MNGMENT TRAINING: CPT

## 2018-10-19 PROCEDURE — 97530 THERAPEUTIC ACTIVITIES: CPT

## 2018-10-19 PROCEDURE — 97110 THERAPEUTIC EXERCISES: CPT

## 2018-10-19 PROCEDURE — 6370000000 HC RX 637 (ALT 250 FOR IP): Performed by: PHYSICAL MEDICINE & REHABILITATION

## 2018-10-19 PROCEDURE — 97116 GAIT TRAINING THERAPY: CPT

## 2018-10-19 PROCEDURE — 1280000000 HC REHAB R&B

## 2018-10-19 RX ORDER — ASPIRIN 81 MG/1
81 TABLET, CHEWABLE ORAL DAILY
Qty: 30 TABLET | Refills: 3 | Status: SHIPPED | OUTPATIENT
Start: 2018-10-20

## 2018-10-19 RX ORDER — METOPROLOL TARTRATE 50 MG/1
50 TABLET, FILM COATED ORAL 2 TIMES DAILY
Qty: 60 TABLET | Refills: 3 | Status: SHIPPED | OUTPATIENT
Start: 2018-10-19 | End: 2018-11-13 | Stop reason: SDUPTHER

## 2018-10-19 RX ORDER — GABAPENTIN 100 MG/1
100 CAPSULE ORAL 2 TIMES DAILY
Qty: 90 CAPSULE | Refills: 1 | Status: SHIPPED | OUTPATIENT
Start: 2018-10-19 | End: 2019-04-03

## 2018-10-19 RX ADMIN — CLOPIDOGREL BISULFATE 75 MG: 75 TABLET ORAL at 07:45

## 2018-10-19 RX ADMIN — METOPROLOL TARTRATE 50 MG: 50 TABLET ORAL at 20:17

## 2018-10-19 RX ADMIN — SIMVASTATIN 40 MG: 40 TABLET, FILM COATED ORAL at 21:13

## 2018-10-19 RX ADMIN — VITAMIN D, TAB 1000IU (100/BT) 1000 UNITS: 25 TAB at 07:44

## 2018-10-19 RX ADMIN — Medication 400 MG: at 07:45

## 2018-10-19 RX ADMIN — GABAPENTIN 100 MG: 100 CAPSULE ORAL at 07:44

## 2018-10-19 RX ADMIN — METOPROLOL TARTRATE 50 MG: 50 TABLET ORAL at 07:52

## 2018-10-19 RX ADMIN — GABAPENTIN 100 MG: 100 CAPSULE ORAL at 21:13

## 2018-10-19 RX ADMIN — MIRTAZAPINE 15 MG: 15 TABLET, FILM COATED ORAL at 21:13

## 2018-10-19 RX ADMIN — Medication 400 MG: at 21:13

## 2018-10-19 RX ADMIN — DILTIAZEM HYDROCHLORIDE 120 MG: 120 CAPSULE, COATED, EXTENDED RELEASE ORAL at 07:45

## 2018-10-19 RX ADMIN — ESCITALOPRAM OXALATE 10 MG: 10 TABLET ORAL at 07:44

## 2018-10-19 RX ADMIN — FAMOTIDINE 40 MG: 20 TABLET ORAL at 07:45

## 2018-10-19 RX ADMIN — PREDNISONE 5 MG: 5 TABLET ORAL at 07:44

## 2018-10-19 ASSESSMENT — PAIN SCALES - GENERAL
PAINLEVEL_OUTOF10: 0
PAINLEVEL_OUTOF10: 0

## 2018-10-19 NOTE — DISCHARGE SUMMARY
MG tablet Take 1 tablet by mouth 2 times daily, Disp-60 tablet, R-3Normal      traMADol (ULTRAM) 50 MG tablet Take 1 tablet by mouth every 6 hours as needed for Pain for up to 7 days. ., Disp-7 tablet, R-0Print              Details   amLODIPine (NORVASC) 5 MG tablet Take 0.5 tablets by mouth nightly, Disp-30 tablet, R-0Adjust Sig      aspirin 81 MG chewable tablet Take 2 tablets by mouth daily, Disp-30 tablet, R-0Historical Med      famotidine (PEPCID) 40 MG tablet Take 40 mg by mouth dailyHistorical Med      loperamide (IMODIUM) 2 MG capsule Take 2 mg by mouth 4 times daily as needed for DiarrheaHistorical Med      mirtazapine (REMERON) 15 MG tablet Take 15 mg by mouth nightlyHistorical Med      Vitamins-Lipotropics (LIPOFLAVONOID) TABS Take 1 tablet by mouth Daily with lunch Indications: vertigoHistorical Med      clopidogrel (PLAVIX) 75 MG tablet Take 75 mg by mouth dailyHistorical Med      escitalopram (LEXAPRO) 10 MG tablet Take 10 mg by mouth daily      vitamin B-12 (CYANOCOBALAMIN) 100 MCG tablet Take 50 mcg by mouth daily      predniSONE (DELTASONE) 5 MG tablet Take 5 mg by mouth daily. simvastatin (ZOCOR) 40 MG tablet Take 40 mg by mouth daily. CALCIUM PO Take 1,000 mg by mouth daily. vitamin D (CHOLECALCIFEROL) 1000 UNIT TABS tablet Take 1,000 Units by mouth daily. Multiple Vitamins-Minerals (MULTIVITAMIN PO) Take 1 tablet by mouth daily Historical Med             Time Spent on discharge is more than 1 hour in the examination, evaluation, counseling and review of medications and discharge plan. Signed:    Jeni Nazario MD   10/19/2018      Thank you Antonina Olsen MD for the opportunity to be involved in this patient's care. If you have any questions or concerns please feel free to contact me at [phone].

## 2018-10-19 NOTE — PLAN OF CARE
Problem: Falls - Risk of:  Goal: Will remain free from falls  Will remain free from falls   Outcome: Ongoing  Pt. Free from falls or self injury at this time. Falls risk protocol maintained. Call light within reach.

## 2018-10-19 NOTE — PROGRESS NOTES
Independent (to wash hands standing at sink)  Toileting: Independent (hygiene and clothing management following BM)        Balance  Sitting Balance: Independent  Standing Balance: Supervision    Functional - Mobility Device: Cane  Activity: Other (to/from therapy gym)  Assist Level: Modified independent     Toilet Transfers  Toilet - Technique: Ambulating (with cane)  Equipment Used: Standard toilet    Bed mobility  Supine to Sit: Modified independent (HOB slightly raised)  Scooting: Independent (to EOB)     Transfers  Sit to stand: Supervision  Stand to sit: Supervision      Cognition  Overall Cognitive Status: WFL      Type of ROM/Therapeutic Exercise  Type of ROM/Therapeutic Exercise: Free weights;AROM  Comment: BUE seated  Exercises  Shoulder Flexion: 2 sets, 20 reps, 3# RUE, LUE to ~70 degrees only  Shoulder Extension: 2 sets, 15 reps, 3# RUE, no resistance LUE to ~45 degrees only  Shoulder ABduction: 2 sets, 15 reps, 3# RUE, no resistance LUE to ~45 degrees only  Elbow Flexion: 2 sets, 20 reps, 3# RUE, 1# LUE  Elbow Extension: 2 sets, 20 reps, 3# RUE, 1# LUE  Supination: 2 sets, 20 reps, 3# RUE, 1# LUE  Pronation: 2 sets, 20 reps, 3# RUE, 1# LUE  Wrist Flexion: 2 sets, 20 reps, 3# RUE, 1# LUE  Wrist Extension: 2 sets, 20 reps, 3# RUE, 1# LUE     Session 2: Pt sleeping in supine at session start, waking easily and motivated. Denies pain. He walked to therapy gym with cane and SBA. After seated rest, he stood for ~20 mins to play [employer] card game while standing on blue foam board. Pt needing CGA to SBA for balance during activity and needing seated rest break after 2/2 fatigue. He was able to reach into kitchen cabinets below waist and above head to gather items, load/unload , and take pie plate in/out of oven - all with SBA.  Discussed IADL tasks brought up by pt earlier in session such as trimming hedges and bringing porch swing inside for winter with concerns regarding higher level balance on uneven

## 2018-10-20 PROCEDURE — 97116 GAIT TRAINING THERAPY: CPT

## 2018-10-20 PROCEDURE — 97535 SELF CARE MNGMENT TRAINING: CPT

## 2018-10-20 PROCEDURE — 93005 ELECTROCARDIOGRAM TRACING: CPT | Performed by: PHYSICAL MEDICINE & REHABILITATION

## 2018-10-20 PROCEDURE — 1280000000 HC REHAB R&B

## 2018-10-20 PROCEDURE — 93010 ELECTROCARDIOGRAM REPORT: CPT | Performed by: INTERNAL MEDICINE

## 2018-10-20 PROCEDURE — 97110 THERAPEUTIC EXERCISES: CPT

## 2018-10-20 PROCEDURE — 6370000000 HC RX 637 (ALT 250 FOR IP): Performed by: PHYSICAL MEDICINE & REHABILITATION

## 2018-10-20 PROCEDURE — 97530 THERAPEUTIC ACTIVITIES: CPT

## 2018-10-20 RX ADMIN — Medication 400 MG: at 20:44

## 2018-10-20 RX ADMIN — PREDNISONE 5 MG: 5 TABLET ORAL at 09:23

## 2018-10-20 RX ADMIN — DILTIAZEM HYDROCHLORIDE 120 MG: 120 CAPSULE, COATED, EXTENDED RELEASE ORAL at 09:23

## 2018-10-20 RX ADMIN — GABAPENTIN 100 MG: 100 CAPSULE ORAL at 20:44

## 2018-10-20 RX ADMIN — FAMOTIDINE 40 MG: 20 TABLET ORAL at 09:22

## 2018-10-20 RX ADMIN — VITAMIN D, TAB 1000IU (100/BT) 1000 UNITS: 25 TAB at 09:23

## 2018-10-20 RX ADMIN — METOPROLOL TARTRATE 50 MG: 50 TABLET ORAL at 20:44

## 2018-10-20 RX ADMIN — ESCITALOPRAM OXALATE 10 MG: 10 TABLET ORAL at 09:23

## 2018-10-20 RX ADMIN — MIRTAZAPINE 15 MG: 15 TABLET, FILM COATED ORAL at 20:44

## 2018-10-20 RX ADMIN — Medication 400 MG: at 09:23

## 2018-10-20 RX ADMIN — SIMVASTATIN 40 MG: 40 TABLET, FILM COATED ORAL at 20:44

## 2018-10-20 RX ADMIN — ASPIRIN 81 MG: 81 TABLET, CHEWABLE ORAL at 09:22

## 2018-10-20 RX ADMIN — GABAPENTIN 100 MG: 100 CAPSULE ORAL at 09:22

## 2018-10-20 RX ADMIN — CLOPIDOGREL BISULFATE 75 MG: 75 TABLET ORAL at 09:23

## 2018-10-20 RX ADMIN — METOPROLOL TARTRATE 50 MG: 50 TABLET ORAL at 09:23

## 2018-10-20 ASSESSMENT — PAIN SCALES - GENERAL
PAINLEVEL_OUTOF10: 0
PAINLEVEL_OUTOF10: 0

## 2018-10-20 NOTE — PROGRESS NOTES
precautions  REQUIRES PT FOLLOW UP: Yes  Activity Tolerance  Activity Tolerance: Patient Tolerated treatment well  Activity Tolerance: Pt tolerated treatement well however concerned about low heart rate with symptoms of dizziness. Heart rate stayed around 45 bpm during therapy session. Nursing notified previously by treating therapist.      Goals  Short term goals  Time Frame for Short term goals: 10/19/18  Short term goal 1: Pt will demonstrate independence with bed mobility-- GOAL MET 10/16  Short term goal 2: Pt will safely ascend and descend 1 flight of stairs with use of cane and and rail with CGA-- GOAL MET 10/16  Short term goal 3: Pt will safely transfer sit<>stand with independence  Long term goals  Time Frame for Long term goals : 10/22/18  Long term goal 1: Pt will demonstrate independence with car transfer  Long term goal 2: Pt will demonstrate safety and independence with LE ther ex program for LE strength maintenance and balance training-- GOAL MET 10/19  Long term goal 3: Pt will ambulate 500' with [facility] with modified independence  Patient Goals   Patient goals : To get stronger and get home by this Friday`    Plan    Plan  Times per week: 5 out 7 days/wk  Times per day: Daily  Current Treatment Recommendations: Strengthening, ROM, Balance Training, Endurance Training, Transfer Training, Gait Training, Stair training, Safety Education & Training, Functional Mobility Training, Home Exercise Program, Patient/Caregiver Education & Training, Equipment Evaluation, Education, & procurement, Positioning  Safety Devices  Type of devices:  All fall risk precautions in place, Bed alarm in place, Call light within reach, Left in bed, Nurse notified, Gait belt     Therapy Time   Individual Concurrent Group Co-treatment   Time In 0830         Time Out 0901         Minutes 31         Timed Code Treatment Minutes: Kristy Circe 852, PTA

## 2018-10-20 NOTE — PROGRESS NOTES
Physical Therapy  Facility/Department: [facility]  Daily Treatment Note  NAME: Evy Perales  : 1927  MRN: [id number]    Date of Service: 10/20/2018    Discharge Recommendations:  Home with assist PRN   PT Equipment Recommendations  Equipment Needed: No    Patient Diagnosis(es): There were no encounter diagnoses. has a past medical history of Arthralgia; Cancer ([facility] [address] Chronic back pain; Chronic diastolic (congestive) heart failure ([address] Diverticulitis; Dizziness; Fatigue; Fractures; Hearing decreased; Hyperlipidemia; Hypertension; Osteoarthritis of knee; Osteoarthritis of shoulder; TIA (transient ischemic attack); Vertigo; and Wears glasses. has a past surgical history that includes Inguinal hernia repair; Cataract removal (); Prostatectomy (); Colonoscopy; Upper gastrointestinal endoscopy (2011); Esophagus dilation; knee surgery (Bilateral); Total shoulder arthroplasty; shoulder surgery; Toe Surgery (Right, 2013); joint replacement; and Total knee arthroplasty. Restrictions  Restrictions/Precautions  Restrictions/Precautions: Fall Risk, General Precautions (Simultaneous filing. User may not have seen previous data.)  Implants present? : Pacemaker (Simultaneous filing. User may not have seen previous data.)  Upper Extremity Weight Bearing Restrictions  Left Upper Extremity Weight Bearing: Non Weight Bearing (Simultaneous filing. User may not have seen previous data.)  Position Activity Restriction  Sternal Precautions: 5# Lifting Restrictions, No Pulling, No Pushing (Simultaneous filing. User may not have seen previous data.)  Other position/activity restrictions: LUE pacemaker precautions (Simultaneous filing. User may not have seen previous data.)  Subjective   General  Chart Reviewed: Yes  Response To Previous Treatment: Patient with no complaints from previous session.   Family / Caregiver Present: No  Referring Practitioner: Dirk Glynn MD  Subjective  Subjective: pt supine in bed. General Comment  Comments: agreeable to therapy. denies pain. Pain Screening  Patient Currently in Pain: Denies  Vital Signs  Patient Currently in Pain: Denies       Orientation     Objective   Bed mobility  Rolling to Left: Independent  Rolling to Right: Independent  Supine to Sit: Independent  Sit to Supine: Independent  Scooting: Independent  Comment: bed mobility completed with HOB flat, no rails  Transfers  Sit to Stand: Modified independent  Stand to sit: Moderate Assistance  Bed to Chair: Modified independent  Stand Pivot Transfers: Modified independent  Comment: simulated car transfer on the sci fit; mod I with SC  Ambulation  Ambulation?: Yes  Ambulation 1  Surface: level tile;outdoors;uneven  Device: Single point cane  Assistance: Supervision  Quality of Gait: slow denise with good reciprocal pattern. Distance: >600 ft  Comments: Patient safely navigates in hallway, up and down long ramp, around obstacles. No LOB. Stairs/Curb  Stairs?: Yes  Stairs  # Steps : 12  Stairs Height: 6\"  Rails: Left ascending  Device: Single pt cane  Assistance: Supervision  Comment: reciprocal pattern ascending and step to descending. Balance  Sitting - Static: Good  Sitting - Dynamic: Good  Standing - Static: Good  Standing - Dynamic: Good  Comments: dynamic standing balance activities to include HR and TR, slow marching, picking up object from floor, SLS (10 sec x 3 B) ; all with supervision and B UE support.     Exercises  Bridging:  (x 10 reps)  Straight Leg Raise: 10 B  Hip Flexion: 20 reps with 3 lb  Hip Abduction: seated clamshells x 20 reps (squeezing ball); resisted hip abd x 20 reps (blue theraband)  Knee Long Arc Quad: x 20 reps BLE (3#)  Ankle Pumps: x 20 reps BLE   AROM RLE (degrees)  RLE AROM: WNL  AROM LLE (degrees)  LLE AROM : WNL  Strength RLE  Strength RLE: WFL  Strength LLE  Strength LLE: WFL                 Assessment   Body structures, Functions, Activity limitations: Decreased functional mobility ; Decreased balance;Decreased strength;Decreased endurance  Assessment: Patient tolerated session well; however, therapist concerned about his low heart rate. Nurse Nam Napier) to notify cardiology regarding pulse of 42 bpm.   To check with nursing later this PM regarding DC Sunday. Treatment Diagnosis: Difficulty walking R26.2  Prognosis: Good  Decision Making: High Complexity  REQUIRES PT FOLLOW UP: Yes  Activity Tolerance  Activity Tolerance: Patient Tolerated treatment well  Activity Tolerance: Patient reports fatigue at the end of the session. Spoke with the nurse about his low pulse (42 bpm taken manually and with machine). Nurse reported that she will notiify cardiology. G-Code     OutComes Score                                                    AM-PAC Score             Goals  Short term goals  Time Frame for Short term goals: 10/19/18  Short term goal 1: Pt will demonstrate independence with bed mobility-- GOAL MET 10/16  Short term goal 2: Pt will safely ascend and descend 1 flight of stairs with use of cane and and rail with CGA-- GOAL MET 10/16  Short term goal 3: Pt will safely transfer sit<>stand with independence  Long term goals  Time Frame for Long term goals : 10/22/18  Long term goal 1: Pt will demonstrate independence with car transfer  Long term goal 2: Pt will demonstrate safety and independence with LE ther ex program for LE strength maintenance and balance training-- GOAL MET 10/19  Long term goal 3: Pt will ambulate 500' with [address] with modified independence  Patient Goals   Patient goals :  To get stronger and get home by this Friday`    Plan    Plan  Times per week: 5 out 7 days/wk  Times per day: Daily  Current Treatment Recommendations: Strengthening, ROM, Balance Training, Endurance Training, Transfer Training, Gait Training, Stair training, Safety Education & Training, Functional Mobility Training, Home Exercise Program, Patient/Caregiver Education &

## 2018-10-20 NOTE — DISCHARGE SUMMARY
Independent  Comment: Completed with HOB flat and no rails    Functional Transfers: Toilet Transfers  Toilet - Technique: Ambulating  Equipment Used: Standard toilet  Toilet Transfer: Modified independent    Tub Transfers  Tub - Transfer From: [employer] - Transfer Type: To and From  Tub - Transfer To: Standing  Tub - Technique: To right  Tub Transfers: Modified independence    Shower t/fs: Mod I, ambulatory level         Functional Mobility  Functional - Mobility Device: Cane  Activity: Other (to/from therapy gym >150ft each way)  Assist Level: Modified independent     Instrumental ADL's  Instrumental ADLs: Yes  Meal Prep  Meal Prep Level: Cane  Meal Prep Level of Assistance: Stand by assistance  Meal Preparation: Pt. performed simulated item retrieval and transportation of items from cabinet to microwave. Pt. was educated on placement of items and reported that he does not perform much cooking tasks at home. Pt. was able to adhere to precautions throughout.                 Perception  Overall Perceptual Status: WFL         UE Function: WFL for ROM, strength, and coordination                Admitting OT FIM scores  Eatin - Patient feeds self  Groomin - Requires setup/cues to do all tasks  Bathin - Able to bathe 8-9 areas  Dressing-Upper: 5 - Requires setup/supervision/cues and/or requires assist with presthesis/brace only  Dressing-Lower: 4 - Requires assist with buttons/zippers/shoelaces and/or assist with shoes only  Toiletin - Requires steadying assistance only  Toilet Transfer: 4 - Requires steadying assistance only < 25% assist  Tub Transfer: 6 - Modified independence  Shower Transfer: 4 - Minimal contact assistance, pt. expends 75% or more effort    Discharge OT FIM scores  Eatin - Patient feeds self  Groomin - Patient independent with all grooming tasks  Bathin - Able to bathe all 10 areas with device  Dressing-Upper: 7 - Patient independently dresses upper body  Dressing-Lower: 7

## 2018-10-21 VITALS
TEMPERATURE: 97.5 F | RESPIRATION RATE: 17 BRPM | SYSTOLIC BLOOD PRESSURE: 112 MMHG | BODY MASS INDEX: 24.58 KG/M2 | OXYGEN SATURATION: 98 % | HEART RATE: 69 BPM | WEIGHT: 181.5 LBS | HEIGHT: 72 IN | DIASTOLIC BLOOD PRESSURE: 55 MMHG

## 2018-10-21 PROCEDURE — 90686 IIV4 VACC NO PRSV 0.5 ML IM: CPT | Performed by: PHYSICAL MEDICINE & REHABILITATION

## 2018-10-21 PROCEDURE — 6370000000 HC RX 637 (ALT 250 FOR IP): Performed by: PHYSICAL MEDICINE & REHABILITATION

## 2018-10-21 PROCEDURE — 6360000002 HC RX W HCPCS: Performed by: PHYSICAL MEDICINE & REHABILITATION

## 2018-10-21 PROCEDURE — G0008 ADMIN INFLUENZA VIRUS VAC: HCPCS | Performed by: PHYSICAL MEDICINE & REHABILITATION

## 2018-10-21 RX ADMIN — FAMOTIDINE 40 MG: 20 TABLET ORAL at 09:09

## 2018-10-21 RX ADMIN — ESCITALOPRAM OXALATE 10 MG: 10 TABLET ORAL at 09:09

## 2018-10-21 RX ADMIN — PREDNISONE 5 MG: 5 TABLET ORAL at 09:09

## 2018-10-21 RX ADMIN — VITAMIN D, TAB 1000IU (100/BT) 1000 UNITS: 25 TAB at 09:09

## 2018-10-21 RX ADMIN — Medication 400 MG: at 09:09

## 2018-10-21 RX ADMIN — DILTIAZEM HYDROCHLORIDE 120 MG: 120 CAPSULE, COATED, EXTENDED RELEASE ORAL at 09:09

## 2018-10-21 RX ADMIN — METOPROLOL TARTRATE 50 MG: 50 TABLET ORAL at 09:09

## 2018-10-21 RX ADMIN — CLOPIDOGREL BISULFATE 75 MG: 75 TABLET ORAL at 09:09

## 2018-10-21 RX ADMIN — GABAPENTIN 100 MG: 100 CAPSULE ORAL at 09:09

## 2018-10-21 RX ADMIN — INFLUENZA A VIRUS A/MICHIGAN/45/2015 X-275 (H1N1) ANTIGEN (FORMALDEHYDE INACTIVATED), INFLUENZA A VIRUS A/SINGAPORE/INFIMH-16-0019/2016 IVR-186 (H3N2) ANTIGEN (FORMALDEHYDE INACTIVATED), INFLUENZA B VIRUS B/PHUKET/3073/2013 ANTIGEN (FORMALDEHYDE INACTIVATED), AND INFLUENZA B VIRUS B/MARYLAND/15/2016 BX-69A ANTIGEN (FORMALDEHYDE INACTIVATED) 0.5 ML: 15; 15; 15; 15 INJECTION, SUSPENSION INTRAMUSCULAR at 11:16

## 2018-10-21 RX ADMIN — ASPIRIN 81 MG: 81 TABLET, CHEWABLE ORAL at 09:09

## 2018-10-21 NOTE — DISCHARGE SUMMARY
, Stairs  # Steps : 12  Stairs Height: 6\"  Rails: Left ascending  Device: Single pt cane  Assistance: Supervision  Comment: reciprocal pattern ascending and step to descending. Mobility: Bed, Chair, Wheel Chair: 7 - Patient independently transfers  Walk: 6 - Modified [place]  Walks at least 150 feet with an ambulatory device, orthosis or prosthesis OR requires extra amount of time OR there is concern for safety  Distance Walked: 600  Wheel Chair: 5 - Supervision Requires standby supervision or cuing to operate wheelchair at least 150 feet  Distance Traveled in Wheel Chair: 200'  Stairs: 6 - [address] [facility] [address] goes up and down at least one flight of stairs but requries a side support, handrail, cane, or portable supports, or the activity takes more than a reasonable amount of times, or there are safety considerations, PT Equipment Recommendations  Equipment Needed: No, Assessment: Pt tolerated session well however continued concern regarding low heart rate. Pt complained of some dizziness during therapy session requiring one supine rest break. Completed ther ex in seated position d/t dizziness. Occupational therapy: Eatin - Patient feeds self  Groomin - Patient independent with all grooming tasks  Bathin - Able to bathe all 10 areas with device  Dressing-Upper: 7 - Patient independently dresses upper body  Dressing-Lower: 7 - Patient independently dresses  Toiletin - Patient independent with all 3 tasks  Toilet Transfer: 7 -Patient independent on and off toilet/BSC  Tub Transfer: 6 - Modified independence  Shower Transfer: 6 - Modified independence,  , Assessment: Pt pleasant and agreeable to session, but worried with pulse oximetry reading of 35bpm for resting heart rate. Testing performed and cardiology consulted, believed to be inaccurate reading 2/2 PVCs. Pt with no functional deficits or symptoms of bradycardia.  He was able to complete all ADL with independence except the entire rehabilitation team, and estimated discharge date is now 10/21, or this coming Sunday. Will fill out his JERONIMO and meds for his discharge on Sunday. We'll have him continue with home therapies of PT, OT, and visiting nurse, and have him get a shower chair to use at home since he is at home alone. He'll follow up with the cardiologist and his family doctor after discharge.      Condition at Discharge: Good    Significant Diagnostics:   Lab Results   Component Value Date     10/18/2018    K 4.9 10/18/2018     10/18/2018    BUN 25 (H) 10/18/2018    CREATININE 1.1 10/18/2018    GLUCOSE 79 10/18/2018    CALCIUM 9.0 10/18/2018     Lab Results   Component Value Date    WBC 9.3 10/18/2018    RBC 4.93 10/18/2018    HGB 15.4 10/18/2018    HCT 45.9 10/18/2018    MCV 93.1 10/18/2018    MCH 31.2 10/18/2018    MCHC 33.5 10/18/2018    RDW 13.8 10/18/2018     10/18/2018    MPV 9.5 10/18/2018     Lab Results   Component Value Date    PROTIME 13.3 (H) 10/12/2018    INR 1.17 (H) 10/12/2018     Lab Results   Component Value Date    APTT 32.5 09/17/2018     Lab Results   Component Value Date    COLORU Yellow 09/17/2018    CLARITYU Clear 09/17/2018    GLUCOSEU Negative 09/17/2018    BILIRUBINUR Negative 09/17/2018    KETUA Negative 09/17/2018    SPECGRAV 1.020 09/17/2018    BLOODU Negative 09/17/2018    PHUR 7.5 09/17/2018    PROTEINU Negative 09/17/2018    UROBILINOGEN 0.2 09/17/2018    NITRU Negative 09/17/2018    LEUKOCYTESUR Negative 09/17/2018    LABMICR Not Indicated 09/17/2018    URRFLXCULT Not Indicated 08/11/2018    URINETYPE Not Specified 09/17/2018     Lab Results   Component Value Date    MUCUS Rare (A) 08/17/2016    WBCUA 0-2 09/04/2018    EPIU 0-2 09/04/2018     Lab Results   Component Value Date    LABURIN 50,000 CFU/ml 08/11/2018    LABURIN <10,000 CFU/ml  No further workup   08/11/2018    ORG Proteus mirabilis (A) 08/11/2018    ORG Gram negative belia (A) 08/11/2018       Xr Chest Standard (2

## 2018-10-21 NOTE — CARE COORDINATION
CASE MANAGEMENT DISCHARGE SUMMARY      Discharge to: home with [facility]    Precertification completed: [facility] Exemption Notification (HENS) completed:na     New Durable Medical Equipment ordered/agency: na    Transportation:    Family/car: private   Medical Transport/ time:    Ambulance form completed: Yes    Notified:    Family:    Facility/Agency: [facility]   RN:    Phone number for report to facility:     Note: Discharging nurse to complete JERONIMO, reconcile AVS, and place final copy with patient's discharge packet. RN to ensure that written prescriptions for  Level II medications are sent with patient to the facility as per protocol.

## 2018-10-22 ENCOUNTER — TELEPHONE (OUTPATIENT)
Dept: CARDIOLOGY CLINIC | Age: 83
End: 2018-10-22

## 2018-10-23 RX ORDER — ATORVASTATIN CALCIUM 10 MG/1
10 TABLET, FILM COATED ORAL DAILY
Qty: 30 TABLET | Refills: 11 | Status: SHIPPED | OUTPATIENT
Start: 2018-10-23

## 2018-10-24 LAB
EKG ATRIAL RATE: 68 BPM
EKG DIAGNOSIS: NORMAL
EKG P-R INTERVAL: 186 MS
EKG Q-T INTERVAL: 440 MS
EKG QRS DURATION: 178 MS
EKG QTC CALCULATION (BAZETT): 467 MS
EKG R AXIS: 63 DEGREES
EKG T AXIS: 253 DEGREES
EKG VENTRICULAR RATE: 68 BPM

## 2018-10-25 ENCOUNTER — TELEPHONE (OUTPATIENT)
Dept: CARDIOLOGY CLINIC | Age: 83
End: 2018-10-25

## 2018-10-25 NOTE — TELEPHONE ENCOUNTER
This is a repetitive call from the patient's caregivers. His PVCs don't perfuse well and pulse ox, BP cuff, and apical pulse are unreliable. He is stable and I encouraged Nestor Due to send a device transmission to verify the pacemaker is functioning normally.

## 2018-10-25 NOTE — TELEPHONE ENCOUNTER
Bayleedashawn De Souza calling- she states patients HR is running 39-40. This was with pulse Ox, manual and BP machine. The last device check the \"lower rate\" is set at 79. Patient feels okay. She asked about doing exercise with the patient today, I told her to hold off today, and we would call back regarding HR.   TY

## 2018-10-26 ENCOUNTER — PROCEDURE VISIT (OUTPATIENT)
Dept: CARDIOLOGY CLINIC | Age: 83
End: 2018-10-26
Payer: MEDICARE

## 2018-10-26 DIAGNOSIS — Z95.0 PACEMAKER: Primary | ICD-10-CM

## 2018-10-26 DIAGNOSIS — R00.1 BRADYCARDIA: ICD-10-CM

## 2018-10-26 PROCEDURE — 93280 PM DEVICE PROGR EVAL DUAL: CPT | Performed by: INTERNAL MEDICINE

## 2018-11-01 ENCOUNTER — TELEPHONE (OUTPATIENT)
Dept: CARDIOLOGY CLINIC | Age: 83
End: 2018-11-01

## 2018-11-01 NOTE — TELEPHONE ENCOUNTER
Chana from [facility] called and stated that pt is not feeling well. She stated that pt is sleeping a lot during the day and feels run down. She stated that pt does not feel any better than before he had the pacer inserted. She stated that pt had PT earlier today and BP was -133/79 HR 84  Sitting BP 96/68 HR 79.  She stated that she took his vitals right before calling and stated that   BP was 124/80 HR 72 Standing- 120/80 HR 70     Please call Tania Pennington RN, [address] @[phone]

## 2018-11-02 NOTE — TELEPHONE ENCOUNTER
MIGUEL Jamesonmy returned call & was given Dr. Rhea Carlson message. Pt has appt 11/13/2018. Is this acceptable? Thanks!

## 2018-11-13 ENCOUNTER — OFFICE VISIT (OUTPATIENT)
Dept: CARDIOLOGY CLINIC | Age: 83
End: 2018-11-13
Payer: MEDICARE

## 2018-11-13 VITALS
HEIGHT: 72 IN | BODY MASS INDEX: 24.92 KG/M2 | OXYGEN SATURATION: 97 % | WEIGHT: 184 LBS | HEART RATE: 78 BPM | DIASTOLIC BLOOD PRESSURE: 66 MMHG | SYSTOLIC BLOOD PRESSURE: 110 MMHG

## 2018-11-13 DIAGNOSIS — Z95.0 PACEMAKER: ICD-10-CM

## 2018-11-13 DIAGNOSIS — I50.32 CHRONIC DIASTOLIC CONGESTIVE HEART FAILURE (HCC): Primary | ICD-10-CM

## 2018-11-13 DIAGNOSIS — I49.5 SINUS NODE DYSFUNCTION (HCC): ICD-10-CM

## 2018-11-13 PROCEDURE — 1123F ACP DISCUSS/DSCN MKR DOCD: CPT | Performed by: INTERNAL MEDICINE

## 2018-11-13 PROCEDURE — G8420 CALC BMI NORM PARAMETERS: HCPCS | Performed by: INTERNAL MEDICINE

## 2018-11-13 PROCEDURE — 1036F TOBACCO NON-USER: CPT | Performed by: INTERNAL MEDICINE

## 2018-11-13 PROCEDURE — 1111F DSCHRG MED/CURRENT MED MERGE: CPT | Performed by: INTERNAL MEDICINE

## 2018-11-13 PROCEDURE — G8598 ASA/ANTIPLAT THER USED: HCPCS | Performed by: INTERNAL MEDICINE

## 2018-11-13 PROCEDURE — G8482 FLU IMMUNIZE ORDER/ADMIN: HCPCS | Performed by: INTERNAL MEDICINE

## 2018-11-13 PROCEDURE — 1101F PT FALLS ASSESS-DOCD LE1/YR: CPT | Performed by: INTERNAL MEDICINE

## 2018-11-13 PROCEDURE — 99214 OFFICE O/P EST MOD 30 MIN: CPT | Performed by: INTERNAL MEDICINE

## 2018-11-13 PROCEDURE — G8427 DOCREV CUR MEDS BY ELIG CLIN: HCPCS | Performed by: INTERNAL MEDICINE

## 2018-11-13 PROCEDURE — 4040F PNEUMOC VAC/ADMIN/RCVD: CPT | Performed by: INTERNAL MEDICINE

## 2018-11-13 RX ORDER — METOPROLOL TARTRATE 50 MG/1
50 TABLET, FILM COATED ORAL 2 TIMES DAILY
Qty: 180 TABLET | Refills: 3 | Status: SHIPPED | OUTPATIENT
Start: 2018-11-13 | End: 2019-01-01

## 2018-11-13 RX ORDER — DILTIAZEM HYDROCHLORIDE 120 MG/1
120 CAPSULE, COATED, EXTENDED RELEASE ORAL DAILY
Qty: 90 CAPSULE | Refills: 3 | Status: SHIPPED | OUTPATIENT
Start: 2018-11-13 | End: 2019-04-03

## 2018-11-13 RX ORDER — CLOPIDOGREL BISULFATE 75 MG/1
75 TABLET ORAL DAILY
Qty: 90 TABLET | Refills: 3 | Status: SHIPPED | OUTPATIENT
Start: 2018-11-13

## 2018-11-13 NOTE — COMMUNICATION BODY
ventricle size.   No wall motion abnormalities noted.   Grade I diastolic dysfunction with normal filling pressure.   Mild mitral, aortic and tricuspid regurgitation.   Systolic pulmonary artery pressure (SPAP) is estimated at 40 mmHg consistent   with mild pulmonary hypertension (RA pressure 3 mmHg).  Right atrial size is mildly dilated . ECHO 8/19/16  Summary   Normal left ventricle size,normal systolic function with an estimated   ejection fraction of 55-60%. No regional wall abnormalites are seen.   Mild concentric left ventricular hypertrophy.   Diastolic filling parameters suggests grade I diastolic dysfunction .   The aortic valve appears sclerotic but opens well.   Mild mitral, aortic,tricuspid and pulmonic regurgitation.   Systolic pulmonary artery pressure (SPAP) is normal and estimated at 33 mmHg   (RA pressure 3 mm Hg). Carotid US 8/18/16   1. There is moderate plaque seen in the right internal carotid artery with  an estimated diameter reduction of <50%. 2. There is moderate plaque seen in the left internal carotid artery with an  estimated diameter reduction of <50%. 3. The vertebral arteries are patent with antegrade flow bilaterally    PFT;s 6/1/16  IMPRESSION: There is no obstruction present. The lung volumes show air  trapping. There is a moderately decreased diffusion capacity of carbon  monoxide. While these findings are nonspecific, it can be seen with  emphysema and clinical correlation with exam and imaging are recommended.    Other conditions that can cause a decreased DLCO are interstitial lung  disease, anemia, and pulmonary vascular disease. Echo 3/30/16  Left ventricle systolic function is normal.  Left ventricle wall motion is normal.  Borderline concentric left ventricle hypertrophy. Mild right ventricular dilatation. Mild right atrial enlargement. Mild mitral valve regurgitation. There is mild to moderate tricuspid valve regurgitation.   Right ventricular systolic

## 2018-11-13 NOTE — PROGRESS NOTES
(CARDIZEM CD) 120 MG extended release capsule Take 1 capsule by mouth daily 30 capsule 3    Diphenhydramine-APAP, sleep, (TYLENOL PM EXTRA STRENGTH PO) Take by mouth      meclizine (ANTIVERT) 12.5 MG tablet Take 12.5 mg by mouth 3 times daily as needed      magnesium oxide (MAG-OX) 400 MG tablet Take 1 tablet by mouth 2 times daily 60 tablet 5    famotidine (PEPCID) 40 MG tablet Take 40 mg by mouth daily      loperamide (IMODIUM) 2 MG capsule Take 2 mg by mouth 4 times daily as needed for Diarrhea      mirtazapine (REMERON) 15 MG tablet Take 15 mg by mouth nightly      Vitamins-Lipotropics (LIPOFLAVONOID) TABS Take 1 tablet by mouth Daily with lunch Indications: vertigo      clopidogrel (PLAVIX) 75 MG tablet Take 75 mg by mouth daily      escitalopram (LEXAPRO) 10 MG tablet Take 10 mg by mouth daily      vitamin B-12 (CYANOCOBALAMIN) 100 MCG tablet Take 50 mcg by mouth daily      predniSONE (DELTASONE) 5 MG tablet Take 5 mg by mouth daily.  CALCIUM PO Take 1,000 mg by mouth daily.  vitamin D (CHOLECALCIFEROL) 1000 UNIT TABS tablet Take 1,000 Units by mouth daily.  Multiple Vitamins-Minerals (MULTIVITAMIN PO) Take 1 tablet by mouth daily        No facility-administered encounter medications on file as of 11/13/2018. Lab Data:  CBC: No results for input(s): WBC, HGB, HCT, MCV, PLT in the last 72 hours. BMP: No results for input(s): NA, K, CL, CO2, PHOS, BUN, CREATININE in the last 72 hours. Invalid input(s): CA  LIVER PROFILE: No results for input(s): AST, ALT, LIPASE, BILIDIR, BILITOT, ALKPHOS in the last 72 hours. Invalid input(s):   AMYLASE,  ALB  LIPID:   Lab Results   Component Value Date    CHOL 171 09/29/2016    CHOL 124 08/18/2016    CHOL 147 09/10/2012     Lab Results   Component Value Date    TRIG 147 09/29/2016    TRIG 94 08/18/2016    TRIG 113 09/10/2012     Lab Results   Component Value Date    HDL 53 06/27/2017    HDL 55 09/29/2016    HDL 45 08/18/2016     Lab Results   Component Value Date    LDLCALC 84 06/27/2017    LDLCALC 87 09/29/2016    LDLCALC 60 08/18/2016     Lab Results   Component Value Date    LABVLDL 27 06/27/2017    LABVLDL 29 09/29/2016    LABVLDL 19 08/18/2016     No results found for: CHOLHDLRATIO  PT/INR: No results for input(s): PROTIME, INR in the last 72 hours. A1C:   Lab Results   Component Value Date    LABA1C 5.6 09/29/2016     BNP:  No results for input(s): BNP in the last 72 hours. IMAGING:   Device check 11/9/18  Normal sensing and pacing    Brief op report 10/12/18  · Insertion of MRI compatible right ventricular pacing lead under fluoroscopy. · Insertion of MRI compatible right atrial lead under fluoroscopy  · Insertion of a MRI compatible dual chamber Pacemaker. · Electronic analysis of lead and device  EKG 10/11/18   shows occasional PVC possible old ant MI but work up 5 years ago in [place] with stress test was negative  ECHO 8/11/18  Summary   Left ventricle systolic function is mildly reduced with ejection fraction of   46%. Inferior basal akinesia is present. Normal left ventricle size. Moderate concentric left ventricular hypertrophy. Grade I diastolic dysfunction with normal filling pressure. The aortic valve sclerosis but opens well. Mild aortic regurgitation. The aortic root is enlarged in size at 4.5cm    Chest xray 9.21.17  No acute cardiopulmonary disease. Hyperinflation of the lungs. 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  There is hypokinesis of the basal inferior wall.   Normal left

## 2018-11-16 ENCOUNTER — TELEPHONE (OUTPATIENT)
Dept: CARDIOLOGY CLINIC | Age: 83
End: 2018-11-16

## 2018-11-16 NOTE — TELEPHONE ENCOUNTER
Pt states that he is leaving for [facility] for the winter and he is wondering what he needs to do with the monitor for his new pacemaker. States RPS put pacemaker in on 11/12. Please call pt to discuss.

## 2019-01-01 ENCOUNTER — PROCEDURE VISIT (OUTPATIENT)
Dept: CARDIOLOGY CLINIC | Age: 84
End: 2019-01-01
Payer: MEDICARE

## 2019-01-01 ENCOUNTER — APPOINTMENT (OUTPATIENT)
Dept: GENERAL RADIOLOGY | Age: 84
End: 2019-01-01
Payer: MEDICARE

## 2019-01-01 ENCOUNTER — HOSPITAL ENCOUNTER (EMERGENCY)
Age: 84
Discharge: HOME | End: 2019-09-12
Payer: MEDICARE

## 2019-01-01 ENCOUNTER — HOSPITAL ENCOUNTER (INPATIENT)
Age: 84
LOS: 3 days | Discharge: ANOTHER HOSPITAL | DRG: 178 | End: 2019-12-10
Attending: EMERGENCY MEDICINE | Admitting: INTERNAL MEDICINE
Payer: MEDICARE

## 2019-01-01 ENCOUNTER — OFFICE VISIT (OUTPATIENT)
Dept: CARDIOLOGY CLINIC | Age: 84
End: 2019-01-01
Payer: MEDICARE

## 2019-01-01 ENCOUNTER — OFFICE VISIT (OUTPATIENT)
Dept: ORTHOPEDIC SURGERY | Age: 84
End: 2019-01-01
Payer: MEDICARE

## 2019-01-01 ENCOUNTER — PROCEDURE VISIT (OUTPATIENT)
Dept: CARDIOLOGY CLINIC | Age: 84
End: 2019-01-01

## 2019-01-01 ENCOUNTER — APPOINTMENT (OUTPATIENT)
Dept: GENERAL RADIOLOGY | Age: 84
DRG: 178 | End: 2019-01-01
Payer: MEDICARE

## 2019-01-01 VITALS
BODY MASS INDEX: 22.98 KG/M2 | OXYGEN SATURATION: 97 % | HEIGHT: 72 IN | HEART RATE: 60 BPM | SYSTOLIC BLOOD PRESSURE: 154 MMHG | RESPIRATION RATE: 18 BRPM | WEIGHT: 169.7 LBS | TEMPERATURE: 97.1 F | DIASTOLIC BLOOD PRESSURE: 76 MMHG

## 2019-01-01 VITALS — HEIGHT: 72 IN | WEIGHT: 160.94 LBS | BODY MASS INDEX: 21.8 KG/M2

## 2019-01-01 VITALS
SYSTOLIC BLOOD PRESSURE: 110 MMHG | DIASTOLIC BLOOD PRESSURE: 60 MMHG | HEART RATE: 62 BPM | BODY MASS INDEX: 23.98 KG/M2 | OXYGEN SATURATION: 94 % | WEIGHT: 177 LBS | HEIGHT: 72 IN

## 2019-01-01 VITALS
HEART RATE: 60 BPM | HEIGHT: 72 IN | BODY MASS INDEX: 21.83 KG/M2 | RESPIRATION RATE: 17 BRPM | SYSTOLIC BLOOD PRESSURE: 140 MMHG | TEMPERATURE: 98 F | DIASTOLIC BLOOD PRESSURE: 103 MMHG | OXYGEN SATURATION: 96 %

## 2019-01-01 DIAGNOSIS — R40.0 SOMNOLENCE: ICD-10-CM

## 2019-01-01 DIAGNOSIS — R53.1 GENERALIZED WEAKNESS: Primary | ICD-10-CM

## 2019-01-01 DIAGNOSIS — E86.0 DEHYDRATION: Primary | ICD-10-CM

## 2019-01-01 DIAGNOSIS — Z95.0 PACEMAKER: ICD-10-CM

## 2019-01-01 DIAGNOSIS — J06.9 RECENT URI: ICD-10-CM

## 2019-01-01 DIAGNOSIS — Z96.612 HISTORY OF TOTAL SHOULDER REPLACEMENT, LEFT: Primary | ICD-10-CM

## 2019-01-01 DIAGNOSIS — I10 ESSENTIAL HYPERTENSION: ICD-10-CM

## 2019-01-01 DIAGNOSIS — M35.3 POLYMYALGIA RHEUMATICA SYNDROME (HCC): ICD-10-CM

## 2019-01-01 DIAGNOSIS — J18.9 HCAP (HEALTHCARE-ASSOCIATED PNEUMONIA): ICD-10-CM

## 2019-01-01 DIAGNOSIS — Z66 DNR (DO NOT RESUSCITATE): ICD-10-CM

## 2019-01-01 DIAGNOSIS — E86.0 MILD DEHYDRATION: ICD-10-CM

## 2019-01-01 DIAGNOSIS — I25.10 CAD IN NATIVE ARTERY: Primary | ICD-10-CM

## 2019-01-01 DIAGNOSIS — R00.1 BRADYCARDIA: ICD-10-CM

## 2019-01-01 DIAGNOSIS — E78.5 HYPERLIPIDEMIA WITH TARGET LDL LESS THAN 100: ICD-10-CM

## 2019-01-01 LAB
A/G RATIO: 0.9 (ref 1.1–2.2)
A/G RATIO: 1.5 (ref 1.1–2.2)
ALBUMIN SERPL-MCNC: 3.5 G/DL (ref 3.4–5)
ALBUMIN SERPL-MCNC: 4.1 G/DL (ref 3.4–5)
ALP BLD-CCNC: 108 U/L (ref 40–129)
ALP BLD-CCNC: 136 U/L (ref 40–129)
ALT SERPL-CCNC: 11 U/L (ref 10–40)
ALT SERPL-CCNC: 14 U/L (ref 10–40)
AMMONIA: 19 UMOL/L (ref 16–60)
ANION GAP SERPL CALCULATED.3IONS-SCNC: 10 MMOL/L (ref 3–16)
ANION GAP SERPL CALCULATED.3IONS-SCNC: 12 MMOL/L (ref 3–16)
ANION GAP SERPL CALCULATED.3IONS-SCNC: 14 MMOL/L (ref 3–16)
ANION GAP SERPL CALCULATED.3IONS-SCNC: 9 MMOL/L (ref 3–16)
AST SERPL-CCNC: 16 U/L (ref 15–37)
AST SERPL-CCNC: 18 U/L (ref 15–37)
BACTERIA: ABNORMAL /HPF
BASE EXCESS VENOUS: -0.6 MMOL/L (ref -3–3)
BASOPHILS ABSOLUTE: 0 K/UL (ref 0–0.2)
BASOPHILS RELATIVE PERCENT: 0.2 %
BASOPHILS RELATIVE PERCENT: 0.4 %
BASOPHILS RELATIVE PERCENT: 0.4 %
BILIRUB SERPL-MCNC: 0.5 MG/DL (ref 0–1)
BILIRUB SERPL-MCNC: 0.7 MG/DL (ref 0–1)
BILIRUBIN URINE: NEGATIVE
BILIRUBIN URINE: NEGATIVE
BLOOD CULTURE, ROUTINE: NORMAL
BLOOD, URINE: ABNORMAL
BLOOD, URINE: NEGATIVE
BUN BLDV-MCNC: 18 MG/DL (ref 7–20)
BUN BLDV-MCNC: 20 MG/DL (ref 7–20)
BUN BLDV-MCNC: 22 MG/DL (ref 7–20)
BUN BLDV-MCNC: 24 MG/DL (ref 7–20)
CALCIUM SERPL-MCNC: 8.5 MG/DL (ref 8.3–10.6)
CALCIUM SERPL-MCNC: 8.8 MG/DL (ref 8.3–10.6)
CALCIUM SERPL-MCNC: 9.1 MG/DL (ref 8.3–10.6)
CALCIUM SERPL-MCNC: 9.4 MG/DL (ref 8.3–10.6)
CARBOXYHEMOGLOBIN: 2.6 % (ref 0–1.5)
CHLORIDE BLD-SCNC: 100 MMOL/L (ref 99–110)
CHLORIDE BLD-SCNC: 97 MMOL/L (ref 99–110)
CHLORIDE BLD-SCNC: 98 MMOL/L (ref 99–110)
CHLORIDE BLD-SCNC: 99 MMOL/L (ref 99–110)
CLARITY: CLEAR
CLARITY: CLEAR
CO2: 20 MMOL/L (ref 21–32)
CO2: 23 MMOL/L (ref 21–32)
CO2: 23 MMOL/L (ref 21–32)
CO2: 27 MMOL/L (ref 21–32)
COLOR: YELLOW
COLOR: YELLOW
CREAT SERPL-MCNC: 1.1 MG/DL (ref 0.8–1.3)
CREAT SERPL-MCNC: 1.3 MG/DL (ref 0.8–1.3)
CREAT SERPL-MCNC: 1.4 MG/DL (ref 0.8–1.3)
CREAT SERPL-MCNC: 1.5 MG/DL (ref 0.8–1.3)
CULTURE, BLOOD 2: NORMAL
EKG ATRIAL RATE: 147 BPM
EKG ATRIAL RATE: 64 BPM
EKG DIAGNOSIS: NORMAL
EKG DIAGNOSIS: NORMAL
EKG P AXIS: 0 DEGREES
EKG P AXIS: 21 DEGREES
EKG P-R INTERVAL: 192 MS
EKG P-R INTERVAL: 258 MS
EKG Q-T INTERVAL: 360 MS
EKG Q-T INTERVAL: 414 MS
EKG QRS DURATION: 110 MS
EKG QRS DURATION: 112 MS
EKG QTC CALCULATION (BAZETT): 371 MS
EKG QTC CALCULATION (BAZETT): 414 MS
EKG R AXIS: -71 DEGREES
EKG R AXIS: -74 DEGREES
EKG T AXIS: -37 DEGREES
EKG T AXIS: -66 DEGREES
EKG VENTRICULAR RATE: 60 BPM
EKG VENTRICULAR RATE: 64 BPM
EOSINOPHILS ABSOLUTE: 0 K/UL (ref 0–0.6)
EOSINOPHILS ABSOLUTE: 0.1 K/UL (ref 0–0.6)
EOSINOPHILS ABSOLUTE: 0.2 K/UL (ref 0–0.6)
EOSINOPHILS RELATIVE PERCENT: 0.1 %
EOSINOPHILS RELATIVE PERCENT: 0.7 %
EOSINOPHILS RELATIVE PERCENT: 2.3 %
EPITHELIAL CELLS, UA: ABNORMAL /HPF
GFR AFRICAN AMERICAN: 53
GFR AFRICAN AMERICAN: 57
GFR AFRICAN AMERICAN: >60
GFR AFRICAN AMERICAN: >60
GFR NON-AFRICAN AMERICAN: 44
GFR NON-AFRICAN AMERICAN: 47
GFR NON-AFRICAN AMERICAN: 52
GFR NON-AFRICAN AMERICAN: >60
GLOBULIN: 2.7 G/DL
GLOBULIN: 4 G/DL
GLUCOSE BLD-MCNC: 104 MG/DL (ref 70–99)
GLUCOSE BLD-MCNC: 158 MG/DL (ref 70–99)
GLUCOSE BLD-MCNC: 76 MG/DL (ref 70–99)
GLUCOSE BLD-MCNC: 91 MG/DL (ref 70–99)
GLUCOSE URINE: NEGATIVE MG/DL
GLUCOSE URINE: NEGATIVE MG/DL
HCO3 VENOUS: 23.5 MMOL/L (ref 23–29)
HCT VFR BLD CALC: 35.8 % (ref 40.5–52.5)
HCT VFR BLD CALC: 37 % (ref 40.5–52.5)
HCT VFR BLD CALC: 40.8 % (ref 40.5–52.5)
HCT VFR BLD CALC: 43.1 % (ref 40.5–52.5)
HEMOGLOBIN: 12.1 G/DL (ref 13.5–17.5)
HEMOGLOBIN: 12.1 G/DL (ref 13.5–17.5)
HEMOGLOBIN: 13.6 G/DL (ref 13.5–17.5)
HEMOGLOBIN: 14.4 G/DL (ref 13.5–17.5)
KETONES, URINE: ABNORMAL MG/DL
KETONES, URINE: NEGATIVE MG/DL
LACTIC ACID, SEPSIS: 1.3 MMOL/L (ref 0.4–1.9)
LEUKOCYTE ESTERASE, URINE: NEGATIVE
LEUKOCYTE ESTERASE, URINE: NEGATIVE
LIPASE: 8 U/L (ref 13–60)
LYMPHOCYTES ABSOLUTE: 0.4 K/UL (ref 1–5.1)
LYMPHOCYTES ABSOLUTE: 0.6 K/UL (ref 1–5.1)
LYMPHOCYTES ABSOLUTE: 0.8 K/UL (ref 1–5.1)
LYMPHOCYTES RELATIVE PERCENT: 10 %
LYMPHOCYTES RELATIVE PERCENT: 2.9 %
LYMPHOCYTES RELATIVE PERCENT: 6.1 %
MAGNESIUM: 2.1 MG/DL (ref 1.8–2.4)
MAGNESIUM: 2.1 MG/DL (ref 1.8–2.4)
MCH RBC QN AUTO: 32.1 PG (ref 26–34)
MCH RBC QN AUTO: 32.2 PG (ref 26–34)
MCH RBC QN AUTO: 32.3 PG (ref 26–34)
MCH RBC QN AUTO: 32.8 PG (ref 26–34)
MCHC RBC AUTO-ENTMCNC: 32.8 G/DL (ref 31–36)
MCHC RBC AUTO-ENTMCNC: 33.3 G/DL (ref 31–36)
MCHC RBC AUTO-ENTMCNC: 33.4 G/DL (ref 31–36)
MCHC RBC AUTO-ENTMCNC: 33.8 G/DL (ref 31–36)
MCV RBC AUTO: 96.3 FL (ref 80–100)
MCV RBC AUTO: 96.6 FL (ref 80–100)
MCV RBC AUTO: 96.9 FL (ref 80–100)
MCV RBC AUTO: 98.6 FL (ref 80–100)
METHEMOGLOBIN VENOUS: 0.3 %
MICROSCOPIC EXAMINATION: NORMAL
MICROSCOPIC EXAMINATION: YES
MONOCYTES ABSOLUTE: 0.8 K/UL (ref 0–1.3)
MONOCYTES ABSOLUTE: 1 K/UL (ref 0–1.3)
MONOCYTES ABSOLUTE: 1.2 K/UL (ref 0–1.3)
MONOCYTES RELATIVE PERCENT: 10.3 %
MONOCYTES RELATIVE PERCENT: 10.9 %
MONOCYTES RELATIVE PERCENT: 8.3 %
MUCUS: ABNORMAL /LPF
NEUTROPHILS ABSOLUTE: 13.3 K/UL (ref 1.7–7.7)
NEUTROPHILS ABSOLUTE: 5.9 K/UL (ref 1.7–7.7)
NEUTROPHILS ABSOLUTE: 7.9 K/UL (ref 1.7–7.7)
NEUTROPHILS RELATIVE PERCENT: 76.4 %
NEUTROPHILS RELATIVE PERCENT: 82.5 %
NEUTROPHILS RELATIVE PERCENT: 88.5 %
NITRITE, URINE: NEGATIVE
NITRITE, URINE: NEGATIVE
O2 CONTENT, VEN: 8 VOL %
O2 SAT, VEN: 47 %
O2 THERAPY: ABNORMAL
PCO2, VEN: 36.9 MMHG (ref 40–50)
PDW BLD-RTO: 13.7 % (ref 12.4–15.4)
PDW BLD-RTO: 13.9 % (ref 12.4–15.4)
PDW BLD-RTO: 13.9 % (ref 12.4–15.4)
PDW BLD-RTO: 14.7 % (ref 12.4–15.4)
PH UA: 6 (ref 5–8)
PH UA: 7 (ref 5–8)
PH VENOUS: 7.42 (ref 7.35–7.45)
PLATELET # BLD: 205 K/UL (ref 135–450)
PLATELET # BLD: 208 K/UL (ref 135–450)
PLATELET # BLD: 226 K/UL (ref 135–450)
PLATELET # BLD: 227 K/UL (ref 135–450)
PMV BLD AUTO: 8.6 FL (ref 5–10.5)
PMV BLD AUTO: 9 FL (ref 5–10.5)
PMV BLD AUTO: 9.5 FL (ref 5–10.5)
PMV BLD AUTO: 9.7 FL (ref 5–10.5)
PO2, VEN: 25.2 MMHG (ref 25–40)
POTASSIUM REFLEX MAGNESIUM: 3.6 MMOL/L (ref 3.5–5.1)
POTASSIUM REFLEX MAGNESIUM: 4.6 MMOL/L (ref 3.5–5.1)
POTASSIUM SERPL-SCNC: 4 MMOL/L (ref 3.5–5.1)
POTASSIUM SERPL-SCNC: 4.4 MMOL/L (ref 3.5–5.1)
PROTEIN UA: 30 MG/DL
PROTEIN UA: NEGATIVE MG/DL
RAPID INFLUENZA  B AGN: NEGATIVE
RAPID INFLUENZA A AGN: NEGATIVE
RBC # BLD: 3.7 M/UL (ref 4.2–5.9)
RBC # BLD: 3.76 M/UL (ref 4.2–5.9)
RBC # BLD: 4.22 M/UL (ref 4.2–5.9)
RBC # BLD: 4.48 M/UL (ref 4.2–5.9)
RBC UA: ABNORMAL /HPF (ref 0–2)
SODIUM BLD-SCNC: 129 MMOL/L (ref 136–145)
SODIUM BLD-SCNC: 134 MMOL/L (ref 136–145)
SODIUM BLD-SCNC: 134 MMOL/L (ref 136–145)
SODIUM BLD-SCNC: 135 MMOL/L (ref 136–145)
SPECIFIC GRAVITY UA: 1.01 (ref 1–1.03)
SPECIFIC GRAVITY UA: >=1.03 (ref 1–1.03)
TCO2 CALC VENOUS: 25 MMOL/L
TOTAL PROTEIN: 6.8 G/DL (ref 6.4–8.2)
TOTAL PROTEIN: 7.5 G/DL (ref 6.4–8.2)
TROPONIN: <0.01 NG/ML
TROPONIN: <0.01 NG/ML
TSH REFLEX: 0.98 UIU/ML (ref 0.27–4.2)
URINE REFLEX TO CULTURE: ABNORMAL
URINE REFLEX TO CULTURE: NORMAL
URINE TYPE: ABNORMAL
URINE TYPE: NORMAL
UROBILINOGEN, URINE: 0.2 E.U./DL
UROBILINOGEN, URINE: 1 E.U./DL
WBC # BLD: 12.2 K/UL (ref 4–11)
WBC # BLD: 15 K/UL (ref 4–11)
WBC # BLD: 7.7 K/UL (ref 4–11)
WBC # BLD: 9.6 K/UL (ref 4–11)
WBC UA: ABNORMAL /HPF (ref 0–5)

## 2019-01-01 PROCEDURE — 85025 COMPLETE CBC W/AUTO DIFF WBC: CPT

## 2019-01-01 PROCEDURE — 82803 BLOOD GASES ANY COMBINATION: CPT

## 2019-01-01 PROCEDURE — G8428 CUR MEDS NOT DOCUMENT: HCPCS | Performed by: ORTHOPAEDIC SURGERY

## 2019-01-01 PROCEDURE — 99285 EMERGENCY DEPT VISIT HI MDM: CPT

## 2019-01-01 PROCEDURE — 36415 COLL VENOUS BLD VENIPUNCTURE: CPT

## 2019-01-01 PROCEDURE — 6360000002 HC RX W HCPCS: Performed by: INTERNAL MEDICINE

## 2019-01-01 PROCEDURE — 1036F TOBACCO NON-USER: CPT | Performed by: ORTHOPAEDIC SURGERY

## 2019-01-01 PROCEDURE — 94640 AIRWAY INHALATION TREATMENT: CPT

## 2019-01-01 PROCEDURE — 83735 ASSAY OF MAGNESIUM: CPT

## 2019-01-01 PROCEDURE — 97166 OT EVAL MOD COMPLEX 45 MIN: CPT

## 2019-01-01 PROCEDURE — 84484 ASSAY OF TROPONIN QUANT: CPT

## 2019-01-01 PROCEDURE — 1200000000 HC SEMI PRIVATE

## 2019-01-01 PROCEDURE — 6370000000 HC RX 637 (ALT 250 FOR IP): Performed by: INTERNAL MEDICINE

## 2019-01-01 PROCEDURE — 93294 REM INTERROG EVL PM/LDLS PM: CPT | Performed by: INTERNAL MEDICINE

## 2019-01-01 PROCEDURE — 97530 THERAPEUTIC ACTIVITIES: CPT

## 2019-01-01 PROCEDURE — 87040 BLOOD CULTURE FOR BACTERIA: CPT

## 2019-01-01 PROCEDURE — 80053 COMPREHEN METABOLIC PANEL: CPT

## 2019-01-01 PROCEDURE — 93005 ELECTROCARDIOGRAM TRACING: CPT | Performed by: EMERGENCY MEDICINE

## 2019-01-01 PROCEDURE — 99238 HOSP IP/OBS DSCHRG MGMT 30/<: CPT | Performed by: INTERNAL MEDICINE

## 2019-01-01 PROCEDURE — 94761 N-INVAS EAR/PLS OXIMETRY MLT: CPT

## 2019-01-01 PROCEDURE — 96375 TX/PRO/DX INJ NEW DRUG ADDON: CPT

## 2019-01-01 PROCEDURE — 80048 BASIC METABOLIC PNL TOTAL CA: CPT

## 2019-01-01 PROCEDURE — 96361 HYDRATE IV INFUSION ADD-ON: CPT

## 2019-01-01 PROCEDURE — 71046 X-RAY EXAM CHEST 2 VIEWS: CPT

## 2019-01-01 PROCEDURE — 1123F ACP DISCUSS/DSCN MKR DOCD: CPT | Performed by: ORTHOPAEDIC SURGERY

## 2019-01-01 PROCEDURE — G8427 DOCREV CUR MEDS BY ELIG CLIN: HCPCS | Performed by: INTERNAL MEDICINE

## 2019-01-01 PROCEDURE — 93296 REM INTERROG EVL PM/IDS: CPT | Performed by: INTERNAL MEDICINE

## 2019-01-01 PROCEDURE — 83605 ASSAY OF LACTIC ACID: CPT

## 2019-01-01 PROCEDURE — 2580000003 HC RX 258: Performed by: INTERNAL MEDICINE

## 2019-01-01 PROCEDURE — G8598 ASA/ANTIPLAT THER USED: HCPCS | Performed by: INTERNAL MEDICINE

## 2019-01-01 PROCEDURE — 93005 ELECTROCARDIOGRAM TRACING: CPT | Performed by: NURSE PRACTITIONER

## 2019-01-01 PROCEDURE — 96365 THER/PROPH/DIAG IV INF INIT: CPT

## 2019-01-01 PROCEDURE — 4040F PNEUMOC VAC/ADMIN/RCVD: CPT | Performed by: ORTHOPAEDIC SURGERY

## 2019-01-01 PROCEDURE — 81003 URINALYSIS AUTO W/O SCOPE: CPT

## 2019-01-01 PROCEDURE — G8484 FLU IMMUNIZE NO ADMIN: HCPCS | Performed by: INTERNAL MEDICINE

## 2019-01-01 PROCEDURE — 85027 COMPLETE CBC AUTOMATED: CPT

## 2019-01-01 PROCEDURE — 71045 X-RAY EXAM CHEST 1 VIEW: CPT

## 2019-01-01 PROCEDURE — 99232 SBSQ HOSP IP/OBS MODERATE 35: CPT | Performed by: INTERNAL MEDICINE

## 2019-01-01 PROCEDURE — 4040F PNEUMOC VAC/ADMIN/RCVD: CPT | Performed by: INTERNAL MEDICINE

## 2019-01-01 PROCEDURE — G8598 ASA/ANTIPLAT THER USED: HCPCS | Performed by: ORTHOPAEDIC SURGERY

## 2019-01-01 PROCEDURE — 99214 OFFICE O/P EST MOD 30 MIN: CPT | Performed by: INTERNAL MEDICINE

## 2019-01-01 PROCEDURE — 83690 ASSAY OF LIPASE: CPT

## 2019-01-01 PROCEDURE — 1036F TOBACCO NON-USER: CPT | Performed by: INTERNAL MEDICINE

## 2019-01-01 PROCEDURE — 1123F ACP DISCUSS/DSCN MKR DOCD: CPT | Performed by: INTERNAL MEDICINE

## 2019-01-01 PROCEDURE — 97162 PT EVAL MOD COMPLEX 30 MIN: CPT

## 2019-01-01 PROCEDURE — 99213 OFFICE O/P EST LOW 20 MIN: CPT | Performed by: ORTHOPAEDIC SURGERY

## 2019-01-01 PROCEDURE — G8420 CALC BMI NORM PARAMETERS: HCPCS | Performed by: INTERNAL MEDICINE

## 2019-01-01 PROCEDURE — 84443 ASSAY THYROID STIM HORMONE: CPT

## 2019-01-01 PROCEDURE — 81001 URINALYSIS AUTO W/SCOPE: CPT

## 2019-01-01 PROCEDURE — 92526 ORAL FUNCTION THERAPY: CPT

## 2019-01-01 PROCEDURE — 6360000002 HC RX W HCPCS: Performed by: EMERGENCY MEDICINE

## 2019-01-01 PROCEDURE — 92610 EVALUATE SWALLOWING FUNCTION: CPT

## 2019-01-01 PROCEDURE — 82140 ASSAY OF AMMONIA: CPT

## 2019-01-01 PROCEDURE — G8420 CALC BMI NORM PARAMETERS: HCPCS | Performed by: ORTHOPAEDIC SURGERY

## 2019-01-01 PROCEDURE — 2580000003 HC RX 258: Performed by: NURSE PRACTITIONER

## 2019-01-01 PROCEDURE — 96360 HYDRATION IV INFUSION INIT: CPT

## 2019-01-01 PROCEDURE — 87804 INFLUENZA ASSAY W/OPTIC: CPT

## 2019-01-01 PROCEDURE — 2580000003 HC RX 258: Performed by: EMERGENCY MEDICINE

## 2019-01-01 PROCEDURE — 93010 ELECTROCARDIOGRAM REPORT: CPT | Performed by: INTERNAL MEDICINE

## 2019-01-01 RX ORDER — PREDNISONE 1 MG/1
5 TABLET ORAL DAILY
Status: DISCONTINUED | OUTPATIENT
Start: 2019-01-01 | End: 2019-01-01 | Stop reason: HOSPADM

## 2019-01-01 RX ORDER — SODIUM CHLORIDE 0.9 % (FLUSH) 0.9 %
10 SYRINGE (ML) INJECTION PRN
Status: DISCONTINUED | OUTPATIENT
Start: 2019-01-01 | End: 2019-01-01 | Stop reason: HOSPADM

## 2019-01-01 RX ORDER — ATORVASTATIN CALCIUM 10 MG/1
10 TABLET, FILM COATED ORAL DAILY
Status: DISCONTINUED | OUTPATIENT
Start: 2019-01-01 | End: 2019-01-01 | Stop reason: HOSPADM

## 2019-01-01 RX ORDER — SODIUM CHLORIDE 9 MG/ML
INJECTION, SOLUTION INTRAVENOUS CONTINUOUS
Status: DISCONTINUED | OUTPATIENT
Start: 2019-01-01 | End: 2019-01-01

## 2019-01-01 RX ORDER — TRAMADOL HYDROCHLORIDE 50 MG/1
50 TABLET ORAL EVERY 8 HOURS PRN
Qty: 6 TABLET | Refills: 0 | Status: SHIPPED | OUTPATIENT
Start: 2019-01-01 | End: 2019-01-01

## 2019-01-01 RX ORDER — CETIRIZINE HYDROCHLORIDE 10 MG/1
5 TABLET ORAL DAILY
Status: DISCONTINUED | OUTPATIENT
Start: 2019-01-01 | End: 2019-01-01 | Stop reason: HOSPADM

## 2019-01-01 RX ORDER — ALBUTEROL SULFATE 2.5 MG/3ML
0.63 SOLUTION RESPIRATORY (INHALATION) EVERY 6 HOURS PRN
Status: DISCONTINUED | OUTPATIENT
Start: 2019-01-01 | End: 2019-01-01 | Stop reason: HOSPADM

## 2019-01-01 RX ORDER — CALCIUM CARBONATE 200(500)MG
500 TABLET,CHEWABLE ORAL DAILY
Status: DISCONTINUED | OUTPATIENT
Start: 2019-01-01 | End: 2019-01-01

## 2019-01-01 RX ORDER — M-VIT,TX,IRON,MINS/CALC/FOLIC 27MG-0.4MG
1 TABLET ORAL DAILY
Status: DISCONTINUED | OUTPATIENT
Start: 2019-01-01 | End: 2019-01-01 | Stop reason: HOSPADM

## 2019-01-01 RX ORDER — SODIUM CHLORIDE 9 MG/ML
1000 INJECTION, SOLUTION INTRAVENOUS CONTINUOUS
Status: DISCONTINUED | OUTPATIENT
Start: 2019-01-01 | End: 2019-01-01

## 2019-01-01 RX ORDER — BENZONATATE 100 MG/1
100 CAPSULE ORAL 3 TIMES DAILY PRN
Status: DISCONTINUED | OUTPATIENT
Start: 2019-01-01 | End: 2019-01-01 | Stop reason: HOSPADM

## 2019-01-01 RX ORDER — ACETAMINOPHEN 325 MG/1
650 TABLET ORAL EVERY 4 HOURS PRN
Status: DISCONTINUED | OUTPATIENT
Start: 2019-01-01 | End: 2019-01-01 | Stop reason: HOSPADM

## 2019-01-01 RX ORDER — CALCIUM CARBONATE 200(500)MG
500 TABLET,CHEWABLE ORAL DAILY PRN
Status: DISCONTINUED | OUTPATIENT
Start: 2019-01-01 | End: 2019-01-01 | Stop reason: HOSPADM

## 2019-01-01 RX ORDER — LEVOFLOXACIN 500 MG/1
250 TABLET, FILM COATED ORAL DAILY
Qty: 3 TABLET | Refills: 0
Start: 2019-01-01 | End: 2019-01-01

## 2019-01-01 RX ORDER — ASPIRIN 81 MG/1
81 TABLET, CHEWABLE ORAL DAILY
Status: DISCONTINUED | OUTPATIENT
Start: 2019-01-01 | End: 2019-01-01 | Stop reason: HOSPADM

## 2019-01-01 RX ORDER — NITROGLYCERIN 80 MG/1
1 PATCH TRANSDERMAL DAILY
Status: DISCONTINUED | OUTPATIENT
Start: 2019-01-01 | End: 2019-01-01 | Stop reason: HOSPADM

## 2019-01-01 RX ORDER — POTASSIUM CHLORIDE 20 MEQ/1
40 TABLET, EXTENDED RELEASE ORAL PRN
Status: DISCONTINUED | OUTPATIENT
Start: 2019-01-01 | End: 2019-01-01 | Stop reason: HOSPADM

## 2019-01-01 RX ORDER — POTASSIUM CHLORIDE 7.45 MG/ML
10 INJECTION INTRAVENOUS PRN
Status: DISCONTINUED | OUTPATIENT
Start: 2019-01-01 | End: 2019-01-01 | Stop reason: HOSPADM

## 2019-01-01 RX ORDER — CLOPIDOGREL BISULFATE 75 MG/1
75 TABLET ORAL DAILY
Status: DISCONTINUED | OUTPATIENT
Start: 2019-01-01 | End: 2019-01-01 | Stop reason: HOSPADM

## 2019-01-01 RX ORDER — BENZONATATE 100 MG/1
100 CAPSULE ORAL 3 TIMES DAILY PRN
Status: ON HOLD | COMMUNITY
End: 2020-01-01 | Stop reason: ALTCHOICE

## 2019-01-01 RX ORDER — CETIRIZINE HYDROCHLORIDE 10 MG/1
10 TABLET ORAL DAILY
Status: ON HOLD | COMMUNITY
End: 2020-01-01 | Stop reason: ALTCHOICE

## 2019-01-01 RX ORDER — UBIDECARENONE 75 MG
50 CAPSULE ORAL DAILY
Status: DISCONTINUED | OUTPATIENT
Start: 2019-01-01 | End: 2019-01-01 | Stop reason: HOSPADM

## 2019-01-01 RX ORDER — FAMOTIDINE 20 MG/1
10 TABLET, FILM COATED ORAL DAILY
Status: DISCONTINUED | OUTPATIENT
Start: 2019-01-01 | End: 2019-01-01 | Stop reason: HOSPADM

## 2019-01-01 RX ORDER — 0.9 % SODIUM CHLORIDE 0.9 %
500 INTRAVENOUS SOLUTION INTRAVENOUS ONCE
Status: COMPLETED | OUTPATIENT
Start: 2019-01-01 | End: 2019-01-01

## 2019-01-01 RX ORDER — ALBUTEROL SULFATE 0.63 MG/3ML
1 SOLUTION RESPIRATORY (INHALATION) EVERY 6 HOURS PRN
COMMUNITY

## 2019-01-01 RX ORDER — ACETAMINOPHEN 325 MG/1
650 TABLET ORAL EVERY 6 HOURS PRN
COMMUNITY

## 2019-01-01 RX ORDER — MIRTAZAPINE 15 MG/1
7.5 TABLET, FILM COATED ORAL NIGHTLY
Status: DISCONTINUED | OUTPATIENT
Start: 2019-01-01 | End: 2019-01-01

## 2019-01-01 RX ORDER — GUAIFENESIN 100 MG/5ML
200 SYRUP ORAL EVERY 4 HOURS
COMMUNITY

## 2019-01-01 RX ORDER — TRAMADOL HYDROCHLORIDE 50 MG/1
50 TABLET ORAL EVERY 6 HOURS PRN
Status: ON HOLD | COMMUNITY
End: 2019-01-01 | Stop reason: SDUPTHER

## 2019-01-01 RX ORDER — BRAN 5 G
1 WAFER ORAL
Status: DISCONTINUED | OUTPATIENT
Start: 2019-01-01 | End: 2019-01-01 | Stop reason: RX

## 2019-01-01 RX ORDER — RANOLAZINE 500 MG/1
1000 TABLET, EXTENDED RELEASE ORAL 2 TIMES DAILY
Status: DISCONTINUED | OUTPATIENT
Start: 2019-01-01 | End: 2019-01-01 | Stop reason: HOSPADM

## 2019-01-01 RX ORDER — 0.9 % SODIUM CHLORIDE 0.9 %
1000 INTRAVENOUS SOLUTION INTRAVENOUS ONCE
Status: COMPLETED | OUTPATIENT
Start: 2019-01-01 | End: 2019-01-01

## 2019-01-01 RX ORDER — ONDANSETRON 2 MG/ML
4 INJECTION INTRAMUSCULAR; INTRAVENOUS EVERY 6 HOURS PRN
Status: DISCONTINUED | OUTPATIENT
Start: 2019-01-01 | End: 2019-01-01 | Stop reason: HOSPADM

## 2019-01-01 RX ORDER — ALBUTEROL SULFATE 2.5 MG/3ML
2.5 SOLUTION RESPIRATORY (INHALATION) 2 TIMES DAILY
Status: DISCONTINUED | OUTPATIENT
Start: 2019-01-01 | End: 2019-01-01 | Stop reason: HOSPADM

## 2019-01-01 RX ORDER — MAGNESIUM SULFATE 1 G/100ML
1 INJECTION INTRAVENOUS PRN
Status: DISCONTINUED | OUTPATIENT
Start: 2019-01-01 | End: 2019-01-01 | Stop reason: HOSPADM

## 2019-01-01 RX ORDER — DRONABINOL 2.5 MG/1
2.5 CAPSULE ORAL
COMMUNITY
End: 2019-01-01 | Stop reason: ALTCHOICE

## 2019-01-01 RX ORDER — GUAIFENESIN 100 MG/5ML
200 SOLUTION ORAL EVERY 4 HOURS PRN
Status: DISCONTINUED | OUTPATIENT
Start: 2019-01-01 | End: 2019-01-01 | Stop reason: HOSPADM

## 2019-01-01 RX ORDER — TRAMADOL HYDROCHLORIDE 50 MG/1
50 TABLET ORAL EVERY 6 HOURS PRN
Status: DISCONTINUED | OUTPATIENT
Start: 2019-01-01 | End: 2019-01-01 | Stop reason: HOSPADM

## 2019-01-01 RX ORDER — MIRTAZAPINE 15 MG/1
15 TABLET, FILM COATED ORAL NIGHTLY
COMMUNITY

## 2019-01-01 RX ORDER — SODIUM CHLORIDE 0.9 % (FLUSH) 0.9 %
10 SYRINGE (ML) INJECTION EVERY 12 HOURS SCHEDULED
Status: DISCONTINUED | OUTPATIENT
Start: 2019-01-01 | End: 2019-01-01 | Stop reason: HOSPADM

## 2019-01-01 RX ORDER — HEPARIN SODIUM 5000 [USP'U]/ML
5000 INJECTION, SOLUTION INTRAVENOUS; SUBCUTANEOUS EVERY 8 HOURS SCHEDULED
Status: DISCONTINUED | OUTPATIENT
Start: 2019-01-01 | End: 2019-01-01 | Stop reason: HOSPADM

## 2019-01-01 RX ORDER — GUAIFENESIN 100 MG/5ML
200 SOLUTION ORAL EVERY 4 HOURS
Status: DISCONTINUED | OUTPATIENT
Start: 2019-01-01 | End: 2019-01-01

## 2019-01-01 RX ORDER — PREDNISONE 1 MG/1
5 TABLET ORAL DAILY
Status: ON HOLD | COMMUNITY
End: 2019-01-01 | Stop reason: HOSPADM

## 2019-01-01 RX ORDER — CALCIUM CARBONATE 500(1250)
500 TABLET ORAL DAILY
COMMUNITY

## 2019-01-01 RX ADMIN — HEPARIN SODIUM 5000 UNITS: 5000 INJECTION INTRAVENOUS; SUBCUTANEOUS at 13:12

## 2019-01-01 RX ADMIN — MIRTAZAPINE 7.5 MG: 15 TABLET, FILM COATED ORAL at 20:53

## 2019-01-01 RX ADMIN — Medication 10 ML: at 20:46

## 2019-01-01 RX ADMIN — VANCOMYCIN HYDROCHLORIDE 1000 MG: 1 INJECTION, POWDER, LYOPHILIZED, FOR SOLUTION INTRAVENOUS at 20:56

## 2019-01-01 RX ADMIN — SODIUM CHLORIDE 500 ML: 9 INJECTION, SOLUTION INTRAVENOUS at 20:56

## 2019-01-01 RX ADMIN — SODIUM CHLORIDE: 9 INJECTION, SOLUTION INTRAVENOUS at 09:44

## 2019-01-01 RX ADMIN — MEROPENEM 1 G: 1 INJECTION, POWDER, FOR SOLUTION INTRAVENOUS at 09:45

## 2019-01-01 RX ADMIN — CLOPIDOGREL BISULFATE 75 MG: 75 TABLET ORAL at 09:11

## 2019-01-01 RX ADMIN — ATORVASTATIN CALCIUM 10 MG: 10 TABLET, FILM COATED ORAL at 10:10

## 2019-01-01 RX ADMIN — ALBUTEROL SULFATE 2.5 MG: 2.5 SOLUTION RESPIRATORY (INHALATION) at 07:30

## 2019-01-01 RX ADMIN — CLOPIDOGREL BISULFATE 75 MG: 75 TABLET ORAL at 10:10

## 2019-01-01 RX ADMIN — MAGNESIUM OXIDE TAB 400 MG (241.3 MG ELEMENTAL MG) 400 MG: 400 (241.3 MG) TAB at 09:12

## 2019-01-01 RX ADMIN — METOPROLOL TARTRATE 25 MG: 25 TABLET ORAL at 10:11

## 2019-01-01 RX ADMIN — MAGNESIUM OXIDE TAB 400 MG (241.3 MG ELEMENTAL MG) 400 MG: 400 (241.3 MG) TAB at 09:49

## 2019-01-01 RX ADMIN — RANOLAZINE 1000 MG: 500 TABLET, FILM COATED, EXTENDED RELEASE ORAL at 20:53

## 2019-01-01 RX ADMIN — MEROPENEM 1 G: 1 INJECTION, POWDER, FOR SOLUTION INTRAVENOUS at 09:11

## 2019-01-01 RX ADMIN — MEROPENEM 1 G: 1 INJECTION, POWDER, FOR SOLUTION INTRAVENOUS at 10:13

## 2019-01-01 RX ADMIN — ALBUTEROL SULFATE 2.5 MG: 2.5 SOLUTION RESPIRATORY (INHALATION) at 19:48

## 2019-01-01 RX ADMIN — MEROPENEM 1 G: 1 INJECTION, POWDER, FOR SOLUTION INTRAVENOUS at 20:45

## 2019-01-01 RX ADMIN — ASPIRIN 81 MG 81 MG: 81 TABLET ORAL at 09:49

## 2019-01-01 RX ADMIN — HEPARIN SODIUM 5000 UNITS: 5000 INJECTION INTRAVENOUS; SUBCUTANEOUS at 15:08

## 2019-01-01 RX ADMIN — RANOLAZINE 1000 MG: 500 TABLET, FILM COATED, EXTENDED RELEASE ORAL at 09:12

## 2019-01-01 RX ADMIN — CETIRIZINE HYDROCHLORIDE 5 MG: 10 TABLET ORAL at 10:10

## 2019-01-01 RX ADMIN — SODIUM CHLORIDE: 9 INJECTION, SOLUTION INTRAVENOUS at 13:13

## 2019-01-01 RX ADMIN — VANCOMYCIN HYDROCHLORIDE 1000 MG: 1 INJECTION, POWDER, LYOPHILIZED, FOR SOLUTION INTRAVENOUS at 23:13

## 2019-01-01 RX ADMIN — MULTIPLE VITAMINS W/ MINERALS TAB 1 TABLET: TAB at 09:48

## 2019-01-01 RX ADMIN — HEPARIN SODIUM 5000 UNITS: 5000 INJECTION INTRAVENOUS; SUBCUTANEOUS at 06:44

## 2019-01-01 RX ADMIN — SODIUM CHLORIDE 1000 ML: 9 INJECTION, SOLUTION INTRAVENOUS at 00:37

## 2019-01-01 RX ADMIN — RANOLAZINE 1000 MG: 500 TABLET, FILM COATED, EXTENDED RELEASE ORAL at 20:46

## 2019-01-01 RX ADMIN — ATORVASTATIN CALCIUM 10 MG: 10 TABLET, FILM COATED ORAL at 09:48

## 2019-01-01 RX ADMIN — MULTIPLE VITAMINS W/ MINERALS TAB 1 TABLET: TAB at 09:12

## 2019-01-01 RX ADMIN — SODIUM CHLORIDE 1000 ML: 9 INJECTION, SOLUTION INTRAVENOUS at 12:04

## 2019-01-01 RX ADMIN — MULTIPLE VITAMINS W/ MINERALS TAB 1 TABLET: TAB at 10:10

## 2019-01-01 RX ADMIN — HEPARIN SODIUM 5000 UNITS: 5000 INJECTION INTRAVENOUS; SUBCUTANEOUS at 22:37

## 2019-01-01 RX ADMIN — METOPROLOL TARTRATE 25 MG: 25 TABLET ORAL at 20:54

## 2019-01-01 RX ADMIN — HEPARIN SODIUM 5000 UNITS: 5000 INJECTION INTRAVENOUS; SUBCUTANEOUS at 21:10

## 2019-01-01 RX ADMIN — FAMOTIDINE 10 MG: 20 TABLET ORAL at 05:36

## 2019-01-01 RX ADMIN — Medication 50 MCG: at 09:49

## 2019-01-01 RX ADMIN — Medication 50 MCG: at 10:11

## 2019-01-01 RX ADMIN — HEPARIN SODIUM 5000 UNITS: 5000 INJECTION INTRAVENOUS; SUBCUTANEOUS at 05:35

## 2019-01-01 RX ADMIN — FAMOTIDINE 10 MG: 20 TABLET ORAL at 06:43

## 2019-01-01 RX ADMIN — ASPIRIN 81 MG 81 MG: 81 TABLET ORAL at 10:10

## 2019-01-01 RX ADMIN — MAGNESIUM OXIDE TAB 400 MG (241.3 MG ELEMENTAL MG) 400 MG: 400 (241.3 MG) TAB at 20:46

## 2019-01-01 RX ADMIN — CLOPIDOGREL BISULFATE 75 MG: 75 TABLET ORAL at 09:49

## 2019-01-01 RX ADMIN — ALBUTEROL SULFATE 0.63 MG: 2.5 SOLUTION RESPIRATORY (INHALATION) at 23:34

## 2019-01-01 RX ADMIN — HEPARIN SODIUM 5000 UNITS: 5000 INJECTION INTRAVENOUS; SUBCUTANEOUS at 06:43

## 2019-01-01 RX ADMIN — ALBUTEROL SULFATE 2.5 MG: 2.5 SOLUTION RESPIRATORY (INHALATION) at 07:17

## 2019-01-01 RX ADMIN — CETIRIZINE HYDROCHLORIDE 5 MG: 10 TABLET ORAL at 09:12

## 2019-01-01 RX ADMIN — VANCOMYCIN HYDROCHLORIDE 1000 MG: 1 INJECTION, POWDER, LYOPHILIZED, FOR SOLUTION INTRAVENOUS at 20:53

## 2019-01-01 RX ADMIN — CETIRIZINE HYDROCHLORIDE 5 MG: 10 TABLET ORAL at 09:49

## 2019-01-01 RX ADMIN — MEROPENEM 1 G: 1 INJECTION, POWDER, FOR SOLUTION INTRAVENOUS at 20:53

## 2019-01-01 RX ADMIN — ANTACID TABLETS 500 MG: 500 TABLET, CHEWABLE ORAL at 09:11

## 2019-01-01 RX ADMIN — MAGNESIUM OXIDE TAB 400 MG (241.3 MG ELEMENTAL MG) 400 MG: 400 (241.3 MG) TAB at 20:53

## 2019-01-01 RX ADMIN — METOPROLOL TARTRATE 25 MG: 25 TABLET ORAL at 09:49

## 2019-01-01 RX ADMIN — ANTACID TABLETS 500 MG: 500 TABLET, CHEWABLE ORAL at 10:08

## 2019-01-01 RX ADMIN — Medication 10 ML: at 10:11

## 2019-01-01 RX ADMIN — PREDNISONE 5 MG: 5 TABLET ORAL at 10:11

## 2019-01-01 RX ADMIN — RANOLAZINE 1000 MG: 500 TABLET, FILM COATED, EXTENDED RELEASE ORAL at 10:11

## 2019-01-01 RX ADMIN — Medication 10 ML: at 09:50

## 2019-01-01 RX ADMIN — HEPARIN SODIUM 5000 UNITS: 5000 INJECTION INTRAVENOUS; SUBCUTANEOUS at 14:34

## 2019-01-01 RX ADMIN — MAGNESIUM OXIDE TAB 400 MG (241.3 MG ELEMENTAL MG) 400 MG: 400 (241.3 MG) TAB at 10:10

## 2019-01-01 RX ADMIN — MEROPENEM 1 G: 1 INJECTION, POWDER, FOR SOLUTION INTRAVENOUS at 20:56

## 2019-01-01 RX ADMIN — PREDNISONE 5 MG: 5 TABLET ORAL at 09:12

## 2019-01-01 RX ADMIN — PREDNISONE 5 MG: 5 TABLET ORAL at 09:49

## 2019-01-01 RX ADMIN — METOPROLOL TARTRATE 25 MG: 25 TABLET ORAL at 09:12

## 2019-01-01 RX ADMIN — ASPIRIN 81 MG 81 MG: 81 TABLET ORAL at 09:12

## 2019-01-01 RX ADMIN — METOPROLOL TARTRATE 25 MG: 25 TABLET ORAL at 20:46

## 2019-01-01 RX ADMIN — ALBUTEROL SULFATE 2.5 MG: 2.5 SOLUTION RESPIRATORY (INHALATION) at 19:26

## 2019-01-01 RX ADMIN — FAMOTIDINE 10 MG: 20 TABLET ORAL at 06:45

## 2019-01-01 RX ADMIN — RANOLAZINE 1000 MG: 500 TABLET, FILM COATED, EXTENDED RELEASE ORAL at 09:49

## 2019-01-01 RX ADMIN — ATORVASTATIN CALCIUM 10 MG: 10 TABLET, FILM COATED ORAL at 09:11

## 2019-01-01 ASSESSMENT — PAIN SCALES - GENERAL: PAINLEVEL_OUTOF10: 0

## 2019-01-01 ASSESSMENT — ENCOUNTER SYMPTOMS
ABDOMINAL PAIN: 0
SHORTNESS OF BREATH: 0
NAUSEA: 0
DIARRHEA: 0
VOMITING: 0
COUGH: 0

## 2019-04-03 ENCOUNTER — APPOINTMENT (OUTPATIENT)
Dept: CT IMAGING | Age: 84
End: 2019-04-03
Payer: MEDICARE

## 2019-04-03 ENCOUNTER — HOSPITAL ENCOUNTER (EMERGENCY)
Age: 84
Discharge: NURSING, REHAB OR LONG-TERM CARE | End: 2019-04-03
Attending: EMERGENCY MEDICINE
Payer: MEDICARE

## 2019-04-03 VITALS
DIASTOLIC BLOOD PRESSURE: 62 MMHG | RESPIRATION RATE: 14 BRPM | HEART RATE: 88 BPM | TEMPERATURE: 97.5 F | BODY MASS INDEX: 23.03 KG/M2 | WEIGHT: 170 LBS | SYSTOLIC BLOOD PRESSURE: 132 MMHG | HEIGHT: 72 IN | OXYGEN SATURATION: 97 %

## 2019-04-03 DIAGNOSIS — M54.2 NECK PAIN: Primary | ICD-10-CM

## 2019-04-03 LAB
A/G RATIO: 1 (ref 1.1–2.2)
ALBUMIN SERPL-MCNC: 3.5 G/DL (ref 3.4–5)
ALP BLD-CCNC: 80 U/L (ref 40–129)
ALT SERPL-CCNC: 13 U/L (ref 10–40)
ANION GAP SERPL CALCULATED.3IONS-SCNC: 10 MMOL/L (ref 3–16)
AST SERPL-CCNC: 19 U/L (ref 15–37)
BASOPHILS ABSOLUTE: 0.1 K/UL (ref 0–0.2)
BASOPHILS RELATIVE PERCENT: 0.6 %
BILIRUB SERPL-MCNC: 0.4 MG/DL (ref 0–1)
BUN BLDV-MCNC: 23 MG/DL (ref 7–20)
CALCIUM SERPL-MCNC: 9.6 MG/DL (ref 8.3–10.6)
CHLORIDE BLD-SCNC: 97 MMOL/L (ref 99–110)
CO2: 26 MMOL/L (ref 21–32)
CREAT SERPL-MCNC: 1.2 MG/DL (ref 0.8–1.3)
EKG ATRIAL RATE: 76 BPM
EKG DIAGNOSIS: NORMAL
EKG P-R INTERVAL: 118 MS
EKG Q-T INTERVAL: 452 MS
EKG QRS DURATION: 172 MS
EKG QTC CALCULATION (BAZETT): 508 MS
EKG R AXIS: -76 DEGREES
EKG T AXIS: 104 DEGREES
EKG VENTRICULAR RATE: 76 BPM
EOSINOPHILS ABSOLUTE: 0.2 K/UL (ref 0–0.6)
EOSINOPHILS RELATIVE PERCENT: 1.8 %
GFR AFRICAN AMERICAN: >60
GFR NON-AFRICAN AMERICAN: 57
GLOBULIN: 3.4 G/DL
GLUCOSE BLD-MCNC: 98 MG/DL (ref 70–99)
HCT VFR BLD CALC: 37.3 % (ref 40.5–52.5)
HEMOGLOBIN: 12.3 G/DL (ref 13.5–17.5)
LYMPHOCYTES ABSOLUTE: 0.7 K/UL (ref 1–5.1)
LYMPHOCYTES RELATIVE PERCENT: 7.4 %
MCH RBC QN AUTO: 30.4 PG (ref 26–34)
MCHC RBC AUTO-ENTMCNC: 33.1 G/DL (ref 31–36)
MCV RBC AUTO: 91.8 FL (ref 80–100)
MONOCYTES ABSOLUTE: 0.9 K/UL (ref 0–1.3)
MONOCYTES RELATIVE PERCENT: 8.7 %
NEUTROPHILS ABSOLUTE: 8.3 K/UL (ref 1.7–7.7)
NEUTROPHILS RELATIVE PERCENT: 81.5 %
PDW BLD-RTO: 14.4 % (ref 12.4–15.4)
PLATELET # BLD: 253 K/UL (ref 135–450)
PMV BLD AUTO: 8.1 FL (ref 5–10.5)
POTASSIUM SERPL-SCNC: 4.6 MMOL/L (ref 3.5–5.1)
RBC # BLD: 4.06 M/UL (ref 4.2–5.9)
SODIUM BLD-SCNC: 133 MMOL/L (ref 136–145)
TOTAL PROTEIN: 6.9 G/DL (ref 6.4–8.2)
WBC # BLD: 10.1 K/UL (ref 4–11)

## 2019-04-03 PROCEDURE — 70498 CT ANGIOGRAPHY NECK: CPT

## 2019-04-03 PROCEDURE — 93010 ELECTROCARDIOGRAM REPORT: CPT | Performed by: INTERNAL MEDICINE

## 2019-04-03 PROCEDURE — 85025 COMPLETE CBC W/AUTO DIFF WBC: CPT

## 2019-04-03 PROCEDURE — 80053 COMPREHEN METABOLIC PANEL: CPT

## 2019-04-03 PROCEDURE — 6360000004 HC RX CONTRAST MEDICATION: Performed by: EMERGENCY MEDICINE

## 2019-04-03 PROCEDURE — 93005 ELECTROCARDIOGRAM TRACING: CPT | Performed by: EMERGENCY MEDICINE

## 2019-04-03 PROCEDURE — 36415 COLL VENOUS BLD VENIPUNCTURE: CPT

## 2019-04-03 PROCEDURE — 6360000002 HC RX W HCPCS: Performed by: EMERGENCY MEDICINE

## 2019-04-03 PROCEDURE — 96374 THER/PROPH/DIAG INJ IV PUSH: CPT

## 2019-04-03 PROCEDURE — 99285 EMERGENCY DEPT VISIT HI MDM: CPT

## 2019-04-03 PROCEDURE — 96375 TX/PRO/DX INJ NEW DRUG ADDON: CPT

## 2019-04-03 RX ORDER — ONDANSETRON 2 MG/ML
4 INJECTION INTRAMUSCULAR; INTRAVENOUS ONCE
Status: COMPLETED | OUTPATIENT
Start: 2019-04-03 | End: 2019-04-03

## 2019-04-03 RX ORDER — RANOLAZINE 500 MG/1
500 TABLET, EXTENDED RELEASE ORAL 2 TIMES DAILY
COMMUNITY

## 2019-04-03 RX ORDER — FENTANYL CITRATE 50 UG/ML
25 INJECTION, SOLUTION INTRAMUSCULAR; INTRAVENOUS ONCE
Status: COMPLETED | OUTPATIENT
Start: 2019-04-03 | End: 2019-04-03

## 2019-04-03 RX ORDER — CITALOPRAM 10 MG/1
10 TABLET ORAL DAILY
COMMUNITY
End: 2019-01-01

## 2019-04-03 RX ORDER — HYDROCODONE BITARTRATE AND ACETAMINOPHEN 5; 325 MG/1; MG/1
1 TABLET ORAL EVERY 6 HOURS PRN
Qty: 12 TABLET | Refills: 0 | Status: SHIPPED | OUTPATIENT
Start: 2019-04-03 | End: 2019-04-08

## 2019-04-03 RX ADMIN — ONDANSETRON 4 MG: 2 INJECTION INTRAMUSCULAR; INTRAVENOUS at 01:34

## 2019-04-03 RX ADMIN — FENTANYL CITRATE 25 MCG: 50 INJECTION INTRAMUSCULAR; INTRAVENOUS at 01:34

## 2019-04-03 RX ADMIN — IOPAMIDOL 75 ML: 755 INJECTION, SOLUTION INTRAVENOUS at 02:16

## 2019-04-03 ASSESSMENT — PAIN SCALES - GENERAL: PAINLEVEL_OUTOF10: 7

## 2019-04-03 NOTE — ED PROVIDER NOTES
Heart Disease Mother         MI    Parkinsonism Father      Social History     Socioeconomic History    Marital status:      Spouse name: Not on file    Number of children: Not on file    Years of education: Not on file    Highest education level: Not on file   Occupational History    Not on file   Social Needs    Financial resource strain: Not on file    Food insecurity:     Worry: Not on file     Inability: Not on file    Transportation needs:     Medical: Not on file     Non-medical: Not on file   Tobacco Use    Smoking status: Former Smoker     Types: Cigarettes     Last attempt to quit: 1949     Years since quittin.8    Smokeless tobacco: Never Used   Substance and Sexual Activity    Alcohol use: No     Alcohol/week: 0.0 oz    Drug use: No    Sexual activity: Not Currently   Lifestyle    Physical activity:     Days per week: Not on file     Minutes per session: Not on file    Stress: Not on file   Relationships    Social connections:     Talks on phone: Not on file     Gets together: Not on file     Attends Hindu service: Not on file     Active member of club or organization: Not on file     Attends meetings of clubs or organizations: Not on file     Relationship status: Not on file    Intimate partner violence:     Fear of current or ex partner: Not on file     Emotionally abused: Not on file     Physically abused: Not on file     Forced sexual activity: Not on file   Other Topics Concern    Not on file   Social History Narrative    Not on file     No current facility-administered medications for this encounter.       Current Outpatient Medications   Medication Sig Dispense Refill    citalopram (CELEXA) 10 MG tablet Take 10 mg by mouth daily      RaNITidine HCl (RANITIDINE 150 MAX STRENGTH PO) Take by mouth      ranolazine (RANEXA) 500 MG extended release tablet Take 1,000 mg by mouth 2 times daily      HYDROcodone-acetaminophen (LORCET) 5-325 MG per tablet Take 1 tablet by mouth every 6 hours as needed for Pain for up to 5 days. . Take lowest dose possible to manage pain 12 tablet 0    metoprolol tartrate (LOPRESSOR) 50 MG tablet Take 1 tablet by mouth 2 times daily 180 tablet 3    clopidogrel (PLAVIX) 75 MG tablet Take 1 tablet by mouth daily 90 tablet 3    atorvastatin (LIPITOR) 10 MG tablet Take 1 tablet by mouth daily 30 tablet 11    aspirin 81 MG chewable tablet Take 1 tablet by mouth daily 30 tablet 3    magnesium oxide (MAG-OX) 400 MG tablet Take 1 tablet by mouth 2 times daily 60 tablet 5    loperamide (IMODIUM) 2 MG capsule Take 2 mg by mouth 4 times daily as needed for Diarrhea      Vitamins-Lipotropics (LIPOFLAVONOID) TABS Take 1 tablet by mouth Daily with lunch Indications: vertigo      vitamin B-12 (CYANOCOBALAMIN) 100 MCG tablet Take 50 mcg by mouth daily      predniSONE (DELTASONE) 5 MG tablet Take 5 mg by mouth daily.  CALCIUM PO Take 1,000 mg by mouth daily.  vitamin D (CHOLECALCIFEROL) 1000 UNIT TABS tablet Take 1,000 Units by mouth daily.  Multiple Vitamins-Minerals (MULTIVITAMIN PO) Take 1 tablet by mouth daily        Allergies   Allergen Reactions    Altace [Ramipril]      (cough) Pt denies ever taking Ramipril    Sulfa Antibiotics      Nursing Notes Reviewed    Physical Exam:  ED Triage Vitals [04/03/19 0114]   Enc Vitals Group      BP (!) 143/82      Pulse 80      Resp 16      Temp 97.5 °F (36.4 °C)      Temp src       SpO2 96 %      Weight 170 lb (77.1 kg)      Height 6' (1.829 m)      Head Circumference       Peak Flow       Pain Score       Pain Loc       Pain Edu? Excl. in [address]?         GENERAL APPEARANCE: A well-developed well-nourished alert interactive 80-year-old male in moderate distress   HEAD: Normocephalic, atraumatic  EYES: Sclera anicteric.no conjunctival injection, PERRL EOM's  Intact  ENT: Mucous membranes moist, no nasal discharge, pharynx clear, no stridor,   NECK: Supple, no meningismus, no adenopathy, trachea midline, thyroid normal, pain in the right lateral neck just behind the sternocleidomastoid muscle. Pain was not reproduced by movement or palpation. There was no upper extremity radiculopathy with 5 over 5 strength normal sensation and full radial ulnar and median nerve function in both hands with bilaterally normal radial and ulnar pulses  HEART: RRR without rubs murmurs or gallops  LUNGS:  Clear good air movement no wheezing no retraction or accessory muscle use,  EXTREMITIES: No acute deformities, no peripheral edema  SKIN: Warm and dry.  Normal color, no rash,  capillary refill less than 2 seconds  MENTAL STATUS: Alert, oriented, interactive,   NEUROLOGICAL:  No facial drooping. moves all 4 extremities, sensation intact, no focal findings     Nursing note and vital signs reviewed     I have reviewed and interpreted all of the currently available lab results from this visit (if applicable):  Results for orders placed or performed during the hospital encounter of 04/03/19   CBC Auto Differential   Result Value Ref Range    WBC 10.1 4.0 - 11.0 K/uL    RBC 4.06 (L) 4.20 - 5.90 M/uL    Hemoglobin 12.3 (L) 13.5 - 17.5 g/dL    Hematocrit 37.3 (L) 40.5 - 52.5 %    MCV 91.8 80.0 - 100.0 fL    MCH 30.4 26.0 - 34.0 pg    MCHC 33.1 31.0 - 36.0 g/dL    RDW 14.4 12.4 - 15.4 %    Platelets 441 004 - 286 K/uL    MPV 8.1 5.0 - 10.5 fL    Neutrophils % 81.5 %    Lymphocytes % 7.4 %    Monocytes % 8.7 %    Eosinophils % 1.8 %    Basophils % 0.6 %    Neutrophils # 8.3 (H) 1.7 - 7.7 K/uL    Lymphocytes # 0.7 (L) 1.0 - 5.1 K/uL    Monocytes # 0.9 0.0 - 1.3 K/uL    Eosinophils # 0.2 0.0 - 0.6 K/uL    Basophils # 0.1 0.0 - 0.2 K/uL   Comprehensive Metabolic Panel   Result Value Ref Range    Sodium 133 (L) 136 - 145 mmol/L    Potassium 4.6 3.5 - 5.1 mmol/L    Chloride 97 (L) 99 - 110 mmol/L    CO2 26 21 - 32 mmol/L    Anion Gap 10 3 - 16    Glucose 98 70 - 99 mg/dL    BUN 23 (H) 7 - 20 mg/dL    CREATININE 1.2 0.8 - 1.3 mg/dL GFR Non- 57 (A) >60    GFR African American >60 >60    Calcium 9.6 8.3 - 10.6 mg/dL    Total Protein 6.9 6.4 - 8.2 g/dL    Alb 3.5 3.4 - 5.0 g/dL    Albumin/Globulin Ratio 1.0 (L) 1.1 - 2.2    Total Bilirubin 0.4 0.0 - 1.0 mg/dL    Alkaline Phosphatase 80 40 - 129 U/L    ALT 13 10 - 40 U/L    AST 19 15 - 37 U/L    Globulin 3.4 g/dL   EKG 12 Lead   Result Value Ref Range    Ventricular Rate 76 BPM    Atrial Rate 76 BPM    P-R Interval 118 ms    QRS Duration 172 ms    Q-T Interval 452 ms    QTc Calculation (Bazett) 508 ms    R Axis -76 degrees    T Axis 104 degrees    Diagnosis       AV sequential or dual chamber electronic pacemakerNo previous ECGs available        Radiographs (if obtained):  ? Radiologist's Report Reviewed:  CTA NECK W CONTRAST   Final Result   Addendum 1 of 1   ADDENDUM:   Bone window reconstructed imaging provided for review. There is no acute osseous abnormality of the cervical spine. There are   degenerative spondylotic changes including disc space narrowing and facet   arthrosis most notably at C4-5 and C5-6. The central canal appears patent   throughout. Final          ? Discussed with Radiologist:     ? The following radiograph was interpreted by myself in the absence of a radiologist:     EKG (if obtained): (All EKG's are interpreted by myself in the absence of a cardiologist)  EKG demonstrates an AV sequential pacemaker with a successfully paced rhythm at a rate of 76. MDM:   Elderly man with neck pain presents to the ER for evaluation. His pain improved dramatically with a small dose of fentanyl. His cervical spine x-rays did not show any acute process he did have some narrowing in the neural foramina and osteophyte formation. His  CT angios of the neck did not show evidence of dissection or significant blockages.   He'll be treated as an outpatient I'll provide him with hydrocodone for pain and in the interim he is to return to the emergency

## 2019-04-03 NOTE — ED TRIAGE NOTES
Patient with neck pain that has been ongoing for 8 days. Patient states he flew in from [facility], 8 days ago and has been having pain since then.
No

## 2019-04-03 NOTE — ED NOTES
IV removed with cannula intact. Patient states pain is controlled at this time. Discharged back to [place]. Zeeshan Lewis here to transport. Discharge instructions reviewed with [facility]. Denies questions. Instructed to f/u states understanding.       Kwabena Ortiz RN  04/03/19 8771

## 2019-04-07 ENCOUNTER — HOSPITAL ENCOUNTER (EMERGENCY)
Age: 84
Discharge: HOME | End: 2019-04-07
Attending: EMERGENCY MEDICINE
Payer: MEDICARE

## 2019-04-07 ENCOUNTER — APPOINTMENT (OUTPATIENT)
Dept: CT IMAGING | Age: 84
End: 2019-04-07
Payer: MEDICARE

## 2019-04-07 VITALS
RESPIRATION RATE: 20 BRPM | OXYGEN SATURATION: 96 % | WEIGHT: 179 LBS | SYSTOLIC BLOOD PRESSURE: 172 MMHG | TEMPERATURE: 98.5 F | HEIGHT: 72 IN | DIASTOLIC BLOOD PRESSURE: 87 MMHG | HEART RATE: 81 BPM | BODY MASS INDEX: 24.24 KG/M2

## 2019-04-07 DIAGNOSIS — S09.90XA INJURY OF HEAD, INITIAL ENCOUNTER: Primary | ICD-10-CM

## 2019-04-07 PROCEDURE — 70450 CT HEAD/BRAIN W/O DYE: CPT

## 2019-04-07 PROCEDURE — 99285 EMERGENCY DEPT VISIT HI MDM: CPT

## 2019-04-07 PROCEDURE — 72125 CT NECK SPINE W/O DYE: CPT

## 2019-04-07 ASSESSMENT — ENCOUNTER SYMPTOMS
ABDOMINAL DISTENTION: 0
ABDOMINAL PAIN: 0
COUGH: 0
FACIAL SWELLING: 0
CHEST TIGHTNESS: 0
WHEEZING: 0
SHORTNESS OF BREATH: 0
EYE DISCHARGE: 0
SORE THROAT: 0
BACK PAIN: 0

## 2019-04-07 NOTE — ED PROVIDER NOTES
Hypertension     Osteoarthritis of knee     Osteoarthritis of shoulder     TIA (transient ischemic attack) 2006    Vertigo     Wears glasses        Past Surgical History:   Procedure Laterality Date    CATARACT REMOVAL      bilaterally    COLONOSCOPY      ESOPHAGEAL DILATATION      X 2    INGUINAL HERNIA REPAIR      right    JOINT REPLACEMENT      KNEE SURGERY Bilateral     PROSTATECTOMY  1998    carcinoma    SHOULDER ARTHROPLASTY      SHOULDER SURGERY      TOE SURGERY Right 2013    PERMANENT RIGHT GREAT TOENAIL ABLATION AND REMOVAL    TOTAL KNEE ARTHROPLASTY      UPPER GASTROINTESTINAL ENDOSCOPY  2011       Family History   Problem Relation Age of Onset    Heart Disease Mother         MI    Parkinsonism Father        Social History     Socioeconomic History    Marital status:       Spouse name: Not on file    Number of children: Not on file    Years of education: Not on file    Highest education level: Not on file   Occupational History    Not on file   Social Needs    Financial resource strain: Not on file    Food insecurity:     Worry: Not on file     Inability: Not on file    Transportation needs:     Medical: Not on file     Non-medical: Not on file   Tobacco Use    Smoking status: Former Smoker     Types: Cigarettes     Last attempt to quit: 1949     Years since quittin.9    Smokeless tobacco: Never Used   Substance and Sexual Activity    Alcohol use: No     Alcohol/week: 0.0 oz    Drug use: No    Sexual activity: Not Currently   Lifestyle    Physical activity:     Days per week: Not on file     Minutes per session: Not on file    Stress: Not on file   Relationships    Social connections:     Talks on phone: Not on file     Gets together: Not on file     Attends Taoist service: Not on file     Active member of club or organization: Not on file     Attends meetings of clubs or organizations: Not on file     Relationship status: Not on file   [facility] Intimate partner violence:     Fear of current or ex partner: Not on file     Emotionally abused: Not on file     Physically abused: Not on file     Forced sexual activity: Not on file   Other Topics Concern    Not on file   Social History Narrative    Not on file       Patient Vitals for the past 24 hrs:   BP Temp Temp src Pulse Resp SpO2 Height Weight   04/07/19 0834 (!) 195/92 98.3 °F (36.8 °C) Oral 90 20 96 % 6' (1.829 m) 179 lb (81.2 kg)         Medications - No data to display    No results found for this visit on 04/07/19. Ct Head Wo Contrast    Result Date: 4/7/2019  EXAMINATION: CT OF THE HEAD WITHOUT CONTRAST  4/7/2019 8:49 am TECHNIQUE: CT of the head was performed without the administration of intravenous contrast. Dose modulation, iterative reconstruction, and/or weight based adjustment of the mA/kV was utilized to reduce the radiation dose to as low as reasonably achievable. COMPARISON: September 4, 2018 HISTORY: ORDERING SYSTEM PROVIDED HISTORY: HEAD TRAUMA, CLOSED, MILD, GCS >= 13, NO RISK FACTORS, NEURO EXAM NORMAL TECHNOLOGIST PROVIDED HISTORY: Has a \"code stroke\" or \"stroke alert\" been called? ->No Ordering Physician Provided Reason for Exam: headache Acuity: Acute Type of Exam: Initial Mechanism of Injury: fall FINDINGS: BRAIN/VENTRICLES: No acute blood products, shift of the midline structures, or CT evidence of acute infarct. The ventricles and sulci are enlarged. Confluent hypoattenuation in the periventricular white matter, likely due to microvascular ischemic change ORBITS: The visualized portion of the orbits demonstrate no acute abnormality. SINUSES: The visualized paranasal sinuses and mastoid air cells demonstrate no acute abnormality. SOFT TISSUES/SKULL:  No acute abnormality of the visualized skull or soft tissues. No acute intracranial abnormality. Confluent microvascular ischemic change.      Ct Cervical Spine Wo Contrast    Result Date: 4/7/2019  EXAMINATION: CT OF THE CERVICAL SPINE WITHOUT CONTRAST 4/7/2019 8:48 am TECHNIQUE: CT of the cervical spine was performed without the administration of intravenous contrast. Multiplanar reformatted images are provided for review. Dose modulation, iterative reconstruction, and/or weight based adjustment of the mA/kV was utilized to reduce the radiation dose to as low as reasonably achievable. COMPARISON: CTA neck 04/03/2019. HISTORY: ORDERING SYSTEM PROVIDED HISTORY: C-SPINE TRAUMA, NEXUS/CCR POSITIVE TECHNOLOGIST PROVIDED HISTORY: Ordering Physician Provided Reason for Exam: neck pain Acuity: Acute Type of Exam: Initial Mechanism of Injury: fall FINDINGS: BONES/ALIGNMENT: There is no evidence of an acute cervical spine fracture. Grade 1 retrolisthesis of C4 on C5 and grade 1 retrolisthesis of C5 on C6, likely relating to degenerative changes. DEGENERATIVE CHANGES: Multilevel degenerative disc disease and facet osteoarthritis. SOFT TISSUES: There is no prevertebral soft tissue swelling. No acute abnormality of the cervical spine. Multilevel degenerative changes. Multilevel grade 1 spondylolisthesis likely relating to the degenerative changes. Cta Neck W Contrast    Addendum Date: 4/3/2019    ADDENDUM: Bone window reconstructed imaging provided for review. There is no acute osseous abnormality of the cervical spine. There are degenerative spondylotic changes including disc space narrowing and facet arthrosis most notably at C4-5 and C5-6. The central canal appears patent throughout. Result Date: 4/3/2019  EXAMINATION: CTA OF THE NECK 4/3/2019 2:09 am TECHNIQUE: CTA of the neck was performed with the administration of intravenous contrast. Multiplanar reformatted images are provided for review. MIP images are provided for review. Stenosis of the internal carotid arteries measured using NASCET criteria.  Dose modulation, iterative reconstruction, and/or weight based adjustment of the mA/kV was utilized to reduce the radiation [address]  [phone]      If symptoms worsen    [facility]  [address] [place],[address]  [phone]    If symptoms worsen      1. Injury of head, initial encounter          Patient was advised at any time to return to the emergency department if there was any worsening.              Renuka Deng MD  04/07/19 1013

## 2019-04-07 NOTE — ED NOTES
Bed: 03  Expected date:   Expected time:   Means of arrival:   Comments:  va pt     Mariusz Barth, RN  04/07/19 5727

## 2019-04-07 NOTE — ED NOTES
SBAR to [place] [address], Mana Brown is here to transport. No  Concerns noted.  Care transferred to them at this time     [facility], RN  04/07/19 9843

## 2019-04-10 ENCOUNTER — HOSPITAL ENCOUNTER (OUTPATIENT)
Dept: GENERAL RADIOLOGY | Age: 84
Discharge: HOME | End: 2019-04-10
Payer: MEDICARE

## 2019-04-10 ENCOUNTER — OFFICE VISIT (OUTPATIENT)
Dept: CARDIOLOGY CLINIC | Age: 84
End: 2019-04-10
Payer: MEDICARE

## 2019-04-10 ENCOUNTER — PROCEDURE VISIT (OUTPATIENT)
Dept: CARDIOLOGY CLINIC | Age: 84
End: 2019-04-10
Payer: MEDICARE

## 2019-04-10 ENCOUNTER — HOSPITAL ENCOUNTER (OUTPATIENT)
Age: 84
Discharge: HOME | End: 2019-04-10
Payer: MEDICARE

## 2019-04-10 VITALS
OXYGEN SATURATION: 96 % | SYSTOLIC BLOOD PRESSURE: 112 MMHG | WEIGHT: 174 LBS | DIASTOLIC BLOOD PRESSURE: 64 MMHG | HEART RATE: 72 BPM | HEIGHT: 72 IN | BODY MASS INDEX: 23.57 KG/M2

## 2019-04-10 DIAGNOSIS — I49.5 SINUS NODE DYSFUNCTION (HCC): Primary | ICD-10-CM

## 2019-04-10 DIAGNOSIS — R00.1 BRADYCARDIA: ICD-10-CM

## 2019-04-10 DIAGNOSIS — Z95.0 PACEMAKER: ICD-10-CM

## 2019-04-10 DIAGNOSIS — Z95.0 PACEMAKER: Primary | ICD-10-CM

## 2019-04-10 DIAGNOSIS — R55 NEAR SYNCOPE: ICD-10-CM

## 2019-04-10 PROCEDURE — 71046 X-RAY EXAM CHEST 2 VIEWS: CPT

## 2019-04-10 PROCEDURE — G8598 ASA/ANTIPLAT THER USED: HCPCS | Performed by: INTERNAL MEDICINE

## 2019-04-10 PROCEDURE — 4040F PNEUMOC VAC/ADMIN/RCVD: CPT | Performed by: INTERNAL MEDICINE

## 2019-04-10 PROCEDURE — 93000 ELECTROCARDIOGRAM COMPLETE: CPT | Performed by: INTERNAL MEDICINE

## 2019-04-10 PROCEDURE — G8420 CALC BMI NORM PARAMETERS: HCPCS | Performed by: INTERNAL MEDICINE

## 2019-04-10 PROCEDURE — G8427 DOCREV CUR MEDS BY ELIG CLIN: HCPCS | Performed by: INTERNAL MEDICINE

## 2019-04-10 PROCEDURE — 1123F ACP DISCUSS/DSCN MKR DOCD: CPT | Performed by: INTERNAL MEDICINE

## 2019-04-10 PROCEDURE — 1036F TOBACCO NON-USER: CPT | Performed by: INTERNAL MEDICINE

## 2019-04-10 PROCEDURE — 93280 PM DEVICE PROGR EVAL DUAL: CPT | Performed by: INTERNAL MEDICINE

## 2019-04-10 PROCEDURE — 99215 OFFICE O/P EST HI 40 MIN: CPT | Performed by: INTERNAL MEDICINE

## 2019-04-10 RX ORDER — TRAMADOL HYDROCHLORIDE 50 MG/1
50 TABLET ORAL EVERY 6 HOURS PRN
COMMUNITY
End: 2019-01-01 | Stop reason: ALTCHOICE

## 2019-04-10 RX ORDER — NITROGLYCERIN 80 MG/1
1 PATCH TRANSDERMAL DAILY
COMMUNITY

## 2019-04-10 NOTE — LETTER
[facility]   Electrophysiology Note              Date:  April 10, 2019  Patient name: Kyree Jain  YOB: 1927    Reason for Follow Up:  Check-Up    Primary Care Physician:Peace Montero MD    HISTORY OF PRESENT ILLNESS: Kyree Jain is a 80 y.o. male who presents for follow up s/p dual chamber pacemaker placement on 10/12/18. He has a history of TIA vs. CVA in 2011, PVD, CAD. He initially presented to the ER on 9/17/18 with complaints of dizziness and lightheadedness and he was found to be bradycardic. On 10/12/18 a dual chamber pacer was placed. Patient reports that he has not been dizzy since he had the pacemaker implanted. He reports that sometimes when he is at home he will lose all sense of direction and will forget where he is and what he is doing. Once he was even found wandering outside. He has fallen multiple times. He is here today with a nurse from his [address] home. A doctor in [place] told him that he needed his pacemaker evaluated. He has had failed attempts at angioplasty in the past in [place]. Patient currently denies any weight gain, edema, palpitations, chest pain, or shortness of breath. Past Medical History:   has a past medical history of Arthralgia, Cancer ([address] Chronic back pain, Chronic diastolic (congestive) heart failure ([address] Diverticulitis, Dizziness, Fatigue, Fractures, Hearing decreased, Hyperlipidemia, Hypertension, Osteoarthritis of knee, Osteoarthritis of shoulder, TIA (transient ischemic attack), Vertigo, and Wears glasses. Past Surgical History:   has a past surgical history that includes Inguinal hernia repair; Cataract removal (1997); Prostatectomy (1998); Colonoscopy; Upper gastrointestinal endoscopy (jan 2011); Esophagus dilation; knee surgery (Bilateral); Total shoulder arthroplasty; shoulder surgery; Toe Surgery (Right, 06/03/2013); joint replacement; and Total knee arthroplasty. Allergies:  Altace [ramipril] and Sulfa antibiotics    Social History:   reports that he quit smoking about 69 years ago. His smoking use included cigarettes. He has never used smokeless tobacco. He reports that he does not drink alcohol or use drugs. Family History: family history includes Heart Disease in his mother; Parkinsonism in his father. Home Medications:    Prior to Admission medications    Medication Sig Start Date End Date Taking? Authorizing Provider   nitroGLYCERIN (NITRO-DUR) 0.4 MG/HR Place 1 patch onto the skin daily   Yes Historical Provider, MD   traMADol (ULTRAM) 50 MG tablet Take 50 mg by mouth every 6 hours as needed for Pain. Yes Historical Provider, MD   citalopram (CELEXA) 10 MG tablet Take 10 mg by mouth daily   Yes Historical Provider, MD   RaNITidine HCl (RANITIDINE 150 MAX STRENGTH PO) Take by mouth   Yes Historical Provider, MD   ranolazine (RANEXA) 500 MG extended release tablet Take 1,000 mg by mouth 2 times daily   Yes Historical Provider, MD   metoprolol tartrate (LOPRESSOR) 50 MG tablet Take 1 tablet by mouth 2 times daily 11/13/18  Yes Renaldo Finney MD   clopidogrel (PLAVIX) 75 MG tablet Take 1 tablet by mouth daily 11/13/18  Yes Renaldo Finney MD   atorvastatin (LIPITOR) 10 MG tablet Take 1 tablet by mouth daily 10/23/18  Yes Renaldo Finney MD   aspirin 81 MG chewable tablet Take 1 tablet by mouth daily 10/20/18  Yes Haider Mattson MD   magnesium oxide (MAG-OX) 400 MG tablet Take 1 tablet by mouth 2 times daily 10/11/18  Yes Renaldo Finney MD   loperamide (IMODIUM) 2 MG capsule Take 2 mg by mouth 4 times daily as needed for Diarrhea   Yes Historical Provider, MD   Vitamins-Lipotropics (LIPOFLAVONOID) TABS Take 1 tablet by mouth Daily with lunch Indications: vertigo   Yes Historical Provider, MD   vitamin B-12 (CYANOCOBALAMIN) 100 MCG tablet Take 50 mcg by mouth daily   Yes Historical Provider, MD   predniSONE (DELTASONE) 5 MG tablet Take 5 mg by mouth daily.    Yes Historical Provider, MD   CALCIUM PO Take 1,000 mg by mouth daily. Yes Historical Provider, MD   vitamin D (CHOLECALCIFEROL) 1000 UNIT TABS tablet Take 1,000 Units by mouth daily. Yes Historical Provider, MD   Multiple Vitamins-Minerals (MULTIVITAMIN PO) Take 1 tablet by mouth daily    Yes Historical Provider, MD          REVIEW OF SYSTEMS:      All 14-point review of systems are completed and  pertinent positives are mentioned in the history of present illness. Other  systems are reviewed and are negative. Physical Examination:    /64   Pulse 72   Ht 6' (1.829 m)   Wt 174 lb (78.9 kg)   SpO2 96%   BMI 23.60 kg/m²       Constitutional and General Appearance:    alert, cooperative, no distress and appears stated age  [de-identified]:    PERRLA, no cervical lymphadenopathy. No masses palpable. Normal oral  mucosa  Respiratory:  · Normal excursion and expansion without use of accessory muscles  · Resp Auscultation: Normal breath sounds without dullness or wheezing  Cardiovascular:  · The apical impulse is not displaced  · Heart tones are crisp and normal. regular S1 and S2.  · Jugular venous pulsation Normal  · The carotid upstroke is normal in amplitude and contour without delay or bruit  · Peripheral pulses are symmetrical and full  Abdomen:  · No masses or tenderness  · Bowel sounds present  Extremities:  ·  No Cyanosis or Clubbing  ·  Lower extremity edema: No  · Skin: Warm and dry  Neurological:  · Alert and oriented. · Moves all extremities well  · No abnormalities of mood, affect, memory, mentation, or behavior are noted      DATA:    ECG:    ECHO 8/11/18:   Left ventricle systolic function is mildly reduced with ejection fraction of   46%. Inferior basal akinesia is present. Normal left ventricle size. Moderate concentric left ventricular hypertrophy. Grade I diastolic dysfunction with normal filling pressure. The aortic valve sclerosis but opens well. Mild aortic regurgitation. The aortic root is enlarged in size at 4.5cm. LV Diastolic Dimension: 7.46 cm LV Systolic Dimension: 4.19 cm   LV Septum Diastolic: 4.05 cm   LV PW Diastolic: 4.94 cm        AO Root Dimension: 4.5 cm                                   LA Dimension: 3.6 cm                                   LA Area: 16 cm2   LVOT: 2.3 cm                    LA volume/Index: 45 ml /22 ml/m2       IMPRESSION:    1. Sinus node dysfunction (HCC)    2. Near syncope    3. Pacemaker    Atrial lead not capturing     RECOMMENDATIONS:  1. Chest X-ray to evaluate atrial lead      RA lead repositioning and risk of the procedure discussed. I will plan RA lead repositioning after chest x-ray    QUALITY MEASURES  1. Tobacco Cessation Counseling: NA  2. Retake of BP if >140/90:   NA  3. Documentation to PCP/referring for new patient:  Sent to PCP at close of office visit  4. CAD patient on anti-platelet: Yes  5. CAD patient on STATIN therapy:  Yes  6. Patient with CHF and aFib on anticoagulation:  NA    Scribe's attestation: This note was scribed in the presence of Dr. Min Stafford by Keli Bryant, JOHN    I, Natividad Galvez, personally performed the services described in this documentation, as scribed by the above signed scribe in my presence. It is both accurate and complete to my knowledge. I agree with the details independently gathered by the clinical support staff, while the remaining scribed note accurately describes my personal service to the patient.      All questions and concerns were addressed to the patient/family. Alternatives to my treatment were discussed. MORRIS Henry F.A.C.C. Electrophysiology  [facility]. [address] [place] [address]. [address].   [place], [address]  Phone: [phone]  Fax: [phone]

## 2019-04-10 NOTE — PROGRESS NOTES
[facility], [place], operated by [facility]   Electrophysiology Note              Date:  April 10, 2019  Patient name: Jennifer Ruiz  YOB: 1927    Reason for Follow Up:  Check-Up    Primary Care Physician:Peace Landon MD    HISTORY OF PRESENT ILLNESS: Jennifer Ruiz is a 80 y.o. male who presents for follow up s/p dual chamber pacemaker placement on 10/12/18. He has a history of TIA vs. CVA in 2011, PVD, CAD. He initially presented to the ER on 9/17/18 with complaints of dizziness and lightheadedness and he was found to be bradycardic. On 10/12/18 a dual chamber pacer was placed. Patient reports that he has not been dizzy since he had the pacemaker implanted. He reports that sometimes when he is at home he will lose all sense of direction and will forget where he is and what he is doing. Once he was even found wandering outside. He has fallen multiple times. He is here today with a nurse from his [facility] [address] [facility] [address] home. A doctor in [place] told him that he needed his pacemaker evaluated. He has had failed attempts at angioplasty in the past in [place]. Patient currently denies any weight gain, edema, palpitations, chest pain, or shortness of breath. Past Medical History:   has a past medical history of Arthralgia, Cancer ([address] Chronic back pain, Chronic diastolic (congestive) heart failure ([address] Diverticulitis, Dizziness, Fatigue, Fractures, Hearing decreased, Hyperlipidemia, Hypertension, Osteoarthritis of knee, Osteoarthritis of shoulder, TIA (transient ischemic attack), Vertigo, and Wears glasses. Past Surgical History:   has a past surgical history that includes Inguinal hernia repair; Cataract removal (1997); Prostatectomy (1998); Colonoscopy; Upper gastrointestinal endoscopy (jan 2011); Esophagus dilation; knee surgery (Bilateral); Total shoulder arthroplasty; shoulder surgery; Toe Surgery (Right, 06/03/2013); joint replacement; and Total knee arthroplasty.      Allergies:  Altace [ramipril] and Sulfa antibiotics    Social History:   reports that he quit smoking about 69 years ago. His smoking use included cigarettes. He has never used smokeless tobacco. He reports that he does not drink alcohol or use drugs. Family History: family history includes Heart Disease in his mother; Parkinsonism in his father. Home Medications:    Prior to Admission medications    Medication Sig Start Date End Date Taking? Authorizing Provider   nitroGLYCERIN (NITRO-DUR) 0.4 MG/HR Place 1 patch onto the skin daily   Yes Historical Provider, MD   traMADol (ULTRAM) 50 MG tablet Take 50 mg by mouth every 6 hours as needed for Pain. Yes Historical Provider, MD   citalopram (CELEXA) 10 MG tablet Take 10 mg by mouth daily   Yes Historical Provider, MD   RaNITidine HCl (RANITIDINE 150 MAX STRENGTH PO) Take by mouth   Yes Historical Provider, MD   ranolazine (RANEXA) 500 MG extended release tablet Take 1,000 mg by mouth 2 times daily   Yes Historical Provider, MD   metoprolol tartrate (LOPRESSOR) 50 MG tablet Take 1 tablet by mouth 2 times daily 11/13/18  Yes Ling Elkins MD   clopidogrel (PLAVIX) 75 MG tablet Take 1 tablet by mouth daily 11/13/18  Yes Ling Elkins MD   atorvastatin (LIPITOR) 10 MG tablet Take 1 tablet by mouth daily 10/23/18  Yes Ling Elkins MD   aspirin 81 MG chewable tablet Take 1 tablet by mouth daily 10/20/18  Yes Angeline Mattson MD   magnesium oxide (MAG-OX) 400 MG tablet Take 1 tablet by mouth 2 times daily 10/11/18  Yes Ling Elkins MD   loperamide (IMODIUM) 2 MG capsule Take 2 mg by mouth 4 times daily as needed for Diarrhea   Yes Historical Provider, MD   Vitamins-Lipotropics (LIPOFLAVONOID) TABS Take 1 tablet by mouth Daily with lunch Indications: vertigo   Yes Historical Provider, MD   vitamin B-12 (CYANOCOBALAMIN) 100 MCG tablet Take 50 mcg by mouth daily   Yes Historical Provider, MD   predniSONE (DELTASONE) 5 MG tablet Take 5 mg by mouth daily.    Yes Historical Provider, MD   CALCIUM PO Take 1,000 mg by mouth daily. Yes Historical Provider, MD   vitamin D (CHOLECALCIFEROL) 1000 UNIT TABS tablet Take 1,000 Units by mouth daily. Yes Historical Provider, MD   Multiple Vitamins-Minerals (MULTIVITAMIN PO) Take 1 tablet by mouth daily    Yes Historical Provider, MD          REVIEW OF SYSTEMS:      All 14-point review of systems are completed and  pertinent positives are mentioned in the history of present illness. Other  systems are reviewed and are negative. Physical Examination:    /64   Pulse 72   Ht 6' (1.829 m)   Wt 174 lb (78.9 kg)   SpO2 96%   BMI 23.60 kg/m²      Constitutional and General Appearance:    alert, cooperative, no distress and appears stated age  [de-identified]:    PERRLA, no cervical lymphadenopathy. No masses palpable. Normal oral  mucosa  Respiratory:  · Normal excursion and expansion without use of accessory muscles  · Resp Auscultation: Normal breath sounds without dullness or wheezing  Cardiovascular:  · The apical impulse is not displaced  · Heart tones are crisp and normal. regular S1 and S2.  · Jugular venous pulsation Normal  · The carotid upstroke is normal in amplitude and contour without delay or bruit  · Peripheral pulses are symmetrical and full  Abdomen:  · No masses or tenderness  · Bowel sounds present  Extremities:  ·  No Cyanosis or Clubbing  ·  Lower extremity edema: No  · Skin: Warm and dry  Neurological:  · Alert and oriented. · Moves all extremities well  · No abnormalities of mood, affect, memory, mentation, or behavior are noted      DATA:    ECG:    ECHO 8/11/18:   Left ventricle systolic function is mildly reduced with ejection fraction of   46%. Inferior basal akinesia is present. Normal left ventricle size. Moderate concentric left ventricular hypertrophy. Grade I diastolic dysfunction with normal filling pressure. The aortic valve sclerosis but opens well. Mild aortic regurgitation. The aortic root is enlarged in size at 4.5cm. LV Diastolic Dimension: 7.55 cm LV Systolic Dimension: 3.82 cm   LV Septum Diastolic: 6.92 cm   LV PW Diastolic: 6.83 cm        AO Root Dimension: 4.5 cm                                   LA Dimension: 3.6 cm                                   LA Area: 16 cm2   LVOT: 2.3 cm                    LA volume/Index: 45 ml /22 ml/m2       IMPRESSION:    1. Sinus node dysfunction (HCC)    2. Near syncope    3. Pacemaker    Atrial lead not capturing     RECOMMENDATIONS:  1. Chest X-ray to evaluate atrial lead      RA lead repositioning and risk of the procedure discussed. I will plan RA lead repositioning after chest x-ray    QUALITY MEASURES  1. Tobacco Cessation Counseling: NA  2. Retake of BP if >140/90:   NA  3. Documentation to PCP/referring for new patient:  Sent to PCP at close of office visit  4. CAD patient on anti-platelet: Yes  5. CAD patient on STATIN therapy:  Yes  6. Patient with CHF and aFib on anticoagulation:  NA    Scribe's attestation: This note was scribed in the presence of Dr. Delcia Severin by Rogelio Rudd RN    I, Aaron Bruno, personally performed the services described in this documentation, as scribed by the above signed scribe in my presence. It is both accurate and complete to my knowledge. I agree with the details independently gathered by the clinical support staff, while the remaining scribed note accurately describes my personal service to the patient.      All questions and concerns were addressed to the patient/family. Alternatives to my treatment were discussed. MORRIS Henry F.A.C.C. Electrophysiology  [place] [address]. [address].   [address] [facility], [address]  Phone: [phone]  Fax: [phone]

## 2019-04-10 NOTE — PROGRESS NOTES
Patient comes in for programming evaluation for his pacemaker. atrial sensing and pacing parameters are within normal range. No changes need to be made at this time. Please see interrogation for more detail. Patient to see Dr. Leonila Erwin today. Around the end of December 2018 his RV  Threshold spiked and R waves  Dropped. Threshold was 6v-0.5ms and R waves 2. His battery went from 9.7 yrs on 10/2018 check to 1.5 yrs on today check. AP 97.9%   98.2%  Ventricular lead not capturing. Chest xray ordered to check RV lead placement.

## 2019-04-11 ENCOUNTER — TELEPHONE (OUTPATIENT)
Dept: CARDIOLOGY CLINIC | Age: 84
End: 2019-04-11

## 2019-04-11 NOTE — TELEPHONE ENCOUNTER
I spoke with Mary from the [facility] ([phone]). Will speak with her again after speaking with Dr. Josesito Barfield regarding date.

## 2019-04-11 NOTE — TELEPHONE ENCOUNTER
----- Message from Denisha Cisneros MD sent at 4/10/2019  4:39 PM EDT -----  Please inform patient that his RV lead needs to be repositioned.  Li, can you please schedule RV lead repositioning

## 2019-04-11 NOTE — TELEPHONE ENCOUNTER
Per HIPAA form I can leave a detailed message on machine. LMOM letting pt know of RPS message. Pt to contact the office and speak with Li to schedule procedure.

## 2019-04-18 ENCOUNTER — HOSPITAL ENCOUNTER (INPATIENT)
Dept: CARDIAC CATH/INVASIVE PROCEDURES | Age: 84
LOS: 1 days | Discharge: NURSING, REHAB OR LONG-TERM CARE | DRG: 262 | End: 2019-04-19
Attending: INTERNAL MEDICINE | Admitting: INTERNAL MEDICINE
Payer: MEDICARE

## 2019-04-18 LAB
ANION GAP SERPL CALCULATED.3IONS-SCNC: 12 MMOL/L (ref 3–16)
BUN BLDV-MCNC: 16 MG/DL (ref 7–20)
CALCIUM SERPL-MCNC: 9.1 MG/DL (ref 8.3–10.6)
CHLORIDE BLD-SCNC: 96 MMOL/L (ref 99–110)
CO2: 23 MMOL/L (ref 21–32)
CREAT SERPL-MCNC: 1.1 MG/DL (ref 0.8–1.3)
EKG ATRIAL RATE: 70 BPM
EKG DIAGNOSIS: NORMAL
EKG P-R INTERVAL: 200 MS
EKG Q-T INTERVAL: 512 MS
EKG QRS DURATION: 180 MS
EKG QTC CALCULATION (BAZETT): 552 MS
EKG R AXIS: 259 DEGREES
EKG T AXIS: 106 DEGREES
EKG VENTRICULAR RATE: 70 BPM
GFR AFRICAN AMERICAN: >60
GFR NON-AFRICAN AMERICAN: >60
GLUCOSE BLD-MCNC: 97 MG/DL (ref 70–99)
HCT VFR BLD CALC: 35.4 % (ref 40.5–52.5)
HEMOGLOBIN: 11.9 G/DL (ref 13.5–17.5)
INR BLD: 1.53 (ref 0.86–1.14)
MCH RBC QN AUTO: 30.7 PG (ref 26–34)
MCHC RBC AUTO-ENTMCNC: 33.5 G/DL (ref 31–36)
MCV RBC AUTO: 91.7 FL (ref 80–100)
PDW BLD-RTO: 14.9 % (ref 12.4–15.4)
PLATELET # BLD: 264 K/UL (ref 135–450)
PMV BLD AUTO: 8.1 FL (ref 5–10.5)
POTASSIUM SERPL-SCNC: 4.8 MMOL/L (ref 3.5–5.1)
PROTHROMBIN TIME: 17.4 SEC (ref 9.8–13)
RBC # BLD: 3.86 M/UL (ref 4.2–5.9)
SODIUM BLD-SCNC: 131 MMOL/L (ref 136–145)
WBC # BLD: 8.1 K/UL (ref 4–11)

## 2019-04-18 PROCEDURE — 85027 COMPLETE CBC AUTOMATED: CPT

## 2019-04-18 PROCEDURE — 99153 MOD SED SAME PHYS/QHP EA: CPT

## 2019-04-18 PROCEDURE — 6360000002 HC RX W HCPCS

## 2019-04-18 PROCEDURE — 93005 ELECTROCARDIOGRAM TRACING: CPT | Performed by: INTERNAL MEDICINE

## 2019-04-18 PROCEDURE — C1894 INTRO/SHEATH, NON-LASER: HCPCS

## 2019-04-18 PROCEDURE — 2580000003 HC RX 258: Performed by: INTERNAL MEDICINE

## 2019-04-18 PROCEDURE — 2580000003 HC RX 258

## 2019-04-18 PROCEDURE — 6370000000 HC RX 637 (ALT 250 FOR IP): Performed by: INTERNAL MEDICINE

## 2019-04-18 PROCEDURE — 2500000003 HC RX 250 WO HCPCS

## 2019-04-18 PROCEDURE — 02WA0MZ REVISION OF CARDIAC LEAD IN HEART, OPEN APPROACH: ICD-10-PCS | Performed by: INTERNAL MEDICINE

## 2019-04-18 PROCEDURE — 1200000000 HC SEMI PRIVATE

## 2019-04-18 PROCEDURE — 99152 MOD SED SAME PHYS/QHP 5/>YRS: CPT | Performed by: INTERNAL MEDICINE

## 2019-04-18 PROCEDURE — 33215 REPOSITION PACING-DEFIB LEAD: CPT

## 2019-04-18 PROCEDURE — 85610 PROTHROMBIN TIME: CPT

## 2019-04-18 PROCEDURE — 33215 REPOSITION PACING-DEFIB LEAD: CPT | Performed by: INTERNAL MEDICINE

## 2019-04-18 PROCEDURE — 80048 BASIC METABOLIC PNL TOTAL CA: CPT

## 2019-04-18 PROCEDURE — 93010 ELECTROCARDIOGRAM REPORT: CPT | Performed by: INTERNAL MEDICINE

## 2019-04-18 PROCEDURE — 99152 MOD SED SAME PHYS/QHP 5/>YRS: CPT

## 2019-04-18 RX ORDER — FAMOTIDINE 20 MG/1
20 TABLET, FILM COATED ORAL NIGHTLY
Status: DISCONTINUED | OUTPATIENT
Start: 2019-04-18 | End: 2019-04-19 | Stop reason: HOSPADM

## 2019-04-18 RX ORDER — RANOLAZINE 500 MG/1
1000 TABLET, EXTENDED RELEASE ORAL 2 TIMES DAILY
Status: DISCONTINUED | OUTPATIENT
Start: 2019-04-18 | End: 2019-04-19 | Stop reason: HOSPADM

## 2019-04-18 RX ORDER — RANITIDINE 150 MG/1
150 TABLET ORAL NIGHTLY
Status: DISCONTINUED | OUTPATIENT
Start: 2019-04-18 | End: 2019-04-18 | Stop reason: CLARIF

## 2019-04-18 RX ORDER — MIDAZOLAM HYDROCHLORIDE 5 MG/ML
INJECTION INTRAMUSCULAR; INTRAVENOUS
Status: COMPLETED | OUTPATIENT
Start: 2019-04-18 | End: 2019-04-18

## 2019-04-18 RX ORDER — FENTANYL CITRATE 50 UG/ML
INJECTION, SOLUTION INTRAMUSCULAR; INTRAVENOUS
Status: COMPLETED | OUTPATIENT
Start: 2019-04-18 | End: 2019-04-18

## 2019-04-18 RX ORDER — TRAMADOL HYDROCHLORIDE 50 MG/1
50 TABLET ORAL EVERY 6 HOURS PRN
Status: DISCONTINUED | OUTPATIENT
Start: 2019-04-18 | End: 2019-04-19 | Stop reason: HOSPADM

## 2019-04-18 RX ORDER — CITALOPRAM 20 MG/1
10 TABLET ORAL DAILY
Status: DISCONTINUED | OUTPATIENT
Start: 2019-04-19 | End: 2019-04-19 | Stop reason: HOSPADM

## 2019-04-18 RX ORDER — CITALOPRAM 20 MG/1
10 TABLET ORAL DAILY
Status: DISCONTINUED | OUTPATIENT
Start: 2019-04-18 | End: 2019-04-18

## 2019-04-18 RX ORDER — SODIUM CHLORIDE 9 MG/ML
1000 INJECTION, SOLUTION INTRAVENOUS CONTINUOUS
Status: DISCONTINUED | OUTPATIENT
Start: 2019-04-18 | End: 2019-04-19 | Stop reason: HOSPADM

## 2019-04-18 RX ORDER — LOPERAMIDE HYDROCHLORIDE 2 MG/1
2 CAPSULE ORAL 4 TIMES DAILY PRN
Status: DISCONTINUED | OUTPATIENT
Start: 2019-04-18 | End: 2019-04-19 | Stop reason: HOSPADM

## 2019-04-18 RX ORDER — SODIUM CHLORIDE 0.9 % (FLUSH) 0.9 %
10 SYRINGE (ML) INJECTION PRN
Status: DISCONTINUED | OUTPATIENT
Start: 2019-04-18 | End: 2019-04-19 | Stop reason: HOSPADM

## 2019-04-18 RX ORDER — SODIUM CHLORIDE 0.9 % (FLUSH) 0.9 %
10 SYRINGE (ML) INJECTION EVERY 12 HOURS SCHEDULED
Status: DISCONTINUED | OUTPATIENT
Start: 2019-04-18 | End: 2019-04-19 | Stop reason: HOSPADM

## 2019-04-18 RX ORDER — ONDANSETRON 2 MG/ML
4 INJECTION INTRAMUSCULAR; INTRAVENOUS EVERY 6 HOURS PRN
Status: DISCONTINUED | OUTPATIENT
Start: 2019-04-18 | End: 2019-04-19 | Stop reason: HOSPADM

## 2019-04-18 RX ORDER — ATORVASTATIN CALCIUM 10 MG/1
10 TABLET, FILM COATED ORAL DAILY
Status: DISCONTINUED | OUTPATIENT
Start: 2019-04-18 | End: 2019-04-19 | Stop reason: HOSPADM

## 2019-04-18 RX ORDER — ACETAMINOPHEN 325 MG/1
650 TABLET ORAL EVERY 4 HOURS PRN
Status: DISCONTINUED | OUTPATIENT
Start: 2019-04-18 | End: 2019-04-19 | Stop reason: HOSPADM

## 2019-04-18 RX ADMIN — RANOLAZINE 1000 MG: 500 TABLET, FILM COATED, EXTENDED RELEASE ORAL at 20:15

## 2019-04-18 RX ADMIN — SODIUM CHLORIDE 1000 ML: 9 INJECTION, SOLUTION INTRAVENOUS at 16:15

## 2019-04-18 RX ADMIN — MIDAZOLAM HYDROCHLORIDE 1 MG: 5 INJECTION INTRAMUSCULAR; INTRAVENOUS at 14:38

## 2019-04-18 RX ADMIN — MIDAZOLAM HYDROCHLORIDE 1 MG: 5 INJECTION INTRAMUSCULAR; INTRAVENOUS at 14:59

## 2019-04-18 RX ADMIN — FENTANYL CITRATE 50 MCG: 50 INJECTION, SOLUTION INTRAMUSCULAR; INTRAVENOUS at 14:59

## 2019-04-18 RX ADMIN — FAMOTIDINE 20 MG: 20 TABLET ORAL at 20:15

## 2019-04-18 RX ADMIN — FENTANYL CITRATE 25 MCG: 50 INJECTION, SOLUTION INTRAMUSCULAR; INTRAVENOUS at 14:24

## 2019-04-18 RX ADMIN — FENTANYL CITRATE 25 MCG: 50 INJECTION, SOLUTION INTRAMUSCULAR; INTRAVENOUS at 14:38

## 2019-04-18 RX ADMIN — ATORVASTATIN CALCIUM 10 MG: 10 TABLET, FILM COATED ORAL at 20:15

## 2019-04-18 RX ADMIN — MAGNESIUM GLUCONATE 500 MG ORAL TABLET 400 MG: 500 TABLET ORAL at 20:15

## 2019-04-18 RX ADMIN — MIDAZOLAM HYDROCHLORIDE 2 MG: 5 INJECTION INTRAMUSCULAR; INTRAVENOUS at 14:24

## 2019-04-18 RX ADMIN — SODIUM CHLORIDE, PRESERVATIVE FREE 10 ML: 5 INJECTION INTRAVENOUS at 20:15

## 2019-04-18 RX ADMIN — METOPROLOL TARTRATE 50 MG: 25 TABLET ORAL at 20:15

## 2019-04-18 ASSESSMENT — PAIN SCALES - GENERAL
PAINLEVEL_OUTOF10: 5
PAINLEVEL_OUTOF10: 0

## 2019-04-18 NOTE — PROGRESS NOTES
Patient admitted to room 209 from cath lab. Patient oriented to room, call light, bed rails, phone, lights and bathroom. Patient instructed about the schedule of the day including: vital sign frequency, lab draws, possible tests, frequency of MD and staff rounds, including RN/MD rounding together at bedside, daily weights, and I &O's. Patient instructed about prescribed diet, how to use 8MENU, and television. bed alarm in place, patient aware of placement and reason. Telemetry box  in place, patient aware of placement and reason. Bed locked, in lowest position, side rails up 2/4, call light within reach. Will continue to monitor.         Vitals:    04/18/19 1714   BP: 124/72   Pulse: 60   Resp: 18   Temp: 97.5 °F (36.4 °C)   SpO2: 97%

## 2019-04-18 NOTE — PROCEDURES
[address] [address]                            CARDIAC CATHETERIZATION    PATIENT NAME: Wayne Bliss                    :        1927  MED REC NO:   [id number]                          ROOM:  ACCOUNT NO:   [de-identified]                           ADMIT DATE: 2019  PROVIDER:     Keren Alva MD    DATE OF PROCEDURE:  2019    PROCEDURE PERFORMED:  1. Repositioning of the right ventricular lead. 2.  Moderate sedation. 3.  Fluoroscopy was used. 4.  New venous access was obtained. PROCEDURE START TIME:  14:29    PROCEDURE STOP TIME:  15:35    MEDICATION GIVEN:  Versed 4 mg, fentanyl 200 mcg. Pre and post Roberth score of 10. Mallampati score of 2. INDICATION:  Dislodgement of the previously implanted RV lead. Original  device implanted was 10/12/2018. Now on the last office visit, the  right atrial lead had dislodged. The plan was to reposition. Sinus  node dysfunction. PROCEDURE IN DETAIL:  The patient was brought to the EP lab in fasting,  non-sedated state. Procedure being performed and the patient were  identified. Risks, benefits, alternative, rationale of the procedure  were discussed with the patient which included, but were not limited to  pain, bleeding, infection, pneumothorax, hemothorax, cardiac perfusion,  tamponade, stroke, myocardial infarction, need for emergent surgery and  death. The patient demonstrated complete understanding and wanted to  proceed. After local infiltration with lidocaine and Marcaine, a horizontal  incision was made at the previously implanted pacemaker site. The  previously implanted RV lead was repositioned without success and then  venous access was obtained. The previously implanted lead was then  pulled out and implanted through the new venous access sheath.   RV  sensing was 7.1 millivolts, threshold was 1.4 volt at 0.5 milliseconds,  slew was 3.1 volt per second. Serial number of the lead is [id number]. The lead was then attached to the previously implanted [place] XT MRI  compatible pacemaker generator. Serial number [id number]. The  previously implanted atrial lead was Medtronic lead. Sensing 4.8  millivolts, pace impedance 380 ohms, threshold 0.5 volt at 0.4  milliseconds. Threshold through the device and the RV lead was 1 volt  at 0.4 milliseconds, sensing 9.8 millivolts, pace impedance 646. Pocket  was then flushed and closed with 2-0, 4-0 Vicryl suture. The patient  tolerated the procedure well, made complete hemodynamic and  neurovascular recovery. CONCLUSION:  Successful RV lead repositioning. PLAN:  Admit chest x-ray tomorrow.   Discharge if device check and chest  x-ray normal.        Yohan Figueroa MD    D: 04/18/2019 16:07:52       T: 04/18/2019 17:47:45     [email or web address]  Job#: [id number]     Doc#: [id number]    CC:

## 2019-04-18 NOTE — H&P
[facility], [place], operated by [facility]   Electrophysiology H&P               Date:                April 18, 2019  Patient name:  Seven Mo  YOB: 1927     Reason for Follow Up:  Check-Up     Primary Care Physician:Peace Delgadillo MD     HISTORY OF PRESENT ILLNESS: Seven Mo is a 80 y.o. male who presents for follow up s/p dual chamber pacemaker placement on 10/12/18. He has a history of TIA vs. CVA in 2011, PVD, CAD. He initially presented to the ER on 9/17/18 with complaints of dizziness and lightheadedness and he was found to be bradycardic. On 10/12/18 a dual chamber pacer was placed. Patient reports that he has not been dizzy since he had the pacemaker implanted. He reports that sometimes when he is at home he will lose all sense of direction and will forget where he is and what he is doing. Once he was even found wandering outside. He has fallen multiple times. He is here today with a nurse from his [facility] [address] [facility]. A doctor in [place] told him that he needed his pacemaker evaluated. He has had failed attempts at angioplasty in the past in [place]. Patient currently denies any weight gain, edema, palpitations, chest pain, or shortness of breath.     Past Medical History:   has a past medical history of Arthralgia, Cancer ([address] Chronic back pain, Chronic diastolic (congestive) heart failure ([address] Diverticulitis, Dizziness, Fatigue, Fractures, Hearing decreased, Hyperlipidemia, Hypertension, Osteoarthritis of knee, Osteoarthritis of shoulder, TIA (transient ischemic attack), Vertigo, and Wears glasses.     Past Surgical History:   has a past surgical history that includes Inguinal hernia repair; Cataract removal (1997); Prostatectomy (1998); Colonoscopy; Upper gastrointestinal endoscopy (jan 2011); Esophagus dilation; knee surgery (Bilateral); Total shoulder arthroplasty; shoulder surgery; Toe Surgery (Right, 06/03/2013); joint replacement; and Total knee arthroplasty. (DELTASONE) 5 MG tablet Take 5 mg by mouth daily.     Yes Historical Provider, MD   CALCIUM PO Take 1,000 mg by mouth daily.     Yes Historical Provider, MD   vitamin D (CHOLECALCIFEROL) 1000 UNIT TABS tablet Take 1,000 Units by mouth daily.     Yes Historical Provider, MD   Multiple Vitamins-Minerals (MULTIVITAMIN PO) Take 1 tablet by mouth daily      Yes Historical Provider, MD               REVIEW OF SYSTEMS:       All 14-point review of systems are completed and  pertinent positives are mentioned in the history of present illness.  Other  systems are reviewed and are negative.     Physical Examination:    /64   Pulse 72   Ht 6' (1.829 m)   Wt 174 lb (78.9 kg)   SpO2 96%   BMI 23.60 kg/m²       Constitutional and General Appearance:               alert, cooperative, no distress and appears stated age  HEENT:               PERRLA, no cervical lymphadenopathy. No masses palpable. Normal oral           mucosa  Respiratory:  · Normal excursion and expansion without use of accessory muscles  · Resp Auscultation: Normal breath sounds without dullness or wheezing  Cardiovascular:  · The apical impulse is not displaced  · Heart tones are crisp and normal. regular S1 and S2.  · Jugular venous pulsation Normal  · The carotid upstroke is normal in amplitude and contour without delay or bruit  · Peripheral pulses are symmetrical and full  Abdomen:  · No masses or tenderness  · Bowel sounds present  Extremities:  ·  No Cyanosis or Clubbing  ·  Lower extremity edema: No  · Skin: Warm and dry  Neurological:  · Alert and oriented. · Moves all extremities well  · No abnormalities of mood, affect, memory, mentation, or behavior are noted        DATA:    ECG:    ECHO 8/11/18:   Left ventricle systolic function is mildly reduced with ejection fraction of   46%.  Inferior basal akinesia is present.   Normal left ventricle size.   Moderate concentric left ventricular hypertrophy.   Grade I diastolic dysfunction with normal filling pressure.   The aortic valve sclerosis but opens well.   Mild aortic regurgitation.   The aortic root is enlarged in size at 9.5DO.      LV Diastolic Dimension: 1.09 cm LV Systolic Dimension: 3.72 cm   LV Septum Diastolic: 8.36 cm   LV PW Diastolic: 5.22 cm        AO Root Dimension: 4.5 cm                                   LA Dimension: 3.6 cm                                   LA Area: 16 cm2   LVOT: 2.3 cm                    LA volume/Index: 45 ml /22 ml/m2         IMPRESSION:    1. Sinus node dysfunction (HCC)    2. Near syncope    3. Pacemaker    Atrial lead not capturing      RECOMMENDATIONS:  1. Chest X-ray to evaluate atrial lead   RA lead repositioning and risk of the procedure discussed. I will plan RA lead repositioning after chest x-ray     QUALITY MEASURES  1. Tobacco Cessation Counseling: NA  2. Retake of BP if >140/90:   NA  3. Documentation to PCP/referring for new patient:  Sent to PCP at close of office visit  4. CAD patient on anti-platelet: Yes  5. CAD patient on STATIN therapy:  Yes  6. Patient with CHF and aFib on anticoagulation:  NA     Scribe's attestation:  This note was scribed in the presence of Dr. Raina Delatorre by Tessie Aldrich, RN     I, Jonna Phillip, personally performed the services described in this documentation, as scribed by the above signed scribe in my presence. It is both accurate and complete to my knowledge. I agree with the details independently gathered by the clinical support staff, while the remaining scribed note accurately describes my personal service to the patient.      All questions and concerns were addressed to the patient/family. Alternatives to my treatment were discussed. History and Physical / Pre-Sedation Assessment    Patient:  Helena Barragan   :   1927     Intended Procedure:  RA lead revision    HPI: see HPI above     Nurses notes reviewed and agreed. Medications reviewed  Allergies:    Allergies   Allergen Reactions    Altace [Ramipril]      (cough) Pt denies ever taking Ramipril    Sulfa Antibiotics        Physical Exam:  Vital Signs: There were no vitals taken for this visit. (see paper charting)  Airway: Mallampati: II (soft palate, uvula, fauces visible)  Pulmonary:Normal  Cardiac:Normal  Abdomen:Normal    Pre-Procedure Assessment / Plan:  ASA: Class 3 - A patient with severe systemic disease that limits activity but is not incapacitating  Level of Sedation Plan: Moderate sedation  Post Procedure plan: Return to same level of care    I assessed the patient and find that the patient is in satisfactory condition to proceed with the planned procedure and sedation plan. I have explained the risk, benefits, and alternatives to the procedure; the patient understands and agrees to proceed. H&P is reflective of office visit from 4/10/2019. No significant changes.        Hetal Centeno MD   4/18/2019

## 2019-04-18 NOTE — PLAN OF CARE
Problem: Pain:  Goal: Pain level will decrease  Description  Pain level will decrease  Outcome: Ongoing  Note:   Pt will be satisfied with pain control. Pt uses numeric pain rating scale with reassessments after pain med administration. Will continue to monitor progression throughout shift. Problem: Falls - Risk of:  Goal: Will remain free from falls  Description  Will remain free from falls  Outcome: Ongoing  Note:   Pt will remain free from falls throughout hospital stay. Fall precautions in place, bed alarm on, bed in lowest position with wheels locked and side rails 2/4 up. Room door open and hourly rounding completed. Will continue to monitor throughout shift.

## 2019-04-18 NOTE — PLAN OF CARE
Problem: OXYGENATION/RESPIRATORY FUNCTION  Goal: Patient will maintain patent airway  Outcome: Ongoing  Note:   Patient's EF (Ejection Fraction) is greater than 40%    Patient has a past medical history of Arthralgia, Cancer ([facility] [address] Chronic back pain, Chronic diastolic (congestive) heart failure ([address] CVA (cerebral vascular accident) ([facility] [address] Diverticulitis, Dizziness, Fatigue, Fractures, Frequent falls, Hearing decreased, Hyperlipidemia, Hypertension, Osteoarthritis of knee, Osteoarthritis of shoulder, PAD (peripheral artery disease) ([address] TIA (transient ischemic attack), Vertigo, and Wears glasses. Comorbidities reviewed and education provided. Patient and family's stated goal of care: be more comfortable prior to discharge    Patient's current functional capacity:  No limitation of physical activity. Ordinary physical activity does not cause undue fatigue, palpitation, dyspnea. Pt resting in bed at this time on room air. Pt denies shortness of breath. Pt without lower extremity edema. Patient's weights and intake/output reviewed:    Patient Vitals for the past 96 hrs (Last 3 readings):   Weight   04/18/19 1326 174 lb (78.9 kg)       Intake/Output Summary (Last 24 hours) at 4/18/2019 1846  Last data filed at 4/18/2019 1835  Gross per 24 hour   Intake 0 ml   Output 0 ml   Net 0 ml       Patient provided with education on CHF signs/symptoms, causes, discharge medications, daily weights, low sodium diet, activity, and follow-up. Notified patient to call the doctor post discharge if patient experiences shortness of breath, chest pain, swelling, cough, or weight gain of three pounds in a day/five pounds in a week. Also notified patient to call the doctor with dizziness, increased fatigue, decreased or difficulty urinating. Pt verbalized understanding. No additional questions at this time.     Education Time: 5 Minutes

## 2019-04-19 ENCOUNTER — PROCEDURE VISIT (OUTPATIENT)
Dept: CARDIOLOGY CLINIC | Age: 84
End: 2019-04-19

## 2019-04-19 ENCOUNTER — APPOINTMENT (OUTPATIENT)
Dept: GENERAL RADIOLOGY | Age: 84
DRG: 262 | End: 2019-04-19
Attending: INTERNAL MEDICINE
Payer: MEDICARE

## 2019-04-19 VITALS
SYSTOLIC BLOOD PRESSURE: 151 MMHG | TEMPERATURE: 97.7 F | HEART RATE: 63 BPM | HEIGHT: 72 IN | RESPIRATION RATE: 16 BRPM | DIASTOLIC BLOOD PRESSURE: 75 MMHG | OXYGEN SATURATION: 95 % | BODY MASS INDEX: 22.9 KG/M2 | WEIGHT: 169.09 LBS

## 2019-04-19 DIAGNOSIS — Z95.0 PACEMAKER: ICD-10-CM

## 2019-04-19 PROCEDURE — 6370000000 HC RX 637 (ALT 250 FOR IP): Performed by: INTERNAL MEDICINE

## 2019-04-19 PROCEDURE — 2580000003 HC RX 258: Performed by: INTERNAL MEDICINE

## 2019-04-19 PROCEDURE — 71046 X-RAY EXAM CHEST 2 VIEWS: CPT

## 2019-04-19 PROCEDURE — 99024 POSTOP FOLLOW-UP VISIT: CPT | Performed by: INTERNAL MEDICINE

## 2019-04-19 RX ORDER — FAMOTIDINE 20 MG/1
20 TABLET, FILM COATED ORAL NIGHTLY
Qty: 60 TABLET | Refills: 3 | Status: SHIPPED | OUTPATIENT
Start: 2019-04-19 | End: 2019-01-01

## 2019-04-19 RX ADMIN — SODIUM CHLORIDE, PRESERVATIVE FREE 10 ML: 5 INJECTION INTRAVENOUS at 08:39

## 2019-04-19 RX ADMIN — RANOLAZINE 1000 MG: 500 TABLET, FILM COATED, EXTENDED RELEASE ORAL at 08:39

## 2019-04-19 RX ADMIN — CITALOPRAM HYDROBROMIDE 10 MG: 20 TABLET ORAL at 08:41

## 2019-04-19 RX ADMIN — METOPROLOL TARTRATE 50 MG: 25 TABLET ORAL at 08:38

## 2019-04-19 RX ADMIN — MAGNESIUM GLUCONATE 500 MG ORAL TABLET 400 MG: 500 TABLET ORAL at 08:39

## 2019-04-19 ASSESSMENT — PAIN DESCRIPTION - ONSET
ONSET: ON-GOING
ONSET: ON-GOING

## 2019-04-19 ASSESSMENT — PAIN DESCRIPTION - PAIN TYPE
TYPE: ACUTE PAIN
TYPE: ACUTE PAIN

## 2019-04-19 ASSESSMENT — PAIN DESCRIPTION - FREQUENCY
FREQUENCY: INTERMITTENT
FREQUENCY: INTERMITTENT

## 2019-04-19 ASSESSMENT — PAIN DESCRIPTION - LOCATION
LOCATION: CHEST
LOCATION: CHEST

## 2019-04-19 ASSESSMENT — PAIN DESCRIPTION - ORIENTATION
ORIENTATION: LEFT
ORIENTATION: LEFT

## 2019-04-19 ASSESSMENT — PAIN DESCRIPTION - PROGRESSION
CLINICAL_PROGRESSION: NOT CHANGED

## 2019-04-19 ASSESSMENT — PAIN DESCRIPTION - DESCRIPTORS
DESCRIPTORS: DISCOMFORT;DULL
DESCRIPTORS: DISCOMFORT;DULL

## 2019-04-19 ASSESSMENT — PAIN SCALES - GENERAL
PAINLEVEL_OUTOF10: 3
PAINLEVEL_OUTOF10: 0
PAINLEVEL_OUTOF10: 3

## 2019-04-19 NOTE — DISCHARGE SUMMARY
[address] [place] [address] SUMMARY      Patient ID:  Kyree Jain  [id number] 62 y.o. 5/23/1927    Admit date: 4/18/2019    Discharge date:  4/19/2019    Admitting Physician: Reji Nazario MD     Discharge Physician: Reji Nazario     Admission Diagnoses: Breakdown (mechanical) of cardiac electrode, initial encounter [T82.110A]  Sinus node dysfunction ([facility] [I49.5]    Discharge Diagnoses:   Patient Active Problem List   Diagnosis    Fatigue    Dizziness    Hyperlipidemia with target LDL less than 100    Essential hypertension    Polymyalgia rheumatica syndrome ([facility]    Other affections of shoulder region, not elsewhere classified    Primary localized osteoarthrosis, lower leg    Primary localized osteoarthrosis of shoulder region    Toe pain    Toe fracture    Callus of foot    TIA (transient ischemic attack)    GERD (gastroesophageal reflux disease)    Depression    BPH (benign prostatic hyperplasia)    Acute encephalopathy    CAD in native artery    Sepsis ([facility]    Community acquired bacterial pneumonia    Chronic diastolic congestive heart failure ([facility]    History of total shoulder replacement, left    Bradycardia    Premature ventricular contraction    Near syncope    RONAK (acute kidney injury) ([facility]    New persistent daily headache    Sinus bradycardia    Sinus node dysfunction (HCC)    Pacemaker    Status post placement of cardiac pacemaker    S/P placement of cardiac pacemaker        Discharged Condition: good  Discharge: Nursing home     Hospital Course: Kyree Jain was admitted for RV lead repositioning. Patient underwent uneventful lead revision. Post procedure chest X ray normal. No hematoma at pacemaker site.     Consults:  None    Significant Diagnostic Studies:   Chest X ray and pacemaker check   Labs:   Lab Results   Component Value Date    CREATININE 1.1 04/18/2019    BUN 16 04/18/2019     (L) 04/18/2019    K 4.8 04/18/2019    CL 96 (L) 04/18/2019    CO2 23 04/18/2019      Lab Results   Component Value Date    WBC 8.1 04/18/2019    HGB 11.9 (L) 04/18/2019    HCT 35.4 (L) 04/18/2019    MCV 91.7 04/18/2019     04/18/2019      Lab Results   Component Value Date    INR 1.53 (H) 04/18/2019    PROTIME 17.4 (H) 04/18/2019    No results found for: BNP    Disposition: long term care facility    Patient Instructions:   Current Discharge Medication List      CONTINUE these medications which have NOT CHANGED    Details   nitroGLYCERIN (NITRO-DUR) 0.4 MG/HR Place 1 patch onto the skin daily      traMADol (ULTRAM) 50 MG tablet Take 50 mg by mouth every 6 hours as needed for Pain. citalopram (CELEXA) 10 MG tablet Take 10 mg by mouth daily      RaNITidine HCl (RANITIDINE 150 MAX STRENGTH PO) Take by mouth      ranolazine (RANEXA) 500 MG extended release tablet Take 1,000 mg by mouth 2 times daily      metoprolol tartrate (LOPRESSOR) 50 MG tablet Take 1 tablet by mouth 2 times daily  Qty: 180 tablet, Refills: 3      clopidogrel (PLAVIX) 75 MG tablet Take 1 tablet by mouth daily  Qty: 90 tablet, Refills: 3      atorvastatin (LIPITOR) 10 MG tablet Take 1 tablet by mouth daily  Qty: 30 tablet, Refills: 11      aspirin 81 MG chewable tablet Take 1 tablet by mouth daily  Qty: 30 tablet, Refills: 3      magnesium oxide (MAG-OX) 400 MG tablet Take 1 tablet by mouth 2 times daily  Qty: 60 tablet, Refills: 5      loperamide (IMODIUM) 2 MG capsule Take 2 mg by mouth 4 times daily as needed for Diarrhea      Vitamins-Lipotropics (LIPOFLAVONOID) TABS Take 1 tablet by mouth Daily with lunch Indications: vertigo      vitamin B-12 (CYANOCOBALAMIN) 100 MCG tablet Take 50 mcg by mouth daily      predniSONE (DELTASONE) 5 MG tablet Take 5 mg by mouth daily. CALCIUM PO Take 1,000 mg by mouth daily. vitamin D (CHOLECALCIFEROL) 1000 UNIT TABS tablet Take 1,000 Units by mouth daily.       Multiple Vitamins-Minerals (MULTIVITAMIN PO) Take 1 tablet by mouth daily

## 2019-04-19 NOTE — CARE COORDINATION
CASE MANAGEMENT INITIAL ASSESSMENT      Reviewed chart and met with patient today, re: Possible discharge needs. Explained Case Management role/services. yes    Family present: none  Primary contact information: Daughter Keely Amaya [phone]    Admit date/status: 4/18/19 IP  Diagnosis: Mechanical Breakdown, (pacemaker)    Insurance: medicare  Precert required for SNF - N        3 night stay required - Y    Living arrangements, Adls, care needs, prior to admission: Lives LTC at the [facility], assisted in all ADL's. Transportation: facility    [address] at home: [facility] Services in the home and/or outpatient, prior to admission: [place] (if applicable) N/A  · Name:  · Address:  · Dialysis Schedule:  · Phone:  · Fax:    PT/OT recs: none seen    Hospital Exemption Notification (HEN): needed for snf, not initiated    Barriers to discharge: none    Plan/comments: pt has been living at the [facility] home in [facility] for less than a month. Pt is in agreement to return. Facility can accept pt back.     ECOC on chart for MD signature

## 2019-04-19 NOTE — PROGRESS NOTES
Patient is asleep. Assessment is complete, see flow sheet. Bed in lowest position, wheels locked, call light is within reach. Patient denies any further needs at the moment. Will continue to monitor.     Vitals:    04/18/19 2304   BP: (!) 152/77   Pulse: 63   Resp: 20   Temp: 98.1 °F (36.7 °C)   SpO2: 98%

## 2019-04-19 NOTE — PROGRESS NOTES
Report called and given to RN on A2 unit at the [facility] home in [place]. PT will be picked up via first care estimated @ 1400. Electronically signed by Paul Dickinson RN on 4/19/2019 at 1:12 PM     Discharge instructions and medications reviewed with patient  at bedside. Intermit. Confusion noted with instructions so family called and instructions given to daughter and son. IV and Tele discontinued, patient has no questions at this time. All belongings accounted for. Discharge packet copy sheet signed and placed in chart. Pt wheeled out by [facility] care ambulance. Electronically signed by Paul Dickinson RN on 4/19/2019 at 2:19 PM

## 2019-04-19 NOTE — PROGRESS NOTES
End of shift report given to Olga Haynes S. [place] Renata bedside. Patient alert and oriented. Bed in lowest position with wheels locked. Call light within reach.  No further needs at this time. [facility]

## 2019-04-19 NOTE — PLAN OF CARE
Problem: OXYGENATION/RESPIRATORY FUNCTION  Goal: Patient will maintain patent airway  4/19/2019 1229 by Feli Carrasco RN  Outcome: Ongoing  Note:   Patient's EF (Ejection Fraction) is less than 40%    Patient has a past medical history of Arthralgia, Cancer ([address] Chronic back pain, Chronic diastolic (congestive) heart failure ([address] CVA (cerebral vascular accident) ([address] Diverticulitis, Dizziness, Fatigue, Fractures, Frequent falls, Hearing decreased, Hyperlipidemia, Hypertension, Osteoarthritis of knee, Osteoarthritis of shoulder, PAD (peripheral artery disease) ([address] TIA (transient ischemic attack), Vertigo, and Wears glasses. Comorbidities reviewed and education provided. Patient and family's stated goal of care: reduce shortness of breath, increase activity tolerance and be more comfortable prior to discharge    Patient's current functional capacity:  Marked limitation of physical activity. Comfortable at rest. Less than ordinary activity causes fatigue, palpitation, or dyspnea. Pt resting in bed at this time on room air. Pt denies shortness of breath. Pt without lower extremity edema.  Patient's weights and intake/output reviewed:    Patient Vitals for the past 96 hrs (Last 3 readings):   Weight   04/19/19 0610 169 lb 1.5 oz (76.7 kg)   04/18/19 1326 174 lb (78.9 kg)       Intake/Output Summary (Last 24 hours) at 4/19/2019 1229  Last data filed at 4/19/2019 0221  Gross per 24 hour   Intake 60 ml   Output 200 ml   Net -140 ml         >>For CHF and Comorbidity documentation on Education Time and Topics, please see Education Tab     4/18/2019 2141 by Ac Kelly RN  Outcome: Ongoing

## 2019-04-19 NOTE — DISCHARGE INSTR - COC
Adacel) 06/26/2014, 04/25/2018       Active Problems:  Patient Active Problem List   Diagnosis Code    Fatigue R53.83    Dizziness R42    Hyperlipidemia with target LDL less than 100 E78.5    Essential hypertension I10    Polymyalgia rheumatica syndrome ([facility] M35.3    Other affections of shoulder region, not elsewhere classified M75.80    Primary localized osteoarthrosis, lower leg M17.10    Primary localized osteoarthrosis of shoulder region M19.019    Toe pain M79.676    Toe fracture S92.919A    Callus of foot L84    TIA (transient ischemic attack) G45.9    GERD (gastroesophageal reflux disease) K21.9    Depression F32.9    BPH (benign prostatic hyperplasia) N40.0    Acute encephalopathy G93.40    CAD in native artery I25.10    Sepsis ([facility]) A41.9    Community acquired bacterial pneumonia J15.9    Chronic diastolic congestive heart failure ([facility]) I50.32    History of total shoulder replacement, left Z96.612    Bradycardia R00.1    Premature ventricular contraction I49.3    Near syncope R55    RONAK (acute kidney injury) ([facility]) N17.9    New persistent daily headache G44.52    Sinus bradycardia R00.1    Sinus node dysfunction ([facility]) I49.5    Pacemaker Z95.0    Status post placement of cardiac pacemaker Z95.0    S/P placement of cardiac pacemaker Z95.0       Isolation/Infection:   Isolation          No Isolation            Nurse Assessment:  Last Vital Signs: BP (!) 151/75   Pulse 63   Temp 97.7 °F (36.5 °C) (Oral)   Resp 16   Ht 6' (1.829 m)   Wt 169 lb 1.5 oz (76.7 kg)   SpO2 95%   BMI 22.93 kg/m²     Last documented pain score (0-10 scale): Pain Level: 3  Last Weight:   Wt Readings from Last 1 Encounters:   04/19/19 169 lb 1.5 oz (76.7 kg)     Mental Status:  disoriented, alert, coherent and logical    IV Access:  - None    Nursing Mobility/ADLs:  Walking   Dependent  Transfer  Assisted  Bathing  Assisted  Dressing  Assisted  Toileting  Assisted  Feeding  Independent  Med Admin Assisted  Med Delivery   whole    Wound Care Documentation and Therapy:        Elimination:  Continence:   · Bowel: Yes  · Bladder: Yes  Urinary Catheter: None   Colostomy/Ileostomy/Ileal Conduit: No       Date of Last BM: 4/18/2019    Intake/Output Summary (Last 24 hours) at 4/19/2019 1021  Last data filed at 4/19/2019 0221  Gross per 24 hour   Intake 60 ml   Output 200 ml   Net -140 ml     I/O last 3 completed shifts: In: 60 [P.O.:60]  Out: 200 [Urine:200]    Safety Concerns:     History of Falls (last 30 days) and At Risk for Falls    Impairments/Disabilities:      None    Nutrition Therapy:  Current Nutrition Therapy:   - Oral Diet:  General    Routes of Feeding: Oral  Liquids: No Restrictions  Daily Fluid Restriction: no  Last Modified Barium Swallow with Video (Video Swallowing Test): not done    Treatments at the Time of Hospital Discharge:   Respiratory Treatments: n/a  Oxygen Therapy:  is not on home oxygen therapy.   Ventilator:    - No ventilator support    Rehab Therapies: Physical Therapy and Occupational Therapy  Weight Bearing Status/Restrictions: No weight bearing restirctions  Other Medical Equipment (for information only, NOT a DME order):  wheelchair  Other Treatments:     Patient's personal belongings (please select all that are sent with patient):  Dentures upper and lower    RN SIGNATURE:  Electronically signed by Ping Almanza RN on 4/19/19 at 12:37 PM    CASE MANAGEMENT/SOCIAL WORK SECTION    Inpatient Status Date: ***    Readmission Risk Assessment Score:  Readmission Risk              Risk of Unplanned Readmission:        21           Discharging to Facility/ Agency   · Name: [facility]  · Phone:[phone]  · Fax:[phone]    / signature: Electronically signed by Torrey Moreno RN on 4/19/19 at 10:22 AM    PHYSICIAN SECTION    Prognosis: {Prognosis:7036163433}    Condition at Discharge: [facility] Patient Condition:[id number]}    Rehab Potential (if transferring to Rehab): {Prognosis:9818002547}    Recommended Follow-up, Labs or Other Treatments After Discharge:    ***               Physician Certification: I certify the above information and transfer of Sunday Romberg  is necessary for the continuing treatment of the diagnosis listed and that he requires {Admit to Appropriate Level of Care:23115} for {GREATER/LESS:157861068} 30 days.      Update Admission H&P: {CHP DME Changes in UQD:159809845}    PHYSICIAN SIGNATURE:  Electronically signed by Trista Farfan MD on 4/19/19 at 10:55 AM

## 2019-04-23 ENCOUNTER — TELEPHONE (OUTPATIENT)
Dept: CARDIOLOGY CLINIC | Age: 84
End: 2019-04-23

## 2019-04-23 NOTE — TELEPHONE ENCOUNTER
PLEASE call and change pt appt time on Friday 4/26 at 0800 to when Dr Neva Cushing is in clinic (12-4) he had a lead revision and post op checks are to be done when EP in office. Thanks.

## 2019-04-24 ENCOUNTER — TELEPHONE (OUTPATIENT)
Dept: CARDIOLOGY CLINIC | Age: 84
End: 2019-04-24

## 2019-04-24 NOTE — TELEPHONE ENCOUNTER
* See previous encounter. Sunshine Foss needed to change appt from 4/26 to 5/3. Pt scheduled for noon for device check.  (RPS will be in office at this time)

## 2019-05-02 NOTE — PROGRESS NOTES
Hospital check. Device interrogation by company representative. See interrogation for further details. Normal device function. No new arrhythmias. Post rv lead repo.

## 2019-05-03 ENCOUNTER — NURSE ONLY (OUTPATIENT)
Dept: CARDIOLOGY CLINIC | Age: 84
End: 2019-05-03
Payer: MEDICARE

## 2019-05-03 ENCOUNTER — TELEPHONE (OUTPATIENT)
Dept: CARDIOLOGY CLINIC | Age: 84
End: 2019-05-03

## 2019-05-03 DIAGNOSIS — T82.120A DISPLACEMENT OF PACEMAKER ELECTRODE LEAD, INITIAL ENCOUNTER: Primary | ICD-10-CM

## 2019-05-03 DIAGNOSIS — R00.1 BRADYCARDIA: ICD-10-CM

## 2019-05-03 DIAGNOSIS — Z95.0 PACEMAKER: ICD-10-CM

## 2019-05-03 DIAGNOSIS — R42 DIZZINESS: Primary | ICD-10-CM

## 2019-05-03 PROCEDURE — 93280 PM DEVICE PROGR EVAL DUAL: CPT | Performed by: INTERNAL MEDICINE

## 2019-05-03 RX ORDER — FUROSEMIDE 20 MG/1
20 TABLET ORAL 2 TIMES DAILY
Qty: 30 TABLET | Refills: 1 | Status: SHIPPED | OUTPATIENT
Start: 2019-05-03 | End: 2019-05-16 | Stop reason: ALTCHOICE

## 2019-05-03 NOTE — TELEPHONE ENCOUNTER
Explained to Gauri Mir, what Dr. Merdis Blizzard did in the office today and that Mr. Zachary Morgan will start on Lasix for SOB. Alo Christiansen was just curious as to why the medication was started when his father had a change in his device. I explained that the device is not there to help with his SOB.

## 2019-05-03 NOTE — TELEPHONE ENCOUNTER
[place], pt's son, is requesting to speak with medical staff regarding pt's device check today with Pj Rodriguez from [employer]. Please return Grey's call at [phone]. Thank you.

## 2019-05-10 LAB
BUN BLDV-MCNC: 18 MG/DL
CALCIUM SERPL-MCNC: 9 MG/DL
CHLORIDE BLD-SCNC: 99 MMOL/L
CO2: 27 MMOL/L
CREAT SERPL-MCNC: 1.2 MG/DL
GFR CALCULATED: NORMAL
GLUCOSE BLD-MCNC: 66 MG/DL
POTASSIUM SERPL-SCNC: 4.3 MMOL/L
SODIUM BLD-SCNC: 135 MMOL/L

## 2019-05-14 ENCOUNTER — TELEPHONE (OUTPATIENT)
Dept: CARDIOLOGY | Age: 84
End: 2019-05-14
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
## 2019-05-16 ENCOUNTER — PROCEDURE VISIT (OUTPATIENT)
Dept: CARDIOLOGY CLINIC | Age: 84
End: 2019-05-16

## 2019-05-16 ENCOUNTER — OFFICE VISIT (OUTPATIENT)
Dept: CARDIOLOGY CLINIC | Age: 84
End: 2019-05-16
Payer: MEDICARE

## 2019-05-16 VITALS
HEART RATE: 61 BPM | HEIGHT: 72 IN | SYSTOLIC BLOOD PRESSURE: 100 MMHG | WEIGHT: 169 LBS | DIASTOLIC BLOOD PRESSURE: 64 MMHG | BODY MASS INDEX: 22.89 KG/M2 | OXYGEN SATURATION: 99 %

## 2019-05-16 DIAGNOSIS — I49.5 SICK SINUS SYNDROME (HCC): Primary | ICD-10-CM

## 2019-05-16 DIAGNOSIS — I25.10 CAD IN NATIVE ARTERY: ICD-10-CM

## 2019-05-16 DIAGNOSIS — E78.5 HYPERLIPIDEMIA WITH TARGET LDL LESS THAN 100: ICD-10-CM

## 2019-05-16 DIAGNOSIS — Z95.0 PACEMAKER: ICD-10-CM

## 2019-05-16 DIAGNOSIS — I10 ESSENTIAL HYPERTENSION: ICD-10-CM

## 2019-05-16 PROCEDURE — G8420 CALC BMI NORM PARAMETERS: HCPCS | Performed by: INTERNAL MEDICINE

## 2019-05-16 PROCEDURE — 99214 OFFICE O/P EST MOD 30 MIN: CPT | Performed by: INTERNAL MEDICINE

## 2019-05-16 PROCEDURE — 1111F DSCHRG MED/CURRENT MED MERGE: CPT | Performed by: INTERNAL MEDICINE

## 2019-05-16 PROCEDURE — G8598 ASA/ANTIPLAT THER USED: HCPCS | Performed by: INTERNAL MEDICINE

## 2019-05-16 PROCEDURE — 1123F ACP DISCUSS/DSCN MKR DOCD: CPT | Performed by: INTERNAL MEDICINE

## 2019-05-16 PROCEDURE — 4040F PNEUMOC VAC/ADMIN/RCVD: CPT | Performed by: INTERNAL MEDICINE

## 2019-05-16 PROCEDURE — G8427 DOCREV CUR MEDS BY ELIG CLIN: HCPCS | Performed by: INTERNAL MEDICINE

## 2019-05-16 PROCEDURE — 1036F TOBACCO NON-USER: CPT | Performed by: INTERNAL MEDICINE
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
## 2019-06-19 ENCOUNTER — HOSPITAL ENCOUNTER (OUTPATIENT)
Dept: GENERAL RADIOLOGY | Age: 84
Discharge: HOME | End: 2019-06-19
Payer: MEDICARE

## 2019-06-19 DIAGNOSIS — R13.10 PROBLEMS WITH SWALLOWING AND MASTICATION: ICD-10-CM

## 2019-06-19 PROCEDURE — 74230 X-RAY XM SWLNG FUNCJ C+: CPT

## 2019-07-05 ENCOUNTER — HOSPITAL ENCOUNTER (OUTPATIENT)
Age: 84
Setting detail: OUTPATIENT SURGERY
Discharge: HOME | End: 2019-07-05
Attending: INTERNAL MEDICINE | Admitting: INTERNAL MEDICINE
Payer: MEDICARE

## 2019-07-05 VITALS
HEIGHT: 72 IN | BODY MASS INDEX: 21.81 KG/M2 | SYSTOLIC BLOOD PRESSURE: 133 MMHG | DIASTOLIC BLOOD PRESSURE: 69 MMHG | TEMPERATURE: 96 F | WEIGHT: 161 LBS | HEART RATE: 60 BPM | RESPIRATION RATE: 16 BRPM | OXYGEN SATURATION: 96 %

## 2019-07-05 PROCEDURE — 7100000011 HC PHASE II RECOVERY - ADDTL 15 MIN: Performed by: INTERNAL MEDICINE

## 2019-07-05 PROCEDURE — 88305 TISSUE EXAM BY PATHOLOGIST: CPT

## 2019-07-05 PROCEDURE — 2709999900 HC NON-CHARGEABLE SUPPLY: Performed by: INTERNAL MEDICINE

## 2019-07-05 PROCEDURE — 99152 MOD SED SAME PHYS/QHP 5/>YRS: CPT | Performed by: INTERNAL MEDICINE

## 2019-07-05 PROCEDURE — 3609012400 HC EGD TRANSORAL BIOPSY SINGLE/MULTIPLE: Performed by: INTERNAL MEDICINE

## 2019-07-05 PROCEDURE — 2580000003 HC RX 258: Performed by: INTERNAL MEDICINE

## 2019-07-05 PROCEDURE — 6360000002 HC RX W HCPCS: Performed by: INTERNAL MEDICINE

## 2019-07-05 PROCEDURE — 7100000010 HC PHASE II RECOVERY - FIRST 15 MIN: Performed by: INTERNAL MEDICINE

## 2019-07-05 PROCEDURE — 3609015300 HC ESOPHAGEAL DILATION MALONEY: Performed by: INTERNAL MEDICINE

## 2019-07-05 RX ORDER — SODIUM CHLORIDE, SODIUM LACTATE, POTASSIUM CHLORIDE, CALCIUM CHLORIDE 600; 310; 30; 20 MG/100ML; MG/100ML; MG/100ML; MG/100ML
INJECTION, SOLUTION INTRAVENOUS CONTINUOUS
Status: DISCONTINUED | OUTPATIENT
Start: 2019-07-05 | End: 2019-07-05 | Stop reason: HOSPADM

## 2019-07-05 RX ORDER — MIDAZOLAM HYDROCHLORIDE 5 MG/ML
INJECTION INTRAMUSCULAR; INTRAVENOUS PRN
Status: DISCONTINUED | OUTPATIENT
Start: 2019-07-05 | End: 2019-07-05 | Stop reason: ALTCHOICE

## 2019-07-05 RX ORDER — FENTANYL CITRATE 50 UG/ML
INJECTION, SOLUTION INTRAMUSCULAR; INTRAVENOUS PRN
Status: DISCONTINUED | OUTPATIENT
Start: 2019-07-05 | End: 2019-07-05 | Stop reason: ALTCHOICE

## 2019-07-05 RX ADMIN — SODIUM CHLORIDE, POTASSIUM CHLORIDE, SODIUM LACTATE AND CALCIUM CHLORIDE: 600; 310; 30; 20 INJECTION, SOLUTION INTRAVENOUS at 08:13

## 2019-07-05 ASSESSMENT — PAIN - FUNCTIONAL ASSESSMENT: PAIN_FUNCTIONAL_ASSESSMENT: 0-10

## 2019-07-05 ASSESSMENT — PAIN DESCRIPTION - DESCRIPTORS: DESCRIPTORS: SHARP

## 2019-07-05 NOTE — H&P
Gastroenterology Preop Assessment    Patient:   Chen Enrique   :    1927   Facility:   [facility]  Referring/PCP: Desiree Jefferson  Date:     2019    Subjective:   Procedure: egd with dilation    HPI/Reason for procedure:  dysphagia    Past Medical History:   Diagnosis Date    Arthralgia     Cancer ([facility] [address]     prostate    Chronic back pain     x40 years    Chronic diastolic (congestive) heart failure (HCC)     CVA (cerebral vascular accident) ([facility] [address]     Diverticulitis     Dizziness     Fatigue     Fractures     Frequent falls     Hearing decreased     Hyperlipidemia     Hypertension     Osteoarthritis of knee     Osteoarthritis of shoulder     PAD (peripheral artery disease) (HCC)     TIA (transient ischemic attack) 2006    Vertigo     Wears glasses      Past Surgical History:   Procedure Laterality Date    CATARACT REMOVAL      bilaterally    COLONOSCOPY      ESOPHAGEAL DILATATION      X 2    INGUINAL HERNIA REPAIR      right    JOINT REPLACEMENT      KNEE SURGERY Bilateral     PACEMAKER INSERTION  10/12/2018    Dual Chamber PPM    PROSTATECTOMY  1998    carcinoma    SHOULDER ARTHROPLASTY      SHOULDER SURGERY      TOE SURGERY Right 2013    PERMANENT RIGHT GREAT TOENAIL ABLATION AND REMOVAL    TOTAL KNEE ARTHROPLASTY      UPPER GASTROINTESTINAL ENDOSCOPY  2011       Social:   Social History     Tobacco Use    Smoking status: Former Smoker     Types: Cigarettes     Last attempt to quit: 1949     Years since quittin.1    Smokeless tobacco: Never Used   Substance Use Topics    Alcohol use: No     Alcohol/week: 0.0 oz     Family:   Family History   Problem Relation Age of Onset    Heart Disease Mother         MI    Parkinsonism Father        Scheduled Medications:     Current Medications:    Prior to Admission medications    Medication Sig Start Date End Date Taking?  Authorizing Provider   famotidine (PEPCID) 20 MG tablet Take 1

## 2019-07-05 NOTE — OP NOTE
Esophagogastroduodenoscopy Note    Patient:   Aleyda Francis    YOB: 1927    Facility:   [facility] [Inpatient]   Referring/PCP: Holly Benton    Procedure:   Esophagogastroduodenoscopy with biopsy, esophageal dilation  Date:     7/5/2019   Endoscopist:  Trent Teixeira     Preoperative Diagnosis:   Dysphagia    Postoperative Diagnosis:  Schatzki ring, Hiatal hernia    Anesthesia:  Versed 4 mg IV, fentanyl 25 mcg IV; Procedural Sedation Time: 10 minutes. Estimated blood loss: Minimal    Complications: None    Description of Procedure:  Informed consent was obtained from the patient after explanation of the procedure including indications, description of the procedure,  benefits and possible risks and complications of the procedure, and alternatives. Questions were answered. The patient's history was reviewed and a directed physical examination was performed prior to the procedure. Patient was monitored throughout the procedure with pulse oximetry and periodic assessment of vital signs. Patient was sedated as noted above. The Nursing staff and I performed a time out. With the patient in the left lateral decubitus position, the Olympus videoendoscope was placed in the patient's mouth and under direct visualization passed into the esophagus. The scope was ultimately passed to the second portion of the duodenum. Visualization was performed during both introduction and withdrawal of the endoscope and retroflexed view of the proximal stomach was obtained. Findings[de-identified]   Esophagus: Hiatal hernia from 40 to 45 cm. Schatzki ring seen. Dilation performed with 54 Fr Hooks. The findings do not support a diagnosis of Blanton's Esophagus. Biopsies taken from mid eosphagus  Stomach: normal.  Biopsies taken from the stomach.   Duodenum: normal    Recommendations:   -  Await pathology results  -  Follow up as needed  - Restart plavix tomorrow    Trent Teixeira

## 2019-07-15 ENCOUNTER — TELEPHONE (OUTPATIENT)
Dept: CARDIOLOGY CLINIC | Age: 84
End: 2019-07-15

## 2019-07-15 NOTE — TELEPHONE ENCOUNTER
Nichol from [facility] called stating device transmitters do not work at their facility. She is asking if she can schedule the pt to have device check done physically in the office at [facility] for future checks. Pt is scheduled for transmission on transmitter for 8-20-19 and they want to cancel that and schedule for [facility] if possible. Please advise and contact Nichol at [phone].

## 2019-07-23 NOTE — PROGRESS NOTES
STRENGTH PO) Take by mouth      ranolazine (RANEXA) 500 MG extended release tablet Take 1,000 mg by mouth 2 times daily      metoprolol tartrate (LOPRESSOR) 50 MG tablet Take 1 tablet by mouth 2 times daily (Patient taking differently: Take 25 mg by mouth 2 times daily ) 180 tablet 3    clopidogrel (PLAVIX) 75 MG tablet Take 1 tablet by mouth daily 90 tablet 3    atorvastatin (LIPITOR) 10 MG tablet Take 1 tablet by mouth daily 30 tablet 11    aspirin 81 MG chewable tablet Take 1 tablet by mouth daily 30 tablet 3    magnesium oxide (MAG-OX) 400 MG tablet Take 1 tablet by mouth 2 times daily 60 tablet 5    loperamide (IMODIUM) 2 MG capsule Take 2 mg by mouth 4 times daily as needed for Diarrhea      Vitamins-Lipotropics (LIPOFLAVONOID) TABS Take 1 tablet by mouth Daily with lunch Indications: vertigo      vitamin B-12 (CYANOCOBALAMIN) 100 MCG tablet Take 50 mcg by mouth daily      predniSONE (DELTASONE) 5 MG tablet Take 5 mg by mouth daily.  CALCIUM PO Take 1,000 mg by mouth daily.  vitamin D (CHOLECALCIFEROL) 1000 UNIT TABS tablet Take 1,000 Units by mouth daily.  Multiple Vitamins-Minerals (MULTIVITAMIN PO) Take 1 tablet by mouth daily        No current facility-administered medications for this visit. Allergies   Allergen Reactions    Altace [Ramipril]      (cough) Pt denies ever taking Ramipril    Sulfa Antibiotics        Review of Systems:  Constitutional: negative  Respiratory: negative  Cardiovascular: negative  Musculoskeletal:negative except for New Patient (L SHOULDER )    Relevant review of systems reviewed and available in the patient's chart in media tab    General Exam:   Constitutional: Patient is adequately groomed with no evidence of malnutrition  Mental Status: The patient is oriented to time, place and person. The patient's mood and affect are appropriate. Vascular: Examination reveals no swelling or calf tenderness.   Peripheral pulses are palpable and

## 2019-09-12 NOTE — ED PROVIDER NOTES
History:   Diagnosis Date    Arthralgia     Cancer [facility])     prostate    Chronic back pain     x40 years    Chronic diastolic (congestive) heart failure (HCC)     CVA [facility]cere[facility] accident) ([facility] [address]     Diverticulitis     Dizziness     Fatigue     Fractures     Frequent falls     Hearing decreased     Hyperlipidemia     Hypertension     Osteoarthritis of knee     Osteoarthritis of shoulder     PAD (peripheral artery disease) ([facility] [address]     TIA (transient ischemic attack) 2006    Vertigo     Wears glasses          SURGICAL HISTORY     Past Surgical History:   Procedure Laterality Date    CATARACT REMOVAL  1997    bilaterally    COLONOSCOPY      ENDOSCOPY, COLON, DIAGNOSTIC  07/05/2019    Bx and dilitation    ESOPHAGEAL DILATATION      X 2    ESOPHAGEAL DILATATION  7/5/2019    ESOPHAGEAL DILATION PERES performed by Gabriella Curran DO at [address]      right    JOINT REPLACEMENT      KNEE SURGERY Bilateral     PACEMAKER INSERTION  10/12/2018    Dual Chamber PPM    PROSTATECTOMY  1998    carcinoma    SHOULDER ARTHROPLASTY      SHOULDER SURGERY      TOE SURGERY Right 06/03/2013    PERMANENT RIGHT GREAT TOENAIL ABLATION AND REMOVAL    TOTAL KNEE ARTHROPLASTY      UPPER GASTROINTESTINAL ENDOSCOPY  jan 2011    UPPER GASTROINTESTINAL ENDOSCOPY N/A 7/5/2019    EGD BIOPSY performed by Gabriella Curran DO at [address]       Previous Medications    ASPIRIN 81 MG CHEWABLE TABLET    Take 1 tablet by mouth daily    ATORVASTATIN (LIPITOR) 10 MG TABLET    Take 1 tablet by mouth daily    CALCIUM PO    Take 1,000 mg by mouth daily. CLOPIDOGREL (PLAVIX) 75 MG TABLET    Take 1 tablet by mouth daily    DRONABINOL (MARINOL) 2.5 MG CAPSULE    Take 2.5 mg by mouth 2 times daily (before meals).     LOPERAMIDE (IMODIUM) 2 MG CAPSULE    Take 2 mg by mouth 4 times daily as needed for Diarrhea    MAGNESIUM OXIDE (MAG-OX) 400 MG TABLET    Take 1 tablet by mouth 2 times daily    METOPROLOL TARTRATE (LOPRESSOR) 50 MG TABLET    Take 1 tablet by mouth 2 times daily    MIRTAZAPINE (REMERON) 15 MG TABLET    Take 15 mg by mouth nightly    MULTIPLE VITAMINS-MINERALS (MULTIVITAMIN PO)    Take 1 tablet by mouth daily     NITROGLYCERIN (NITRO-DUR) 0.4 MG/HR    Place 1 patch onto the skin daily    PREDNISONE (DELTASONE) 5 MG TABLET    Take 5 mg by mouth daily. RANITIDINE HCL (RANITIDINE 150 MAX STRENGTH PO)    Take by mouth    RANOLAZINE (RANEXA) 500 MG EXTENDED RELEASE TABLET    Take 1,000 mg by mouth 2 times daily    TRAMADOL (ULTRAM) 50 MG TABLET    Take 50 mg by mouth every 6 hours as needed for Pain. VITAMIN B-12 (CYANOCOBALAMIN) 100 MCG TABLET    Take 50 mcg by mouth daily    VITAMIN D (CHOLECALCIFEROL) 1000 UNIT TABS TABLET    Take 1,000 Units by mouth daily. VITAMINS-LIPOTROPICS (LIPOFLAVONOID) TABS    Take 1 tablet by mouth Daily with lunch Indications: vertigo         ALLERGIES     Altace [ramipril] and Sulfa antibiotics    FAMILYHISTORY       Family History   Problem Relation Age of Onset    Heart Disease Mother         MI    Parkinsonism Father           SOCIAL HISTORY       Social History     Socioeconomic History    Marital status:       Spouse name: None    Number of children: None    Years of education: None    Highest education level: None   Occupational History    None   Social Needs    Financial resource strain: None    Food insecurity:     Worry: None     Inability: None    Transportation needs:     Medical: None     Non-medical: None   Tobacco Use    Smoking status: Former Smoker     Types: Cigarettes     Last attempt to quit: 1949     Years since quittin.3    Smokeless tobacco: Never Used   Substance and Sexual Activity    Alcohol use: No     Alcohol/week: 0.0 standard drinks    Drug use: No    Sexual activity: Not Currently   Lifestyle    Physical activity:     Days per week: None Minutes per session: None    Stress: None   Relationships    Social connections:     Talks on phone: None     Gets together: None     Attends [employer] service: None     Active member of club or organization: None     Attends meetings of clubs or organizations: None     Relationship status: None    Intimate partner violence:     Fear of current or ex partner: None     Emotionally abused: None     Physically abused: None     Forced sexual activity: None   Other Topics Concern    None   Social History Narrative    None       SCREENINGS             PHYSICAL EXAM    (up to 7 for level 4, 8 or more for level 5)     ED Triage Vitals [09/12/19 1120]   BP Temp Temp Source Pulse Resp SpO2 Height Weight   139/83 98 °F (36.7 °C) Oral 60 17 93 % 6' (1.829 m) --       Physical Exam   Constitutional: He appears well-developed and well-nourished. HENT:   Head: Normocephalic and atraumatic. Right Ear: External ear normal.   Left Ear: External ear normal.   Nose: Nose normal.   Mouth/Throat: Oropharynx is clear and moist.   Eyes: Conjunctivae are normal.   Neck: Normal range of motion. Cardiovascular: Normal rate and regular rhythm. Pulmonary/Chest: Effort normal and breath sounds normal.   Abdominal: Soft. Bowel sounds are normal. He exhibits no distension. There is no tenderness. Musculoskeletal: Normal range of motion. Neurological: He is alert. Skin: Skin is warm and dry. Psychiatric: He has a normal mood and affect. His behavior is normal.   Nursing note and vitals reviewed.       DIAGNOSTIC RESULTS   LABS:    Labs Reviewed   CBC WITH AUTO DIFFERENTIAL - Abnormal; Notable for the following components:       Result Value    Neutrophils Absolute 7.9 (*)     Lymphocytes Absolute 0.6 (*)     All other components within normal limits    Narrative:     Performed at:  [facility] [address],  [place], [facility]   Phone [phone]   COMPREHENSIVE METABOLIC PANEL W/ REFLEX TO MG FOR LOW K - Abnormal; Notable for the following components:    Sodium 135 (*)     Chloride 98 (*)     BUN 22 (*)     GFR Non-African American 52 (*)     Alkaline Phosphatase 136 (*)     All other components within normal limits    Narrative:     Performed at:  [facility] [address],  [place], [facility]   Phone [phone]   TROPONIN    Narrative:     Performed at:  [facility]) - [facility] [address],  [place], [facility]   Phone [phone]   URINE RT REFLEX TO CULTURE    Narrative:     Performed at:  [facility]) - [facility] [address],  [place], [facility]   Phone [phone]       All other labs were within normal range or not returned as of this dictation. EKG: All EKG's are interpreted by the Emergency Department Physician in the absence of a cardiologist.  Please see their note for interpretation of EKG. RADIOLOGY:   Non-plain film images such as CT, Ultrasound and MRI are read by the radiologist. Plain radiographic images are visualized andpreliminarily interpreted by the  ED Provider with the below findings:        Interpretation [facility] Radiologist below, if available at the time of this note:    XR CHEST STANDARD (2 VW)   Final Result   No active cardiopulmonary disease           No results found. PROCEDURES   Unless otherwise noted below, none     Procedures    CRITICAL CARE TIME   N/A    CONSULTS:  None      EMERGENCY DEPARTMENT COURSE and DIFFERENTIAL DIAGNOSIS/MDM:   Vitals:    Vitals:    09/12/19 1120   BP: 139/83   Pulse: 60   Resp: 17   Temp: 98 °F (36.7 °C)   TempSrc: Oral   SpO2: 93%   Height: 6' (1.829 m)       Patient was given thefollowing medications:  Medications   0.9 % sodium chloride bolus (1,000 mLs Intravenous New Bag 9/12/19 1204)       Patient presented to the emergency department with complaints of not feeling well for the last several hours.   He was concerned

## 2019-12-07 PROBLEM — J18.9 HCAP (HEALTHCARE-ASSOCIATED PNEUMONIA): Status: ACTIVE | Noted: 2019-01-01

## 2020-01-01 ENCOUNTER — HOSPITAL ENCOUNTER (INPATIENT)
Age: 85
LOS: 6 days | Discharge: NURSING, REHAB OR LONG-TERM CARE | DRG: 202 | End: 2020-07-20
Attending: EMERGENCY MEDICINE | Admitting: INTERNAL MEDICINE
Payer: MEDICARE

## 2020-01-01 ENCOUNTER — APPOINTMENT (OUTPATIENT)
Dept: GENERAL RADIOLOGY | Age: 85
DRG: 202 | End: 2020-01-01
Payer: MEDICARE

## 2020-01-01 ENCOUNTER — APPOINTMENT (OUTPATIENT)
Dept: ULTRASOUND IMAGING | Age: 85
DRG: 202 | End: 2020-01-01
Payer: MEDICARE

## 2020-01-01 VITALS
DIASTOLIC BLOOD PRESSURE: 89 MMHG | OXYGEN SATURATION: 92 % | HEIGHT: 72 IN | BODY MASS INDEX: 25.1 KG/M2 | WEIGHT: 185.3 LBS | RESPIRATION RATE: 22 BRPM | SYSTOLIC BLOOD PRESSURE: 137 MMHG | HEART RATE: 88 BPM | TEMPERATURE: 99.2 F

## 2020-01-01 LAB
A/G RATIO: 1 (ref 1.1–2.2)
ALBUMIN SERPL-MCNC: 3 G/DL (ref 3.4–5)
ALP BLD-CCNC: 92 U/L (ref 40–129)
ALT SERPL-CCNC: 19 U/L (ref 10–40)
AMORPHOUS: ABNORMAL /HPF
ANION GAP SERPL CALCULATED.3IONS-SCNC: 10 MMOL/L (ref 3–16)
ANION GAP SERPL CALCULATED.3IONS-SCNC: 11 MMOL/L (ref 3–16)
ANION GAP SERPL CALCULATED.3IONS-SCNC: 12 MMOL/L (ref 3–16)
ANION GAP SERPL CALCULATED.3IONS-SCNC: 14 MMOL/L (ref 3–16)
ANION GAP SERPL CALCULATED.3IONS-SCNC: 14 MMOL/L (ref 3–16)
ANION GAP SERPL CALCULATED.3IONS-SCNC: 18 MMOL/L (ref 3–16)
ANION GAP SERPL CALCULATED.3IONS-SCNC: 18 MMOL/L (ref 3–16)
AST SERPL-CCNC: 33 U/L (ref 15–37)
BACTERIA: ABNORMAL /HPF
BASOPHILS ABSOLUTE: 0 K/UL (ref 0–0.2)
BASOPHILS ABSOLUTE: 0.1 K/UL (ref 0–0.2)
BASOPHILS RELATIVE PERCENT: 0.3 %
BASOPHILS RELATIVE PERCENT: 0.6 %
BILIRUB SERPL-MCNC: 0.7 MG/DL (ref 0–1)
BILIRUBIN URINE: NEGATIVE
BLOOD CULTURE, ROUTINE: ABNORMAL
BLOOD CULTURE, ROUTINE: ABNORMAL
BLOOD, URINE: ABNORMAL
BUN BLDV-MCNC: 38 MG/DL (ref 7–20)
BUN BLDV-MCNC: 41 MG/DL (ref 7–20)
BUN BLDV-MCNC: 43 MG/DL (ref 7–20)
BUN BLDV-MCNC: 43 MG/DL (ref 7–20)
BUN BLDV-MCNC: 46 MG/DL (ref 7–20)
BUN BLDV-MCNC: 48 MG/DL (ref 7–20)
BUN BLDV-MCNC: 52 MG/DL (ref 7–20)
CALCIUM SERPL-MCNC: 8.1 MG/DL (ref 8.3–10.6)
CALCIUM SERPL-MCNC: 8.5 MG/DL (ref 8.3–10.6)
CALCIUM SERPL-MCNC: 8.6 MG/DL (ref 8.3–10.6)
CALCIUM SERPL-MCNC: 8.7 MG/DL (ref 8.3–10.6)
CALCIUM SERPL-MCNC: 8.8 MG/DL (ref 8.3–10.6)
CHLORIDE BLD-SCNC: 100 MMOL/L (ref 99–110)
CHLORIDE BLD-SCNC: 105 MMOL/L (ref 99–110)
CHLORIDE BLD-SCNC: 109 MMOL/L (ref 99–110)
CHLORIDE BLD-SCNC: 110 MMOL/L (ref 99–110)
CHLORIDE BLD-SCNC: 111 MMOL/L (ref 99–110)
CHLORIDE BLD-SCNC: 111 MMOL/L (ref 99–110)
CHLORIDE BLD-SCNC: 113 MMOL/L (ref 99–110)
CLARITY: ABNORMAL
CO2: 17 MMOL/L (ref 21–32)
CO2: 17 MMOL/L (ref 21–32)
CO2: 19 MMOL/L (ref 21–32)
CO2: 19 MMOL/L (ref 21–32)
CO2: 21 MMOL/L (ref 21–32)
CO2: 22 MMOL/L (ref 21–32)
CO2: 24 MMOL/L (ref 21–32)
COLOR: YELLOW
CREAT SERPL-MCNC: 1.6 MG/DL (ref 0.8–1.3)
CREAT SERPL-MCNC: 1.7 MG/DL (ref 0.8–1.3)
CREAT SERPL-MCNC: 1.8 MG/DL (ref 0.8–1.3)
CREAT SERPL-MCNC: 1.8 MG/DL (ref 0.8–1.3)
CREAT SERPL-MCNC: 1.9 MG/DL (ref 0.8–1.3)
CREAT SERPL-MCNC: 2 MG/DL (ref 0.8–1.3)
CREAT SERPL-MCNC: 2.1 MG/DL (ref 0.8–1.3)
CULTURE, BLOOD 2: ABNORMAL
EKG ATRIAL RATE: 42 BPM
EKG DIAGNOSIS: NORMAL
EKG P-R INTERVAL: 200 MS
EKG Q-T INTERVAL: 424 MS
EKG QRS DURATION: 122 MS
EKG QTC CALCULATION (BAZETT): 510 MS
EKG R AXIS: 84 DEGREES
EKG T AXIS: 268 DEGREES
EKG VENTRICULAR RATE: 87 BPM
EOSINOPHILS ABSOLUTE: 0 K/UL (ref 0–0.6)
EOSINOPHILS ABSOLUTE: 0.1 K/UL (ref 0–0.6)
EOSINOPHILS RELATIVE PERCENT: 0.1 %
EOSINOPHILS RELATIVE PERCENT: 1 %
EPITHELIAL CELLS, UA: ABNORMAL /HPF (ref 0–5)
GFR AFRICAN AMERICAN: 36
GFR AFRICAN AMERICAN: 38
GFR AFRICAN AMERICAN: 40
GFR AFRICAN AMERICAN: 43
GFR AFRICAN AMERICAN: 43
GFR AFRICAN AMERICAN: 46
GFR AFRICAN AMERICAN: 49
GFR NON-AFRICAN AMERICAN: 30
GFR NON-AFRICAN AMERICAN: 31
GFR NON-AFRICAN AMERICAN: 33
GFR NON-AFRICAN AMERICAN: 35
GFR NON-AFRICAN AMERICAN: 35
GFR NON-AFRICAN AMERICAN: 38
GFR NON-AFRICAN AMERICAN: 41
GLOBULIN: 3.1 G/DL
GLUCOSE BLD-MCNC: 108 MG/DL (ref 70–99)
GLUCOSE BLD-MCNC: 110 MG/DL (ref 70–99)
GLUCOSE BLD-MCNC: 111 MG/DL (ref 70–99)
GLUCOSE BLD-MCNC: 115 MG/DL (ref 70–99)
GLUCOSE BLD-MCNC: 83 MG/DL (ref 70–99)
GLUCOSE BLD-MCNC: 85 MG/DL (ref 70–99)
GLUCOSE BLD-MCNC: 92 MG/DL (ref 70–99)
GLUCOSE BLD-MCNC: 94 MG/DL (ref 70–99)
GLUCOSE BLD-MCNC: 99 MG/DL (ref 70–99)
GLUCOSE URINE: NEGATIVE MG/DL
HCT VFR BLD CALC: 36.4 % (ref 40.5–52.5)
HCT VFR BLD CALC: 36.4 % (ref 40.5–52.5)
HEMOGLOBIN: 12.1 G/DL (ref 13.5–17.5)
HEMOGLOBIN: 12.1 G/DL (ref 13.5–17.5)
KETONES, URINE: NEGATIVE MG/DL
LACTIC ACID, SEPSIS: 1.8 MMOL/L (ref 0.4–1.9)
LACTIC ACID, SEPSIS: 3 MMOL/L (ref 0.4–1.9)
LEUKOCYTE ESTERASE, URINE: ABNORMAL
LYMPHOCYTES ABSOLUTE: 0.4 K/UL (ref 1–5.1)
LYMPHOCYTES ABSOLUTE: 0.6 K/UL (ref 1–5.1)
LYMPHOCYTES RELATIVE PERCENT: 3.2 %
LYMPHOCYTES RELATIVE PERCENT: 5.3 %
MCH RBC QN AUTO: 32.5 PG (ref 26–34)
MCH RBC QN AUTO: 32.8 PG (ref 26–34)
MCHC RBC AUTO-ENTMCNC: 33.2 G/DL (ref 31–36)
MCHC RBC AUTO-ENTMCNC: 33.2 G/DL (ref 31–36)
MCV RBC AUTO: 97.7 FL (ref 80–100)
MCV RBC AUTO: 98.9 FL (ref 80–100)
MICROSCOPIC EXAMINATION: YES
MONOCYTES ABSOLUTE: 0.9 K/UL (ref 0–1.3)
MONOCYTES ABSOLUTE: 1 K/UL (ref 0–1.3)
MONOCYTES RELATIVE PERCENT: 7.3 %
MONOCYTES RELATIVE PERCENT: 8.9 %
MUCUS: ABNORMAL /LPF
NEUTROPHILS ABSOLUTE: 10.7 K/UL (ref 1.7–7.7)
NEUTROPHILS ABSOLUTE: 9 K/UL (ref 1.7–7.7)
NEUTROPHILS RELATIVE PERCENT: 84.2 %
NEUTROPHILS RELATIVE PERCENT: 89.1 %
NITRITE, URINE: NEGATIVE
ORGANISM: ABNORMAL
PDW BLD-RTO: 13.5 % (ref 12.4–15.4)
PDW BLD-RTO: 13.9 % (ref 12.4–15.4)
PERFORMED ON: ABNORMAL
PERFORMED ON: ABNORMAL
PH UA: 5.5 (ref 5–8)
PLATELET # BLD: 101 K/UL (ref 135–450)
PLATELET # BLD: 82 K/UL (ref 135–450)
PMV BLD AUTO: 10.6 FL (ref 5–10.5)
PMV BLD AUTO: 10.6 FL (ref 5–10.5)
POTASSIUM REFLEX MAGNESIUM: 3.7 MMOL/L (ref 3.5–5.1)
POTASSIUM REFLEX MAGNESIUM: 4.1 MMOL/L (ref 3.5–5.1)
POTASSIUM SERPL-SCNC: 3.6 MMOL/L (ref 3.5–5.1)
POTASSIUM SERPL-SCNC: 3.8 MMOL/L (ref 3.5–5.1)
POTASSIUM SERPL-SCNC: 3.9 MMOL/L (ref 3.5–5.1)
POTASSIUM SERPL-SCNC: 3.9 MMOL/L (ref 3.5–5.1)
POTASSIUM SERPL-SCNC: 4 MMOL/L (ref 3.5–5.1)
PROCALCITONIN: 0.68 NG/ML (ref 0–0.15)
PROTEIN UA: 100 MG/DL
RBC # BLD: 3.68 M/UL (ref 4.2–5.9)
RBC # BLD: 3.73 M/UL (ref 4.2–5.9)
RBC UA: ABNORMAL /HPF (ref 0–4)
REPORT: NORMAL
SARS-COV-2, NAAT: NOT DETECTED
SARS-COV-2, PCR: NOT DETECTED
SODIUM BLD-SCNC: 134 MMOL/L (ref 136–145)
SODIUM BLD-SCNC: 139 MMOL/L (ref 136–145)
SODIUM BLD-SCNC: 142 MMOL/L (ref 136–145)
SODIUM BLD-SCNC: 142 MMOL/L (ref 136–145)
SODIUM BLD-SCNC: 144 MMOL/L (ref 136–145)
SODIUM BLD-SCNC: 146 MMOL/L (ref 136–145)
SODIUM BLD-SCNC: 148 MMOL/L (ref 136–145)
SPECIFIC GRAVITY UA: 1.02 (ref 1–1.03)
TOTAL PROTEIN: 6.1 G/DL (ref 6.4–8.2)
URINE REFLEX TO CULTURE: ABNORMAL
URINE TYPE: ABNORMAL
UROBILINOGEN, URINE: 0.2 E.U./DL
WBC # BLD: 10.7 K/UL (ref 4–11)
WBC # BLD: 12 K/UL (ref 4–11)
WBC UA: ABNORMAL /HPF (ref 0–5)

## 2020-01-01 PROCEDURE — 6360000002 HC RX W HCPCS: Performed by: INTERNAL MEDICINE

## 2020-01-01 PROCEDURE — 6370000000 HC RX 637 (ALT 250 FOR IP): Performed by: INTERNAL MEDICINE

## 2020-01-01 PROCEDURE — 1200000000 HC SEMI PRIVATE

## 2020-01-01 PROCEDURE — 85025 COMPLETE CBC W/AUTO DIFF WBC: CPT

## 2020-01-01 PROCEDURE — 0T9D3ZZ DRAINAGE OF URETHRA, PERCUTANEOUS APPROACH: ICD-10-PCS | Performed by: UROLOGY

## 2020-01-01 PROCEDURE — 81001 URINALYSIS AUTO W/SCOPE: CPT

## 2020-01-01 PROCEDURE — 2580000003 HC RX 258: Performed by: INTERNAL MEDICINE

## 2020-01-01 PROCEDURE — 80048 BASIC METABOLIC PNL TOTAL CA: CPT

## 2020-01-01 PROCEDURE — 80053 COMPREHEN METABOLIC PANEL: CPT

## 2020-01-01 PROCEDURE — 2580000003 HC RX 258: Performed by: EMERGENCY MEDICINE

## 2020-01-01 PROCEDURE — 83605 ASSAY OF LACTIC ACID: CPT

## 2020-01-01 PROCEDURE — 87186 SC STD MICRODIL/AGAR DIL: CPT

## 2020-01-01 PROCEDURE — 2500000003 HC RX 250 WO HCPCS: Performed by: UROLOGY

## 2020-01-01 PROCEDURE — 36415 COLL VENOUS BLD VENIPUNCTURE: CPT

## 2020-01-01 PROCEDURE — 97162 PT EVAL MOD COMPLEX 30 MIN: CPT

## 2020-01-01 PROCEDURE — U0003 INFECTIOUS AGENT DETECTION BY NUCLEIC ACID (DNA OR RNA); SEVERE ACUTE RESPIRATORY SYNDROME CORONAVIRUS 2 (SARS-COV-2) (CORONAVIRUS DISEASE [COVID-19]), AMPLIFIED PROBE TECHNIQUE, MAKING USE OF HIGH THROUGHPUT TECHNOLOGIES AS DESCRIBED BY CMS-2020-01-R: HCPCS

## 2020-01-01 PROCEDURE — 99233 SBSQ HOSP IP/OBS HIGH 50: CPT | Performed by: INTERNAL MEDICINE

## 2020-01-01 PROCEDURE — 73521 X-RAY EXAM HIPS BI 2 VIEWS: CPT

## 2020-01-01 PROCEDURE — 99232 SBSQ HOSP IP/OBS MODERATE 35: CPT | Performed by: INTERNAL MEDICINE

## 2020-01-01 PROCEDURE — 87040 BLOOD CULTURE FOR BACTERIA: CPT

## 2020-01-01 PROCEDURE — U0002 COVID-19 LAB TEST NON-CDC: HCPCS

## 2020-01-01 PROCEDURE — 99222 1ST HOSP IP/OBS MODERATE 55: CPT | Performed by: INTERNAL MEDICINE

## 2020-01-01 PROCEDURE — 99239 HOSP IP/OBS DSCHRG MGMT >30: CPT | Performed by: INTERNAL MEDICINE

## 2020-01-01 PROCEDURE — 93010 ELECTROCARDIOGRAM REPORT: CPT | Performed by: INTERNAL MEDICINE

## 2020-01-01 PROCEDURE — 97530 THERAPEUTIC ACTIVITIES: CPT

## 2020-01-01 PROCEDURE — 6370000000 HC RX 637 (ALT 250 FOR IP)

## 2020-01-01 PROCEDURE — 93005 ELECTROCARDIOGRAM TRACING: CPT | Performed by: EMERGENCY MEDICINE

## 2020-01-01 PROCEDURE — 97535 SELF CARE MNGMENT TRAINING: CPT

## 2020-01-01 PROCEDURE — 6360000002 HC RX W HCPCS: Performed by: EMERGENCY MEDICINE

## 2020-01-01 PROCEDURE — 84145 PROCALCITONIN (PCT): CPT

## 2020-01-01 PROCEDURE — 97166 OT EVAL MOD COMPLEX 45 MIN: CPT

## 2020-01-01 PROCEDURE — 71045 X-RAY EXAM CHEST 1 VIEW: CPT

## 2020-01-01 PROCEDURE — 87150 DNA/RNA AMPLIFIED PROBE: CPT

## 2020-01-01 PROCEDURE — 72100 X-RAY EXAM L-S SPINE 2/3 VWS: CPT

## 2020-01-01 PROCEDURE — 99285 EMERGENCY DEPT VISIT HI MDM: CPT

## 2020-01-01 PROCEDURE — 76770 US EXAM ABDO BACK WALL COMP: CPT

## 2020-01-01 PROCEDURE — 87077 CULTURE AEROBIC IDENTIFY: CPT

## 2020-01-01 PROCEDURE — 96375 TX/PRO/DX INJ NEW DRUG ADDON: CPT

## 2020-01-01 PROCEDURE — 96365 THER/PROPH/DIAG IV INF INIT: CPT

## 2020-01-01 PROCEDURE — 6370000000 HC RX 637 (ALT 250 FOR IP): Performed by: EMERGENCY MEDICINE

## 2020-01-01 PROCEDURE — 51798 US URINE CAPACITY MEASURE: CPT

## 2020-01-01 RX ORDER — TIZANIDINE 4 MG/1
4 TABLET ORAL EVERY 6 HOURS PRN
Status: DISCONTINUED | OUTPATIENT
Start: 2020-01-01 | End: 2020-01-01 | Stop reason: HOSPADM

## 2020-01-01 RX ORDER — TAMSULOSIN HYDROCHLORIDE 0.4 MG/1
0.4 CAPSULE ORAL NIGHTLY
Status: DISCONTINUED | OUTPATIENT
Start: 2020-01-01 | End: 2020-01-01 | Stop reason: HOSPADM

## 2020-01-01 RX ORDER — HYDROCODONE BITARTRATE AND ACETAMINOPHEN 5; 325 MG/1; MG/1
1 TABLET ORAL EVERY 4 HOURS PRN
Status: DISCONTINUED | OUTPATIENT
Start: 2020-01-01 | End: 2020-01-01

## 2020-01-01 RX ORDER — POLYETHYLENE GLYCOL 3350 17 G/17G
17 POWDER, FOR SOLUTION ORAL DAILY PRN
Status: DISCONTINUED | OUTPATIENT
Start: 2020-01-01 | End: 2020-01-01 | Stop reason: HOSPADM

## 2020-01-01 RX ORDER — CALCIUM CARBONATE 500(1250)
500 TABLET ORAL DAILY
Status: DISCONTINUED | OUTPATIENT
Start: 2020-01-01 | End: 2020-01-01 | Stop reason: HOSPADM

## 2020-01-01 RX ORDER — METOPROLOL TARTRATE 50 MG/1
50 TABLET, FILM COATED ORAL NIGHTLY
COMMUNITY

## 2020-01-01 RX ORDER — CETIRIZINE HYDROCHLORIDE 10 MG/1
5 TABLET ORAL DAILY
Status: DISCONTINUED | OUTPATIENT
Start: 2020-01-01 | End: 2020-01-01 | Stop reason: HOSPADM

## 2020-01-01 RX ORDER — SODIUM CHLORIDE 9 MG/ML
INJECTION, SOLUTION INTRAVENOUS CONTINUOUS
Status: DISCONTINUED | OUTPATIENT
Start: 2020-01-01 | End: 2020-01-01

## 2020-01-01 RX ORDER — OXYCODONE HYDROCHLORIDE 5 MG/1
5 TABLET ORAL EVERY 4 HOURS PRN
Status: DISCONTINUED | OUTPATIENT
Start: 2020-01-01 | End: 2020-01-01 | Stop reason: HOSPADM

## 2020-01-01 RX ORDER — GUAIFENESIN 100 MG/5ML
200 SOLUTION ORAL EVERY 6 HOURS PRN
Status: DISCONTINUED | OUTPATIENT
Start: 2020-01-01 | End: 2020-01-01 | Stop reason: HOSPADM

## 2020-01-01 RX ORDER — OXYCODONE HYDROCHLORIDE 5 MG/1
5 TABLET ORAL ONCE
Status: COMPLETED | OUTPATIENT
Start: 2020-01-01 | End: 2020-01-01

## 2020-01-01 RX ORDER — TRAMADOL HYDROCHLORIDE 50 MG/1
50 TABLET ORAL EVERY 6 HOURS PRN
Status: DISCONTINUED | OUTPATIENT
Start: 2020-01-01 | End: 2020-01-01

## 2020-01-01 RX ORDER — ASPIRIN 81 MG/1
81 TABLET, CHEWABLE ORAL DAILY
Status: DISCONTINUED | OUTPATIENT
Start: 2020-01-01 | End: 2020-01-01 | Stop reason: HOSPADM

## 2020-01-01 RX ORDER — OXYCODONE HCL 10 MG/1
10 TABLET, FILM COATED, EXTENDED RELEASE ORAL EVERY 12 HOURS SCHEDULED
Status: DISCONTINUED | OUTPATIENT
Start: 2020-01-01 | End: 2020-01-01 | Stop reason: HOSPADM

## 2020-01-01 RX ORDER — SODIUM CHLORIDE 0.9 % (FLUSH) 0.9 %
10 SYRINGE (ML) INJECTION EVERY 12 HOURS SCHEDULED
Status: DISCONTINUED | OUTPATIENT
Start: 2020-01-01 | End: 2020-01-01 | Stop reason: HOSPADM

## 2020-01-01 RX ORDER — HYDROCODONE BITARTRATE AND ACETAMINOPHEN 5; 325 MG/1; MG/1
1 TABLET ORAL EVERY 6 HOURS PRN
Qty: 12 TABLET | Refills: 0 | Status: SHIPPED | OUTPATIENT
Start: 2020-01-01 | End: 2020-01-01

## 2020-01-01 RX ORDER — ATORVASTATIN CALCIUM 10 MG/1
10 TABLET, FILM COATED ORAL DAILY
Status: DISCONTINUED | OUTPATIENT
Start: 2020-01-01 | End: 2020-01-01 | Stop reason: HOSPADM

## 2020-01-01 RX ORDER — ONDANSETRON 2 MG/ML
4 INJECTION INTRAMUSCULAR; INTRAVENOUS EVERY 6 HOURS PRN
Status: DISCONTINUED | OUTPATIENT
Start: 2020-01-01 | End: 2020-01-01 | Stop reason: HOSPADM

## 2020-01-01 RX ORDER — RANOLAZINE 500 MG/1
1000 TABLET, EXTENDED RELEASE ORAL 2 TIMES DAILY
Status: DISCONTINUED | OUTPATIENT
Start: 2020-01-01 | End: 2020-01-01 | Stop reason: HOSPADM

## 2020-01-01 RX ORDER — ACETAMINOPHEN 500 MG
1000 TABLET ORAL EVERY 6 HOURS PRN
Status: DISCONTINUED | OUTPATIENT
Start: 2020-01-01 | End: 2020-01-01 | Stop reason: HOSPADM

## 2020-01-01 RX ORDER — OXYCODONE HCL 10 MG/1
10 TABLET, FILM COATED, EXTENDED RELEASE ORAL EVERY 12 HOURS SCHEDULED
Qty: 14 TABLET | Refills: 0 | Status: SHIPPED | OUTPATIENT
Start: 2020-01-01 | End: 2020-07-27

## 2020-01-01 RX ORDER — PROMETHAZINE HYDROCHLORIDE 25 MG/1
12.5 TABLET ORAL EVERY 6 HOURS PRN
Status: DISCONTINUED | OUTPATIENT
Start: 2020-01-01 | End: 2020-01-01 | Stop reason: HOSPADM

## 2020-01-01 RX ORDER — TRAMADOL HYDROCHLORIDE 50 MG/1
TABLET ORAL
Status: COMPLETED
Start: 2020-01-01 | End: 2020-01-01

## 2020-01-01 RX ORDER — NICOTINE POLACRILEX 4 MG/1
20 GUM, CHEWING ORAL DAILY
COMMUNITY

## 2020-01-01 RX ORDER — CEFDINIR 300 MG/1
300 CAPSULE ORAL DAILY
Qty: 7 CAPSULE | Refills: 0
Start: 2020-01-01 | End: 2020-07-23

## 2020-01-01 RX ORDER — MIRTAZAPINE 15 MG/1
7.5 TABLET, FILM COATED ORAL NIGHTLY
Status: DISCONTINUED | OUTPATIENT
Start: 2020-01-01 | End: 2020-01-01 | Stop reason: HOSPADM

## 2020-01-01 RX ORDER — HYDROCODONE BITARTRATE AND ACETAMINOPHEN 5; 325 MG/1; MG/1
1 TABLET ORAL EVERY 6 HOURS PRN
Status: DISCONTINUED | OUTPATIENT
Start: 2020-01-01 | End: 2020-01-01

## 2020-01-01 RX ORDER — SODIUM CHLORIDE 0.9 % (FLUSH) 0.9 %
10 SYRINGE (ML) INJECTION PRN
Status: DISCONTINUED | OUTPATIENT
Start: 2020-01-01 | End: 2020-01-01 | Stop reason: HOSPADM

## 2020-01-01 RX ORDER — TAMSULOSIN HYDROCHLORIDE 0.4 MG/1
0.4 CAPSULE ORAL NIGHTLY
COMMUNITY

## 2020-01-01 RX ORDER — HEPARIN SODIUM 5000 [USP'U]/ML
5000 INJECTION, SOLUTION INTRAVENOUS; SUBCUTANEOUS EVERY 8 HOURS SCHEDULED
Status: DISCONTINUED | OUTPATIENT
Start: 2020-01-01 | End: 2020-01-01

## 2020-01-01 RX ORDER — CLOPIDOGREL BISULFATE 75 MG/1
75 TABLET ORAL DAILY
Status: DISCONTINUED | OUTPATIENT
Start: 2020-01-01 | End: 2020-01-01 | Stop reason: HOSPADM

## 2020-01-01 RX ORDER — 0.9 % SODIUM CHLORIDE 0.9 %
30 INTRAVENOUS SOLUTION INTRAVENOUS ONCE
Status: COMPLETED | OUTPATIENT
Start: 2020-01-01 | End: 2020-01-01

## 2020-01-01 RX ORDER — ACETAMINOPHEN 500 MG
1000 TABLET ORAL ONCE
Status: COMPLETED | OUTPATIENT
Start: 2020-01-01 | End: 2020-01-01

## 2020-01-01 RX ORDER — BENZONATATE 100 MG/1
100 CAPSULE ORAL 3 TIMES DAILY PRN
Status: DISCONTINUED | OUTPATIENT
Start: 2020-01-01 | End: 2020-01-01 | Stop reason: HOSPADM

## 2020-01-01 RX ORDER — HYDROCODONE BITARTRATE AND ACETAMINOPHEN 5; 325 MG/1; MG/1
1 TABLET ORAL ONCE
Status: COMPLETED | OUTPATIENT
Start: 2020-01-01 | End: 2020-01-01

## 2020-01-01 RX ORDER — OXYCODONE HYDROCHLORIDE AND ACETAMINOPHEN 5; 325 MG/1; MG/1
1 TABLET ORAL ONCE
Status: DISCONTINUED | OUTPATIENT
Start: 2020-01-01 | End: 2020-01-01

## 2020-01-01 RX ADMIN — Medication 500 MG: at 08:18

## 2020-01-01 RX ADMIN — SODIUM CHLORIDE: 9 INJECTION, SOLUTION INTRAVENOUS at 10:34

## 2020-01-01 RX ADMIN — ATORVASTATIN CALCIUM 10 MG: 10 TABLET, FILM COATED ORAL at 07:57

## 2020-01-01 RX ADMIN — HEPARIN SODIUM 5000 UNITS: 5000 INJECTION, SOLUTION INTRAVENOUS; SUBCUTANEOUS at 06:03

## 2020-01-01 RX ADMIN — ATORVASTATIN CALCIUM 10 MG: 10 TABLET, FILM COATED ORAL at 08:37

## 2020-01-01 RX ADMIN — MIRTAZAPINE 7.5 MG: 15 TABLET, FILM COATED ORAL at 20:09

## 2020-01-01 RX ADMIN — RANOLAZINE 1000 MG: 500 TABLET, FILM COATED, EXTENDED RELEASE ORAL at 08:19

## 2020-01-01 RX ADMIN — RANOLAZINE 1000 MG: 500 TABLET, FILM COATED, EXTENDED RELEASE ORAL at 07:54

## 2020-01-01 RX ADMIN — CLOPIDOGREL BISULFATE 75 MG: 75 TABLET ORAL at 08:19

## 2020-01-01 RX ADMIN — TRAMADOL HYDROCHLORIDE 50 MG: 50 TABLET, FILM COATED ORAL at 02:41

## 2020-01-01 RX ADMIN — Medication 500 MG: at 07:58

## 2020-01-01 RX ADMIN — ATORVASTATIN CALCIUM 10 MG: 10 TABLET, FILM COATED ORAL at 08:03

## 2020-01-01 RX ADMIN — TIZANIDINE 4 MG: 4 TABLET ORAL at 10:34

## 2020-01-01 RX ADMIN — Medication 500 MG: at 08:37

## 2020-01-01 RX ADMIN — HYDROCODONE BITARTRATE AND ACETAMINOPHEN 1 TABLET: 5; 325 TABLET ORAL at 19:12

## 2020-01-01 RX ADMIN — Medication 10 ML: at 20:28

## 2020-01-01 RX ADMIN — HYDROCODONE BITARTRATE AND ACETAMINOPHEN 1 TABLET: 5; 325 TABLET ORAL at 18:29

## 2020-01-01 RX ADMIN — ASPIRIN 81 MG 81 MG: 81 TABLET ORAL at 07:57

## 2020-01-01 RX ADMIN — OXYCODONE HYDROCHLORIDE 5 MG: 5 TABLET ORAL at 15:54

## 2020-01-01 RX ADMIN — METOPROLOL TARTRATE 25 MG: 25 TABLET, FILM COATED ORAL at 08:20

## 2020-01-01 RX ADMIN — HYDROCODONE BITARTRATE AND ACETAMINOPHEN 1 TABLET: 5; 325 TABLET ORAL at 14:57

## 2020-01-01 RX ADMIN — Medication 10 ML: at 08:05

## 2020-01-01 RX ADMIN — CEFTRIAXONE SODIUM 1 G: 1 INJECTION, POWDER, FOR SOLUTION INTRAMUSCULAR; INTRAVENOUS at 17:55

## 2020-01-01 RX ADMIN — Medication 500 MG: at 08:04

## 2020-01-01 RX ADMIN — METOPROLOL TARTRATE 25 MG: 25 TABLET, FILM COATED ORAL at 08:33

## 2020-01-01 RX ADMIN — CETIRIZINE HYDROCHLORIDE 5 MG: 10 TABLET ORAL at 08:18

## 2020-01-01 RX ADMIN — OXYCODONE HYDROCHLORIDE 5 MG: 5 TABLET ORAL at 21:08

## 2020-01-01 RX ADMIN — ASPIRIN 81 MG 81 MG: 81 TABLET ORAL at 08:30

## 2020-01-01 RX ADMIN — Medication 10 ML: at 00:02

## 2020-01-01 RX ADMIN — ATORVASTATIN CALCIUM 10 MG: 10 TABLET, FILM COATED ORAL at 08:19

## 2020-01-01 RX ADMIN — ASPIRIN 81 MG 81 MG: 81 TABLET ORAL at 08:20

## 2020-01-01 RX ADMIN — CEFTRIAXONE SODIUM 1 G: 1 INJECTION, POWDER, FOR SOLUTION INTRAMUSCULAR; INTRAVENOUS at 14:57

## 2020-01-01 RX ADMIN — OXYCODONE HYDROCHLORIDE 10 MG: 10 TABLET, FILM COATED, EXTENDED RELEASE ORAL at 20:22

## 2020-01-01 RX ADMIN — Medication 10 ML: at 08:20

## 2020-01-01 RX ADMIN — MIRTAZAPINE 7.5 MG: 15 TABLET, FILM COATED ORAL at 21:34

## 2020-01-01 RX ADMIN — OXYCODONE HYDROCHLORIDE 10 MG: 10 TABLET, FILM COATED, EXTENDED RELEASE ORAL at 08:04

## 2020-01-01 RX ADMIN — HYDROMORPHONE HYDROCHLORIDE 0.5 MG: 1 INJECTION, SOLUTION INTRAMUSCULAR; INTRAVENOUS; SUBCUTANEOUS at 15:23

## 2020-01-01 RX ADMIN — METOPROLOL TARTRATE 25 MG: 25 TABLET, FILM COATED ORAL at 08:04

## 2020-01-01 RX ADMIN — WATER 1 G: 1 INJECTION INTRAMUSCULAR; INTRAVENOUS; SUBCUTANEOUS at 18:53

## 2020-01-01 RX ADMIN — Medication 10 ML: at 21:00

## 2020-01-01 RX ADMIN — TRAMADOL HYDROCHLORIDE 50 MG: 50 TABLET, FILM COATED ORAL at 21:34

## 2020-01-01 RX ADMIN — RANOLAZINE 1000 MG: 500 TABLET, FILM COATED, EXTENDED RELEASE ORAL at 20:09

## 2020-01-01 RX ADMIN — METOPROLOL TARTRATE 25 MG: 25 TABLET, FILM COATED ORAL at 08:24

## 2020-01-01 RX ADMIN — TIZANIDINE 4 MG: 4 TABLET ORAL at 04:35

## 2020-01-01 RX ADMIN — RANOLAZINE 1000 MG: 500 TABLET, FILM COATED, EXTENDED RELEASE ORAL at 08:03

## 2020-01-01 RX ADMIN — TIZANIDINE 4 MG: 4 TABLET ORAL at 15:54

## 2020-01-01 RX ADMIN — ASPIRIN 81 MG 81 MG: 81 TABLET ORAL at 08:04

## 2020-01-01 RX ADMIN — METOPROLOL TARTRATE 25 MG: 25 TABLET, FILM COATED ORAL at 20:09

## 2020-01-01 RX ADMIN — HYDROCODONE BITARTRATE AND ACETAMINOPHEN 1 TABLET: 5; 325 TABLET ORAL at 08:33

## 2020-01-01 RX ADMIN — SODIUM CHLORIDE: 9 INJECTION, SOLUTION INTRAVENOUS at 00:00

## 2020-01-01 RX ADMIN — ATORVASTATIN CALCIUM 10 MG: 10 TABLET, FILM COATED ORAL at 10:27

## 2020-01-01 RX ADMIN — PROMETHAZINE HYDROCHLORIDE 12.5 MG: 25 TABLET ORAL at 22:11

## 2020-01-01 RX ADMIN — HYDROCODONE BITARTRATE AND ACETAMINOPHEN 1 TABLET: 5; 325 TABLET ORAL at 13:31

## 2020-01-01 RX ADMIN — SODIUM CHLORIDE: 9 INJECTION, SOLUTION INTRAVENOUS at 19:13

## 2020-01-01 RX ADMIN — TRAMADOL HYDROCHLORIDE 50 MG: 50 TABLET, FILM COATED ORAL at 02:50

## 2020-01-01 RX ADMIN — RANOLAZINE 1000 MG: 500 TABLET, FILM COATED, EXTENDED RELEASE ORAL at 21:35

## 2020-01-01 RX ADMIN — METOPROLOL TARTRATE 25 MG: 25 TABLET, FILM COATED ORAL at 00:00

## 2020-01-01 RX ADMIN — CLOPIDOGREL BISULFATE 75 MG: 75 TABLET ORAL at 08:37

## 2020-01-01 RX ADMIN — ACETAMINOPHEN 1000 MG: 500 TABLET ORAL at 18:23

## 2020-01-01 RX ADMIN — HYDROCODONE BITARTRATE AND ACETAMINOPHEN 1 TABLET: 5; 325 TABLET ORAL at 07:57

## 2020-01-01 RX ADMIN — ATORVASTATIN CALCIUM 10 MG: 10 TABLET, FILM COATED ORAL at 08:30

## 2020-01-01 RX ADMIN — Medication 500 MG: at 08:30

## 2020-01-01 RX ADMIN — CETIRIZINE HYDROCHLORIDE 5 MG: 10 TABLET ORAL at 07:58

## 2020-01-01 RX ADMIN — TIZANIDINE 4 MG: 4 TABLET ORAL at 16:36

## 2020-01-01 RX ADMIN — CEFTRIAXONE SODIUM 1 G: 1 INJECTION, POWDER, FOR SOLUTION INTRAMUSCULAR; INTRAVENOUS at 16:31

## 2020-01-01 RX ADMIN — Medication 10 ML: at 21:54

## 2020-01-01 RX ADMIN — CEFTRIAXONE SODIUM 1 G: 1 INJECTION, POWDER, FOR SOLUTION INTRAMUSCULAR; INTRAVENOUS at 16:36

## 2020-01-01 RX ADMIN — HYDROCODONE BITARTRATE AND ACETAMINOPHEN 1 TABLET: 5; 325 TABLET ORAL at 08:24

## 2020-01-01 RX ADMIN — TAMSULOSIN HYDROCHLORIDE 0.4 MG: 0.4 CAPSULE ORAL at 20:59

## 2020-01-01 RX ADMIN — TAMSULOSIN HYDROCHLORIDE 0.4 MG: 0.4 CAPSULE ORAL at 20:22

## 2020-01-01 RX ADMIN — OXYCODONE HYDROCHLORIDE 5 MG: 5 TABLET ORAL at 12:43

## 2020-01-01 RX ADMIN — Medication 10 ML: at 21:35

## 2020-01-01 RX ADMIN — MIRTAZAPINE 7.5 MG: 15 TABLET, FILM COATED ORAL at 20:22

## 2020-01-01 RX ADMIN — CLOPIDOGREL BISULFATE 75 MG: 75 TABLET ORAL at 08:04

## 2020-01-01 RX ADMIN — CLOPIDOGREL BISULFATE 75 MG: 75 TABLET ORAL at 07:57

## 2020-01-01 RX ADMIN — TAMSULOSIN HYDROCHLORIDE 0.4 MG: 0.4 CAPSULE ORAL at 20:09

## 2020-01-01 RX ADMIN — METOPROLOL TARTRATE 25 MG: 25 TABLET, FILM COATED ORAL at 21:34

## 2020-01-01 RX ADMIN — Medication 10 ML: at 07:58

## 2020-01-01 RX ADMIN — HYDROCODONE BITARTRATE AND ACETAMINOPHEN 1 TABLET: 5; 325 TABLET ORAL at 02:05

## 2020-01-01 RX ADMIN — METOPROLOL TARTRATE 25 MG: 25 TABLET, FILM COATED ORAL at 10:44

## 2020-01-01 RX ADMIN — CETIRIZINE HYDROCHLORIDE 5 MG: 10 TABLET ORAL at 08:30

## 2020-01-01 RX ADMIN — CLOPIDOGREL BISULFATE 75 MG: 75 TABLET ORAL at 10:44

## 2020-01-01 RX ADMIN — Medication 10 ML: at 08:44

## 2020-01-01 RX ADMIN — CETIRIZINE HYDROCHLORIDE 5 MG: 10 TABLET ORAL at 10:44

## 2020-01-01 RX ADMIN — Medication 500 MG: at 10:27

## 2020-01-01 RX ADMIN — METOPROLOL TARTRATE 25 MG: 25 TABLET, FILM COATED ORAL at 07:57

## 2020-01-01 RX ADMIN — CETIRIZINE HYDROCHLORIDE 5 MG: 10 TABLET ORAL at 08:37

## 2020-01-01 RX ADMIN — SODIUM CHLORIDE: 9 INJECTION, SOLUTION INTRAVENOUS at 12:32

## 2020-01-01 RX ADMIN — TRAMADOL HYDROCHLORIDE 50 MG: 50 TABLET, FILM COATED ORAL at 10:27

## 2020-01-01 RX ADMIN — HYDROCODONE BITARTRATE AND ACETAMINOPHEN 1 TABLET: 5; 325 TABLET ORAL at 03:28

## 2020-01-01 RX ADMIN — ASPIRIN 81 MG 81 MG: 81 TABLET ORAL at 10:27

## 2020-01-01 RX ADMIN — METOPROLOL TARTRATE 25 MG: 25 TABLET, FILM COATED ORAL at 20:59

## 2020-01-01 RX ADMIN — TIZANIDINE 4 MG: 4 TABLET ORAL at 10:32

## 2020-01-01 RX ADMIN — MIRTAZAPINE 7.5 MG: 15 TABLET, FILM COATED ORAL at 21:00

## 2020-01-01 RX ADMIN — CEFTRIAXONE SODIUM 1 G: 1 INJECTION, POWDER, FOR SOLUTION INTRAMUSCULAR; INTRAVENOUS at 16:32

## 2020-01-01 RX ADMIN — VANCOMYCIN HYDROCHLORIDE 1000 MG: 1 INJECTION, POWDER, LYOPHILIZED, FOR SOLUTION INTRAVENOUS at 19:04

## 2020-01-01 RX ADMIN — CETIRIZINE HYDROCHLORIDE 5 MG: 10 TABLET ORAL at 08:04

## 2020-01-01 RX ADMIN — HYDROCODONE BITARTRATE AND ACETAMINOPHEN 1 TABLET: 5; 325 TABLET ORAL at 16:38

## 2020-01-01 RX ADMIN — ACETAMINOPHEN 1000 MG: 500 TABLET ORAL at 22:11

## 2020-01-01 RX ADMIN — SODIUM CHLORIDE 2313 ML: 9 INJECTION, SOLUTION INTRAVENOUS at 18:54

## 2020-01-01 RX ADMIN — METOPROLOL TARTRATE 25 MG: 25 TABLET, FILM COATED ORAL at 20:23

## 2020-01-01 RX ADMIN — ASPIRIN 81 MG 81 MG: 81 TABLET ORAL at 08:37

## 2020-01-01 RX ADMIN — CLOPIDOGREL BISULFATE 75 MG: 75 TABLET ORAL at 08:30

## 2020-01-01 RX ADMIN — TRAMADOL HYDROCHLORIDE 50 MG: 50 TABLET, COATED ORAL at 02:50

## 2020-01-01 RX ADMIN — TIZANIDINE 4 MG: 4 TABLET ORAL at 16:20

## 2020-01-01 RX ADMIN — ACETAMINOPHEN 1000 MG: 500 TABLET ORAL at 20:59

## 2020-01-01 RX ADMIN — SODIUM CHLORIDE: 9 INJECTION, SOLUTION INTRAVENOUS at 02:42

## 2020-01-01 RX ADMIN — ACETAMINOPHEN 1000 MG: 500 TABLET ORAL at 04:36

## 2020-01-01 RX ADMIN — TAMSULOSIN HYDROCHLORIDE 0.4 MG: 0.4 CAPSULE ORAL at 21:35

## 2020-01-01 RX ADMIN — HEPARIN SODIUM 5000 UNITS: 5000 INJECTION, SOLUTION INTRAVENOUS; SUBCUTANEOUS at 06:04

## 2020-01-01 ASSESSMENT — PAIN SCALES - PAIN ASSESSMENT IN ADVANCED DEMENTIA (PAINAD)
FACIALEXPRESSION: 2
FACIALEXPRESSION: 2
BREATHING: 1
BREATHING: 1
CONSOLABILITY: 2
BODYLANGUAGE: 2
CONSOLABILITY: 2
BODYLANGUAGE: 1
NEGVOCALIZATION: 1
FACIALEXPRESSION: 0
BODYLANGUAGE: 2
TOTALSCORE: 5
TOTALSCORE: 1
NEGVOCALIZATION: 2
NEGVOCALIZATION: 2
BODYLANGUAGE: 0
BREATHING: 2
NEGVOCALIZATION: 2
FACIALEXPRESSION: 2
BREATHING: 2
TOTALSCORE: 8
BODYLANGUAGE: 1
NEGVOCALIZATION: 0
FACIALEXPRESSION: 2
BODYLANGUAGE: 2
FACIALEXPRESSION: 1
TOTALSCORE: 9
TOTALSCORE: 7
FACIALEXPRESSION: 2
TOTALSCORE: 8
FACIALEXPRESSION: 1
BREATHING: 1
NEGVOCALIZATION: 2
NEGVOCALIZATION: 2
FACIALEXPRESSION: 2
CONSOLABILITY: 2
BREATHING: 1
TOTALSCORE: 8
BODYLANGUAGE: 0
CONSOLABILITY: 1
CONSOLABILITY: 1
TOTALSCORE: 8
CONSOLABILITY: 0
CONSOLABILITY: 2
NEGVOCALIZATION: 1
BODYLANGUAGE: 2
NEGVOCALIZATION: 1
CONSOLABILITY: 0
BODYLANGUAGE: 2
TOTALSCORE: 7
BREATHING: 1
CONSOLABILITY: 0
BREATHING: 1
BREATHING: 2

## 2020-01-01 ASSESSMENT — PAIN DESCRIPTION - LOCATION
LOCATION: GENERALIZED
LOCATION: KNEE
LOCATION: GENERALIZED

## 2020-01-01 ASSESSMENT — PAIN DESCRIPTION - FREQUENCY: FREQUENCY: INTERMITTENT

## 2020-01-01 ASSESSMENT — PAIN SCALES - GENERAL
PAINLEVEL_OUTOF10: 6
PAINLEVEL_OUTOF10: 0
PAINLEVEL_OUTOF10: 4
PAINLEVEL_OUTOF10: 7
PAINLEVEL_OUTOF10: 4
PAINLEVEL_OUTOF10: 10
PAINLEVEL_OUTOF10: 2
PAINLEVEL_OUTOF10: 8
PAINLEVEL_OUTOF10: 6
PAINLEVEL_OUTOF10: 8
PAINLEVEL_OUTOF10: 8
PAINLEVEL_OUTOF10: 10
PAINLEVEL_OUTOF10: 0
PAINLEVEL_OUTOF10: 7
PAINLEVEL_OUTOF10: 6
PAINLEVEL_OUTOF10: 6
PAINLEVEL_OUTOF10: 7
PAINLEVEL_OUTOF10: 6
PAINLEVEL_OUTOF10: 6
PAINLEVEL_OUTOF10: 4
PAINLEVEL_OUTOF10: 7
PAINLEVEL_OUTOF10: 9
PAINLEVEL_OUTOF10: 0
PAINLEVEL_OUTOF10: 8
PAINLEVEL_OUTOF10: 8
PAINLEVEL_OUTOF10: 0
PAINLEVEL_OUTOF10: 7
PAINLEVEL_OUTOF10: 4
PAINLEVEL_OUTOF10: 5
PAINLEVEL_OUTOF10: 5
PAINLEVEL_OUTOF10: 7
PAINLEVEL_OUTOF10: 8

## 2020-01-01 ASSESSMENT — PAIN SCALES - WONG BAKER
WONGBAKER_NUMERICALRESPONSE: 6
WONGBAKER_NUMERICALRESPONSE: 8
WONGBAKER_NUMERICALRESPONSE: 6

## 2020-01-01 ASSESSMENT — PAIN DESCRIPTION - PAIN TYPE
TYPE: ACUTE PAIN

## 2020-01-01 ASSESSMENT — PAIN DESCRIPTION - DESCRIPTORS
DESCRIPTORS: ACHING
DESCRIPTORS: ACHING
DESCRIPTORS: SHARP

## 2020-01-01 ASSESSMENT — PAIN DESCRIPTION - ORIENTATION: ORIENTATION: RIGHT

## 2020-07-14 PROBLEM — R50.9 FEVER IN ADULT: Status: ACTIVE | Noted: 2020-01-01

## 2020-07-14 NOTE — CONSULTS
Pharmacy to dose IV Vanco in ED:  Please give 1g IV Vanco x1 now to provide Gram+ organism coverage to include MRSA. BUN 52, SCr 2.1, WBC 12. Est CrCl is 23mL/min.   Xiao Walsh PharmD 7/14/2020 6:47 PM

## 2020-07-14 NOTE — ED PROVIDER NOTES
[facility]ENC[facility]PART[facility]    CHIEF COMPLAINT  Fever (SENT HERE [facility] HOME WITH TEMP 102 RECTALLY, PT WAS JUST SEEN HERE FOR WEAKNESS ANFD HAD [facility])       HISTORY OF PRESENT ILLNESS  Buddy Randall is a 80 y.o. male who presents to the ED with fever and generalized weakness. Call ahead from the patient's PCP who indicates that the patient has a history of vascular dementia, coronary artery disease, CVA, and as below. Known to be DNR CCA. Low-grade temperatures over the past several days and true fever to 102 Fahrenheit yesterday evening. COVID testing pending. No cough, congestion, nausea, vomiting, diarrhea, abdominal pain. Creatinine increasing. 1.5-2.3. Some concern for worsening mentation from baseline. The patient himself denies chest pain, shortness of breath, nausea, vomiting, diarrhea, abdominal pain. No other complaints, modifying factors or associated symptoms.      I have reviewed the following from the nursing documentation:    Past Medical History:   Diagnosis Date    Arthralgia     Cancer [facility])     prostate    Chronic back pain     x40 years    Chronic diastolic (congestive) heart failure ([address]     CVA (cerebral vascular accident) ([address]     Diverticulitis     Dizziness     Fatigue     Fractures     Frequent falls     Hearing decreased     Hyperlipidemia     Hypertension     Osteoarthritis of knee     Osteoarthritis of shoulder     PAD (peripheral artery disease) ([address]     TIA (transient ischemic attack) 2006    Vertigo     Wears glasses      Past Surgical History:   Procedure Laterality Date    CATARACT REMOVAL  1997    bilaterally    COLONOSCOPY      ENDOSCOPY, COLON, DIAGNOSTIC  07/05/2019    Bx and dilitation    ESOPHAGEAL DILATATION      X 2    ESOPHAGEAL DILATATION  7/5/2019    ESOPHAGEAL DILATION LARISA performed by Maverick Everett DO at [facility]      right    JOINT REPLACEMENT left knee    KNEE SURGERY Bilateral     PACEMAKER INSERTION  10/12/2018    Dual Chamber PPM    PROSTATECTOMY  1998    carcinoma    SHOULDER ARTHROPLASTY      SHOULDER SURGERY      TOE SURGERY Right 2013    PERMANENT RIGHT GREAT TOENAIL ABLATION AND REMOVAL    TOTAL KNEE ARTHROPLASTY      UPPER GASTROINTESTINAL ENDOSCOPY  2011    UPPER GASTROINTESTINAL ENDOSCOPY N/A 2019    EGD BIOPSY [facility] by Newton Blackwood DO at [facility]     Family History   Problem Relation Age of Onset    Heart Disease Mother         MI    Parkinsonism Father      Social History     Socioeconomic History    Marital status:       Spouse name: Not on file    Number of children: 4    Years of education: 12    Highest education level: Not on file   Occupational History    Not on file   Social Needs    Financial resource strain: Not on file    Food insecurity     Worry: Not on file     Inability: Not on file    Transportation needs     Medical: Not on file     Non-medical: Not on file   Tobacco Use    Smoking status: Former Smoker     Types: Cigarettes     Last attempt to quit: 1949     Years since quittin.1    Smokeless tobacco: Never Used   Substance and Sexual Activity    Alcohol use: No     Alcohol/week: 0.0 standard drinks    Drug use: No    Sexual activity: Not Currently   Lifestyle    Physical activity     Days per week: Not on file     Minutes per session: Not on file    Stress: Not on file   Relationships    Social connections     Talks on phone: Not on file     Gets together: Not on file     Attends Quaker service: Not on file     Active member of club or organization: Not on file     Attends meetings of clubs or organizations: Not on file     Relationship status: Not on file    Intimate partner violence     Fear of current or ex partner: Not on file     Emotionally abused: Not on file     Physically abused: Not on file     Forced sexual activity: Not on file Other Topics Concern    Not on file   Social History Narrative    Not on file     Current Facility-Administered Medications   Medication Dose Route Frequency Provider Last Rate Last Dose    0.9 % sodium chloride IV bolus 2,313 mL  30 mL/kg Intravenous Once Faviola Andrew MD 4,626 mL/hr at 07/14/20 1854 2,313 mL at 07/14/20 1854    vancomycin 1000 mg IVPB in 250 mL D5W addavial  1,000 mg Intravenous Once Faviola Andrew MD         Current Outpatient Medications   Medication Sig Dispense Refill    guaiFENesin (ROBITUSSIN) 100 MG/5ML syrup Take 200 mg by mouth every 4 hours      calcium carbonate (OSCAL) 500 MG TABS tablet Take 500 mg by mouth daily      cetirizine (ZYRTEC) 10 MG tablet Take 10 mg by mouth daily      albuterol (ACCUNEB) 0.63 MG/3ML nebulizer solution Take 1 ampule by nebulization every 6 hours as needed for Wheezing      benzonatate (TESSALON) 100 MG capsule Take 100 mg by mouth 3 times daily as needed for Cough      acetaminophen (TYLENOL) 325 MG tablet Take 650 mg by mouth every 6 hours as needed for Pain      metoprolol tartrate (LOPRESSOR) 25 MG tablet Take 25 mg by mouth 2 times daily      mirtazapine (REMERON) 15 MG tablet Take 7.5 mg by mouth nightly       nitroGLYCERIN (NITRO-DUR) 0.4 MG/HR Place 1 patch onto the skin daily      RaNITidine HCl (RANITIDINE 150 MAX STRENGTH PO) Take by mouth Daily       ranolazine (RANEXA) 500 MG extended release tablet Take 1,000 mg by mouth 2 times daily      clopidogrel (PLAVIX) 75 MG tablet Take 1 tablet by mouth daily 90 tablet 3    atorvastatin (LIPITOR) 10 MG tablet Take 1 tablet by mouth daily 30 tablet 11    aspirin 81 MG chewable tablet Take 1 tablet by mouth daily 30 tablet 3    magnesium oxide (MAG-OX) 400 MG tablet Take 1 tablet by mouth 2 times daily 60 tablet 5    Vitamins-Lipotropics (LIPOFLAVONOID) TABS Take 1 tablet by mouth Daily with lunch Indications: vertigo      vitamin B-12 (CYANOCOBALAMIN) 100 MCG tablet Take 50 fL    Neutrophils % 89.1 %    Lymphocytes % 3.2 %    Monocytes % 7.3 %    Eosinophils % 0.1 %    Basophils % 0.3 %    Neutrophils Absolute 10.7 (H) 1.7 - 7.7 K/uL    Lymphocytes Absolute 0.4 (L) 1.0 - 5.1 K/uL    Monocytes Absolute 0.9 0.0 - 1.3 K/uL    Eosinophils Absolute 0.0 0.0 - 0.6 K/uL    Basophils Absolute 0.0 0.0 - 0.2 K/uL   Comprehensive Metabolic Panel w/ Reflex to MG   Result Value Ref Range    Sodium 139 136 - 145 mmol/L    Potassium reflex Magnesium 4.1 3.5 - 5.1 mmol/L    Chloride 105 99 - 110 mmol/L    CO2 24 21 - 32 mmol/L    Anion Gap 10 3 - 16    Glucose 99 70 - 99 mg/dL    BUN 52 (H) 7 - 20 mg/dL    CREATININE 2.1 (H) 0.8 - 1.3 mg/dL    GFR Non-African American 30 (A) >60    GFR  36 (A) >60    Calcium 8.7 8.3 - 10.6 mg/dL    Total Protein 6.1 (L) 6.4 - 8.2 g/dL    Alb 3.0 (L) 3.4 - 5.0 g/dL    Albumin/Globulin Ratio 1.0 (L) 1.1 - 2.2    Total Bilirubin 0.7 0.0 - 1.0 mg/dL    Alkaline Phosphatase 92 40 - 129 U/L    ALT 19 10 - 40 U/L    AST 33 15 - 37 U/L    Globulin 3.1 g/dL   Urinalysis Reflex to Culture    Specimen: Urine, clean catch   Result Value Ref Range    Color, UA Yellow Straw/Yellow    Clarity, UA SL CLOUDY (A) Clear    Glucose, Ur Negative Negative mg/dL    Bilirubin Urine Negative Negative    Ketones, Urine Negative Negative mg/dL    Specific Gravity, UA 1.020 1.005 - 1.030    Blood, Urine SMALL (A) Negative    pH, UA 5.5 5.0 - 8.0    Protein,  (A) Negative mg/dL    Urobilinogen, Urine 0.2 <2.0 E.U./dL    Nitrite, Urine Negative Negative    Leukocyte Esterase, Urine TRACE (A) Negative    Microscopic Examination YES     Urine Type NotGiven     Urine Reflex to Culture Not Indicated    Lactate, Sepsis   Result Value Ref Range    Lactic Acid, Sepsis 3.0 (H) 0.4 - 1.9 mmol/L   Microscopic Urinalysis   Result Value Ref Range    Mucus, UA Rare (A) None Seen /LPF    WBC, UA 3-5 0 - 5 /HPF    RBC, UA 3-4 0 - 4 /HPF    Epithelial Cells, UA 2-5 0 - 5 /HPF    Bacteria, UA 1+ (A) None Seen /HPF    Amorphous, UA 1+ /HPF   EKG 12 Lead   Result Value Ref Range    Ventricular Rate 87 BPM    Atrial Rate 42 BPM    P-R Interval 200 ms    QRS Duration 122 ms    Q-T Interval 424 ms    QTc Calculation (Bazett) 510 ms    R Axis 84 degrees    T Axis 268 degrees    Diagnosis       Demand pacemaker; interpretation is based on intrinsic rhythmMarked sinus bradycardia with marked sinus arrhythmia with Premature ventricular complexes or Fusion complexesNon-specific intra-ventricular conduction delayMarked ST abnormality, possible inferior subendocardial injuryMarked ST abnormality, possible anterolateral subendocardial injuryAbnormal ECGWhen compared with ECG of 07-DEC-2019 18:26,Significant changes have occurred       RADIOLOGY  I have reviewed all radiographic studies for this visit. XR CHEST PORTABLE   Final Result   Stable portable study. ECG  EKG interpreted by myself. Rate: 87  Rhythm: Demand pacemaker, underlying rhythm sinus bradycardia with sinus arrhythmia with premature ventricular complexes  Axis: 84  Intervals:   QTc 510  Comparison: Compared to 12/7/2019, there are more frequent PVCs  Impression: Demand pacemaker, underlying rhythm sinus bradycardia with sinus arrhythmia with premature ventricular complexes, nonspecific intraventricular conduction delay, diffuse ST segment nonspecific abnormalities       ED COURSE/MDM  Patient seen and evaluated. Old records reviewed. Labs and imaging reviewed and results discussed with patient/family to extent possible. 45-year-old male presents with generalized weakness and fever. On arrival the patient is febrile with a rectal temperature of 102 °F.  Slightly hypertensive with otherwise reassuring vital signs. Lungs are clear. Abdominal exam is benign. There is no nuchal rigidity and I am not concerned for meningitis. Chest x-ray reveals nothing acute. Urine not infectious. COVID-19 testing pending.     The

## 2020-07-15 PROBLEM — N30.01 ACUTE CYSTITIS WITH HEMATURIA: Status: ACTIVE | Noted: 2020-01-01

## 2020-07-15 NOTE — CARE COORDINATION
Case Management Assessment  Initial Evaluation    Date/Time of Evaluation: 7/15/2020 3:46 PM  Assessment Completed by: Daniel Rudd    Patient Name: Zerita Pallas  YOB: 1927  Diagnosis: Fever in adult [R50.9]  Fever in adult [R50.9]  Date / Time: 7/14/2020  5:24 PM  Admission status/Date:  07/14/2020  Chart Reviewed: Yes      Patient Interviewed: Yes   Family Interviewed:  Yes - Carlie Agrawal    Hospitalization in the last 30 days:  No    Current PCP: Catherine Urbina required for SNF : NO     3 night stay required - WAIVED    ADLS  Support Systems: Spouse/Significant Other, Children  Transportation: EMS transportation    Meal Preparation: per facility    Housing  Home Environment: LTC at [facility] ([address] [place] [address])  Steps: [address] to Return to Present Housing: YES  Other Identified Issues:        Community Service Affiliation  Dialysis:  No    · Name:  · Location  · Dialysis Schedule:  · Phone:   · Fax: Outpatient PT/OT: No    Cancer Center: No     CHF Clinic: No     Pulmonary Rehab: No  Pain Clinic: No  Community Mental Health: No    Wound Clinic: No     COVID SCREENING: Yes - Negative 07/14     The Plan for Transition of Care is related to the following treatment goals: to return to LTC bed at [address] [place] [address]. +bedhold. +Chiquita (MSW) confirms. Daughter, Leyla Teixeira confirms DCP to return to [place] [facility]. She lives nearby and is the primary contact for this reason. Son, Misa Lomeli lives out of state. Leyla Teixeira requests clinical update and to be able to come see pt. [address] RN updated on DCP and will follow up with family. +CM following    The Patient and/or patient representative (daughter, Leyla Teixeira) was provided with a choice of provider and agrees with the discharge plan. [x] Yes [] No  Explained Case Management role/services.

## 2020-07-15 NOTE — PROGRESS NOTES
4 Eyes Skin Assessment     The patient is being assess for  Admission    I agree that 2 RN's have performed a thorough Head to Toe Skin Assessment on the patient. ALL assessment sites listed below have been assessed. Areas assessed by both nurses:   [x]   Head, Face, and Ears   [x]   Shoulders, Back, and Chest  [x]   Arms, Elbows, and Hands   [x]   Coccyx, Sacrum, and IschIum  [x]   Legs, Feet, and Heels  Pt has discolored BUE. Abrasions on arm. Wears pierce sleeves at the nursing facility. Does the Patient have Skin Breakdown?   No         Garo Prevention initiated:  No   Wound Care Orders initiated:  No      WOC nurse consulted for Pressure Injury (Stage 3,4, Unstageable, DTI, NWPT, and Complex wounds), New and Established Ostomies:  No      Nurse 1 eSignature: Electronically signed by Andrea Castrejon RN on 7/15/20 at 4:42 AM EDT    **SHARE this note so that the co-signing nurse is able to place an eSignature**    Nurse 2 eSignature: Electronically signed by Concetta Maddox RN on 7/15/20 at 6:06 AM EDT

## 2020-07-15 NOTE — CONSULTS
Patient is being seen at the request of Yoseph Mcdonald for a consultation for acute febrile illness, R/o Covid    HISTORY OF PRESENT ILLNESS: The patient is a 80-year-old man with a past medical history of prostate cancer, CVA, chronic diastolic CHF who presented to [facility] emergency department with complaints of generalized fever and weakness over the last few days. Patient also reportedly has a history of dementia and is cared for at the [address] [place] [address]. He is a very limited historian. Last night he was noted to be febrile with a temperature of 102.5 Fahrenheit. After evaluation the emergency department he was found to have acute kidney injury by labs and was admitted to the hospital for further care. He was tested for COVID-19 and placed in respiratory droplet plus isolation. Currently he reports no fever or chills. He denies any cough or shortness of breath.     PAST MEDICAL HISTORY:  Past Medical History:   Diagnosis Date    Arthralgia     Cancer [facility])     prostate    Chronic back pain     x40 years    Chronic diastolic (congestive) heart failure (HCC)     CVA (cerebral vascular accident) ([address]     Diverticulitis     Dizziness     Fatigue     Fractures     Frequent falls     Hearing decreased     Hyperlipidemia     Hypertension     Osteoarthritis of knee     Osteoarthritis of shoulder     [address] (peripheral artery disease) [address] [address]     TIA (transient ischemic attack) 2006    Vertigo     Wears glasses      PAST SURGICAL HISTORY:  Past Surgical History:   Procedure Laterality Date    CATARACT REMOVAL  1997    bilaterally    COLONOSCOPY      ENDOSCOPY, COLON, DIAGNOSTIC  07/05/2019    Bx and dilitation    ESOPHAGEAL DILATATION      X 2    ESOPHAGEAL DILATATION  7/5/2019    ESOPHAGEAL DILATION LARISA performed by Faby Jama DO at [address] [facility]      right    JOINT REPLACEMENT      left knee    KNEE SURGERY Bilateral     PACEMAKER INSERTION  10/12/2018    Dual Chamber PPM    PROSTATECTOMY  1998    carcinoma    SHOULDER ARTHROPLASTY      SHOULDER SURGERY      TOE SURGERY Right 06/03/2013    PERMANENT RIGHT GREAT TOENAIL ABLATION AND REMOVAL    TOTAL KNEE ARTHROPLASTY      UPPER GASTROINTESTINAL ENDOSCOPY  jan 2011    UPPER GASTROINTESTINAL ENDOSCOPY N/A 7/5/2019    EGD BIOPSY performed by Darren Garcia DO at [address]:  family history includes Heart Disease in his mother; Parkinsonism in his father. SOCIAL HISTORY:   reports that he quit smoking about 71 years ago. His smoking use included cigarettes. He has never used smokeless tobacco.    Scheduled Meds:   tamsulosin  0.4 mg Oral Nightly    aspirin  81 mg Oral Daily    atorvastatin  10 mg Oral Daily    calcium elemental  500 mg Oral Daily    cetirizine  5 mg Oral Daily    clopidogrel  75 mg Oral Daily    metoprolol tartrate  25 mg Oral BID    mirtazapine  7.5 mg Oral Nightly    ranolazine  1,000 mg Oral BID    sodium chloride flush  10 mL Intravenous 2 times per day     Continuous Infusions:   sodium chloride 75 mL/hr at 07/15/20 1232     PRN Meds:  acetaminophen, benzonatate, guaiFENesin, sodium chloride flush, polyethylene glycol, promethazine **OR** ondansetron    ALLERGIES:  Patient is allergic to altace [ramipril] and sulfa antibiotics.     REVIEW OF SYSTEMS:  Constitutional: + for fever  HENT: Negative for sore throat  Eyes: Negative for redness   Respiratory: Negative for dyspnea, cough  Cardiovascular: Negative for chest pain  Gastrointestinal: Negative for vomiting, diarrhea   Genitourinary: Negative for hematuria   Musculoskeletal: Negative for arthralgias   Skin: Negative for rash  Neurological: Negative for syncope  Hematological: Negative for adenopathy  Psychiatric/Behavorial: Negative for anxiety    PHYSICAL EXAM:  Blood pressure (!) 164/75, pulse 65, temperature 97.8 °F (36.6 °C), temperature source Oral, resp. rate 18, height 6' (1.829 m), weight 171 lb 11.2 oz (77.9 kg), SpO2 97 %.' on RA  Gen: No distress. Appears stated age  Eyes: PERRL. No sclera icterus. No conjunctival injection. ENT: No discharge. Pharynx clear. Neck: Trachea midline. No obvious mass. Resp: No accessory muscle use. No crackles. No wheezes. No rhonchi. No dullness on percussion. CV: Regular rate. Regular rhythm. No murmur or rub. No edema. GI: Non-tender. Non-distended. No hernia. Skin: Warm and dry. No nodule on exposed extremities. Lymph: No cervical LAD. No supraclavicular LAD. M/S: No cyanosis. No joint deformity. No clubbing. Neuro: Awake. Alert. Moves all four extremities. Conversant but not oriented. LABS:  CBC:   Recent Labs     07/14/20  1745 07/15/20  0532   WBC 12.0* 10.7   HGB 12.1* 12.1*   HCT 36.4* 36.4*   MCV 97.7 98.9   * 82*     BMP:   Recent Labs     07/14/20  1745 07/15/20  0532    134*   K 4.1 3.7    100   CO2 24 22   BUN 52* 48*   CREATININE 2.1* 2.0*     LIVER PROFILE:   Recent Labs     07/14/20  1745   AST 33   ALT 19   BILITOT 0.7   ALKPHOS 92     PT/INR: No results for input(s): PROTIME, INR in the last 72 hours. APTT: No results for input(s): APTT in the last 72 hours. UA:  Recent Labs     07/14/20  1800   COLORU Yellow   PHUR 5.5   WBCUA 3-5   RBCUA 3-4   MUCUS Rare*   BACTERIA 1+*   CLARITYU SL CLOUDY*   SPECGRAV 1.020   LEUKOCYTESUR TRACE*   UROBILINOGEN 0.2   BILIRUBINUR Negative   BLOODU SMALL*   GLUCOSEU Negative   AMORPHOUS 1+     No results for input(s): PHART, [id number], PO2ART in the last 72 hours. Chest imaging was reviewed by me and showed:    CXR 7/14: Foreshortening of the distal left clavicle is unchanged.  A left proximal   humeral prosthesis is present.  A left subclavian cardiac device is   unchanged.  The heart size is stable. The lungs are clear without focal consolidation, effusion or edema.              ASSESSMENT:  ·  Acute febrile illness  · Acute kidney injury  · Probable urinary tract infection  · Dementia  · History of prostate cancer    PLAN:  · Covid 19 testing and respiratory droplet isolation is appropriate   · Follow-up cultures  · Ceftriaxone    Thank you for the consult.

## 2020-07-15 NOTE — H&P
Hospital Medicine History & Physical      PCP: Debbie Martel    Date of Admission: 7/14/2020    Date of Service: Pt seen/examined on 7/14/2020    Chief Complaint: Fever    History Of Present Illness:    80 y.o. male who presented to the hospital with a chief complaint of generalized fever weakness. The patient lives at a [address] home and has a history of dementia. Per staff the patient is a DNR CCA. They noted this evening that he was more lethargic and he was having low-grade temperatures. This evening he was febrile to 102 °F.  He was brought to the emergency department to get evaluated. In the emergency department work-up was unrevealing, COVID testing was obtained. He was also found to have acute kidney injury. He will be admitted for further medical evaluation and treatment.     Past Medical History:        Diagnosis Date    Arthralgia     Cancer [facility])     prostate    Chronic back pain     x40 years    Chronic diastolic (congestive) heart failure (HCC)     CVA (cerebral vascular accident) ([address]     Diverticulitis     Dizziness     Fatigue     Fractures     Frequent falls     Hearing decreased     Hyperlipidemia     Hypertension     Osteoarthritis of knee     Osteoarthritis of shoulder     [facility]per[facility]) [facility]     TIA (transient ischemic attack) 2006    Vertigo     Wears glasses        Past Surgical History:          Procedure Laterality Date    CATARACT REMOVAL  1997    bilaterally    COLONOSCOPY      ENDOSCOPY, COLON, DIAGNOSTIC  07/05/2019    Bx and dilitation    ESOPHAGEAL DILATATION      X 2    ESOPHAGEAL DILATATION  7/5/2019    ESOPHAGEAL DILATION LARISA performed by Angelo Zavala DO at [facility]      right    JOINT REPLACEMENT      left knee    KNEE SURGERY Bilateral     PACEMAKER INSERTION  10/12/2018    Dual Chamber PPM    PROSTATECTOMY  1998    carcinoma    SHOULDER ARTHROPLASTY      SHOULDER SURGERY      TOE SURGERY Right 06/03/2013    PERMANENT RIGHT GREAT TOENAIL ABLATION AND REMOVAL    TOTAL KNEE ARTHROPLASTY      UPPER GASTROINTESTINAL ENDOSCOPY  jan 2011    UPPER GASTROINTESTINAL ENDOSCOPY N/A 7/5/2019    EGD BIOPSY performed by Tabatha Melendez DO at [facility]       Medications Prior to Admission:      Prior to Admission medications    Medication Sig Start Date End Date Taking?  Authorizing Provider   metoprolol tartrate (LOPRESSOR) 50 MG tablet Take 50 mg by mouth nightly   Yes Historical Provider, MD   omeprazole 20 MG EC tablet Take 20 mg by mouth daily   Yes Historical Provider, MD   tamsulosin (FLOMAX) 0.4 MG capsule Take 0.4 mg by mouth nightly   Yes Historical Provider, MD   calcium carbonate (OSCAL) 500 MG TABS tablet Take 500 mg by mouth daily   Yes Historical Provider, MD   acetaminophen (TYLENOL) 325 MG tablet Take 650 mg by mouth every 6 hours as needed for Pain   Yes Historical Provider, MD   metoprolol tartrate (LOPRESSOR) 25 MG tablet Take 25 mg by mouth daily (with breakfast)    Yes Historical Provider, MD   mirtazapine (REMERON) 15 MG tablet Take 15 mg by mouth nightly    Yes Historical Provider, MD   nitroGLYCERIN (NITRO-DUR) 0.4 MG/HR Place 1 patch onto the skin daily Remove at bedtime   Yes Historical Provider, MD   ranolazine (RANEXA) 500 MG extended release tablet Take 500 mg by mouth 2 times daily    Yes Historical Provider, MD   clopidogrel (PLAVIX) 75 MG tablet Take 1 tablet by mouth daily 11/13/18  Yes Adri Nickerson MD   atorvastatin (LIPITOR) 10 MG tablet Take 1 tablet by mouth daily 10/23/18  Yes Adri Nickerson MD   aspirin 81 MG chewable tablet Take 1 tablet by mouth daily 10/20/18  Yes Ignacio Mattson MD   magnesium oxide (MAG-OX) 400 MG tablet Take 1 tablet by mouth 2 times daily 10/11/18  Yes Adri Nickerson MD   Vitamins-Lipotropics (LIPOFLAVONOID) TABS Take 1 tablet by mouth Daily with lunch Indications: vertigo   Yes Historical Provider, MD vitamin B-12 (CYANOCOBALAMIN) 100 MCG tablet Take 50 mcg by mouth daily   Yes Historical Provider, MD   Multiple Vitamins-Minerals (MULTIVITAMIN PO) Take 1 tablet by mouth daily    Yes Historical Provider, MD   guaiFENesin (ROBITUSSIN) 100 MG/5ML syrup Take 200 mg by mouth every 4 hours    Historical Provider, MD   albuterol (ACCUNEB) 0.63 MG/3ML nebulizer solution Take 1 ampule by nebulization every 6 hours as needed for Wheezing    Historical Provider, MD       Allergies:  Altace [ramipril] and Sulfa antibiotics    Social History:      TOBACCO:   reports that he quit smoking about 71 years ago. His smoking use included cigarettes. He has never used smokeless tobacco.  ETOH:   reports no history of alcohol use. Family History:          Problem Relation Age of Onset    Heart Disease Mother         MI    Parkinsonism Father        REVIEW OF SYSTEMS:   Patient has pleasantly demented and cannot really give me much history or review of systems. He denies any pain, cough, fevers or chills. PHYSICAL EXAM:  /69   Pulse 64   Temp 98.4 °F (36.9 °C) (Oral)   Resp 28   Ht 5' 10\" (1.778 m)   Wt 170 lb (77.1 kg)   SpO2 95%   BMI 24.39 kg/m²   General appearance: Pleasantly demented but alert to self and place  HEENT:  Normal cephalic, atraumatic without obvious deformity. Pupils equal, round, and reactive to light. Extra ocular muscles intact. Conjunctivae/corneas clear. Neck: Supple, with full range of motion. No jugular venous distention. Trachea midline. Respiratory:  Normal respiratory effort. Clear to auscultation, bilaterally without Rales/Wheezes/Rhonchi. Cardiovascular:  Regular rate and rhythm with normal S1/S2 without murmurs, rubs or gallops. Abdomen: Soft, non-tender, non-distended with normal bowel sounds. Musculoskeletal:  No clubbing, cyanosis or edema bilaterally. Full range of motion without deformity.   Skin: Skin color, texture, turgor normal.  No rashes or lesions. Neurologic: Apart from his dementia he is neurologically intact and follows commands  Psychiatric: Mild dementia  Capillary Refill: Brisk,< 3 seconds   Peripheral Pulses: +2 palpable, equal bilaterally       Labs:   Recent Labs     07/14/20  1745   WBC 12.0*   HGB 12.1*   HCT 36.4*   *     Recent Labs     07/14/20  1745      K 4.1      CO2 24   BUN 52*   CREATININE 2.1*   CALCIUM 8.7     Recent Labs     07/14/20  1745   AST 33   ALT 19   BILITOT 0.7   ALKPHOS 92     No results for input(s): INR in the last 72 hours. No results for input(s): Iman Moellers in the last 72 hours. Urinalysis:      Lab Results   Component Value Date    NITRU Negative 07/14/2020    WBCUA 3-5 07/14/2020    BACTERIA 1+ 07/14/2020    RBCUA 3-4 07/14/2020    BLOODU SMALL 07/14/2020    SPECGRAV 1.020 07/14/2020    GLUCOSEU Negative 07/14/2020       Radiology:     XR CHEST PORTABLE   Final Result   Stable portable study. ASSESSMENT:  Febrile illness, unclear source  Acute kidney injury  Alzheimer's dementia  Coronary disease  Hypertension  BPH    PLAN:  No clear source of infection, leukocytosis with fever. Chest x-ray is within normal limits. COVID testing has been obtained and the patient will remain in droplet precautions. He received a gram of vancomycin and cefepime in the emergency department. We will hold off on antibiotics for now.  -Hold nephrotoxic medications, gentle IV fluids repeat CMP in the a.m.  -Resume home medications including blood pressure medicines  -Supportive care, pain control if needed, Tylenol for fever      DVT Prophylaxis: Heparin  Diet: DIET GENERAL;  Code Status: DNR-CCA    Dispo -admit to [facility]      Thank you for the opportunity to be involved in this patient's care.       (Please note that portions of this note were completed with a voice recognition program. Efforts were made to edit the dictations but occasionally words are mis-transcribed.)

## 2020-07-15 NOTE — PROGRESS NOTES
07/15/20 0811   Vital Signs   Temp 97.1 °F (36.2 °C)   Temp Source Oral   Pulse 62   Heart Rate Source Monitor   Resp 18   BP (!) 180/88   BP Location Right upper arm   BP Upper/Lower Upper   Patient Position Semi fowlers   Level of Consciousness 0   MEWS Score 1   Patient Currently in Pain Denies   Oxygen Therapy   SpO2 97 %   O2 Device None (Room air)     Assessment completed. No signs or symptoms of distress noted. Patient in droplet plus precautions at this time. Awaiting COVID ruleout. Patient confused at this time, this is the patient's baseline. Patient took his pills that could be crushed in pudding, and the other pills he was able to swallow whole in pudding. Call light within reach, bed alarm on. Will continue to monitor.

## 2020-07-15 NOTE — PROGRESS NOTES
Progress Note    Admit Date:  7/14/2020    80year old male from [address] [place] [address] presented with c/o fever and weakness. He was noted to be lethargic and had a fever of 102. Admitted to med-surg for COVID rule out and RONAK. Subjective:  Mr. Bean Gomez has dementia and is confused. He cannot give me much history. He is afebrile. Objective:   Patient Vitals for the past 4 hrs:   BP Temp Temp src Pulse Resp SpO2   07/15/20 0811 (!) 180/88 97.1 °F (36.2 °C) Oral 62 18 97 %            Intake/Output Summary (Last 24 hours) at 7/15/2020 1142  Last data filed at 7/15/2020 0954  Gross per 24 hour   Intake 2803 ml   Output --   Net 2803 ml       Physical Exam:    General appearance: Pleasantly demented but alert to self and place  HEENT:  Normal cephalic, atraumatic without obvious deformity. Pupils equal, round, and reactive to light. Extra ocular muscles intact. Conjunctivae/corneas clear. Neck: Supple, with full range of motion. No jugular venous distention. Trachea midline. Respiratory:  Normal respiratory effort. Clear to auscultation, bilaterally without Rales/Wheezes/Rhonchi. Cardiovascular:  Regular rate and rhythm with normal S1/S2 without murmurs, rubs or gallops. Abdomen: Soft, non-tender, non-distended with normal bowel sounds. Musculoskeletal:  No clubbing, cyanosis or edema bilaterally. Full range of motion without deformity. Skin: Skin color, texture, turgor normal.  No rashes or lesions.   Neurologic: Apart from his dementia he is neurologically intact and follows commands  Psychiatric: Mild dementia  Capillary Refill: Brisk,< 3 seconds   Peripheral Pulses: +2 palpable, equal bilaterally     Data:  CBC:   Recent Labs     07/14/20  1745 07/15/20  0532   WBC 12.0* 10.7   HGB 12.1* 12.1*   HCT 36.4* 36.4*   MCV 97.7 98.9   * 82*     BMP:   Recent Labs     07/14/20  1745 07/15/20  0532    134*   K 4.1 3.7    100   CO2 24 22   BUN 52* 48*   CREATININE 2.1* 2.0*     LIVER PROFILE: Recent Labs     07/14/20  1745   AST 33   ALT 19   BILITOT 0.7   ALKPHOS 92     PT/INR: No results for input(s): PROTIME, INR in the last 72 hours. CULTURES  Blood: pending  COVID: pending    RADIOLOGY  XR CHEST PORTABLE   Final Result   Stable portable study. Assessment/Plan:  Febrile illness  COVID rule out  - fever up to 102.  - admitted to [facility] rule out  - CXR negative. UA with trace leukocytes  - blood cx are pending  - receiving IVFs. He received Cefepime, Vanc.   - Pulmonary consult. Leukocytosis  - possibly related to above  - improved on repeat. - procalcitonin is 0.68    RONAK  - likely pre-renal, volume depletion  - creatinine on admission 2.1  - baseline ~1.3  - continued IVFs. Improved to 2. Thrombocytopenia  - hold Heparin today. Use SCD's  - monitor platelet level. Dementia  - supportive care measures  - continue home medication regimen. CAD  - continue aspirin, Plavix, statin, BB, Ranexa. Hypertension  - BP elevated. - continue Lopressor    Hyperlipidemia  - on Atorvastatin. BPH  - on Flomax. H/o CVA  - on aspirin, Plavix, statin. SSS  - s/p pacemaker    Chronic Diastolic CHF  - appears compensated  - continue BB.      GERD  - on PPI    DVT Prophylaxis: Heparin  Diet: DIET GENERAL;  Code Status: DNR-SUSU Danielle 7/15/2020 2:24 PM

## 2020-07-15 NOTE — PLAN OF CARE
Skin Integrity:  Goal: Skin integrity will stabilize  Description: Skin integrity will stabilize  7/15/2020 1100 by Mike Wong RN  Outcome: Ongoing  7/15/2020 0350 by Ana Paula Howard RN  Outcome: Ongoing     Problem: Discharge Planning:  Goal: Patients continuum of care needs are met  Description: Patients continuum of care needs are met  7/15/2020 1100 by Mike Wong RN  Outcome: Ongoing  7/15/2020 0350 by Ana Paula Howard RN  Outcome: Ongoing

## 2020-07-15 NOTE — PLAN OF CARE
Problem: Falls - Risk of:  Goal: Will remain free from falls  Description: Will remain free from falls  Outcome: Ongoing     Problem: Skin Integrity:  Goal: Will show no infection signs and symptoms  Description: Will show no infection signs and symptoms  Outcome: Ongoing     Problem: Skin Integrity:  Goal: Absence of new skin breakdown  Description: Absence of new skin breakdown  Outcome: Ongoing     Problem: Pain:  Goal: Patient's pain/discomfort is manageable  Description: Patient's pain/discomfort is manageable  Outcome: Ongoing

## 2020-07-15 NOTE — PROGRESS NOTES
Spoke to to Dr. Lani Brown from the [facility] about patient's baseline and patient's mentation. Dr. Lani Brown states that patient can get disoriented at times and has a baseline of confusion.

## 2020-07-16 NOTE — PROGRESS NOTES
Pt incont. of urine bladder scan done results  439 perfect  serve sent to DR HONEYCUTT [facility]. Pt now off the floor to xray.

## 2020-07-16 NOTE — PROGRESS NOTES
family at beside concerned stated pt. is moaning in his sleep and jerking, also pt x-ray and US are done perfect serve sent to Dr. Archie Natarajan, R/T d/c.

## 2020-07-16 NOTE — DISCHARGE SUMMARY
Name:  Nahum Stevens  Room:  0213/0213-02  MRN:    [id number]    Discharge Summary      This discharge summary is in conjunction with a complete physical exam done on the day of discharge. Attending Physician: Dr. Iglesia Salinas  Discharging Physician: Dr. Wagner Book: 7/14/2020  Discharge:   7/20/2020    HPI:  80 y.o. male who presented to the hospital with a chief complaint of generalized fever weakness. The patient lives at a [address] and has a history of dementia. Per staff the patient is a DNR CCA. They noted this evening that he was more lethargic and he was having low-grade temperatures. This evening he was febrile to 102 °F.  He was brought to the emergency department to get evaluated. In the emergency department work-up was unrevealing, COVID testing was obtained. He was also found to have acute kidney injury. He will be admitted for further medical evaluation and treatment. Diagnoses this Admission and Hospital Course   Febrile illness  COVID ruled out  - fever up to 102 at time of admission without clear etiology. Possible bronchitis. He also has urinary retention but no evidence of UTI. - admitted to med-surg.   - CXR negative. UA with trace leukocytes  - blood cx coagulase-negative staph-contamination  - receiving IVFs. He received Cefepime, Vanc. On Rocephin changed to oral Omnicef at discharge  - Pulmonary consulted.     Leukocytosis resolved  - possibly related to above  - improved on repeat. - procalcitonin is 0.68     RONAK versus chronic kidney disease.  -May have a competent of obstructive uropathy. Urology consultation obtained. - creatinine on admission 2.1  - baseline ~1.3 but creatinine has fluctuated a lot.     Thrombocytopenia  - hold Heparin today. Use SCD's  - monitor platelet level.      Vascular Dementia  - supportive care measures  - continue home medication regimen.      CAD  - continue aspirin, Plavix, statin, BB, Ranexa.      Hypertension  - BP elevated. - continue Lopressor     Hyperlipidemia  - on Atorvastatin. BPH  - on Flomax.      H/o CVA  - on aspirin, Plavix, statin.      SSS  - s/p pacemaker     Chronic Diastolic CHF  - appears compensated  - continue BB.      GERD  - on PPI     DJD of the LS spine.  -  Cannot use NSAID due to kidney issues and age. - Add Zanaflex. Family signed with hospice, d/c o SNF with hospice care. I spoke to dtr at bedside     Procedures (Please Review Full Report for Details)  N/A    Consults    Pulmonology       Physical Exam at Discharge:    /89   Pulse 88   Temp 99.2 °F (37.3 °C) (Axillary)   Resp 22   Ht 6' (1.829 m)   Wt 185 lb 4.8 oz (84.1 kg)   SpO2 92%   BMI 25.13 kg/m²     General appearance: Pleasantly demented, poorly responsive. awakens a little. Did take Po diet this AM .   intermittently alert to self and place  HEENT:  Normal cephalic, atraumatic without obvious deformity. Pupils equal, round, and reactive to light.  Extra ocular muscles intact. Conjunctivae/corneas clear. Neck: Supple, with full range of motion. No jugular venous distention. Trachea midline. Respiratory:  Normal respiratory effort. Clear to auscultation, bilaterally without Rales/Wheezes/Rhonchi. Cardiovascular:  Regular rate and rhythm with normal S1/S2 without murmurs, rubs or gallops. Abdomen: Soft, non-tender, non-distended with normal bowel sounds. Musculoskeletal:  No clubbing, cyanosis or edema bilaterally.  Full range of motion without deformity. Skin: Skin color, texture, turgor normal.  No rashes or lesions.   Neurologic: Apart from his dementia he is neurologically intact and follows commands  Psychiatric: Mild dementia  Capillary Refill: Brisk,< 3 seconds   Peripheral Pulses: +2 palpable, equal bilaterally     BMP:   Recent Labs     07/18/20  0420 07/19/20  0637 07/20/20  0517   * 144 148*   K 4.0 3.6 3.8   * 111* 113*   CO2 17* 19* 17*   BUN 46* 43* 38*   CREATININE 1.9* 1.8* 1.6* CULTURES  Blood: staphylococcus  COVID: negative    RADIOLOGY  US RENAL COMPLETE   Final Result   1. Limited visualization of the left kidney. 2. Otherwise, unremarkable renal and urinary bladder ultrasound. No   hydronephrosis. XR LUMBAR SPINE (2-3 VIEWS)   Final Result   Lumbar-      No acute fracture subluxation of the lumbar spine. Multilevel spondylosis of the lumbar spine. Pelvis/bilateral hips-      No acute fracture or dislocation of the pelvis or hips. Mild osteoarthritis of both hips. XR HIP BILATERAL W AP PELVIS (2 VIEWS)   Final Result   Lumbar-      No acute fracture subluxation of the lumbar spine. Multilevel spondylosis of the lumbar spine. Pelvis/bilateral hips-      No acute fracture or dislocation of the pelvis or hips. Mild osteoarthritis of both hips. XR CHEST PORTABLE   Final Result   Stable portable study. Discharge Medications     Medication List      START taking these medications    cefdinir 300 MG capsule  Commonly known as:  OMNICEF  Take 1 capsule by mouth daily for 7 days     oxyCODONE 10 MG extended release tablet  Commonly known as:  OXYCONTIN  Take 1 tablet by mouth every 12 hours for 7 days.         CONTINUE taking these medications    acetaminophen 325 MG tablet  Commonly known as:  TYLENOL     albuterol 0.63 MG/3ML nebulizer solution  Commonly known as:  ACCUNEB     aspirin 81 MG chewable tablet  Take 1 tablet by mouth daily     atorvastatin 10 MG tablet  Commonly known as:  Lipitor  Take 1 tablet by mouth daily     calcium carbonate 500 MG Tabs tablet  Commonly known as:  OSCAL     clopidogrel 75 MG tablet  Commonly known as:  PLAVIX  Take 1 tablet by mouth daily     guaiFENesin 100 MG/5ML syrup  Commonly known as:  ROBITUSSIN     Lipoflavonoid Tabs     magnesium oxide 400 MG tablet  Commonly known as:  MAG-OX  Take 1 tablet by mouth 2 times daily     * metoprolol tartrate 50 MG tablet  Commonly known as: LOPRESSOR     * metoprolol tartrate 25 MG tablet  Commonly known as:  LOPRESSOR     mirtazapine 15 MG tablet  Commonly known as:  REMERON     MULTIVITAMIN PO     Nitro-Dur 0.4 MG/HR  Generic drug:  nitroGLYCERIN     omeprazole 20 MG EC tablet     ranolazine 500 MG extended release tablet  Commonly known as:  RANEXA     tamsulosin 0.4 MG capsule  Commonly known as:  FLOMAX     vitamin B-12 100 MCG tablet  Commonly known as:  CYANOCOBALAMIN         * This list has 2 medication(s) that are the same as other medications prescribed for you. Read the directions carefully, and ask your doctor or other care provider to review them with you. ASK your doctor about these medications    HYDROcodone-acetaminophen 5-325 MG per tablet  Commonly known as:  Norco  Take 1 tablet by mouth every 6 hours as needed for Pain for up to 3 days. Intended supply: 3 days. Take lowest dose possible to manage pain  Ask about: Should I take this medication? Where to Get Your Medications      You can get these medications from any pharmacy    Bring a paper prescription for each of these medications  · HYDROcodone-acetaminophen 5-325 MG per tablet  · oxyCODONE 10 MG extended release tablet     Information about where to get these medications is not yet available    Ask your nurse or doctor about these medications  · cefdinir 300 MG capsule           Discharged to SNF with hospice care . Total time 40 minutes. > 50%  dominated by counseling and coordination of care.       Brinda Hale MD   7/20/20

## 2020-07-16 NOTE — DISCHARGE INSTR - COC
Continuity of Care Form    Patient Name: Mikayla Burgess   :  1927  MRN:  [id number]    Admit date:  2020  Discharge date:  2020    Code Status Order: DNR-CCA   Advance Directives:   Advance Care Flowsheet Documentation     Date/Time Healthcare Directive Type of Healthcare Directive Copy in [address] Agent's Name Healthcare Agent's Phone Number    [phone]  Yes, patient has an advance directive for healthcare treatment  Health care treatment directive  Yes, copy in chart  --  --  --          Admitting Physician:  Cheko Gomez MD  PCP: Merlin Quan    Discharging Nurse: [facility] Unit/Room#: 0213/0213-02  Discharging Unit Phone Number: [phone]    Emergency Contact:   Extended Emergency Contact Information  Primary Emergency Contact: Ander Molina [facility]  Mobile Phone: [phone]  Relation: Child  Preferred language: English   needed?  No  Secondary Emergency Contact: Nathalia Mojica   [facility] [address] Phone: [phone]  Relation: Child    Past Surgical History:  Past Surgical History:   Procedure Laterality Date    CATARACT REMOVAL      bilaterally    COLONOSCOPY      ENDOSCOPY, COLON, DIAGNOSTIC  2019    Bx and dilitation    ESOPHAGEAL DILATATION      X 2    ESOPHAGEAL DILATATION  2019    ESOPHAGEAL DILATION Ros Badillo performed by Murtaza Rainey DO at [address] [facility]      right    JOINT REPLACEMENT      left knee    KNEE SURGERY Bilateral     PACEMAKER INSERTION  10/12/2018    Dual Chamber PPM    PROSTATECTOMY  1998    carcinoma    SHOULDER ARTHROPLASTY      SHOULDER SURGERY      TOE SURGERY Right 2013    PERMANENT RIGHT GREAT TOENAIL ABLATION AND REMOVAL    TOTAL KNEE ARTHROPLASTY      UPPER GASTROINTESTINAL ENDOSCOPY  2011    UPPER GASTROINTESTINAL ENDOSCOPY N/A 2019    EGD BIOPSY performed by Murtaza Rainey DO at [address] [facility] ENDOSCOPY       Immunization History:   Immunization History   Administered Date(s) Administered    Influenza Virus Vaccine 10/15/2019    Influenza Whole 10/17/2006    Influenza, Quadv, 6 mo and older, IM, PF (Flulaval, Fluarix) 10/21/2018    Pneumococcal Conjugate 13-valent ([id number]) 08/22/2017    Pneumococcal Conjugate 7-valent (Prevnar7) 10/18/2012    Tdap (Boostrix, Adacel) 06/26/2014, 04/25/2018       Active Problems:  Patient Active Problem List   Diagnosis Code    Generalized weakness R53.1    Dizziness R42    Hyperlipidemia with target LDL less than 100 E78.5    Essential hypertension I10    Polymyalgia rheumatica syndrome (HCC) M35.3    Other affections of shoulder region, not elsewhere classified M75.80    Primary localized osteoarthrosis, lower leg M17.10    Primary localized osteoarthrosis of shoulder region M19.019    Toe pain M79.676    Toe fracture S92.919A    Callus of foot L84    TIA (transient ischemic attack) G45.9    GERD (gastroesophageal reflux disease) K21.9    Depression F32.9    BPH (benign prostatic hyperplasia) N40.0    Acute encephalopathy G93.40    CAD in native artery I25.10    Sepsis ([facility] [address] [facility] A41.9    Community acquired bacterial pneumonia J15.9    Chronic diastolic congestive heart failure ([facility]) I50.32    History of total shoulder replacement, left Z96.612    Bradycardia R00.1    Premature ventricular contraction I49.3    Near syncope R55    RONAK (acute kidney injury) ([facility]) N17.9    New persistent daily headache G44.52    Sinus bradycardia R00.1    Sinus node dysfunction ([facility]) I49.5    Pacemaker Z95.0    Status post placement of cardiac pacemaker Z95.0    S/P placement of cardiac pacemaker Z95.0    HCAP (healthcare-associated pneumonia) J18.9    CKD (chronic kidney disease) stage 3, GFR 30-59 ml/min ([facility]) N18.3    Fever R50.9    Acute cystitis with hematuria N30.01       Isolation/Infection:   Isolation          No Isolation        Patient Infection Status     Infection Onset Added Last Indicated Last Indicated By Review Planned Expiration Resolved Resolved By    None active    Resolved    COVID-19 Rule Out 07/14/20 07/14/20 07/14/20 COVID-19 (Ordered)   07/15/20 Rule-Out Test Resulted          Nurse Assessment:  Last Vital Signs: BP (!) 146/65   Pulse 72   Temp 98.2 °F (36.8 °C) (Oral)   Resp 22   Ht 6' (1.829 m)   Wt 171 lb 11.2 oz (77.9 kg)   SpO2 95%   BMI 23.29 kg/m²     Last documented pain score (0-10 scale): Pain Level: 7  Last Weight:   Wt Readings from Last 1 Encounters:   07/15/20 171 lb 11.2 oz (77.9 kg)     Mental Status:  disoriented and alert    IV Access:  - None    Nursing Mobility/ADLs:  Walking   Dependent  Transfer  Dependent  Bathing  Dependent  Dressing  Dependent  Toileting  Dependent  Feeding  Assisted  Med Admin  Dependent  Med Delivery   crushed    Wound Care Documentation and Therapy:        Elimination:  Continence:   · Bowel: No  · Bladder: No  Urinary Catheter: Insertion Date: 7/17/2020   Colostomy/Ileostomy/Ileal Conduit: No       Date of Last BM: 7/19/2020    Intake/Output Summary (Last 24 hours) at 7/16/2020 1232  Last data filed at 7/16/2020 0904  Gross per 24 hour   Intake 860 ml   Output --   Net 860 ml     I/O last 3 completed shifts: In: 900 [P.O.:900]  Out: -     Safety Concerns: At Risk for Falls    Impairments/Disabilities:      Hearing    Nutrition Therapy:  Current Nutrition Therapy:   - Oral Diet:  General    Routes of Feeding: Oral  Liquids: Thin Liquids  Daily Fluid Restriction: no  Last Modified Barium Swallow with Video (Video Swallowing Test): not done    Treatments at the Time of Hospital Discharge:   Respiratory Treatments:   Oxygen Therapy:  is not on home oxygen therapy. Ventilator:    - No ventilator support    Rehab Therapies:   Weight Bearing Status/Restrictions: No weight bearing restirctions  Other Medical Equipment (for information only, NOT a DME order):     Other Treatments: Patient's personal belongings (please select all that are sent with patient):  None    RN SIGNATURE:  Electronically signed by Low Davis RN on 7/20/20 at 5:42 PM EDT    CASE MANAGEMENT/SOCIAL WORK SECTION    Inpatient Status Date: 07/14/2020    Readmission Risk Assessment Score:  Readmission Risk              Risk of Unplanned Readmission:        20           Discharging to Facility/ Agency   Name: Name: Sam Robbins  Address:  [address] [place] [address]  Phone:  [phone]  Fax:  [phone]  ·   · Address:  · Phone:  · Fax:    Dialysis Facility (if applicable)   · Name:  · Address:  · Dialysis Schedule:  · Phone:  · Fax:    / signature: Electronically signed by Saskia Wilhelm RN on 7/16/20 at 12:45 PM EDT    PHYSICIAN SECTION    Prognosis: Fair    Condition at Discharge: Stable    Rehab Potential (if transferring to Rehab): Fair    Recommended Labs or Other Treatments After Discharge: PT and OT/Long term care    Physician Certification: I certify the above information and transfer of Vivek Spaulding  is necessary for the continuing treatment of the diagnosis listed and that he requires [facility] for greater 30 days.      Update Admission H&P: No change in H&P    PHYSICIAN SIGNATURE:  Electronically signed by Marcin Jurado MD on 7/20/20 at 12:35 PM EDT

## 2020-07-16 NOTE — PROGRESS NOTES
pt daughter is stating her dad is still in a lot pain requesting IV pain med Norco given at 6090  . BP at that time was 168/95 p 77.  she questioned d/c  Perfect serve sent to Dr Ankur Carrera. Spoke with DR Ankur Carrera no new orders for IV pain med's. Will continue to monitor.

## 2020-07-16 NOTE — PROGRESS NOTES
pt still moaning  with pain at times resp. rapid for a few breaths  then slows, sp02 91-95 % on RA bp 169/72  Perfect serve sent to DR Rosanne Gong, spoke with DR Rosanne Gong new order given for norco 5 mg -325 mg po every 6 hour PRN updated family and pt will continue to monitor.

## 2020-07-16 NOTE — PROGRESS NOTES
nurse x3 tried to place Parra unable to place ,pt. is incont urine small amount bladder scan 275 at this time perfect serve sent to [place].

## 2020-07-16 NOTE — PROGRESS NOTES
134* 142   K 4.1 3.7 3.9    100 110   CO2 24 22 21   BUN 52* 48* 41*   CREATININE 2.1* 2.0* 1.8*     LIVER PROFILE:   Recent Labs     07/14/20  1745   AST 33   ALT 19   BILITOT 0.7   ALKPHOS 92     PT/INR: No results for input(s): PROTIME, INR in the last 72 hours. APTT: No results for input(s): APTT in the last 72 hours. UA:  Recent Labs     07/14/20  1800   COLORU Yellow   PHUR 5.5   WBCUA 3-5   RBCUA 3-4   MUCUS Rare*   BACTERIA 1+*   CLARITYU SL CLOUDY*   SPECGRAV 1.020   LEUKOCYTESUR TRACE*   UROBILINOGEN 0.2   BILIRUBINUR Negative   BLOODU SMALL*   GLUCOSEU Negative   AMORPHOUS 1+     No results for input(s): PHART, OZH6TYB, PO2ART in the last 72 hours. Cultures:   sars Cov2- neg     Films:  CXR 7/14: Foreshortening of the distal left clavicle is unchanged.  A left proximal   humeral prosthesis is present.  A left subclavian cardiac device is   unchanged.  The heart size is stable.  The lungs are clear without focal consolidation, effusion or edema.                  ASSESSMENT:  ·  Acute febrile illness  · Acute kidney injury  · Probable urinary tract infection  · Dementia  · History of prostate cancer     PLAN:  · Covid 19 testing neg  · Follow-up cultures  · Ceftriaxone D2

## 2020-07-17 NOTE — PLAN OF CARE
Problem: Falls - Risk of:  Goal: Will remain free from falls  Description: Will remain free from falls  Outcome: Ongoing  Goal: Absence of physical injury  Description: Absence of physical injury  Outcome: Ongoing     Problem: Skin Integrity:  Goal: Will show no infection signs and symptoms  Description: Will show no infection signs and symptoms  Outcome: Ongoing  Goal: Absence of new skin breakdown  Description: Absence of new skin breakdown  Outcome: Ongoing     Problem: Infection:  Goal: Will remain free from infection  Description: Will remain free from infection  Outcome: Ongoing     Problem: Safety:  Goal: Free from accidental physical injury  Description: Free from accidental physical injury  Outcome: Ongoing  Goal: Free from intentional harm  Description: Free from intentional harm  Outcome: Ongoing     Problem: Daily Care:  Goal: Daily care needs are met  Description: Daily care needs are met  Outcome: Ongoing     Problem: Pain:  Goal: Patient's pain/discomfort is manageable  Description: Patient's pain/discomfort is manageable  Outcome: Ongoing  Goal: Pain level will decrease  Description: Pain level will decrease  Outcome: Ongoing  Goal: Control of acute pain  Description: Control of acute pain  Outcome: Ongoing  Goal: Control of chronic pain  Description: Control of chronic pain  Outcome: Ongoing     Problem: Skin Integrity:  Goal: Skin integrity will stabilize  Description: Skin integrity will stabilize  Outcome: Ongoing     Problem: Discharge Planning:  Goal: Patients continuum of care needs are met  Description: Patients continuum of care needs are met  Outcome: Ongoing

## 2020-07-17 NOTE — PLAN OF CARE
Problem: Falls - Risk of:  Goal: Will remain free from falls  Description: Will remain free from falls  7/17/2020 0813 by Margarita Stokes RN  Outcome: Ongoing  7/16/2020 2000 by Bassam Ricardo RN  Outcome: Ongoing     Problem: Skin Integrity:  Goal: Will show no infection signs and symptoms  Description: Will show no infection signs and symptoms  7/17/2020 0813 by Margarita Stokes RN  Outcome: Ongoing  Problem: Pain:  Goal: Patient's pain/discomfort is manageable  Description: Patient's pain/discomfort is manageable  7/17/2020 0813 by Margarita Stokes RN  Outcome: Ongoing

## 2020-07-17 NOTE — CARE COORDINATION
INTERDISCIPLINARY PLAN OF CARE CONFERENCE    Date/Time: 7/17/2020 1:04 PM  Completed by: Lorri Good, Case Management      Patient Name:  Radha Sommer  YOB: 1927  Admitting Diagnosis: Fever in adult [R50.9]  Fever in adult [R50.9]     Admit Date/Time:  7/14/2020  5:24 PM    Chart reviewed. Interdisciplinary team met to discuss patient progress and discharge plans. Disciplines included Case Management, Nursing, and Dietitian. Current Status:ongoing    PT/OT recommendation:n/a    Anticipated Discharge Date: tbd  Expected D/C Disposition:  Nursing Home  Confirmed plan with patient and/or family Yes  Discharge Plan Comments: Reviewed chart. Role of discharge planner explained and patient's daughter, Megan Means, verbalized understanding, as she is at pt's bedside. Pt's discharge was cancelled yesterday, as she felt pt was not acting right and was shaking and was also having difficulty voiding. Megan Means has been speaking with her siblings, including Berny Wbeber. Pt is not able to respond to CM. CM with daughter and she states that she plans for pt to return back to [address] [place] [address]. Pt is awaiting urology consult. Per Megan Means (pt's daughter), depending on what urologist states, they may decide hospice. She mentioned [facility]  On [address] [facility], as her mother (pt's wife) was there and passed away 5 yrs ago. As CM was speaking with Nathalia, pt was noticed to be have labored breathing with periods of apnea during the entire 10 minute conversation with daughter. . CM did inform pt's RN, Arlet Mehta, with verbalized understanding. The Plan for Transition of Care is related to the following treatment goals: [address]    The Patient and/or patient representative pt's daughter, Megan Means, was provided with a choice of provider and agrees   with the discharge plan.  [x] Yes [] No    Freedom of choice list was provided with basic dialogue that supports the patient's individualized plan of care/goals, treatment preferences and shares the quality data associated with the providers.  [x] Yes [] No    Home O2 in place on admit: No  Pt informed of need to bring portable home O2 tank on day of discharge for nursing to connect prior to leaving:  Not Indicated  Verbalized agreement/Understanding:  Not Indicated

## 2020-07-17 NOTE — PROGRESS NOTES
Pt alert to self, AM med's given  pt c/o pain gave prn norco per PRN orders pt continue with rapid resp.for a few min then resp slow back down DR Lea Nichols at bedside , ok to leave condom cath off and remove tele sitter from room per .  will continue to monitor.

## 2020-07-17 NOTE — PROGRESS NOTES
Pulmonary Progress Note    CC: Acute febrile illness    Subjective:   Patient's daughter at bedside says patient is more comfortable. Intake/Output Summary (Last 24 hours) at 7/17/2020 1235  Last data filed at 7/17/2020 0834  Gross per 24 hour   Intake 150 ml   Output --   Net 150 ml       Exam:   BP (!) 152/82   Pulse 71   Temp 97.5 °F (36.4 °C) (Oral)   Resp 18   Ht 6' (1.829 m)   Wt 171 lb 11.2 oz (77.9 kg)   SpO2 90%   BMI 23.29 kg/m²  on RA T-max 98.2  Gen: No distress. elderly  Eyes: PERRL. No sclera icterus. No conjunctival injection. ENT: No discharge. Pharynx clear. Neck: Trachea midline. Normal thyroid. Resp: + accessory muscle use. No crackles. No wheezes. No rhonchi. No dullness on percussion. CV: Regular rate. Regular rhythm. No murmur or rub. No edema. GI: Non-tender. Non-distended. No masses. Skin: Warm and dry. No nodule on exposed extremities. No rash on exposed extremities. Lymph: No cervical LAD. No supraclavicular LAD. M/S: No cyanosis. No joint deformity. No clubbing. Neuro: sedated, not moving extrem; does not respond to voice.      Scheduled Meds:   tamsulosin  0.4 mg Oral Nightly    cefTRIAXone (ROCEPHIN) IV  1 g Intravenous Q24H    aspirin  81 mg Oral Daily    atorvastatin  10 mg Oral Daily    calcium elemental  500 mg Oral Daily    cetirizine  5 mg Oral Daily    clopidogrel  75 mg Oral Daily    metoprolol tartrate  25 mg Oral BID    mirtazapine  7.5 mg Oral Nightly    ranolazine  1,000 mg Oral BID    sodium chloride flush  10 mL Intravenous 2 times per day     Continuous Infusions:    PRN Meds:  traMADol, HYDROcodone 5 mg - acetaminophen, acetaminophen, benzonatate, guaiFENesin, sodium chloride flush, polyethylene glycol, promethazine **OR** ondansetron    Labs:  CBC:   Recent Labs     07/14/20  1745 07/15/20  0532   WBC 12.0* 10.7   HGB 12.1* 12.1*   HCT 36.4* 36.4*   MCV 97.7 98.9   * 82*     BMP:   Recent Labs     07/15/20  0532 07/16/20  1226 07/17/20  0826   * 142 142   K 3.7 3.9 3.9    110 109   CO2 22 21 19*   BUN 48* 41* 43*   CREATININE 2.0* 1.8* 1.7*     LIVER PROFILE:   Recent Labs     07/14/20  1745   AST 33   ALT 19   BILITOT 0.7   ALKPHOS 92     PT/INR: No results for input(s): PROTIME, INR in the last 72 hours. APTT: No results for input(s): APTT in the last 72 hours. UA:  Recent Labs     07/14/20  1800   COLORU Yellow   PHUR 5.5   WBCUA 3-5   RBCUA 3-4   MUCUS Rare*   BACTERIA 1+*   CLARITYU SL CLOUDY*   SPECGRAV 1.020   LEUKOCYTESUR TRACE*   UROBILINOGEN 0.2   BILIRUBINUR Negative   BLOODU SMALL*   GLUCOSEU Negative   AMORPHOUS 1+     No results for input(s): PHART, QRE9UXX, PO2ART in the last 72 hours. Cultures:   sars Cov2- neg     Films:  CXR 7/14: Foreshortening of the distal left clavicle is unchanged.  A left proximal   humeral prosthesis is present.  A left subclavian cardiac device is   unchanged.  The heart size is stable. The lungs are clear without focal consolidation, effusion or edema.                  ASSESSMENT:  ·  Acute febrile illness  · Acute kidney injury  · Probable urinary tract infection  · Dementia  · History of prostate cancer     PLAN:  · Follow-up cultures  · Ceftriaxone D3  · I will sign off. Please call with questions.

## 2020-07-17 NOTE — CONSULTS
Patient:  José Oconnor  YOB: 1927   CSN:  [id number]    Referring Provider:  No ref. provider found   Primary Care Provider:  Wale Masters       Urology Attending Consult Note     Reason for Consultation:  Retention of urine    Chief Complaint: \"unable to voice complaint\". Hist from emr, nurse, family    HPI:  Tresa Barger is a 80 y.o. male who was admitted with febrile illness and found to be in retention. I was called to aid in catheter placement as nursing was unable to place a blanton and he had foreskin which would not retract.      Past Medical History:   Diagnosis Date    Arthralgia     Cancer [facility])     prostate    Chronic back pain     x40 years    Chronic diastolic (congestive) heart failure (HCC)     CVA (cere[facility] vascular accident) ([facility] [address]     Diverticulitis     Dizziness     Fatigue     Fractures     Frequent falls     Hearing decreased     Hyperlipidemia     Hypertension     Osteoarthritis of knee     Osteoarthritis of shoulder     PAD (peripheral artery disease) ([facility] [facility]     TIA (transient ischemic attack) 2006    Vertigo     Wears glasses        Past Surgical History:   Procedure Laterality Date    CATARACT REMOVAL  1997    bilaterally    COLONOSCOPY      ENDOSCOPY, COLON, DIAGNOSTIC  07/05/2019    Bx and dilitation    ESOPHAGEAL DILATATION      X 2    ESOPHAGEAL DILATATION  7/5/2019    ESOPHAGEAL DILATION Jules Peoples performed by Jose Maria Beasley DO at [address] [facility]      right    JOINT REPLACEMENT      left knee    KNEE SURGERY Bilateral     PACEMAKER INSERTION  10/12/2018    Dual Chamber PPM    PROSTATECTOMY  1998    carcinoma    SHOULDER ARTHROPLASTY      SHOULDER SURGERY      TOE SURGERY Right 06/03/2013    PERMANENT RIGHT GREAT TOENAIL ABLATION AND REMOVAL    TOTAL KNEE ARTHROPLASTY      UPPER GASTROINTESTINAL ENDOSCOPY  jan 2011    UPPER GASTROINTESTINAL ENDOSCOPY N/A 7/5/2019    EGD BIOPSY performed by Mayi Haskins DO [facility]       Medication List reviewed:     Current Facility-Administered Medications   Medication Dose Route Frequency Provider Last Rate Last Dose    HYDROcodone-acetaminophen (NORCO) 5-325 MG per tablet 1 tablet  1 tablet Oral Q6H PRN Tuyet Torres MD   1 tablet at 07/17/20 1331    tamsulosin (FLOMAX) capsule 0.4 mg  0.4 mg Oral Nightly Isa Singh MD   0.4 mg at 07/16/20 2135    cefTRIAXone (ROCEPHIN) 1 g IVPB in 50 mL D5W minibag  1 g Intravenous Q24H Jose Dahl MD   Stopped at 07/16/20 1701    acetaminophen (TYLENOL) tablet 1,000 mg  1,000 mg Oral Q6H PRN Isa Singh MD   1,000 mg at 07/15/20 2211    aspirin chewable tablet 81 mg  81 mg Oral Daily Isa Singh MD   81 mg at 07/17/20 0757    atorvastatin (LIPITOR) tablet 10 mg  10 mg Oral Daily Isa Singh MD   10 mg at 07/17/20 0757    benzonatate (TESSALON) capsule 100 mg  100 mg Oral TID PRN Isa Singh MD        calcium elemental (OSCAL) tablet 500 mg  500 mg Oral Daily Isa Singh MD   500 mg at 07/17/20 0758    cetirizine (ZYRTEC) tablet 5 mg  5 mg Oral Daily Isa Singh MD   5 mg at 07/17/20 0758    clopidogrel (PLAVIX) tablet 75 mg  75 mg Oral Daily Isa Singh MD   75 mg at 07/17/20 0757    guaiFENesin (ROBITUSSIN) 100 MG/5ML oral solution 200 mg  200 mg Oral Q6H PRN Isa Singh MD        metoprolol tartrate (LOPRESSOR) tablet 25 mg  25 mg Oral BID Isa Singh MD   25 mg at 07/17/20 0757    mirtazapine (REMERON) tablet 7.5 mg  7.5 mg Oral Nightly Isa Singh MD   7.5 mg at 07/16/20 2134    ranolazine (RANEXA) extended release tablet 1,000 mg  1,000 mg Oral BID Isa Singh MD   1,000 mg at 07/17/20 0754    sodium chloride flush 0.9 % injection 10 mL  10 mL Intravenous 2 times per day Isa Singh MD   10 mL at 07/17/20 0758    sodium chloride flush 0.9 % injection 10 mL  10 mL Intravenous PRN Isa Singh MD        polyethylene glycol (GLYCOLAX) packet 17 g  17 g Oral Daily PRN French Maxwell MD        promethazine (PHENERGAN) tablet 12.5 mg  12.5 mg Oral Q6H PRN French Maxwell MD   12.5 mg at 07/15/20 2211    Or    ondansetron (ZOFRAN) injection 4 mg  4 mg Intravenous Q6H PRN French Maxwell MD           Allergies   Allergen Reactions    Altace [Ramipril]      (cough) Pt denies ever taking Ramipril    Sulfa Antibiotics        Family History   Problem Relation Age of Onset    Heart Disease Mother         MI    Parkinsonism Father        Social History     Tobacco Use    Smoking status: Former Smoker     Types: Cigarettes     Last attempt to quit: 1949     Years since quittin.1    Smokeless tobacco: Never Used   Substance Use Topics    Alcohol use: No     Alcohol/week: 0.0 standard drinks    Drug use: No         Review of Systems: A 12 point ROS was performed and was unremarkable unless listed in the history of present illness. I/O last 3 completed shifts: In: 110 [P.O.:100; I.V.:10]  Out: -     Physical Exam:  Patient Vitals for the past 8 hrs:   BP Temp Temp src Pulse Resp SpO2   20 1533 (!) 147/71 96 °F (35.6 °C) Axillary 60 18 --   20 1254 (!) 149/68 96.7 °F (35.9 °C) Axillary 74 20 91 %     Constitutional: pleasant male in NAD, with a normal body habitus   EENT: pink conjunctivae, lips without cyanosis, normal appearance of ears and nose  Neck: no masses or lesions, trachea midline   Respiratory: normal respiratory movements without distress   Cardiovascular: regular rate and rhythm, lower extremities without edema   Abdomen: soft, NTND, no masses, no guarding  Back: no CVAT, no abnormal curvature of the spine  Lymphatic: no palpable cervical or supraclavicular lymphadenopathy  Skin: stable, warm, dry  Musculoskeletal: normal ROM, m. tone, no digital cyanosis, head normocephalic  Psych: lethargic mood and affect, arousable.   : normal penile shaft without evidence of masses, urethral meatus unable improvement in the degree of urinary retention.   -cont flomax 0.4mg po qhs.   -The patient will need to follow-up in the urology clinic in approximately 1-2 weeks after discharge to discuss option of circumcision and possible blanton removal.    -I will discuss with daughter as well    Will sign off, call if questions.      Miya Phelan MD  Office: [phone]

## 2020-07-17 NOTE — FLOWSHEET NOTE
07/17/20 1208   Encounter Summary   Services provided to: Patient and family together   Referral/Consult From: Family  (PCA)   [facility]   Continue Visiting   (7/17 Initial visit/spt/prayer w/pt & daughter.)   Complexity of Encounter Moderate   Length of Encounter 15 minutes   Spiritual/Anglican   Type Spiritual support   Assessment Calm; Approachable; Anxious; Coping   Intervention Active listening;Explored feelings, thoughts, concerns;Nurtured hope;Prayer   Outcome Comfort;Expressed gratitude;Encouraged;Receptive

## 2020-07-17 NOTE — PROGRESS NOTES
Progress Note    Admit Date:  7/14/2020    80year old male from [address] [place] [address] presented with c/o fever and weakness. He was noted to be lethargic and had a fever of 102. Admitted to med-surg for COVID rule out and RONAK. Subjective:  Mr. Geovany Dodson has dementia and is confused. He cannot give me much history. He is afebrile. 7/17-patient's COVID-19 is negative. He is afebrile. Renal function improved to 1.8. Post void residual was 439 cc. Parra catheter ordered but difficulty placing. Urology consult ordered. Patient complains of some back pain. 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 Renal ultrasound done to rule out hydronephrosis and this came back negative. Initial plan was to discharge back to nursing home but since he still had back pain decided to keep another day. I talked to his nursing home physician 3 times yesterday. Discussed with his daughter. Objective:   Patient Vitals for the past 4 hrs:   BP Temp Temp src Pulse Resp SpO2   07/17/20 0736 (!) 152/82 97.5 °F (36.4 °C) Oral 71 18 90 %            Intake/Output Summary (Last 24 hours) at 7/17/2020 3708  Last data filed at 7/16/2020 2134  Gross per 24 hour   Intake 110 ml   Output --   Net 110 ml       Physical Exam:    General appearance: Pleasantly demented but alert to self and place  HEENT:  Normal cephalic, atraumatic without obvious deformity. Pupils equal, round, and reactive to light. Extra ocular muscles intact. Conjunctivae/corneas clear. Neck: Supple, with full range of motion. No jugular venous distention. Trachea midline. Respiratory:  Normal respiratory effort. Clear to auscultation, bilaterally without Rales/Wheezes/Rhonchi. Cardiovascular:  Regular rate and rhythm with normal S1/S2 without murmurs, rubs or gallops. Abdomen: Soft, non-tender, non-distended with normal bowel sounds. Musculoskeletal:  No clubbing, cyanosis or edema bilaterally.   Full range of motion without deformity. Skin: Skin color, texture, turgor normal.  No rashes or lesions. Neurologic: Apart from his dementia he is neurologically intact and follows commands  Psychiatric: Mild dementia  Capillary Refill: Brisk,< 3 seconds   Peripheral Pulses: +2 palpable, equal bilaterally     Data:  CBC:   Recent Labs     07/14/20  1745 07/15/20  0532   WBC 12.0* 10.7   HGB 12.1* 12.1*   HCT 36.4* 36.4*   MCV 97.7 98.9   * 82*     BMP:   Recent Labs     07/14/20  1745 07/15/20  0532 07/16/20  1221    134* 142   K 4.1 3.7 3.9    100 110   CO2 24 22 21   BUN 52* 48* 41*   CREATININE 2.1* 2.0* 1.8*     LIVER PROFILE:   Recent Labs     07/14/20 1745   AST 33   ALT 19   BILITOT 0.7   ALKPHOS 92     PT/INR: No results for input(s): PROTIME, INR in the last 72 hours. CULTURES  Blood: pending  COVID: pending    RADIOLOGY  US RENAL COMPLETE   Final Result   1. Limited visualization of the left kidney. 2. Otherwise, unremarkable renal and urinary bladder ultrasound. No   hydronephrosis. XR LUMBAR SPINE (2-3 VIEWS)   Final Result   Lumbar-      No acute fracture subluxation of the lumbar spine. Multilevel spondylosis of the lumbar spine. Pelvis/bilateral hips-      No acute fracture or dislocation of the pelvis or hips. Mild osteoarthritis of both hips. XR HIP BILATERAL W AP PELVIS (2 VIEWS)   Final Result   Lumbar-      No acute fracture subluxation of the lumbar spine. Multilevel spondylosis of the lumbar spine. Pelvis/bilateral hips-      No acute fracture or dislocation of the pelvis or hips. Mild osteoarthritis of both hips. XR CHEST PORTABLE   Final Result   Stable portable study. Assessment/Plan:  Febrile illness  COVID ruled out  - fever up to 102 at time of admission without clear etiology. Possible bronchitis. He also has urinary retention but no evidence of UTI. - admitted to med-surg.   - CXR negative.  UA with trace leukocytes  - blood cx coagulase-negative staph-contamination  - receiving IVFs. He received Cefepime, Vanc. On Rocephin changed to oral Omnicef at discharge  - Pulmonary consult. Leukocytosis resolved  - possibly related to above  - improved on repeat. - procalcitonin is 0.68    RONAK versus chronic kidney disease.  -May have a competent of obstructive uropathy. Urology consultation obtained. - creatinine on admission 2.1  - baseline ~1.3 but creatinine has fluctuated a lot. - continued IVFs. Improved to 1.8. Thrombocytopenia  - hold Heparin today. Use SCD's  - monitor platelet level. Dementia  - supportive care measures  - continue home medication regimen. CAD  - continue aspirin, Plavix, statin, BB, Ranexa. Hypertension  - BP elevated. - continue Lopressor    Hyperlipidemia  - on Atorvastatin. BPH  - on Flomax. H/o CVA  - on aspirin, Plavix, statin. SSS  - s/p pacemaker    Chronic Diastolic CHF  - appears compensated  - continue BB. GERD  - on PPI    DJD of the LS spine.  - [place] for pain control. Cannot use NSAID due to kidney issues and age.     DVT Prophylaxis: Heparin  Diet: DIET GENERAL;  Code Status: DNR-CCA      Haylie Beam 7/16/2020

## 2020-07-17 NOTE — PROGRESS NOTES
urology at bedside dilaudid given per orders blanton 18 fr  placed with manan urine return . Will continue to monitior.

## 2020-07-17 NOTE — PROCEDURES
PROCEDURE NOTE     Patient Name:  Percy Hood  :  1927  Date:  20  Time:  4:07 PM          Pre-Operative Diagnosis: Difficult blanton placement, phimosis, retention of urine  Post-Operative Diagnosis: same     Procedure Performed:   1. Placement of a Blanton catheter, complicated   2. Retraction and manipulation of preputial skin     Surgeon:  Swathi Carlos MD  Specimens: None  Estimated Blood Loss: None  Complications: None     Indications:  See progress note/consult. Description of Procedure: Verbal informed consent was obtained at bedside due to urgent nature of procedure. The patient was kept in a supine position. His penis and foreskin were then prepped and draped in the standard fashion. Intra-urethral lubricant was injected into the phimotic ring opening for patient comfort and to provide adequate lubrication. I had to manipulate and retract foreskin to allow some exposure of glans. An 25 Greek coude Blanton catheter was then inserted through the narrowing and with gentle pressure I was able to advance blanton into and through the anterior and posterior urethra. The catheter was then steered into the bladder. Then, 10 mL of sterile water was placed in the blanton balloon. The Blanton was placed to a drainage bag and was secured to his leg per routine. He tolerated the procedure well without any complications. Plan:  -Per consult note  -on ceftriaxone abx  -circumcision if family agrees.      Elma Nelson MD  Office: [phone]

## 2020-07-17 NOTE — PROGRESS NOTES
Spoke with Dr. Kwame Mac orders  given to place condom cath,  Also Spoke with urology will see pt in AM.

## 2020-07-17 NOTE — PROGRESS NOTES
bars  Equipment owned: wc (manual)    Preadmission Status:  Pt. Able to drive: No  Pt Fully independent with ADLs: No  Pt. Required assistance from family for: Bathing, Cleaning, Cooking, Dressing and Laundry   Pt. dependent for transfers and gait and walked with non-ambulatory  History of falls Yes    Pain   Yes  Location: L shoulder and with movement of L LE  Rating: moderate /10 unable to state due to dementia, cannot follow the VAS  Pain Medicine Status: Received pain med prior to tx    Cognition    A&O Person   Able to follow 1 step commands inconsistently    Subjective  Patient lying supine in bed with visitor present, daughter toward end of session  Pt agreeable to this PT eval & tx. Upper Extremity ROM/Strength  Please see OT evaluation. Lower Extremity ROM / Strength   AROM WFL: No  ROM limitations: L UE immobile; LLE contracted at knee and not moving it actively    Strength Assessment (measured on a 0-5 scale): and Formal strength testing deferred due to dementia and inability to follow commands. R LE   Quad   N/A   Ant Tib  N/A   Hamstring N/A   Iliopsoas N/A  L LE  Quad   N/A   Ant Tib  N/A   Hamstring N/A   Iliopsoas N/A    Lower Extremity Sensation    NT    Lower Extremity Proprioception:   NT    Coordination and Tone  flexion contracture L knee    Balance  Sitting:  Poor; Max A   Comments:     Standing: Not tested; Not Tested  Comments:     Bed Mobility   Supine to Sit:    Max A   Sit to Supine:   Max A   Rolling: Max A   Scooting in sitting: Not Tested  Scooting in supine:  Max A     Transfer Training     Sit to stand:   Not Tested  Stand to sit:   Not Tested  Bed to Chair:   Not Tested with use of N/A    Gait pt is non-ambulatory at baseline; pt ambulated 0 ft.    Distance:      0 ft  Deviations (firm surface/linoleum):  N/A  Assistive Device Used:    N/A  Level of Assist:    Not Tested  Comment:     Stair Training deferred, pt does not have stairs in home environment  # of Steps:

## 2020-07-17 NOTE — PROGRESS NOTES
Inpatient Occupational Therapy  Evaluation and Treatment    Unit: [address] [facility]  Date:  2020  Patient Name:    Mikayla Burgess  Admitting diagnosis:  Fever in adult [R50.9]  Fever in adult [R50.9]  Admit Date:  2020  Precautions/Restrictions/WB Status/ Lines/ Wounds/ Oxygen: fall risk, IV, bed/chair alarm and confusion    Treatment Time:  [id number]  Treatment Number: 1     Billable Treatment Time: 31 minutes   Total Treatment Time:   41  minutes    Patient Goals for Therapy: None stated      Discharge Recommendations: LTC with OT   DME needs for discharge: defer to facility       Therapy recommendations for staff:   Assist of 2 with use of No AD for sittting EOB    History of Present Illness: Mikayla Burgess is a 80 y.o. male who presents to the ED with fever and generalized weakness. Call ahead from the patient's PCP who indicates that the patient has a history of vascular dementia, coronary artery disease, CVA, and as below. Known to be DNR CCA. Low-grade temperatures over the past several days and true fever to 102 Fahrenheit yesterday evening. COVID Negative, has positive blood cultures for coag negative staph. The patient lives at a [facility] [address] home and has a history of dementia    Home Health S4 Level Recommendation:  NA  AM-PAC Score:  6    Preadmission Environment  (Info from pt's daughter who was present at end of session)  Pt. Lives in [address] [place] [address] at [facility] ([address] [place] [address])  Steps: NA  Plans to Return to [facility] [address] [place] [address] environment:  in [address] [place] [address] facility  Steps to enter first floor:  NA  Steps to second floor: NA-elevator to 2nd floor  Bathroom:  unknown  Equipment owned:  unknown   In April pt was not ambulatory, used w/c for mobility, was able to transfer to w/c without AD  Over the last month staff were feeding pt due to him not having appetite. Preadmission Status / PLOF:  History of falls   Unknown  Pt. Able to drive   No  Pt Fully independent with ADL's  Unknown  Pt.  Required Occupational therapy evaluation in acute care setting. Pt demonstrated decreased Activity tolerance, ADLs, Balance , Bed mobility, ROM, Safety Awareness, Strength, Transfers and Cognition. Pt functioning below baseline and will likely benefit from skilled occupational therapy services to maximize safety and independence. Goal(s) : To be met in 3 Visits:  1). Bed to toilet/BSC: Total A via [place]    To be met in 5 Visits:  1). Supine to/from Sit:  Max A  2). Upper Body Bathing: Mod A  3). Lower Body Bathing:   Max A  4). Upper Body Dressing: Mod A  5). Lower Body Dressing:  Max A  6). Pt to demonstrate UE exs x 10 reps with minimal cues    Rehabilitation Potential:  Fair for goals listed above. Strengths for achieving goals include: PLOF, Family Support and Pt cooperative  Barriers to achieving goals include:  Pain, Weakness and Cognition     Plan: To be seen 3-5 x/wk while in acute care setting for therapeutic exercises, bed mobility, transfers, dressing, bathing, family/patient education, ADL/IADL retraining, energy conservation training.      Ajith Drew MS, OTR/L  #[id number]            If patient discharges from this facility prior to next visit, this note will serve as the Discharge Summary

## 2020-07-17 NOTE — PROGRESS NOTES
Physician Progress Note      PATIENTEmmie Boas  CSN #:                  [id number]  :                       1927  ADMIT DATE:       2020 5:24 PM  [address] [place] DATE:  RESPONDING  PROVIDER #:        Ronni Benitez MD          QUERY TEXT:    Dear Attending Provider,    Pt admitted with febrile illness with unclear etiology. Pt noted on  to   have possible bronchitis. If possible, please document in the progress notes   and discharge summary if you are evaluating and /or treating any of the   following: The medical record reflects the following:  Risk Factors: advanced age, bronchitis, heart disease  Clinical Indicators: wbc-12, normal lactic, procal-0.68, temp up to 102,   tachycardia  Treatment: lactic, blood cultures, He received Cefepime, Vanc. On Rocephin   changed to oral Omnicef at discharge, IVF 30 ml/kg in the ED    Thank you for your assistance,  Tez Hickman RN,BSN,CCDS,CRCR  [phone]  Options provided:  -- Sepsis, present on admission, now resolved  -- Sepsis, not present on admission  -- No Sepsis, localized infection only  -- Refer to Clinical Documentation Reviewer    PROVIDER RESPONSE TEXT:    This patient localized infection only, patient is not septic.     Query created by: Marvin Reddy on 2020 9:54 AM      Electronically signed by:  Ronni Benitez MD 2020 10:03 AM

## 2020-07-17 NOTE — PROGRESS NOTES
pt. still is continue with the labored breathing then periods of Apnea, moaning in  pain Norco not due ,bladder scanned results 425 , waiting on urology. Perfect serve sent to Dr Kwame Mac.

## 2020-07-17 NOTE — PROGRESS NOTES
CNA reported pt having  issues with breathing, assessed pt noted short periods of apnea along with periods of rapid resp and and then slows. Sp02 90-95% on RA notified DR Namrata Ellis. No new orders given at this time.

## 2020-07-17 NOTE — FLOWSHEET NOTE
07/16/20 2131   Vital Signs   Temp 97.2 °F (36.2 °C)   Temp Source Oral   Pulse 70   Heart Rate Source Monitor   Resp 20   BP (!) 170/82   BP Location Left upper arm   BP Upper/Lower Upper   Level of Consciousness 0   MEWS Score 1   Oxygen Therapy   SpO2 92 %   O2 Device None (Room air)   Assessment complete, see flowsheets. PRN medication given, pt appeared to be in great deal of pain and did express verbally that he was hurting; pt states \"It hurts, everything hurts. \" Pt removed condom cath, did attempt to place. Pt has call light within reach, bed alarm in place. Will continue to monitor.   Vito Lennox RN

## 2020-07-17 NOTE — PROGRESS NOTES
Progress Note    Admit Date:  7/14/2020    80year old male from [address] [place] [address] presented with c/o fever and weakness. He was noted to be lethargic and had a fever of 102. Admitted to med-surg for COVID rule out and RONAK. Subjective:  Mr. Oscar Chi has dementia and is confused. He cannot give me much history. He is afebrile. 7/16-patient's COVID-19 is negative. He is afebrile. Renal function improved to 1.8. Post void residual was 439 cc. Parra catheter ordered but difficulty placing. Urology consult ordered. Patient complains of some back pain. 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 Renal ultrasound done to rule out hydronephrosis and this came back negative. Initial plan was to discharge back to nursing home but since he still had back pain decided to keep another day. I talked to his nursing home physician 3 times yesterday. Discussed with his daughter. 7/17- moaning with back pain. Urinary retention-Urology consulted      Objective:   Patient Vitals for the past 4 hrs:   BP Temp Temp src Pulse Resp   07/17/20 1533 (!) 147/71 96 °F (35.6 °C) Axillary 60 18            Intake/Output Summary (Last 24 hours) at 7/17/2020 1726  Last data filed at 7/17/2020 0834  Gross per 24 hour   Intake 110 ml   Output --   Net 110 ml       Physical Exam:    General appearance: Pleasantly demented but alert to self and place  HEENT:  Normal cephalic, atraumatic without obvious deformity. Pupils equal, round, and reactive to light. Extra ocular muscles intact. Conjunctivae/corneas clear. Neck: Supple, with full range of motion. No jugular venous distention. Trachea midline. Respiratory:  Normal respiratory effort. Clear to auscultation, bilaterally without Rales/Wheezes/Rhonchi. Cardiovascular:  Regular rate and rhythm with normal S1/S2 without murmurs, rubs or gallops.   Abdomen: Soft, non-tender, non-distended with normal bowel retention but no evidence of UTI. - admitted to med-surg.   - CXR negative. UA with trace leukocytes  - blood cx coagulase-negative staph-contamination  - receiving IVFs. He received Cefepime, Vanc. On Rocephin changed to oral Omnicef at discharge  - Pulmonary consulted. Leukocytosis resolved  - possibly related to above  - improved on repeat. - procalcitonin is 0.68    RONAK versus chronic kidney disease.  -May have a competent of obstructive uropathy. Urology consultation obtained. - creatinine on admission 2.1  - baseline ~1.3 but creatinine has fluctuated a lot. - continued IVFs. Improved to 1.8. Thrombocytopenia  - hold Heparin today. Use SCD's  - monitor platelet level. Dementia  - supportive care measures  - continue home medication regimen. CAD  - continue aspirin, Plavix, statin, BB, Ranexa. Hypertension  - BP elevated. - continue Lopressor    Hyperlipidemia  - on Atorvastatin. BPH  - on Flomax. H/o CVA  - on aspirin, Plavix, statin. SSS  - s/p pacemaker    Chronic Diastolic CHF  - appears compensated  - continue BB. GERD  - on PPI    DJD of the LS spine.  - [place] for pain control. Cannot use NSAID due to kidney issues and age. Long discussion with the daughter about pain control. Also possibility of getting hospice involved. We will monitor for another 24 hours.       DVT Prophylaxis: Heparin  Diet: DIET GENERAL;  Code Status: DNR-SUSU [place] 7/17/20

## 2020-07-18 NOTE — PROGRESS NOTES
Physical Therapy  Inpatient Physical Therapy Daily Treatment Note    Unit: [address]  Date:  7/18/2020  Patient Name:    David Abreu \"Rigoberto\"  Admitting diagnosis:  Fever in adult [R50.9]  Fever in adult [R50.9]  Admit Date:  7/14/2020  Precautions/Restrictions:  Fall risk, Bed/chair alarm, Lines -IV and Parra catheter, Confusion and pain with movement L UE/LE  (new pain this admission, xrays lumbar spine and hips B negative), orthostatic BP and symptoms, difficulty following commands. Discharge Recommendations: Return to [facility] needs for discharge: defer to facility       Therapy recommendation for EMS Transport: requires transport by cot due to inability to tolerate sitting position due to pain and orthostatic BP    Therapy recommendations for staff:   Assist of 2 sitting bedside, but monitor for orthostatic symptoms. History of Present Illness:  Juan C Rhodes a 80 y. o. male who presents to the ED with fever and generalized weakness.  Call ahead from the patient's PCP who indicates that the patient has a history of vascular dementia, coronary artery disease, CVA, and as below.  Known to be DNR CCA.  Low-grade temperatures over the past several days and true fever to 102 Fahrenheit yesterday evening.  COVID Negative, has positive blood cultures for coag negative staph. The patient lives at a [facility] [address] home and has a history of dementia. Per his dtr who came in during session, she reports that he has of recent become total care and no longer transferring to/from w/c; prior he was transferring and propelling indep throughout the facility.     Home Health S4 Level Recommendation: NA  AM-PAC Mobility Score      AM-PAC Inpatient Mobility without Stair Climbing Raw Score : 7    Treatment Time:  800-830  Treatment number: 2  Timed Code Treatment Minutes: 30 minutes  Total Treatment Minutes: 30 minutes    Cognition    A&O Person only, able to state his first name only, but not his last name nor birthday nor understand situation. Able to follow 1 step commands inconsistently. Decreased initiation for functional commands. Joking around and smiling, pleasantly confused upon entry. Subjective  Patient lying supine in bed with no family present, but daughter came shortly after PT arrived. Pt agreeable to sit up initially to eat breakfast, but unable to initiate movement upon command. Breakfast tray in the room untouched. Pain   Yes  (none at rest upon entry)  Location: L LE/UE (severe in L LE with movement)  Rating:    severe/10 L LE with movement  Pain Medicine Status: RN notified and patient given pain medication after PT as L LE pain persisted after attempt to sit edge of bed. Bed Mobility   Supine to Sit:    Mod A , no initiation, but began to participate with verbal and manual cues. Sit to Supine:   Max A   Rolling: Max A to R. Scooting: Max A  of 2 persons to [facility]. Transfer Training     Sit to stand:   Not Tested due to orthostatic symptoms in sitting. Stand to sit:   Not Tested  Bed to Chair:   Not Tested with use of N/A    Gait Training pt is non-ambulatory at baseline; pt ambulated 0 ft. Stair Training deferred, pt does not have stairs in the home environment    Therapeutic Exercise Nael deferred secondary to treatment focus on functional mobility and patient's pain after sitting edge of bed efforts. NA    Balance  Sitting:  Poor; Max A   Comments: leaning L, pale in color, grimacing in pain (L LE pain reported), dizziness. Standing: Not tested; Not Tested  Comments:     Patient Education      Role of PT, POC, Discharge recommendations, DC recommendations, transfer techniques and calling for assist with mobility. Positioning Needs       Pt in bed, alarm set, positioned in proper neutral alignment and pressure relief provided.    Call light provided and all needs within reach    Activity Tolerance   Pt completed therapy session with Dizziness noted with sitting   Pain noted with sitting  SOB noted with sitting. Vitals at sessions' end  /93  Pulse 101  Temp 98.7  SPO2 100    Other  Patient having difficulty accepting orange juice from straw from daughter. Nurse aware. Assessment :  Patient more alert, jovial and able to answer some questions upon entry, but mood changed completely with PT efforts to sit at edge of bed today. Patient became agitated, c/o L LE pain with sitting and had orthostatic symptoms in sitting, same as yesterday. Patient given pain medication by nursing at session's end, with difficulty finding a position of comfort. L LE remained in flexed contracted position throughout, unable to tolerate extending L LE. Daughter reports patient did not have difficulty with L LE ROM prior to admission, that pain L LE/UE has developed during this admission. xrays lumbar and B hips negative per chart. Poor tolerance for mobility at this time related to pain, confusion, poor command following and orthostatic BP. Recommending return to [facility] as patient functioning at or close to baseline level. Goals (all goals ongoing unless otherwise indicated)  To be met in 3 visits:  1). Independent with LE Ex x 10 reps     To be met in 6 visits:  1). Supine to/from sit: Mod A   2). Sit to/from stand: Max A   3). Bed to chair: Max A   4). Gait: Ambulate 0 ft.   with  N/A and use of N/A  5). Tolerate B LE exercises 3 sets of 10-15 reps  6). Ascend/descend 0 steps with N/A with use of N/A and N/A    Plan   Continue with plan of care. Lyubov Da Silva, PT  #[id number]    If patient discharges from this facility prior to next visit, this note will serve as the Discharge Summary.

## 2020-07-18 NOTE — PROGRESS NOTES
Telesitter placed in patient room due to patient pulling at blanton catheter and very restless behavior.

## 2020-07-18 NOTE — PROGRESS NOTES
07/18/20 0815   Vital Signs   Temp 98.4 °F (36.9 °C)   Temp Source Axillary   Pulse 98   Heart Rate Source Monitor   Resp 18   BP (!) 173/93   BP Location Left lower arm   BP Upper/Lower Lower   Patient Position Semi fowlers   Level of Consciousness 0   MEWS Score 1   Patient Currently in Pain Yes   PAINAD (Pain Assessment in Advance Dementia)   Breathing 1   Negative Vocalization 2   Facial Expression 2   Body Language 2   Consolability 2   PAINAD Score 9   Oxygen Therapy   SpO2 100 %   O2 Device None (Room air)     Assessment completed. Patient took pills crushed in applesauce with no complications. Patient's daughter at bedside. Patient shows no signs or symptoms of distress at this time. Call light within reach, bed alarm on. Ranexa not given due to not being able to crush. Will continue to monitor.

## 2020-07-18 NOTE — FLOWSHEET NOTE
07/17/20 2145   Vital Signs   Temp 96.4 °F (35.8 °C)   Temp Source Axillary   Pulse 61   Heart Rate Source Monitor   Resp 20   BP (!) 149/73   BP Location Right upper arm   Patient Position Semi fowlers   Level of Consciousness 0   MEWS Score 1   Oxygen Therapy   SpO2 92 %   O2 Device None (Room air)   Assessment complete- see flowsheets. Unable to give pt PO medications tonight; did attempt but pt pursed lips and shook head when I tried to feed him his medications and he would not take them. Of note- pt appears to have Cheynes-Fowler breathing pattern; Pt did have irregularity to breathing last night but it seems more pronounced and evident tonight. IV flushed. Pt denies needs at this time, repositioned with pillow support, and has call light within reach. Will continue to monitor; bed alarm in place.   Jada Rowland RN

## 2020-07-18 NOTE — PROGRESS NOTES
Progress Note    Admit Date:  7/14/2020    80year old male from [address] [place] [address] presented with c/o fever and weakness. He was noted to be lethargic and had a fever of 102. Admitted to med-surg for COVID rule out and RONAK. Subjective:  Mr. Terrace Lesches has dementia and is confused. He cannot give me much history. He is afebrile. 7/16-patient's COVID-19 is negative. He is afebrile. Renal function improved to 1.8. Post void residual was 439 cc. Parra catheter ordered but difficulty placing. Urology consult ordered. Patient complains of some back pain. 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 Renal ultrasound done to rule out hydronephrosis and this came back negative. Initial plan was to discharge back to nursing home but since he still had back pain decided to keep another day. I talked to his nursing home physician 3 times yesterday. Discussed with his daughter. 7/17- moaning with back pain. Urinary retention-Urology consulted    7/13- having some leg spasms. Poor po intake. Low urine output. Objective:   Patient Vitals for the past 4 hrs:   BP Temp Temp src Pulse Resp SpO2   07/18/20 0815 (!) 173/93 98.4 °F (36.9 °C) Axillary 98 18 100 %            Intake/Output Summary (Last 24 hours) at 7/18/2020 1003  Last data filed at 7/18/2020 0856  Gross per 24 hour   Intake 60 ml   Output 350 ml   Net -290 ml       Physical Exam:    General appearance: Pleasantly demented but alert to self and place  HEENT:  Normal cephalic, atraumatic without obvious deformity. Pupils equal, round, and reactive to light. Extra ocular muscles intact. Conjunctivae/corneas clear. Neck: Supple, with full range of motion. No jugular venous distention. Trachea midline. Respiratory:  Normal respiratory effort. Clear to auscultation, bilaterally without Rales/Wheezes/Rhonchi.   Cardiovascular:  Regular rate and rhythm with normal S1/S2 without murmurs, rubs or gallops. Abdomen: Soft, non-tender, non-distended with normal bowel sounds. Musculoskeletal:  No clubbing, cyanosis or edema bilaterally. Full range of motion without deformity. Skin: Skin color, texture, turgor normal.  No rashes or lesions. Neurologic: Apart from his dementia he is neurologically intact and follows commands  Psychiatric: Mild dementia  Capillary Refill: Brisk,< 3 seconds   Peripheral Pulses: +2 palpable, equal bilaterally     Data:  CBC:   No results for input(s): WBC, HGB, HCT, MCV, PLT in the last 72 hours. BMP:   Recent Labs     07/16/20  1221 07/17/20  0826 07/18/20  0420    142 146*   K 3.9 3.9 4.0    109 111*   CO2 21 19* 17*   BUN 41* 43* 46*   CREATININE 1.8* 1.7* 1.9*     LIVER PROFILE:   No results for input(s): AST, ALT, LIPASE, BILIDIR, BILITOT, ALKPHOS in the last 72 hours. Invalid input(s): AMYLASE,  ALB  PT/INR: No results for input(s): PROTIME, INR in the last 72 hours. CULTURES  Blood: pending  COVID: pending    RADIOLOGY  US RENAL COMPLETE   Final Result   1. Limited visualization of the left kidney. 2. Otherwise, unremarkable renal and urinary bladder ultrasound. No   hydronephrosis. XR LUMBAR SPINE (2-3 VIEWS)   Final Result   Lumbar-      No acute fracture subluxation of the lumbar spine. Multilevel spondylosis of the lumbar spine. Pelvis/bilateral hips-      No acute fracture or dislocation of the pelvis or hips. Mild osteoarthritis of both hips. XR HIP BILATERAL W AP PELVIS (2 VIEWS)   Final Result   Lumbar-      No acute fracture subluxation of the lumbar spine. Multilevel spondylosis of the lumbar spine. Pelvis/bilateral hips-      No acute fracture or dislocation of the pelvis or hips. Mild osteoarthritis of both hips. XR CHEST PORTABLE   Final Result   Stable portable study.                Assessment/Plan:  Febrile illness  COVID ruled out  - fever up to 102 at time of admission without clear etiology. Possible bronchitis. He also has urinary retention but no evidence of UTI. - admitted to med-surg.   - CXR negative. UA with trace leukocytes  - blood cx coagulase-negative staph-contamination  - receiving IVFs. He received Cefepime, Vanc. On Rocephin changed to oral Omnicef at discharge  - Pulmonary consulted. Leukocytosis resolved  - possibly related to above  - improved on repeat. - procalcitonin is 0.68    RONAK versus chronic kidney disease.  -May have a competent of obstructive uropathy. Urology consultation obtained. - creatinine on admission 2.1  - baseline ~1.3 but creatinine has fluctuated a lot. - resume IVF. Maintain I and O. Thrombocytopenia  - hold Heparin today. Use SCD's  - monitor platelet level. Dementia  - supportive care measures  - continue home medication regimen. CAD  - continue aspirin, Plavix, statin, BB, Ranexa. Hypertension  - BP elevated. - continue Lopressor    Hyperlipidemia  - on Atorvastatin. BPH  - on Flomax. H/o CVA  - on aspirin, Plavix, statin. SSS  - s/p pacemaker    Chronic Diastolic CHF  - appears compensated  - continue BB. GERD  - on PPI    DJD of the LS spine.  - [place] for pain control. Cannot use NSAID due to kidney issues and age. - Add Zanaflex. Long discussion with the daughter about pain control. Also possibility of getting hospice involved. We will monitor for another 24 hours.       DVT Prophylaxis: Heparin  Diet: DIET GENERAL;  Code Status: DNR-CCA      PARAMESWARAN SHEREEN 7/18/2020 10:05 AM

## 2020-07-18 NOTE — FLOWSHEET NOTE
07/18/20 0202   Vital Signs   Temp 98.4 °F (36.9 °C)   Temp Source Axillary   Pulse 76   Heart Rate Source Monitor   Resp 22   /67   BP Location Left lower arm   Patient Position Semi fowlers   Level of Consciousness 0   MEWS Score 2   Patient Currently in Pain Yes   PRN pain medication given at this time per pt request and PRN order parameters, see eMAR. Pill given crushed in vanilla pudding per pt request, pt declined to finish the pudding or have anything to drink. Pt pillows repositioned and full reposition offered at this time. Pt declined. Pt states \"It hurts. Let me go back to sleep now. \" Call light within reach, pt denies further needs at this time. Bed alarm on. Will continue to monitor.   Jada Rowland RN

## 2020-07-18 NOTE — PLAN OF CARE
Problem: Falls - Risk of:  Goal: Will remain free from falls  Description: Will remain free from falls  7/17/2020 2006 by Burgess Chelsey RN  Outcome: Ongoing  7/17/2020 0813 by Mary Hairston RN  Outcome: Ongoing  Goal: Absence of physical injury  Description: Absence of physical injury  Outcome: Ongoing     Problem: Skin Integrity:  Goal: Will show no infection signs and symptoms  Description: Will show no infection signs and symptoms  7/17/2020 2006 by Burgess Chelsey RN  Outcome: Ongoing  7/17/2020 0813 by Mary Hairston RN  Outcome: Ongoing  Goal: Absence of new skin breakdown  Description: Absence of new skin breakdown  Outcome: Ongoing     Problem: Infection:  Goal: Will remain free from infection  Description: Will remain free from infection  Outcome: Ongoing     Problem: Safety:  Goal: Free from accidental physical injury  Description: Free from accidental physical injury  Outcome: Ongoing  Goal: Free from intentional harm  Description: Free from intentional harm  Outcome: Ongoing     Problem: Daily Care:  Goal: Daily care needs are met  Description: Daily care needs are met  Outcome: Ongoing     Problem: Pain:  Goal: Patient's pain/discomfort is manageable  Description: Patient's pain/discomfort is manageable  7/17/2020 2006 by Burgess Chelsey RN  Outcome: Ongoing  7/17/2020 0813 by Mary Hairston RN  Outcome: Ongoing  Goal: Pain level will decrease  Description: Pain level will decrease  Outcome: Ongoing  Goal: Control of acute pain  Description: Control of acute pain  Outcome: Ongoing  Goal: Control of chronic pain  Description: Control of chronic pain  Outcome: Ongoing     Problem: Skin Integrity:  Goal: Skin integrity will stabilize  Description: Skin integrity will stabilize  Outcome: Ongoing     Problem: Discharge Planning:  Goal: Patients continuum of care needs are met  Description: Patients continuum of care needs are met  Outcome: Ongoing

## 2020-07-19 NOTE — FLOWSHEET NOTE
07/18/20 2045   Vital Signs   Temp 98.8 °F (37.1 °C)   Temp Source Oral   Pulse 69   Heart Rate Source Monitor   Resp 18   /81   BP Location Left lower arm   Patient Position Semi fowlers   Level of Consciousness 0   MEWS Score 1   Oxygen Therapy   SpO2 93 %   O2 Device None (Room air)   Assessment complete, see flowsheets. Pt given PRN tylenol at this time for continued pain and appearing uncomfortable. All scheduled medications given except for ranexa as it cannot be crushed or chewed and pt takes pills crushed in pudding per his request at this time. Pt refusing help with turns at this time, does have call light within reach and bed alarm in place. Parra in place and secured, draining brown hazy urine. Pt respiration still in cheyne-canseco pattern tonight. Will continue to monitor.   Darlene Lambert RN

## 2020-07-19 NOTE — PLAN OF CARE
Problem: Falls - Risk of:  Goal: Will remain free from falls  Description: Will remain free from falls  7/18/2020 2009 by Hunter Carpenter RN  Outcome: Ongoing  7/18/2020 1054 by Hansel Candelario RN  Outcome: Ongoing  Goal: Absence of physical injury  Description: Absence of physical injury  7/18/2020 2009 by Hunter Carpenter RN  Outcome: Ongoing  7/18/2020 1054 by Hansel Candelario RN  Outcome: Ongoing     Problem: Skin Integrity:  Goal: Will show no infection signs and symptoms  Description: Will show no infection signs and symptoms  7/18/2020 2009 by Hunter Carpenter RN  Outcome: Ongoing  7/18/2020 1054 by Hansel Candelario RN  Outcome: Ongoing  Goal: Absence of new skin breakdown  Description: Absence of new skin breakdown  7/18/2020 2009 by Hunter Carpenter RN  Outcome: Ongoing  7/18/2020 1054 by Hansel Candelario RN  Outcome: Ongoing     Problem: Infection:  Goal: Will remain free from infection  Description: Will remain free from infection  7/18/2020 2009 by Hunter Carpenter RN  Outcome: Ongoing  7/18/2020 1054 by Hansel Candelario RN  Outcome: Ongoing     Problem: Safety:  Goal: Free from accidental physical injury  Description: Free from accidental physical injury  7/18/2020 2009 by Hunter Carpenter RN  Outcome: Ongoing  7/18/2020 1054 by Hansel Candelario RN  Outcome: Ongoing  Goal: Free from intentional harm  Description: Free from intentional harm  7/18/2020 2009 by Hunter Carpenter RN  Outcome: Ongoing  7/18/2020 1054 by Hansel Candelario RN  Outcome: Ongoing     Problem: Daily Care:  Goal: Daily care needs are met  Description: Daily care needs are met  7/18/2020 2009 by Hunter Carpenter RN  Outcome: Ongoing  7/18/2020 1054 by Hansel Candelario RN  Outcome: Ongoing     Problem: Pain:  Goal: Patient's pain/discomfort is manageable  Description: Patient's pain/discomfort is manageable  7/18/2020 2009 by Hunter Carpenter RN  Outcome: Ongoing  7/18/2020 1054 by Hansel Candelario RN  Outcome: Ongoing  Goal: Pain level will decrease  Description: Pain level will decrease  7/18/2020 2009 by Joselyn Dos Santos RN  Outcome: Ongoing  7/18/2020 1054 by Pedro Bolden RN  Outcome: Ongoing  Goal: Control of acute pain  Description: Control of acute pain  7/18/2020 2009 by Joselyn Dos Santos RN  Outcome: Ongoing  7/18/2020 1054 by Pedro Bolden RN  Outcome: Ongoing  Goal: Control of chronic pain  Description: Control of chronic pain  7/18/2020 2009 by Joselyn Dos Santos RN  Outcome: Ongoing  7/18/2020 1054 by Pedro Bolden RN  Outcome: Ongoing     Problem: Skin Integrity:  Goal: Skin integrity will stabilize  Description: Skin integrity will stabilize  7/18/2020 2009 by Joselyn Dos Santos RN  Outcome: Ongoing  7/18/2020 1054 by Pedro Bolden RN  Outcome: Ongoing     Problem: Discharge Planning:  Goal: Patients continuum of care needs are met  Description: Patients continuum of care needs are met  7/18/2020 2009 by Josleyn Dos Santos RN  Outcome: Ongoing  7/18/2020 1054 by Pedro Bolden RN  Outcome: Ongoing

## 2020-07-19 NOTE — PROGRESS NOTES
Patient's daughter Lisandro Burgess is here seeing patient and is requesting that patient be placed in hospice care. She was requesting to speak with you regarding this. Thank you.      Above message sent to the MD.

## 2020-07-19 NOTE — PROGRESS NOTES
07/19/20 0715   Vital Signs   Temp 96.8 °F (36 °C)   Temp Source Axillary   Pulse 68   Heart Rate Source Monitor   Resp 18   BP (!) 155/77   BP Location Left upper arm   BP Upper/Lower Upper   Patient Position Semi fowlers   Level of Consciousness 0   MEWS Score 1   Patient Currently in Pain Yes   Pain Assessment   Pain Assessment 0-10   Pain Level 8   PAINAD (Pain Assessment in Advance Dementia)   Breathing 1   Negative Vocalization 2   Facial Expression 1   Body Language 2   Consolability 2   PAINAD Score 8   Oxygen Therapy   SpO2 96 %   O2 Device None (Room air)     Assessment completed. Patient's daughter Anjelica Leo is at bedside with patient and is assisting patient with his breakfast. Patient shows not signs or symptoms of distress, however is showing signs of pain. Pain rating on dementia scale is 8/10. Telesitter is place for patient safety due to patient pulling at Parra catheter. Bed alarm on.

## 2020-07-19 NOTE — PROGRESS NOTES
At bedside with patient while MD discussed hospice care with patient's daughter Gian Blood in person and patient's son Xiao Helm over the phone.

## 2020-07-19 NOTE — PROGRESS NOTES
Patient's daughter called out to report that patient was getting very restless and appeared to be in pain. Patient scored a 7 on the PAINAD scale. Patient was uncooperative with swallowing his pain medication at this time. Medication was crushed in applesauce and this was wasted with another RN.

## 2020-07-19 NOTE — PROGRESS NOTES
Progress Note    Admit Date:  7/14/2020    80year old male from [address] [place] [address] presented with c/o fever and weakness. He was noted to be lethargic and had a fever of 102. Admitted to med-surg for COVID rule out and RONAK. Subjective:  Mr. Eddie Valdez has dementia and is confused. He cannot give me much history. He is afebrile. 7/16-patient's COVID-19 is negative. He is afebrile. Renal function improved to 1.8. Post void residual was 439 cc. Parra catheter ordered but difficulty placing. Urology consult ordered. Patient complains of some back pain. 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 Renal ultrasound done to rule out hydronephrosis and this came back negative. Initial plan was to discharge back to nursing home but since he still had back pain decided to keep another day. I talked to his nursing home physician 3 times yesterday. Discussed with his daughter. 7/17- moaning with back pain. Urinary retention-Urology consulted    7/18- having some leg spasms. Poor po intake. Low urine output. 7/19- still having pain issues. Family has decided to do hospice. Objective:   BP (!) 155/77   Pulse 68   Temp 96.8 °F (36 °C) (Axillary)   Resp 18   Ht 6' (1.829 m)   Wt 185 lb 4.8 oz (84.1 kg)   SpO2 96%   BMI 25.13 kg/m²            Intake/Output Summary (Last 24 hours) at 7/19/2020 1327  Last data filed at 7/19/2020 1318  Gross per 24 hour   Intake 2032 ml   Output 750 ml   Net 1282 ml       Physical Exam:    General appearance: Pleasantly demented but alert to self and place  HEENT:  Normal cephalic, atraumatic without obvious deformity. Pupils equal, round, and reactive to light. Extra ocular muscles intact. Conjunctivae/corneas clear. Neck: Supple, with full range of motion. No jugular venous distention. Trachea midline. Respiratory:  Normal respiratory effort.  Clear to auscultation, bilaterally without Rales/Wheezes/Rhonchi. Cardiovascular:  Regular rate and rhythm with normal S1/S2 without murmurs, rubs or gallops. Abdomen: Soft, non-tender, non-distended with normal bowel sounds. Musculoskeletal:  No clubbing, cyanosis or edema bilaterally. Full range of motion without deformity. Skin: Skin color, texture, turgor normal.  No rashes or lesions. Neurologic: Apart from his dementia he is neurologically intact and follows commands  Psychiatric: Mild dementia  Capillary Refill: Brisk,< 3 seconds   Peripheral Pulses: +2 palpable, equal bilaterally     Data:  CBC:   No results for input(s): WBC, HGB, HCT, MCV, PLT in the last 72 hours. BMP:   Recent Labs     07/17/20  0826 07/18/20  0420 07/19/20  0637    146* 144   K 3.9 4.0 3.6    111* 111*   CO2 19* 17* 19*   BUN 43* 46* 43*   CREATININE 1.7* 1.9* 1.8*     LIVER PROFILE:   No results for input(s): AST, ALT, LIPASE, BILIDIR, BILITOT, ALKPHOS in the last 72 hours. Invalid input(s): AMYLASE,  ALB  PT/INR: No results for input(s): PROTIME, INR in the last 72 hours. CULTURES  Blood: pending  COVID: pending    RADIOLOGY  US RENAL COMPLETE   Final Result   1. Limited visualization of the left kidney. 2. Otherwise, unremarkable renal and urinary bladder ultrasound. No   hydronephrosis. XR LUMBAR SPINE (2-3 VIEWS)   Final Result   Lumbar-      No acute fracture subluxation of the lumbar spine. Multilevel spondylosis of the lumbar spine. Pelvis/bilateral hips-      No acute fracture or dislocation of the pelvis or hips. Mild osteoarthritis of both hips. XR HIP BILATERAL W AP PELVIS (2 VIEWS)   Final Result   Lumbar-      No acute fracture subluxation of the lumbar spine. Multilevel spondylosis of the lumbar spine. Pelvis/bilateral hips-      No acute fracture or dislocation of the pelvis or hips. Mild osteoarthritis of both hips. XR CHEST PORTABLE   Final Result   Stable portable study. Assessment/Plan:  Febrile illness  COVID ruled out  - fever up to 102 at time of admission without clear etiology. Possible bronchitis. He also has urinary retention but no evidence of UTI. - admitted to med-surg.   - CXR negative. UA with trace leukocytes  - blood cx coagulase-negative staph-contamination  - receiving IVFs. He received Cefepime, Vanc. On Rocephin changed to oral Omnicef at discharge  - Pulmonary consulted. Leukocytosis resolved  - possibly related to above  - improved on repeat. - procalcitonin is 0.68    RONAK versus chronic kidney disease.  -May have a competent of obstructive uropathy. Urology consultation obtained. - creatinine on admission 2.1  - baseline ~1.3 but creatinine has fluctuated a lot. - resume IVF. Maintain I and O. Thrombocytopenia  - hold Heparin today. Use SCD's  - monitor platelet level. Dementia  - supportive care measures  - continue home medication regimen. CAD  - continue aspirin, Plavix, statin, BB, Ranexa. Hypertension  - BP elevated. - continue Lopressor    Hyperlipidemia  - on Atorvastatin. BPH  - on Flomax. H/o CVA  - on aspirin, Plavix, statin. SSS  - s/p pacemaker    Chronic Diastolic CHF  - appears compensated  - continue BB. GERD  - on PPI    DJD of the LS spine.  -  Cannot use NSAID due to kidney issues and age. - Add Zanaflex. I had a family meeting with the daughter and the son. The son was on the phone. The family has decided to get the patient enrolled with hospice. The qualifying diagnosis of the vascular dementia. I will stop his IV fluids. I will start him on OxyContin and oxycodone. Both the daughter and the son are interested in quality of life for their father and would like his pain controlled. We will try to get his pain under better control today and hopefully discharge him back to the nursing home tomorrow.       DVT Prophylaxis: Heparin  Diet: DIET GENERAL;  Code Status: DNR-CCA      GABRIEL REGAN 7/19/2020 1:27 PM

## 2020-07-19 NOTE — PROGRESS NOTES
Patient's family Suhail Castellanos is requesting a patient advocate due to feeling that patient's pain is not be addressed and that their requests yesterday for hospice care are being ignored.      Above message sent to the MD.

## 2020-07-19 NOTE — CARE COORDINATION
Rec'd call from pt nurse, Caroline Odonnell, stating that pt dtr is requesting a consult to [facility]. CM met with pt dtr, Nathalia, at bedside who states that she would like referral to [facility] for informational meeting. Referral called and faxed at this time to [facility] with [facility]. [facility] states that they will call pt dtr, Yamile Osborne, to arrange meeting time. [phone]: Rec'd call from Randy Valencia with [facility] stating that she has been in contact with pt dtr, Yamile Osborne, and they are meeting to discuss hospice vs palliative services at 2 pm today.

## 2020-07-20 NOTE — PROGRESS NOTES
[facility]:        Pt being discharged from hospital to [facility] with hospice services. Please leave blanton cath in place. PTS to  at [phone]. Spoke with Sharon Camejo, and Madelyn HOWE re: plan. Hospital RN to provide routine discharge instructions. Thank you for the opportunity to care for this patient and family. Charis Opitz RN   [phone].

## 2020-07-20 NOTE — CARE COORDINATION
INTERDISCIPLINARY PLAN OF CARE CONFERENCE    Date/Time: 7/20/2020 12:08 PM  Completed by: Leigh Cuevas, Case Management      Patient Name:  Mora Tabor  YOB: 1927  Admitting Diagnosis: Fever in adult [R50.9]  Fever in adult [R50.9]     Admit Date/Time:  7/14/2020  5:24 PM    Chart reviewed. Interdisciplinary team met to discuss patient progress and discharge plans. Disciplines included Case Management, Nursing, and Dietitian. Current Status: IP 07/14/2020    PT/OT recommendation:    Anticipated Discharge Date: TBD  Expected D/C Disposition:  Nursing Home   Confirmed plan with patient's daughter at bedside  Discharge Plan Comments: Chart reviewed. Met with pt's daughter at bedside and explained the role of the CM. [facility] to meet with the patient today and Sierra Simon RN [facility] is enroute and will be here by noon. + CM following    The Plan for Transition of Care is related to the following treatment goals: Hospice family Meeting today. +CM following.

## 2022-07-29 NOTE — PLAN OF CARE
Problem: Falls - Risk of:  Goal: Will remain free from falls  Will remain free from falls   Outcome: Ongoing  Fall precautions in place, bed alarm on, nonskid foot wear applied, bed in lowest position, and call light within reach. Patient uses call light appropriately. Will continue to monitor. unknown

## 2023-05-18 NOTE — MR AVS SNAPSHOT
Buzz Vick Elmore 60 y.o.  [facility]:Follow-up, Hypertension, Hyperlipidemia, Vitamin D Deficiency, and Diabetes      [place]: Follow-up, Hypertension, Hyperlipidemia, Vitamin D Deficiency, and Diabetes    Blood sugar and 90 day average sugar reviewed  Results for orders placed or performed in visit on 05/18/23   POC Glycosylated Hemoglobin (Hb A1C)    Specimen: Blood   Result Value Ref Range    Hemoglobin A1C 6.0 %    Lot Number [id number]     Expiration Date 01/24/2025    POC Glucose, Blood    Specimen: Blood   Result Value Ref Range    Glucose 104 70 - 130 mg/dL    Lot Number [id number]     Expiration Date 11/18/2023    a1c improved on mounjaro from 6.8 to 6.0%  He has lost some weight   Knee doing well   bp is good  Diabetes currently well controlled- is losing weight with mounjaro   Would like to titrate dosing  Also Dr Kitchen recommended chol lowering - update levels and adjust     Allergies   Allergen Reactions   • Atorvastatin Myalgia       Current Outpatient Medications:   •  fluticasone (FLONASE) 50 MCG/ACT nasal spray, 2 sprays by Each Nare route Daily., Disp: 48 mL, Rfl: 1  •  aspirin 81 MG tablet, Take 1 tablet by mouth Daily., Disp: , Rfl:   •  carvedilol (COREG) 12.5 MG tablet, TAKE ONE TABLET BY MOUTH TWICE A DAY WITH MEALS (Patient taking differently: Take 1 tablet by mouth 2 (Two) Times a Day With Meals.), Disp: 60 tablet, Rfl: 11  •  Cholecalciferol (VITAMIN D) 2000 UNITS capsule, Take 1 capsule by mouth Daily., Disp: , Rfl:   •  meloxicam (MOBIC) 15 MG tablet, TAKE ONE TABLET BY MOUTH DAILY, Disp: 30 tablet, Rfl: 2  •  pantoprazole (PROTONIX) 40 MG EC tablet, TAKE ONE TABLET BY MOUTH DAILY (Patient taking differently: Take 1 tablet by mouth Daily.), Disp: 90 tablet, Rfl: 1  •  prasugrel (EFFIENT) 10 MG tablet, Take 1 tablet by mouth Daily., Disp: 90 tablet, Rfl: 3  •  ropivacaine (NAROPIN) 0.2 % infusion (INFUSYSTEM), 2 mg/hr by Peripheral Nerve route Continuous., Disp: , Rfl:   •  rosuvastatin (CRESTOR)  40 MG tablet, TAKE ONE TABLET BY MOUTH DAILY (Patient taking differently: Take 1 tablet by mouth Every Night.), Disp: 90 tablet, Rfl: 3  •  tamsulosin (FLOMAX) 0.4 MG capsule 24 hr capsule, Take 1 capsule by mouth Every Night., Disp: 30 capsule, Rfl: 0  •  telmisartan-hydrochlorothiazide (MICARDIS HCT) 80-25 MG per tablet, Take 1 tablet by mouth Daily., Disp: 90 tablet, Rfl: 3  •  Tirzepatide (Mounjaro) 5 MG/0.5ML solution pen-injector, Inject 0.5 mL under the skin into the appropriate area as directed 1 (One) Time Per Week., Disp: 2 mL, Rfl: 3  Patient Active Problem List    Diagnosis    • S/P total knee replacement, right [Z96.651]    • Degenerative arthritis of right knee [M17.11]    • Colon polyps [K63.5]    • Abdominal wall cellulitis [L03.311]    • Left upper quadrant abdominal mass [R19.02]    • IHD (ischemic heart disease) [I25.9]    • Obesity [E66.9]    • GERD (gastroesophageal reflux disease) [K21.9]    • Chronic low back pain [M54.50, G89.29]    • Coagulation disorder [D68.9]    • Benign prostatic hyperplasia with urinary obstruction [N40.1, N13.8]    • Chest pain [R07.9]    • Mechanical complication due to tissue graft [T85.698A]    • Atherosclerosis of coronary artery [I25.10]    • Type 2 diabetes mellitus without complication, without long-term current use of insulin [E11.9]    • Gastroesophageal reflux disease [K21.9]    • Hyperlipidemia [E78.5]    • Hypertension [I10]    • Knee pain [M25.569]    • Lipoma [D17.9]    • Low back pain [M54.50]    • Calculus of kidney [N20.0]    • Muscle pain [M79.10]    • Vitamin D deficiency [E55.9]    • Acute serous otitis media [H65.00]    • 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 Negative for appetite change and unexpected weight change.   HENT: Negative for congestion and rhinorrhea.    Eyes:  "Negative for visual disturbance.   Respiratory: Negative for cough and shortness of breath.    Cardiovascular: Negative for palpitations and leg swelling.   Gastrointestinal: Negative for constipation, diarrhea and nausea.   Genitourinary: Negative for hematuria.   Musculoskeletal: Positive for arthralgias. Negative for back pain, gait problem, joint swelling and myalgias.   Skin: Negative for color change, rash and wound.   Allergic/Immunologic: Negative for environmental allergies, food allergies and immunocompromised state.   Neurological: Negative for dizziness, weakness and light-headedness.   Psychiatric/Behavioral: Negative for confusion, decreased concentration, dysphoric mood and sleep disturbance. The patient is not nervous/anxious.      Social History     Socioeconomic History   • Marital status:    Tobacco Use   • Smoking status: Never   • Smokeless tobacco: Never   Vaping Use   • Vaping Use: Never used   Substance and Sexual Activity   • Alcohol use: Not Currently   • Drug use: No   • Sexual activity: Yes     Partners: Female     Birth control/protection: Natural family planning/Rhythm     Family History   Problem Relation Age of Onset   • Breast cancer Other    • Colon cancer Other    • Heart disease Other    • Breast cancer Mother    • Heart disease Mother    • Hypertension Mother    • Heart attack Mother    • Cancer Mother    • Colon cancer Father    • Hypertension Father    • Heart attack Father    • Diabetes Father    • Heart disease Paternal Grandfather    • Heart disease Brother      /78   Pulse 64   Ht 185.4 cm (73\")   Wt 115 kg (253 lb 9.6 oz)   SpO2 100%   BMI 33.46 kg/m²   Physical Exam  Vitals and nursing note reviewed.   Constitutional:       Appearance: Normal appearance. He is well-developed.   HENT:      Head: Normocephalic and atraumatic.   Eyes:      General: Lids are normal.      Extraocular Movements: Extraocular movements intact.      Conjunctiva/sclera: " Additional Information  If you have questions, please contact the physician practice where you receive care. Remember, [employer] [facility] is NOT to be used for urgent needs. For medical emergencies, dial 911. For questions regarding your [employer] account call [phone]. If you have a clinical question, please call your doctor's office. Conjunctivae normal.      Pupils: Pupils are equal, round, and reactive to light.   Neck:      Thyroid: No thyroid mass or thyromegaly.      Vascular: No carotid bruit.      Trachea: Trachea normal. No tracheal deviation.   Cardiovascular:      Rate and Rhythm: Normal rate and regular rhythm.      Pulses: Normal pulses.      Heart sounds: Normal heart sounds. No murmur heard.    No friction rub. No gallop.   Pulmonary:      Effort: Pulmonary effort is normal. No respiratory distress.      Breath sounds: Normal breath sounds. No wheezing.   Musculoskeletal:         General: No deformity. Normal range of motion.      Cervical back: Normal range of motion and neck supple.   Lymphadenopathy:      Cervical: No cervical adenopathy.   Skin:     General: Skin is warm and dry.      Findings: No erythema or rash.      Nails: There is no clubbing.   Neurological:      General: No focal deficit present.      Mental Status: He is alert and oriented to person, place, and time.      Cranial Nerves: No cranial nerve deficit.      Deep Tendon Reflexes: Reflexes are normal and symmetric. Reflexes normal.   Psychiatric:         Mood and Affect: Mood normal.         Speech: Speech normal.         Behavior: Behavior normal.         Thought Content: Thought content normal.         Judgment: Judgment normal.       Results for orders placed or performed in visit on 05/18/23   POC Glycosylated Hemoglobin (Hb A1C)    Specimen: Blood   Result Value Ref Range    Hemoglobin A1C 6.0 %    Lot Number [id number]     Expiration Date 01/24/2025    POC Glucose, Blood    Specimen: Blood   Result Value Ref Range    Glucose 104 70 - 130 mg/dL    Lot Number [id number]     Expiration Date 11/18/2023      Diagnoses and all orders for this visit:    1. Type 2 diabetes mellitus without complication, without long-term current use of insulin (Primary)  Assessment & Plan:  Peak a1c 6.8%  Titrate mounjaro   High risk from cv standpoint  Agree with titration and  work on weight loss     Orders:  -     POC Glycosylated Hemoglobin (Hb A1C)  -     POC Glucose, Blood  -     Comprehensive Metabolic Panel; Future  -     Microalbumin / Creatinine Urine Ratio - Urine, Clean Catch; Future  -     Comprehensive Metabolic Panel  -     Microalbumin / Creatinine Urine Ratio - Urine, Clean Catch    2. Mixed hyperlipidemia  Assessment & Plan:  Check flp   Taking crestor 40 mg daily   May need injectable vs addit zetia    Orders:  -     Lipid Panel; Future  -     TSH; Future  -     T4, Free; Future  -     Lipid Panel  -     TSH  -     T4, Free    3. Primary hypertension  Assessment & Plan:  bp is controlled with current medications, monitoring      4. Vitamin D deficiency  Assessment & Plan:  Continue supplement, update levels     Orders:  -     Vitamin D,25-Hydroxy; Future  -     Vitamin D,25-Hydroxy    Other orders  -     fluticasone (FLONASE) 50 MCG/ACT nasal spray; 2 sprays by Each Nare route Daily.  Dispense: 48 mL; Refill: 1  -     Tirzepatide (Mounjaro) 5 MG/0.5ML solution pen-injector; Inject 0.5 mL under the skin into the appropriate area as directed 1 (One) Time Per Week.  Dispense: 2 mL; Refill: 3  Return in about 3 months (around 8/18/2023) for Recheck.    Maida Rey MD  Signed Maida Villegas

## 2024-01-15 NOTE — H&P
Take dicyclomine with each meal.   (36.6 °C) Oral 72 18 96 % - -   10/14/18 1830 (!) 168/79 97.5 °F (36.4 °C) Oral 68 18 97 % - -     Psych: Stable mood, normal judgement, normal affect   Const: No distress  Eyes: Conjunctiva noninjected, no icterus noted; pupils equal, round, and reactive to light. HENT: Atraumatic, normocephalic; Oral mucosa moist  Neck: Trachea midline, neck supple. No thyromegaly noted. CV: Regular rate and rhythm, no murmur rub or gallop noted  Resp: Lungs clear to auscultation bilaterally, no rales wheezes or ronchi, no retractions. Respirations unlabored. GI: Soft, nontender, nondistended. Normal bowel sounds. No palpable masses. Neuro: Alert, oriented, appropriate. No cranial nerve deficits appreciated. Sensation intact to light touch. Motor examination reveals normal strength in all four limbs diffusely. Skin: Normal temperature and turgor  MSK: No joint abnormalities noted. Ext: No significant edema appreciated. No varicosities. Lab Results   Component Value Date    WBC 9.1 10/15/2018    HGB 14.9 10/15/2018    HCT 44.7 10/15/2018    MCV 93.3 10/15/2018     (L) 10/15/2018     Lab Results   Component Value Date    INR 1.17 (H) 10/12/2018    INR 1.16 (H) 09/17/2018    INR 1.10 09/04/2018    PROTIME 13.3 (H) 10/12/2018    PROTIME 13.2 (H) 09/17/2018    PROTIME 12.5 09/04/2018     Lab Results   Component Value Date    CREATININE 1.3 10/15/2018    BUN 28 (H) 10/15/2018     10/15/2018    K 4.9 10/15/2018     10/15/2018    CO2 29 10/15/2018     Lab Results   Component Value Date    ALT 20 09/17/2018    AST 20 09/17/2018    ALKPHOS 79 09/17/2018    BILITOT 0.3 09/17/2018     Date: 08/11/2018 Start: 10:29 AM    Study Location: [address] [place] [address] Lab  Technical Quality: Limited visualization due to body habitus. Indications:Syncope.     Height: 72 inches Weight: 178 pounds BSA: 2.03 m2 BMI: 24.14 kg/m2    BP: 148/78 mmHg     Conclusions      Summary   Left ventricle systolic function is

## (undated) DEVICE — FORCEPS ENDOSCP BX L230CM DIA28MM ALGTR CUP SPEC RETRV GI

## (undated) DEVICE — CONMED SCOPE SAVER BITE BLOCK, 20X27 MM: Brand: SCOPE SAVER

## (undated) DEVICE — ENDO CARRY-ON PROCEDURE KIT INCLUDES SUCTION TUBING, LUBRICANT, GAUZE, BIOHAZARD STICKER, TRANSPORT PAD AND INTERCEPT BEDSIDE KIT.: Brand: ENDO CARRY-ON PROCEDURE KIT